# Patient Record
Sex: MALE | Race: WHITE | NOT HISPANIC OR LATINO | Employment: OTHER | URBAN - METROPOLITAN AREA
[De-identification: names, ages, dates, MRNs, and addresses within clinical notes are randomized per-mention and may not be internally consistent; named-entity substitution may affect disease eponyms.]

---

## 2021-01-14 ENCOUNTER — OFFICE VISIT (OUTPATIENT)
Dept: FAMILY MEDICINE | Facility: CLINIC | Age: 61
End: 2021-01-14
Payer: COMMERCIAL

## 2021-01-14 VITALS
OXYGEN SATURATION: 97 % | HEART RATE: 67 BPM | WEIGHT: 258 LBS | TEMPERATURE: 98.5 F | HEIGHT: 70 IN | DIASTOLIC BLOOD PRESSURE: 82 MMHG | RESPIRATION RATE: 18 BRPM | BODY MASS INDEX: 36.94 KG/M2 | SYSTOLIC BLOOD PRESSURE: 140 MMHG

## 2021-01-14 DIAGNOSIS — R10.31 RIGHT GROIN PAIN: Primary | ICD-10-CM

## 2021-01-14 DIAGNOSIS — M53.3 SACRAL PAIN: ICD-10-CM

## 2021-01-14 PROCEDURE — 99204 OFFICE O/P NEW MOD 45 MIN: CPT | Performed by: NURSE PRACTITIONER

## 2021-01-14 RX ORDER — CHLORAL HYDRATE 500 MG
2 CAPSULE ORAL DAILY
COMMUNITY
End: 2021-04-26

## 2021-01-14 ASSESSMENT — MIFFLIN-ST. JEOR: SCORE: 1976.53

## 2021-01-14 NOTE — PROGRESS NOTES
"  Assessment & Plan     Right groin pain  No definite hernia on exam today, but history is suspicious for this. Will obtain ultrasound.  - US Extremity Non Vascular Right; Future    Sacral pain  Ongoing for over a year. Xray negative. Will obtain MRI.  - MR Sacrum and Coccyx w/o Contrast; Future                       BMI:   Estimated body mass index is 37.02 kg/m  as calculated from the following:    Height as of this encounter: 1.778 m (5' 10\").    Weight as of this encounter: 117 kg (258 lb).         Patient Instructions   Schedule ultrasound - [phone]  I will let you know what it shows.      Return in about 1 week (around 1/21/2021) for worsening or continued symptoms.    TOREY Stewart CNP  [facility] is a 60 year old who presents to clinic today for the following health issues     HPI       Musculoskeletal problem/pain  Onset/Duration: x 4 months  Description  Location: leg - right - also tailbone pain from fall over a year ago  Joint Swelling: no  Redness: no  Pain: YES - more pain when getting up after sitting for awhile  Warmth: no  Intensity:  moderate, severe  Progression of Symptoms:  worsening and constant  Accompanying signs and symptoms:   Fevers: no  Numbness/tingling/weakness: no  History  Trauma to the area: no  Recent illness:  no  Previous similar problem: YES  Previous evaluation:  no  Precipitating or alleviating factors:  Aggravating factors include: sitting, climbing stairs and going down stairs  Therapies tried and outcome: nothing    Above HPI reviewed. Additionally,  had an xray at [facility] in August and was initially told he had a fracture of his coccyx but then later called and told the radiology read was negative. Reports the initial injury was about a year ago, he fell off a ladder into a seated position. He has had pain to the tail bone since the injury that has not improved.    He also reports right groin pain over the " "past 2 years that has worsened over the past few months. He notes he has an occasional ache to his right testicle as well. No constipation, melena or hematochezia. No increase in urinary frequency, dysuria, hematuria. Denies ever noticing a bulge to his groin. Pain is worst when up and walking, relieves with lying.        Review of Systems   Constitutional, HEENT, cardiovascular, pulmonary, gi and gu systems are negative, except as otherwise noted.      Objective    BP (!) 140/82 (BP Location: Right arm, Patient Position: Sitting, Cuff Size: Adult Large)   Pulse 67   Temp 98.5  F (36.9  C) (Tympanic)   Resp 18   Ht 1.778 m (5' 10\")   Wt 117 kg (258 lb)   SpO2 97%   BMI 37.02 kg/m    Body mass index is 37.02 kg/m .  Physical Exam  Vitals signs and nursing note reviewed.   Constitutional:       Appearance: Normal appearance.   HENT:      Head: Normocephalic and atraumatic.      Mouth/Throat:      Mouth: Mucous membranes are moist.   Neck:      Musculoskeletal: Neck supple.   Cardiovascular:      Rate and Rhythm: Normal rate.   Pulmonary:      Effort: Pulmonary effort is normal.   Abdominal:      General: Abdomen is protuberant.      Palpations: Abdomen is soft.      Tenderness: There is no abdominal tenderness.   Genitourinary:     Testes:         Right: Mass, tenderness or swelling not present.         Left: Mass, tenderness or swelling not present.      Epididymis:      Right: Normal.      Left: Normal.      Comments: Tender over right inguinal canal, however I do not appreciate a definite hernia.  Musculoskeletal:      Comments: TTP generalized over sacrum   Skin:     General: Skin is warm and dry.   Neurological:      General: No focal deficit present.      Mental Status: He is alert.   Psychiatric:         Mood and Affect: Mood normal.         Behavior: Behavior normal.                          "

## 2021-02-04 ENCOUNTER — HOSPITAL ENCOUNTER (OUTPATIENT)
Dept: MRI IMAGING | Facility: CLINIC | Age: 61
End: 2021-02-04
Attending: NURSE PRACTITIONER
Payer: COMMERCIAL

## 2021-02-04 ENCOUNTER — HOSPITAL ENCOUNTER (OUTPATIENT)
Dept: ULTRASOUND IMAGING | Facility: CLINIC | Age: 61
End: 2021-02-04
Attending: NURSE PRACTITIONER
Payer: COMMERCIAL

## 2021-02-04 DIAGNOSIS — R10.31 RIGHT GROIN PAIN: ICD-10-CM

## 2021-02-04 DIAGNOSIS — M53.3 SACRAL PAIN: ICD-10-CM

## 2021-02-04 PROCEDURE — 72195 MRI PELVIS W/O DYE: CPT | Mod: 26 | Performed by: RADIOLOGY

## 2021-02-04 PROCEDURE — 76705 ECHO EXAM OF ABDOMEN: CPT

## 2021-02-04 PROCEDURE — 72195 MRI PELVIS W/O DYE: CPT

## 2021-02-08 ENCOUNTER — TELEPHONE (OUTPATIENT)
Dept: FAMILY MEDICINE | Facility: CLINIC | Age: 61
End: 2021-02-08

## 2021-02-08 NOTE — TELEPHONE ENCOUNTER
Reason for call:  Results    Symptom or request: Patient would like Iliana Reid to address his hip and groin area that showed on MRI and US today.    Duration (how long have symptoms been present): Ongoing    Have you been treated for this before? Yes    Additional comments: Patient not happy only his tail bone was addressed, he has pain in groin and hip.Patient wants to know findings other than his tail bone.    Phone Number patient can be reached at:  Cell number on file:    Telephone Information:   Mobile [phone]       Best Time:  Any    Can we leave a detailed message on this number:  YES    Call taken on 2/8/2021 at 5:36 PM by Xiao Stanley

## 2021-02-09 NOTE — TELEPHONE ENCOUNTER
"Pt called back; wants to \"get to the bottom of this\" and wants an appt. Transferred to scheduling to arrange an appt; recommend he schedule a long visit as he wants a complete Physical.     Marlee HERNANDEZ RN, BSN      "

## 2021-02-09 NOTE — TELEPHONE ENCOUNTER
When I saw him in the office, we discussed pain to his groin which we evaluated with ultrasound to rule out hernia. This ultrasound was negative with the exception of a benign appearing lymph node. If he has ongoing groin pain, occasionally a hernia can be missed on ultrasound. We could obtain a CT of the pelvis if that pain has not improved since I saw him on January 14. At the end of the visit, he added that he had pain to the tail bone ongoing for over a year which is why we added the sacral MRI, I did make recommendations regarding this. We did not discuss hip pain which is why it was not addressed. If he has concerns about his hip, I need to reexamine him as I did not examine his hip and would recommend and office visit to do so.

## 2021-02-11 ENCOUNTER — OFFICE VISIT (OUTPATIENT)
Dept: FAMILY MEDICINE | Facility: CLINIC | Age: 61
End: 2021-02-11
Payer: COMMERCIAL

## 2021-02-11 VITALS
TEMPERATURE: 98.3 F | BODY MASS INDEX: 36.26 KG/M2 | HEART RATE: 76 BPM | SYSTOLIC BLOOD PRESSURE: 144 MMHG | WEIGHT: 259 LBS | HEIGHT: 71 IN | RESPIRATION RATE: 16 BRPM | OXYGEN SATURATION: 99 % | DIASTOLIC BLOOD PRESSURE: 92 MMHG

## 2021-02-11 DIAGNOSIS — R07.89 CHEST DISCOMFORT: ICD-10-CM

## 2021-02-11 DIAGNOSIS — R03.0 ELEVATED BLOOD PRESSURE READING WITHOUT DIAGNOSIS OF HYPERTENSION: ICD-10-CM

## 2021-02-11 DIAGNOSIS — R01.1 SYSTOLIC MURMUR: ICD-10-CM

## 2021-02-11 DIAGNOSIS — Z00.00 GENERAL MEDICAL EXAM: Primary | ICD-10-CM

## 2021-02-11 DIAGNOSIS — R94.31 ABNORMAL ELECTROCARDIOGRAM: ICD-10-CM

## 2021-02-11 DIAGNOSIS — M54.16 LUMBAR RADICULOPATHY: ICD-10-CM

## 2021-02-11 DIAGNOSIS — Z00.00 ENCOUNTER FOR PREVENTIVE HEALTH EXAMINATION: Primary | ICD-10-CM

## 2021-02-11 PROCEDURE — 99396 PREV VISIT EST AGE 40-64: CPT | Performed by: NURSE PRACTITIONER

## 2021-02-11 PROCEDURE — 99214 OFFICE O/P EST MOD 30 MIN: CPT | Mod: 25 | Performed by: NURSE PRACTITIONER

## 2021-02-11 PROCEDURE — 93000 ELECTROCARDIOGRAM COMPLETE: CPT | Performed by: NURSE PRACTITIONER

## 2021-02-11 RX ORDER — TAMSULOSIN HYDROCHLORIDE 0.4 MG/1
0.4 CAPSULE ORAL
COMMUNITY
Start: 2020-12-08 | End: 2022-07-22

## 2021-02-11 RX ORDER — CYCLOBENZAPRINE HCL 10 MG
10 TABLET ORAL 3 TIMES DAILY PRN
Qty: 60 TABLET | Refills: 1 | Status: ON HOLD | OUTPATIENT
Start: 2021-02-11 | End: 2021-05-28

## 2021-02-11 ASSESSMENT — MIFFLIN-ST. JEOR: SCORE: 1996.95

## 2021-02-11 NOTE — PATIENT INSTRUCTIONS
Lifestyle changes that can lower blood pressure include:  Limiting sodium in the diet.  Goal of <2.3 grams (2300 mg) daily.  Avoiding salty and prepared foods and not adding salt at the table can help.   Regular moderate exercise at least 150 minutes per week (30 minutes per day 5 days per week) plus muscle strengthening exercise at least 2 days per week.   Weight loss can help even if not dramatic or down to normal BMI range.  DASH type diet can actually lower blood pressure. You can find multiple resources for this on the internet.  Cutting back on alcohol can help for women drinking more than a drink per day and men more than 2 drinks per day on average.   Quitting smoking can help reduce your risk of heart attack or stroke.    A good resource for lifestyle changes is https://www.cardiosmart.org/topics/healthy-living    Schedule MRI.  Please contact the cardiac testing department at [phone] to schedule.          Preventive Health Recommendations  Male Ages 50 - 64    Yearly exam:             See your health care provider every year in order to  o   Review health changes.   o   Discuss preventive care.    o   Review your medicines if your doctor has prescribed any.     Have a cholesterol test every 5 years, or more frequently if you are at risk for high cholesterol/heart disease.     Have a diabetes test (fasting glucose) every three years. If you are at risk for diabetes, you should have this test more often.     Have a colonoscopy at age 50, or have a yearly FIT test (stool test). These exams will check for colon cancer.      Talk with your health care provider about whether or not a prostate cancer screening test (PSA) is right for you.    You should be tested each year for STDs (sexually transmitted diseases), if you re at risk.     Shots: Get a flu shot each year. Get a tetanus shot every 10 years.     Nutrition:    Eat at least 5 servings of fruits and vegetables daily.     Eat whole-grain bread,  whole-wheat pasta and brown rice instead of white grains and rice.     Get adequate Calcium and Vitamin D.     Lifestyle    Exercise for at least 150 minutes a week (30 minutes a day, 5 days a week). This will help you control your weight and prevent disease.     Limit alcohol to one drink per day.     No smoking.     Wear sunscreen to prevent skin cancer.     See your dentist every six months for an exam and cleaning.     See your eye doctor every 1 to 2 years.

## 2021-02-11 NOTE — PROGRESS NOTES
"  3  SUBJECTIVE:   CC: Michael Jimenez is an 60 year old male who presents for preventive health visit.       Patient has been advised of split billing requirements and indicates understanding: Yes  Healthy Habits:    Do you get at least three servings of calcium containing foods daily (dairy, green leafy vegetables, etc.)? yes    Amount of exercise or daily activities, outside of work: 0 day(s) per week    Problems taking medications regularly No    Medication side effects: No    Have you had an eye exam in the past two years? yes    Do you see a dentist twice per year? yes    Do you have sleep apnea, excessive snoring or daytime drowsiness?no      Joint Pain    Onset: 2 year groin, 1 year hip    Description:   Location: right hip  Character: Sharp and Dull ache    Intensity: moderate, severe    Progression of Symptoms: worse    Accompanying Signs & Symptoms:  Other symptoms: radiation of pain to groin, buttocks, and tailbone    History:   Previous similar pain: no       Precipitating factors:   Trauma or overuse: YES- fell on tailbone 1 year ago    Alleviating factors:  Improved by: nothing    Therapies Tried and outcome:       Above HPI reviewed. Additionally, seen by me for right groin/testicular pain about a month ago as well as pain to the tail bone ongoing for a year. Ultrasound of the groin did not reveal hernia, one benign appearing enlarged lymph node. 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  Presents today because he did not mention at his last visit that he has had ongoing pain to his right lower back that radiates to the right buttock, occasionally the right hip and the right lateral thigh. Occasionally, he right leg feels like it could \"give out\". No paresthesias, saddle anesthesia, loss of bowel or bladder control, fevers. No known injury.    Has also recently had some left sided chest discomfort. He notes it is not pain, it just doesn't feel right. Does not radiate to " neck, arm or jaw. Not associated with shortness of breath, dyspnea on exertion, palpitations, nausea, diaphoresis. Happens occasionally, last was a few weeks ago.    Today's PHQ-2 Score:   PHQ-2 ( 1999 [employer]) 2/11/2021   Q1: Little interest or pleasure in doing things 0   Q2: Feeling down, depressed or hopeless 0   PHQ-2 Score 0       Abuse: Current or Past(Physical, Sexual or Emotional)- No  Do you feel safe in your environment? Yes    Have you ever done Advance Care Planning? (For example, a Health Directive, POLST, or a discussion with a medical provider or your loved ones about your wishes): Yes, patient states has an Advance Care Planning document and will bring a copy to the clinic.    Social History     Tobacco Use     Smoking status: Never Smoker     Smokeless tobacco: Never Used   Substance Use Topics     Alcohol use: No     If you drink alcohol do you typically have >3 drinks per day or >7 drinks per week? No                      Last PSA: No results found for: PSA    Reviewed orders with patient. Reviewed health maintenance and updated orders accordingly - Yes  Lab work is in process  BP Readings from Last 3 Encounters:   02/11/21 (!) 144/92   01/14/21 (!) 140/82   07/16/09 114/60    Wt Readings from Last 3 Encounters:   02/11/21 117.5 kg (259 lb)   01/14/21 117 kg (258 lb)   07/09/09 103.8 kg (228 lb 12.8 oz)                  Patient Active Problem List   Diagnosis     CARDIOVASCULAR SCREENING; LDL GOAL LESS THAN 160     History reviewed. No pertinent surgical history.    Social History     Tobacco Use     Smoking status: Never Smoker     Smokeless tobacco: Never Used   Substance Use Topics     Alcohol use: No     Family History   Problem Relation Age of Onset     Heart Disease Father         emphasemia         Current Outpatient Medications   Medication Sig Dispense Refill     cyclobenzaprine (FLEXERIL) 10 MG tablet Take 1 tablet (10 mg) by mouth 3 times daily as needed for muscle spasms 60 tablet 1  "    FINASTERIDE OR 1 TABLET BY MOUTH DAILY       fish oil-omega-3 fatty acids 1000 MG capsule Take 2 g by mouth daily       Glucosamine Sulfate 1000 MG TABS        tamsulosin (FLOMAX) 0.4 MG capsule TAKE 1 CAPSULE BY MOUTH ONCE DAILY AFTER A MEAL         Reviewed and updated as needed this visit by clinical staff  Tobacco  Allergies  Meds  Problems  Med Hx  Surg Hx  Fam Hx  Soc Hx          Reviewed and updated as needed this visit by Provider  Tobacco  Allergies  Meds  Problems  Med Hx  Surg Hx  Fam Hx             ROS:  CONSTITUTIONAL: NEGATIVE for fever, chills, change in weight  INTEGUMENTARY/SKIN: NEGATIVE for worrisome rashes, moles or lesions  EYES: NEGATIVE for vision changes or irritation  ENT: NEGATIVE for ear, mouth and throat problems  RESP: NEGATIVE for significant cough or SOB  CV: NEGATIVE for chest pain, palpitations or peripheral edema  GI: NEGATIVE for nausea, abdominal pain, heartburn, or change in bowel habits   male: negative for dysuria, hematuria, decreased urinary stream, erectile dysfunction, urethral discharge  MUSCULOSKELETAL: NEGATIVE for significant arthralgias or myalgia  NEURO: NEGATIVE for weakness, dizziness or paresthesias  PSYCHIATRIC: NEGATIVE for changes in mood or affect    OBJECTIVE:   BP (!) 144/92   Pulse 76   Temp 98.3  F (36.8  C) (Tympanic)   Resp 16   Ht 1.803 m (5' 11\")   Wt 117.5 kg (259 lb)   SpO2 99%   BMI 36.12 kg/m    EXAM:  GENERAL: healthy, alert and no distress  EYES: Eyes grossly normal to inspection, PERRL and conjunctivae and sclerae normal  HENT: ear canals and TM's normal, nose and mouth without ulcers or lesions  NECK: no adenopathy, no asymmetry, masses, or scars and thyroid normal to palpation  RESP: lungs clear to auscultation - no rales, rhonchi or wheezes  CV: regular rates and rhythm, normal S1 S2, no S3 or S4, grade 2/6 systolic murmur heard best over the RUSB, peripheral pulses strong and no peripheral edema  ABDOMEN: soft, " "nontender, no hepatosplenomegaly, no masses and bowel sounds normal  MS: No TTP of lumbar spine. Normal heel and toe walk. Positive right SLR. No TTP over trochanteric bursa. Normal ROM of right hip, however increased pain with lateral extension.   SKIN: no suspicious lesions or rashes  NEURO: Normal strength and tone, mentation intact and speech normal  PSYCH: mentation appears normal, affect normal/bright    Diagnostic Test Results:  Labs reviewed in Epic  EKG with LBBB, no previous for comparison    ASSESSMENT/PLAN:   1. Encounter for preventive health examination    - Lipid panel reflex to direct LDL Fasting; Future  - **Basic metabolic panel FUTURE anytime; Future  - PSA, screen; Future  - **Hepatitis C Screen Reflex to RNA FUTURE anytime; Future  - HIV Antigen Antibody Combo    2. Lumbar radiculopathy  Will obtain MRI given pattern of pain, was offered PT however he does not want to pursue PT until he \"knows what's wrong\". Discussed can try flexeril.  - cyclobenzaprine (FLEXERIL) 10 MG tablet; Take 1 tablet (10 mg) by mouth 3 times daily as needed for muscle spasms  Dispense: 60 tablet; Refill: 1  - MR Lumbar Spine w/o Contrast; Future    3. Chest discomfort  Given recent discomfort, EKG was obtained and does show LBBB with no previous for comparison. 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 Will obtain stress echo.  - EKG 12-lead complete w/read - Clinics  - Echocardiogram Exercise Stress; Future    4. Abnormal electrocardiogram  See above  - Echocardiogram Exercise Stress; Future    5. Systolic murmur  See above    6. Elevated blood pressure reading without diagnosis of hypertension  Discussed lifestyle interventions, he is not currently interested in starting medication.      Patient has been advised of split billing requirements and indicates understanding: " "Yes  COUNSELING:  Reviewed preventive health counseling, as reflected in patient instructions       Regular exercise       Healthy diet/nutrition       Consider Hep C screening for all patients one time for ages 18-79 years       HIV screeninx in teen years, 1x in adult years, and at intervals if high risk    Estimated body mass index is 36.12 kg/m  as calculated from the following:    Height as of this encounter: 1.803 m (5' 11\").    Weight as of this encounter: 117.5 kg (259 lb).    Weight management plan: Discussed healthy diet and exercise guidelines    He reports that he has never smoked. He has never used smokeless tobacco.      Counseling Resources:  ATP IV Guidelines  Pooled Cohorts Equation Calculator  FRAX Risk Assessment  ICSI Preventive Guidelines  Dietary Guidelines for Americans, 2010  USDA's MyPlate  ASA Prophylaxis  Lung CA Screening    TOREY Stewart [facility]  "

## 2021-02-12 ENCOUNTER — HOSPITAL ENCOUNTER (OUTPATIENT)
Dept: MRI IMAGING | Facility: CLINIC | Age: 61
Discharge: HOME | End: 2021-02-12
Attending: NURSE PRACTITIONER | Admitting: NURSE PRACTITIONER
Payer: COMMERCIAL

## 2021-02-12 DIAGNOSIS — M54.16 LUMBAR RADICULOPATHY: ICD-10-CM

## 2021-02-12 PROCEDURE — 72148 MRI LUMBAR SPINE W/O DYE: CPT

## 2021-02-15 DIAGNOSIS — M54.16 LUMBAR RADICULOPATHY: Primary | ICD-10-CM

## 2021-02-18 DIAGNOSIS — Z00.00 ENCOUNTER FOR PREVENTIVE HEALTH EXAMINATION: ICD-10-CM

## 2021-02-18 DIAGNOSIS — Z00.00 GENERAL MEDICAL EXAM: ICD-10-CM

## 2021-02-18 LAB
ANION GAP SERPL CALCULATED.3IONS-SCNC: 3 MMOL/L (ref 3–14)
BUN SERPL-MCNC: 26 MG/DL (ref 7–30)
CALCIUM SERPL-MCNC: 9 MG/DL (ref 8.5–10.1)
CHLORIDE SERPL-SCNC: 105 MMOL/L (ref 94–109)
CHOLEST SERPL-MCNC: 259 MG/DL
CO2 SERPL-SCNC: 33 MMOL/L (ref 20–32)
CREAT SERPL-MCNC: 1.38 MG/DL (ref 0.66–1.25)
GFR SERPL CREATININE-BSD FRML MDRD: 54 ML/MIN/{1.73_M2}
GLUCOSE SERPL-MCNC: 80 MG/DL (ref 70–99)
HDLC SERPL-MCNC: 52 MG/DL
LDLC SERPL CALC-MCNC: 171 MG/DL
NONHDLC SERPL-MCNC: 207 MG/DL
POTASSIUM SERPL-SCNC: 4.2 MMOL/L (ref 3.4–5.3)
PSA SERPL-ACNC: 0.25 UG/L (ref 0–4)
SODIUM SERPL-SCNC: 141 MMOL/L (ref 133–144)
TRIGL SERPL-MCNC: 181 MG/DL

## 2021-02-18 PROCEDURE — 80048 BASIC METABOLIC PNL TOTAL CA: CPT | Performed by: NURSE PRACTITIONER

## 2021-02-18 PROCEDURE — 86803 HEPATITIS C AB TEST: CPT | Performed by: NURSE PRACTITIONER

## 2021-02-18 PROCEDURE — 36415 COLL VENOUS BLD VENIPUNCTURE: CPT | Performed by: NURSE PRACTITIONER

## 2021-02-18 PROCEDURE — G0103 PSA SCREENING: HCPCS | Performed by: NURSE PRACTITIONER

## 2021-02-18 PROCEDURE — 87389 HIV-1 AG W/HIV-1&-2 AB AG IA: CPT | Performed by: NURSE PRACTITIONER

## 2021-02-18 PROCEDURE — 80061 LIPID PANEL: CPT | Performed by: NURSE PRACTITIONER

## 2021-02-19 LAB
HCV AB SERPL QL IA: NONREACTIVE
HIV 1+2 AB+HIV1 P24 AG SERPL QL IA: NONREACTIVE

## 2021-02-23 ENCOUNTER — OFFICE VISIT (OUTPATIENT)
Dept: NEUROSURGERY | Facility: CLINIC | Age: 61
End: 2021-02-23
Attending: NURSE PRACTITIONER
Payer: COMMERCIAL

## 2021-02-23 VITALS
SYSTOLIC BLOOD PRESSURE: 143 MMHG | DIASTOLIC BLOOD PRESSURE: 98 MMHG | OXYGEN SATURATION: 95 % | HEIGHT: 71 IN | WEIGHT: 261.4 LBS | HEART RATE: 97 BPM | BODY MASS INDEX: 36.6 KG/M2

## 2021-02-23 DIAGNOSIS — M54.16 LUMBAR RADICULOPATHY: ICD-10-CM

## 2021-02-23 PROCEDURE — 99203 OFFICE O/P NEW LOW 30 MIN: CPT | Performed by: NURSE PRACTITIONER

## 2021-02-23 ASSESSMENT — MIFFLIN-ST. JEOR: SCORE: 2007.83

## 2021-02-23 ASSESSMENT — PAIN SCALES - GENERAL: PAINLEVEL: EXTREME PAIN (8)

## 2021-02-23 NOTE — PATIENT INSTRUCTIONS
-Lumbar injection ordered. They will contact you to schedule.  -Physical therapy ordered. They will contact you to schedule.  -Please contact our clinic with questions or concerns at [phone].

## 2021-02-23 NOTE — LETTER
2/23/2021         RE: Michael Jimenez  [address] [place] [id number]        Dear Colleague,    Thank you for referring your patient, Michael Jimenez, to the [facility]. Please see a copy of my visit note below.    [facility] Neurosurgery  Neurosurgery Clinic Visit      CC: low back and leg pain    Primary care Provider: Clinic, [facility]    Reason For Visit:   I was asked by Annabel Reid CNP to consult on the patient for lumbar radiculopathy.      HPI: Michael Jimenez is a 60 year old male with a history of chronic low back pain presents for worsening symptoms. He reports right-sided low back pain that radiates to the right hip and right lateral thigh to the knee. He denies paresthesias and weakness. He does note decreased flexibility. He denies bowel/bladder complaints and foot drop. He has undergone chiropractic therapy with benefit up until recently. He has had an MRI, but has not had any PT or injections. He is interested in conservative management at this time.      No past medical history on file.    Past Medical History reviewed with patient during visit.    No past surgical history on file.  Past Surgical History reviewed with patient during visit.    Current Outpatient Medications   Medication     cyclobenzaprine (FLEXERIL) 10 MG tablet     FINASTERIDE OR     fish oil-omega-3 fatty acids 1000 MG capsule     Glucosamine Sulfate 1000 MG TABS     tamsulosin (FLOMAX) 0.4 MG capsule     No current facility-administered medications for this visit.        No Known Allergies    Social History     Socioeconomic History     Marital status:      Spouse name: Not on file     Number of children: Not on file     Years of education: Not on file     Highest education level: Not on file   Occupational History     Not on file   Social Needs     Financial resource strain: Not on file     Food insecurity     Worry: Not on file     Inability: Not on file      "Transportation needs     Medical: Not on file     Non-medical: Not on file   Tobacco Use     Smoking status: Never Smoker     Smokeless tobacco: Never Used   Substance and Sexual Activity     Alcohol use: No     Drug use: No     Sexual activity: Never   Lifestyle     Physical activity     Days per week: Not on file     Minutes per session: Not on file     Stress: Not on file   Relationships     Social connections     Talks on phone: Not on file     Gets together: Not on file     Attends [employer] service: Not on file     Active member of club or organization: Not on file     Attends meetings of clubs or organizations: Not on file     Relationship status: Not on file     Intimate partner violence     Fear of current or ex partner: Not on file     Emotionally abused: Not on file     Physically abused: Not on file     Forced sexual activity: Not on file   Other Topics Concern     Parent/sibling w/ CABG, MI or angioplasty before 65F 55M? Yes     Comment: father   Social History Narrative     Not on file       Family History   Problem Relation Age of Onset     Heart Disease Father         emphasemia         ROS: 10 point ROS neg other than the symptoms noted above in the HPI.    Vital Signs:   BP (!) 143/98   Pulse 97   Ht 5' 11\" (1.803 m)   Wt 261 lb 6.4 oz (118.6 kg)   SpO2 95%   BMI 36.46 kg/m        Examination:  Constitutional:  Alert, well nourished, NAD.  Memory: recent and remote memory   HEENT: Normocephalic, atraumatic.   Pulm:  Without shortness of breath   CV:  No pitting edema of BLE.      Neurological:  Awake  Alert  Oriented x 3  Speech clear  Tongue midline    Motor exam:   Hip Flexor:                Right: 5/5  Left:  5/5  Hip Adductor:             Right:  5/5  Left:  5/5  Hip Abductor:             Right:  5/5  Left:  5/5  Gastroc Soleus:        Right:  5/5  Left:  5/5  Tib/Ant:                      Right:  5/5  Left:  5/5  EHL:                          Right:  5/5  Left:  5/5     Sensation normal " to bilateral upper and lower extremities  Muscle tone to bilateral upper and lower extremities   Gait: Able to stand from a seated position. Normal non-antalgic, non-myelopathic gait.  Able to heel/toe walk without loss of balance    Lumbar examination reveals no tenderness of the spine or paraspinous muscles.  Hip height is symmetrical. Negative SI joint, sciatic notch or greater trochanteric tenderness to palpation bilaterally.  Straight leg raise is negative bilaterally.      Imaging:   Lumbar MRI 2/12/2021  IMPRESSION:  1. Multilevel degenerative disc disease and facet arthropathy of the lumbar spine, as described.  2. Mild edema-like marrow signal centered about the right L4-L5 facet joint in the setting of severe right facet arthropathy, likely due to inflammatory phase of degenerative facet arthropathy.  3. Mild spinal canal stenosis at L3-L4. No high-grade spinal canal or lateral recess stenosis.  4. Moderate to severe left neural foraminal stenosis at L4-L5. There are lesser degrees of multilevel neural foraminal narrowing elsewhere, as detailed.  5. Probable mild chronic compression deformities of T12 and L1. No definite acute fracture seen.    Assessment/Plan:   Lumbar radiculopathy. Recommend LESI and PT at this time. He verbalized understanding and agreement.    Patient Instructions   -Lumbar injection ordered. They will contact you to schedule.  -Physical therapy ordered. They will contact you to schedule.  -Please contact our clinic with questions or concerns at [phone].      Magali Howell CNP  [facility]surgery  [address] [place] [id number]  Tel [phone]  Fax [phone]        Again, thank you for allowing me to participate in the care of your patient.        Sincerely,        Magali Howell, JADA

## 2021-02-23 NOTE — PROGRESS NOTES
[facility] Neurosurgery  Neurosurgery Clinic Visit      CC: low back and leg pain    Primary care Provider: Clinic, [facility]    Reason For Visit:   I was asked by Annabel Reid CNP to consult on the patient for lumbar radiculopathy.      HPI: Michael Jimenez is a 60 year old male with a history of chronic low back pain presents for worsening symptoms. He reports right-sided low back pain that radiates to the right hip and right lateral thigh to the knee. He denies paresthesias and weakness. He does note decreased flexibility. He denies bowel/bladder complaints and foot drop. He has undergone chiropractic therapy with benefit up until recently. He has had an MRI, but has not had any PT or injections. He is interested in conservative management at this time.      No past medical history on file.    Past Medical History reviewed with patient during visit.    No past surgical history on file.  Past Surgical History reviewed with patient during visit.    Current Outpatient Medications   Medication     cyclobenzaprine (FLEXERIL) 10 MG tablet     FINASTERIDE OR     fish oil-omega-3 fatty acids 1000 MG capsule     Glucosamine Sulfate 1000 MG TABS     tamsulosin (FLOMAX) 0.4 MG capsule     No current facility-administered medications for this visit.        No Known Allergies    Social History     Socioeconomic History     Marital status:      Spouse name: Not on file     Number of children: Not on file     Years of education: Not on file     Highest education level: Not on file   Occupational History     Not on file   Social Needs     Financial resource strain: Not on file     Food insecurity     Worry: Not on file     Inability: Not on file     Transportation needs     Medical: Not on file     Non-medical: Not on file   Tobacco Use     Smoking status: Never Smoker     Smokeless tobacco: Never Used   Substance and Sexual Activity     Alcohol use: No     Drug use: No     Sexual activity: Never  "  Lifestyle     Physical activity     Days per week: Not on file     Minutes per session: Not on file     Stress: Not on file   Relationships     Social connections     Talks on phone: Not on file     Gets together: Not on file     Attends Religion service: Not on file     Active member of club or organization: Not on file     Attends meetings of clubs or organizations: Not on file     Relationship status: Not on file     Intimate partner violence     Fear of current or ex partner: Not on file     Emotionally abused: Not on file     Physically abused: Not on file     Forced sexual activity: Not on file   Other Topics Concern     Parent/sibling w/ CABG, MI or angioplasty before 65F 55M? Yes     Comment: father   Social History Narrative     Not on file       Family History   Problem Relation Age of Onset     Heart Disease Father         emphasemia         ROS: 10 point ROS neg other than the symptoms noted above in the HPI.    Vital Signs:   BP (!) 143/98   Pulse 97   Ht 5' 11\" (1.803 m)   Wt 261 lb 6.4 oz (118.6 kg)   SpO2 95%   BMI 36.46 kg/m        Examination:  Constitutional:  Alert, well nourished, NAD.  Memory: recent and remote memory   HEENT: Normocephalic, atraumatic.   Pulm:  Without shortness of breath   CV:  No pitting edema of BLE.      Neurological:  Awake  Alert  Oriented x 3  Speech clear  Tongue midline    Motor exam:   Hip Flexor:                Right: 5/5  Left:  5/5  Hip Adductor:             Right:  5/5  Left:  5/5  Hip Abductor:             Right:  5/5  Left:  5/5  Gastroc Soleus:        Right:  5/5  Left:  5/5  Tib/Ant:                      Right:  5/5  Left:  5/5  EHL:                          Right:  5/5  Left:  5/5     Sensation normal to bilateral upper and lower extremities  Muscle tone to bilateral upper and lower extremities   Gait: Able to stand from a seated position. Normal non-antalgic, non-myelopathic gait.  Able to heel/toe walk without loss of balance    Lumbar examination " reveals no tenderness of the spine or paraspinous muscles.  Hip height is symmetrical. Negative SI joint, sciatic notch or greater trochanteric tenderness to palpation bilaterally.  Straight leg raise is negative bilaterally.      Imaging:   Lumbar MRI 2/12/2021  IMPRESSION:  1. Multilevel degenerative disc disease and facet arthropathy of the lumbar spine, as described.  2. Mild edema-like marrow signal centered about the right L4-L5 facet joint in the setting of severe right facet arthropathy, likely due to inflammatory phase of degenerative facet arthropathy.  3. Mild spinal canal stenosis at L3-L4. No high-grade spinal canal or lateral recess stenosis.  4. Moderate to severe left neural foraminal stenosis at L4-L5. There are lesser degrees of multilevel neural foraminal narrowing elsewhere, as detailed.  5. Probable mild chronic compression deformities of T12 and L1. No definite acute fracture seen.    Assessment/Plan:   Lumbar radiculopathy. Recommend LESI and PT at this time. He verbalized understanding and agreement.    Patient Instructions   -Lumbar injection ordered. They will contact you to schedule.  -Physical therapy ordered. They will contact you to schedule.  -Please contact our clinic with questions or concerns at [phone].      Magali Howell, [facility]  [address] [place] [id number]  Tel [phone]  Fax [phone]

## 2021-02-23 NOTE — NURSING NOTE
"Michael Jimenez is a 60 year old male who presents for:  Chief Complaint   Patient presents with     Neurologic Problem     LBP. Referred by Annabel Reid. MRI 2/12/21. Onset: on and off his whole life but worse in the last 2-3 months. He has had a few injuries - fell off a ladder about a year ago. He is having right sided low back and into the buttocks and will radiate into the anterior leg down to the knee. He is walking with a limp now. When he lays his pain increases. No tx.         Vitals:    Vitals:    02/23/21 1357   BP: (!) 143/98   Pulse: 97   SpO2: 95%   Weight: 261 lb 6.4 oz (118.6 kg)   Height: 5' 11\" (1.803 m)       BMI:  Estimated body mass index is 36.46 kg/m  as calculated from the following:    Height as of this encounter: 5' 11\" (1.803 m).    Weight as of this encounter: 261 lb 6.4 oz (118.6 kg).    Pain Score:  Extreme Pain (8)        Ange Cloud CMA      "

## 2021-02-24 ENCOUNTER — TELEPHONE (OUTPATIENT)
Dept: PALLIATIVE MEDICINE | Facility: CLINIC | Age: 61
End: 2021-02-24

## 2021-02-24 DIAGNOSIS — R07.9 CHEST PAIN, UNSPECIFIED TYPE: Primary | ICD-10-CM

## 2021-02-24 NOTE — TELEPHONE ENCOUNTER
Screening Questions for Radiology Injections:    Injection to be done at which interventional clinic site? [facility]    If [facility] location, tell patient that this procedure requires a COVID-19 lab test be done within 4 days of the procedure. Would you still like to move forward with scheduling the procedure?  YES:    If YES, let patient know that someone will call them to schedule the COVID-19 test and that they will only receive a call back if the result is positive. Route to nursing to enter order.     Instruct patient to arrive as directed prior to the scheduled appointment time:    [place] minutes before      [place]: 30 minutes before; if IV needed 1 hour before     Procedure ordered by Lynda    Procedure ordered? LESI      Transforaminal Cervical ED - no pain provider currently performing    As a reminder, receiving steroids can decrease your body's ability to fight infection.   Would you still like to move forward with scheduling the injection?  Yes    What insurance would patient like us to bill for this procedure? [employer]      Worker's comp or MVA (motor vehicle accident) -Any injection DO NOT SCHEDULE and route to Luna Anderson.      [employer] insurance - For SI joint injections, DO NOT SCHEDULE and route Luna Anderson.       ALL BCBS, [employer] and [employer]-Route to Luna for review DO NOT SCHEDULE      IF SCHEDULING IN [place] AND NEEDS A PA, IT IS OKAY TO SCHEDULE. [place] HANDLES THEIR OWN PA'S AFTER THE PATIENT IS SCHEDULED. PLEASE SCHEDULE AT LEAST 1 WEEK OUT SO A PA CAN BE OBTAINED.    Any chance of pregnancy? Not Applicable   If YES, do NOT schedule and route to RN [place]    Is an  needed? No     Patient has a drive home? (mandatory) YES: INFORMED    Is patient taking any blood thinners (i.e. plavix, coumadin, jantoven, warfarin, heparin, pradaxa or dabigatran, etc)? No   If hold needed, do NOT schedule, route to RN pool     Is patient taking  any aspirin products (includes Excedrin and Fiorinal)? No     If more than 325mg/day, OK to schedule; Instruct pt to decrease to less than 325 mg for 7 days AND route to RN pool    For CERVICAL procedures, hold all aspirin products for 6 days.     Tell pt that if aspirin product is not held for 6 days, the procedure WILL BE cancelled.      Does the patient have a bleeding or clotting disorder? No     If YES, okay to schedule AND route to RN nurse pool    For any patients with platelet count <100, must be forwarded to provider    Is patient diabetic?  No  If YES, instruct them to bring their glucometer.    Does patient have an active infection or treated for one within the past week? No     Is patient currently taking any antibiotics?  No     For patients on chronic, preventative, or prophylactic antibiotics, procedures may be scheduled.     For patients on antibiotics for active or recent infection:antibiotic course must have been completed for 4 days    Is patient currently taking any steroid medications? (i.e. Prednisone, Medrol)  No     For patients on steroid medications, course must have been completed for 4 days    Is patient actively being treated for cancer or immunocompromised? No  If YES, do NOT schedule and route to RN pool     Are you able to get on and off an exam table with minimal or no assistance? Yes  If NO, do NOT schedule and route to RN pool    Are you able to roll over and lay on your stomach with minimal or no assistance? Yes  If NO, do NOT schedule and route to RN pool     Any allergies to contrast dye, iodine, shellfish, or numbing and steroid medications? No  If YES, route to RN pool AND add allergy information to appointment notes    Allergies: Patient has no known allergies.      Has the patient had a flu shot or any other vaccinations within 7 days before or after the procedure.  No     Have you recently had a COVID vaccine or have plans to get it in the near future?   If yes, explain  that for the vaccine to work best they need to:       wait 1 week before and 1 week after getting Vaccine #1    wait 1 week before and 2 weeks after getting Vaccine #2    If patient has concerns about the timing, send to RN pool     Does patient have an MRI/CT?  YES: 2021  Check Procedure Scheduling Grid to see if required.      Was the MRI done within the last 3 years?  Yes    If yes, where was the MRI done i.e.[facility], [facility], [facility], [facility] etc? [facility]      If no, do not schedule and route to RN pool    If MRI was not done at [facility], [facility] or [facility] do NOT schedule and route to RN pool.      If pt has an imaging disc, the injection MAY be scheduled but pt has to bring disc to appt.     If they show up without the disc the injection cannot be done    Procedure Specific Instructions:      If celiac plexus block, informed patient NPO for 6 hours and that it is okay to take medications with sips of water, especially blood pressure medications  Not Applicable         If this is for a cervical procedure, informed patient that aspirin needs to be held for 6 days.   Not Applicable      If IV needed:    Do not schedule procedures requiring IV placement in the first appointment of the day or first appointment after lunch. Do NOT schedule at 0745, 0815 or 1245.     Instructed pt to arrive 30 minutes early for IV start if required. (Check Procedure Scheduling Grid)  Not Applicable    Reminders:      If you are started on any steroids or antibiotics between now and your appointment, you must contact us because the procedure may need to be cancelled.  Yes      For all procedures except radiofrequency ablations (RFAs) and spinal cord stimulator (SCS) trials, informed patient:    IV sedation is not provided for this procedure.  If you feel that an oral anti-anxiety medication is needed, you can discuss this further with your referring provider or primary care provider.  The Pain Clinic provider will  discuss specifics of what the procedure includes at your appointment.  Most procedures last 10-20 minutes.  We use numbing medications to help with any discomfort during the procedure.  Not Applicable      For patients 85 or older we recommend having an adult stay w/ them for the remainder of the day.       Does the patient have any questions?  NO  Abeba Snyder  [facility]

## 2021-02-24 NOTE — TELEPHONE ENCOUNTER
Pt rescheduled to [place], no covid test needed. Closing encounter.    Abeba COFFMAN    [facility]

## 2021-02-25 ENCOUNTER — HOSPITAL ENCOUNTER (OUTPATIENT)
Dept: PHYSICAL THERAPY | Facility: CLINIC | Age: 61
Setting detail: THERAPIES SERIES
End: 2021-02-25
Attending: NURSE PRACTITIONER
Payer: COMMERCIAL

## 2021-02-25 DIAGNOSIS — M54.16 LUMBAR RADICULOPATHY: ICD-10-CM

## 2021-02-25 PROCEDURE — 97110 THERAPEUTIC EXERCISES: CPT | Mod: GP | Performed by: PHYSICAL THERAPIST

## 2021-02-25 PROCEDURE — 97161 PT EVAL LOW COMPLEX 20 MIN: CPT | Mod: GP | Performed by: PHYSICAL THERAPIST

## 2021-02-25 NOTE — PROGRESS NOTES
02/25/21 0600   General Information   Type of Visit Initial OP Ortho PT Evaluation   Start of Care Date 02/25/21   Referring Physician TOREY Fitzpatrick CNP    Patient/Family Goals Statement to become more flexible, help LBP   Orders Evaluate and Treat   Date of Order 02/23/21   Certification Required? No   Medical Diagnosis Lumbar radiculopathy   Surgical/Medical history reviewed Yes   Precautions/Limitations no known precautions/limitations   Body Part(s)   Body Part(s) Lumbar Spine/SI   Presentation and Etiology   Pertinent history of current problem (include personal factors and/or comorbidities that impact the POC) Pt fell 1 year ago from the roof to the ground, landing on his back.  Felt like he landed on his tailbone.  Now low back is flaring up.  Pain keeps floating around, can be down right thigh, right groin, right SIJ.  Ultrasound cleared, no hernia.  Night is really hard, more than 2 hours is impossible.  Lumbar steroid injection next week.   Impairments A. Pain;B. Decreased WB tolerance;C. Swelling;D. Decreased ROM;E. Decreased flexibility   Functional Limitations perform activities of daily living;perform required work activities;perform desired leisure / sports activities   Symptom Location right lateral thigh to knee; right groin; right SIJ    How/Where did it occur From insidious onset;From Degenerative Joint Disease   Onset date of current episode/exacerbation 12/25/20   Chronicity Chronic   Pain rating (0-10 point scale) Best (/10);Worst (/10)   Best (/10) 3   Worst (/10) 9   Pain quality A. Sharp;C. Aching;E. Shooting;F. Stabbing   Frequency of pain/symptoms B. Intermittent   Pain/symptoms are: Worse during the night   Pain/symptoms exacerbated by A. Sitting;B. Walking;I. Bending   Pain/symptoms eased by E. Changing positions;G. Heat;I. OTC medication(s)   Progression of symptoms since onset: Worsened   Current / Previous Interventions   Diagnostic Tests: MRI   MRI Results Results   MRI  results MR lumbar spine IMPRESSION: 1. Multilevel degenerative disc disease and facet arthropathy of the lumbar spine, as described.  2. Mild edema-like marrow signal centered about the right L4-L5 facet joint in the setting of severe right facet arthropathy, likely due to inflammatory phase of degenerative facet arthropathy. 3. Mild spinal canal stenosis at L3-L4. No high-grade spinal canal or lateral recess stenosis. 4. Moderate to severe left neural foraminal stenosis at L4-L5. There are lesser degrees of multilevel neural foraminal narrowing elsewhere, as detailed. 5. Probable mild chronic compression deformities of T12 and L1. No definite acute fracture seen.   Prior Level of Function   Prior Level of Function-Mobility Independent   Prior Level of Function-ADLs Independent   Current Level of Function   Current Community Support Family/friend caregiver   Patient role/employment history A. Employed   Employment Comments partially employed   Living environment [facility]   Fall Risk Screen   Fall screen completed by PT   Have you fallen 2 or more times in the past year? No   Have you fallen and had an injury in the past year? No   Is patient a fall risk? No   Lumbar Spine/SI Objective Findings   Flexion ROM fingertips to tibia   Extension ROM 50%   Right Side Bending ROM fingertips to patella   Left Side Bending ROM fingertips to patella   Hip Screen (+) right for groin pain with end range flexion (100 degrees) groin pain with end range IR(10 degrees); limited ER 25 degrees   Hip Flexion (L2) Strength 4/5 some anterior hip pain   Hip Abduction Strength 4+/5   Hip Extension Strength 4+/5   Knee Extension (L3) Strength 5/5   Ankle Dorsiflexion (L4) Strength 5/5   Great Toe Extension (L5) Strength 5/5   Ankle Plantar Flexion (S1) Strength 5/5   Hip Flexor Flexibility 0 degrees of hip extension   Piriformis Flexibility limited and painful right   SLR 45 deg kartik   Planned Therapy Interventions   Planned Therapy  Interventions ADL retraining;balance training;gait training;joint mobilization;manual therapy;motor coordination training;neuromuscular re-education;ROM;strengthening;stretching   Planned Modality Interventions   Planned Modality Interventions Cryotherapy;Traction;TENS   Clinical Impression   Criteria for Skilled Therapeutic Interventions Met yes, treatment indicated   PT Diagnosis chronic LBP with underlying stenosis; right > left hip hypomobility and pain with likely underlying OA   Influenced by the following impairments pain; weakness; ROM loss; impaired gait and balance   Functional limitations due to impairments difficulty walking, performing ADL's, prolonged standing   Clinical Presentation Stable/Uncomplicated   Clinical Presentation Rationale (+) motivation   (-) age; underlying health conditions; likely only 1 PT visit   Clinical Decision Making (Complexity) Low complexity   Therapy Frequency other (see comments)   Predicted Duration of Therapy Intervention (days/wks) Pt preferring only 1 PT visit to learn HEP; chart open 6 weeks   Risk & Benefits of therapy have been explained Yes   Patient, Family & other staff in agreement with plan of care Yes   Clinical Impression Comments Michael arrives today with chronic low back pain radiating into right LE.  He also presents with signs and symptoms of right hip OA.  Patient will benefit from skilled PT to address the above impairments and functional limitations.   Education Assessment   Preferred Learning Style Listening;Demonstration;Pictures/video   Barriers to Learning No barriers   ORTHO GOALS   PT Ortho Eval Goals 1;2;3   Ortho Goal 1   Goal Description Patient will have >20 deg of right hip IR mobility to reduce strain on lumbar with ADL's.   Target Date 04/08/21   Ortho Goal 2   Goal Description Patient will be able to sleep for 4+ hours without waking due to LBP.   Target Date 04/01/21   Ortho Goal 3   Goal Description Patient will have =>60 degree straight  leg raise to reduce strain on lumbar while donning / doffing shoes.   Target Date 04/08/21   Total Evaluation Time   PT Eval, Low Complexity Minutes (54737) 25       Sara Dueñas, PT, DTP, [facility]  Doctor of Physical Therapy #[id number]  [facility]  [phone]  [email or web address]

## 2021-02-28 NOTE — PROGRESS NOTES
Pre procedure Diagnosis: lumbar radiculopathy    Post procedure Diagnosis: Same  Procedure performed: L4-5 interlaminar epidural steroid injection   Anesthesia: none  Complications: none  Operators: Kathleen Green MD     Indications:   Michael Jimenez is a 60 year old male was sent by Magali Howell NP for epidural steroid injection.  They have a history of right > left back pain, going into the buttock. On the right, the pain goes to the groin, anterior and lateral leg. Most of the time pain stays above the knee, but it can intermittently shoot down into the right foot.  Exam shows no SI joint or piriformis tenderness, no trochanteric tenderness. Pain with DUSTY, hip grind on right, and limited ROM and they have tried conservative treatment including medication, PT.    MRI was done on 2/12/21 which showed   1. Multilevel degenerative disc disease and facet arthropathy of the  lumbar spine, as described.  2. Mild edema-like marrow signal centered about the right L4-L5 facet  joint in the setting of severe right facet arthropathy, likely due to  inflammatory phase of degenerative facet arthropathy.  3. Mild spinal canal stenosis at L3-L4. No high-grade spinal canal or  lateral recess stenosis.  4. Moderate to severe left neural foraminal stenosis at L4-L5. There  are lesser degrees of multilevel neural foraminal narrowing elsewhere,  as detailed.  5. Probable mild chronic compression deformities of T12 and L1. No  definite acute fracture seen.      Options/alternatives, benefits and risks were discussed with the patient including bleeding, infection, no pain relief, tissue trauma, exposure to radiation, reaction to medications, spinal cord injury, dural puncture, weakness, numbness and headache.  Questions were answered to his satisfaction and he agrees to proceed. Voluntary informed consent was obtained and signed.     Vitals were reviewed: Yes  Allergies were reviewed:  Yes   Medications were reviewed:  Yes    Pre-procedure pain score: 8/10    Procedure:  After getting informed consent, patient was brought into the procedure suite and was placed in a prone position on the procedure table.   A Pause for the Cause was performed.  Patient was prepped and draped in sterile fashion.     The L4-5 interspace was identified with AP fluoroscopy.  A total of 4ml of 1% lidocaine was used to anesthetize the skin for a right paramedian approach.    A 20gauge 4.5inch Touhy needle was advanced under intermittent fluoroscopy.  A JOSE DAVID syringe was used to advance the needle into the epidural space.   After loss of resistance, there was no evidence of blood or CSF on aspiration. Location was verified with both AP and lateral fluoroscopic imaging.    A total of 3ml of Omnipaque 300 was injected demonstrating appropriate epidural spread and was checked in both the AP and lateral views. 7ml was wasted. There was no evidence of intravascular or intrathecal spread.    2 ml of 0.5% bupivacaine with 10mg of dexamethasone and 2ml of preservative free saline was injected.  The needle was removed from the epidural space, flushed with 1% lidocaine and removed.     Hemostasis was achieved, the area was cleaned, and bandaids were placed when appropriate.  The patient tolerated the procedure well, and was taken to the recovery room.    Images were saved to PACS.    Post-procedure pain score: 1/10  Follow-up includes:   -f/u phone call in one week  -f/u with referring provider  I think very possible right groin pain is related to hip, and that should be evaluated.     Kathleen Green MD  [facility]

## 2021-03-01 ENCOUNTER — RADIOLOGY INJECTION OFFICE VISIT (OUTPATIENT)
Dept: PALLIATIVE MEDICINE | Facility: CLINIC | Age: 61
End: 2021-03-01
Attending: NURSE PRACTITIONER
Payer: COMMERCIAL

## 2021-03-01 VITALS
DIASTOLIC BLOOD PRESSURE: 100 MMHG | RESPIRATION RATE: 16 BRPM | TEMPERATURE: 97.8 F | OXYGEN SATURATION: 97 % | HEART RATE: 86 BPM | SYSTOLIC BLOOD PRESSURE: 160 MMHG

## 2021-03-01 DIAGNOSIS — M54.16 LUMBAR RADICULOPATHY: ICD-10-CM

## 2021-03-01 PROCEDURE — 62323 NJX INTERLAMINAR LMBR/SAC: CPT | Performed by: PSYCHIATRY & NEUROLOGY

## 2021-03-01 NOTE — NURSING NOTE
Pre-procedure Intake    Have you been fasting? NA    If yes, for how long?     Are you taking a prescribed blood thinner such as coumadin, Plavix, Xarelto?    No    If yes, when did you take your last dose?     Do you take aspirin?  No    If cervical procedure, have you held aspirin for 6 days?   NA    Do you have any allergies to contrast dye, iodine, steroid and/or numbing medications?  NO    Are you currently taking antibiotics or have an active infection?  NO    Have you had a fever/elevated temperature within the past week? NO    Are you currently taking oral steroids? NO    Do you have a ? Yes       Are you pregnant or breastfeeding?  Not Applicable    Are the vital signs normal?  Yes    Mayo James, RN  Care Coordinator   [facility]

## 2021-03-01 NOTE — NURSING NOTE
Discharge Information    IV Discontiued Time:  NA    Amount of Fluid Infused:  NA    Discharge Criteria = When patient returns to baseline or as per MD order    Consciousness:  Pt is fully awake    Circulation:  BP +/- 20% of pre-procedure level    Respiration:  Patient is able to breathe deeply    O2 Sat:  Patient is able to maintain O2 Sat >92% on room air    Activity:  Moves 4 extremities on command    Ambulation:  Patient is able to stand and walk or stand and pivot into wheelchair    Dressing:  Clean/dry or No Dressing    Notes:   Discharge instructions and AVS given to patient    Patient meets criteria for discharge?  YES    Admitted to PCU?  No    Responsible adult present to accompany patient home?  Yes    Signature/Title:    Mayo James RN  RN Care Coordinator  [facility]

## 2021-03-01 NOTE — PATIENT INSTRUCTIONS
[facility]   Procedure Discharge Instructions    Today you saw:    Dr. Sneha Green     You had an:  Lumbar Epidural steroid injection       Medications used:  Lidocaine   Bupivacaine   Dexamethasone Omnipaque   Normal saline          If you were holding your blood thinning medication, please restart taking it: N/A    Be cautious when walking. Numbness and/or weakness in the lower extremities may occur for up to 6-8 hours after the procedure due to effect of the local anesthetic    Do not drive for 6 hours. The effect of the local anesthetic could slow your reflexes.     You may resume your regular activities after 24 hours    Avoid strenuous activity for the first 24 hours    You may shower, however avoid swimming, tub baths or hot tubs for 24 hours following your procedure    You may have a mild to moderate increase in pain for several days following the injection.    It may take up to 14 days for the steroid medication to start working although you may feel the effect as early as a few days after the procedure.       You may use ice packs for 10-15 minutes, 3 to 4 times a day at the injection site for comfort    Do not use heat to painful areas for 6 to 8 hours. This will give the local anesthetic time to wear off and prevent you from accidentally burning your skin.     Unless you have been directed to avoid the use of anti-inflammatory medications (NSAIDS), you may use medications such as ibuprofen, Aleve or Tylenol for pain control if needed.     Possible side effects of steroids that you may experience include flushing, elevated blood pressure, increased appetite, mild headaches and restlessness.  All of these symptoms will get better with time.    If you experience any of the following, call the Pain Clinic during work hours (Mon-Friday 8-4:30 pm) at [phone] or the Provider Line after hours at [phone]:  -Fever over 100 degree F  -Swelling, bleeding, redness,  drainage, warmth at the injection site  -Progressive weakness or numbness in your legs  -Loss of bowel or bladder function  -Unusual new onset of pain that is not improving

## 2021-03-03 ENCOUNTER — HOSPITAL ENCOUNTER (OUTPATIENT)
Dept: NUCLEAR MEDICINE | Facility: CLINIC | Age: 61
Setting detail: NUCLEAR MEDICINE
End: 2021-03-03
Attending: NURSE PRACTITIONER
Payer: COMMERCIAL

## 2021-03-03 ENCOUNTER — HOSPITAL ENCOUNTER (OUTPATIENT)
Dept: CARDIOLOGY | Facility: CLINIC | Age: 61
End: 2021-03-03
Attending: NURSE PRACTITIONER
Payer: COMMERCIAL

## 2021-03-03 VITALS — HEIGHT: 71 IN | WEIGHT: 261 LBS | BODY MASS INDEX: 36.54 KG/M2

## 2021-03-03 DIAGNOSIS — R93.1 ABNORMAL ECHOCARDIOGRAM: Primary | ICD-10-CM

## 2021-03-03 DIAGNOSIS — R07.9 CHEST PAIN, UNSPECIFIED TYPE: ICD-10-CM

## 2021-03-03 LAB
CV BLOOD PRESSURE: 42 %
CV STRESS MAX HR HE: 80
NUC STRESS EJECTION FRACTION: 45 %
RATE PRESSURE PRODUCT: NORMAL
STRESS ECHO BASELINE DIASTOLIC HE: 90
STRESS ECHO BASELINE HR: 69
STRESS ECHO BASELINE SYSTOLIC BP: 140
STRESS ECHO CALCULATED PERCENT HR: 51 %
STRESS ECHO LAST STRESS DIASTOLIC BP: 88
STRESS ECHO LAST STRESS SYSTOLIC BP: 138
STRESS ECHO TARGET HR: 158

## 2021-03-03 PROCEDURE — 343N000001 HC RX 343: Performed by: NURSE PRACTITIONER

## 2021-03-03 PROCEDURE — 93017 CV STRESS TEST TRACING ONLY: CPT

## 2021-03-03 PROCEDURE — 78452 HT MUSCLE IMAGE SPECT MULT: CPT | Mod: 26 | Performed by: INTERNAL MEDICINE

## 2021-03-03 PROCEDURE — 78452 HT MUSCLE IMAGE SPECT MULT: CPT

## 2021-03-03 PROCEDURE — A9502 TC99M TETROFOSMIN: HCPCS | Performed by: NURSE PRACTITIONER

## 2021-03-03 PROCEDURE — 250N000011 HC RX IP 250 OP 636: Performed by: NURSE PRACTITIONER

## 2021-03-03 PROCEDURE — 93016 CV STRESS TEST SUPVJ ONLY: CPT | Performed by: INTERNAL MEDICINE

## 2021-03-03 PROCEDURE — 93018 CV STRESS TEST I&R ONLY: CPT | Performed by: INTERNAL MEDICINE

## 2021-03-03 RX ORDER — REGADENOSON 0.08 MG/ML
0.4 INJECTION, SOLUTION INTRAVENOUS ONCE
Status: COMPLETED | OUTPATIENT
Start: 2021-03-03 | End: 2021-03-03

## 2021-03-03 RX ADMIN — TETROFOSMIN 11.8 MCI.: 1.38 INJECTION, POWDER, LYOPHILIZED, FOR SOLUTION INTRAVENOUS at 09:02

## 2021-03-03 RX ADMIN — TETROFOSMIN 37.2 MCI.: 1.38 INJECTION, POWDER, LYOPHILIZED, FOR SOLUTION INTRAVENOUS at 10:45

## 2021-03-03 RX ADMIN — REGADENOSON 0.4 MG: 0.08 INJECTION, SOLUTION INTRAVENOUS at 10:31

## 2021-03-03 ASSESSMENT — MIFFLIN-ST. JEOR: SCORE: 2006.02

## 2021-03-09 ENCOUNTER — OFFICE VISIT (OUTPATIENT)
Dept: CARDIOLOGY | Facility: CLINIC | Age: 61
End: 2021-03-09
Attending: NURSE PRACTITIONER
Payer: COMMERCIAL

## 2021-03-09 VITALS
BODY MASS INDEX: 35.98 KG/M2 | DIASTOLIC BLOOD PRESSURE: 83 MMHG | SYSTOLIC BLOOD PRESSURE: 128 MMHG | HEART RATE: 88 BPM | WEIGHT: 258 LBS | OXYGEN SATURATION: 97 %

## 2021-03-09 DIAGNOSIS — I44.7 LEFT BUNDLE BRANCH BLOCK: ICD-10-CM

## 2021-03-09 DIAGNOSIS — I10 ESSENTIAL HYPERTENSION: ICD-10-CM

## 2021-03-09 DIAGNOSIS — E78.5 HYPERLIPIDEMIA LDL GOAL <70: ICD-10-CM

## 2021-03-09 DIAGNOSIS — E66.812 CLASS 2 OBESITY DUE TO EXCESS CALORIES WITH BODY MASS INDEX (BMI) OF 35.0 TO 35.9 IN ADULT, UNSPECIFIED WHETHER SERIOUS COMORBIDITY PRESENT: ICD-10-CM

## 2021-03-09 DIAGNOSIS — R94.39 ABNORMAL CARDIOVASCULAR STRESS TEST: Primary | ICD-10-CM

## 2021-03-09 DIAGNOSIS — E66.09 CLASS 2 OBESITY DUE TO EXCESS CALORIES WITH BODY MASS INDEX (BMI) OF 35.0 TO 35.9 IN ADULT, UNSPECIFIED WHETHER SERIOUS COMORBIDITY PRESENT: ICD-10-CM

## 2021-03-09 DIAGNOSIS — R01.1 HEART MURMUR: ICD-10-CM

## 2021-03-09 PROBLEM — E66.01 MORBID OBESITY (H): Status: ACTIVE | Noted: 2021-03-09

## 2021-03-09 PROCEDURE — 99205 OFFICE O/P NEW HI 60 MIN: CPT | Performed by: INTERNAL MEDICINE

## 2021-03-09 RX ORDER — ROSUVASTATIN CALCIUM 20 MG/1
20 TABLET, COATED ORAL DAILY
Qty: 90 TABLET | Refills: 3 | Status: SHIPPED | OUTPATIENT
Start: 2021-03-09 | End: 2022-03-07

## 2021-03-09 RX ORDER — LISINOPRIL 10 MG/1
10 TABLET ORAL DAILY
Qty: 90 TABLET | Refills: 3 | Status: SHIPPED | OUTPATIENT
Start: 2021-03-09 | End: 2021-04-14

## 2021-03-09 RX ORDER — ASPIRIN 81 MG/1
81 TABLET, CHEWABLE ORAL DAILY
Qty: 90 TABLET | Refills: 0 | COMMUNITY
Start: 2021-03-09 | End: 2022-03-29

## 2021-03-09 NOTE — PROGRESS NOTES
Referring provider:TOREY Stewart CNP    HPI: Mr. Michael Jimenez is a 60 year old  male with PMH significant for obesity, former smoker, hypertension (not on treatment) and hyperlipidemia.    Patient recently presented to his primary care physician and reported chest discomfort.  Subsequently he had an EKG which showed left bundle branch block.  He had a Lexiscan stress test on 3/3/2021 which showed reduced LVEF at 42%, prior MI in basal anteroseptal left ventricle. I have reviewed patient's stress test which shows wide QRS tachycardia likely nonsustained VT on the pre- stress EKG.  No VT during drug infusion.    Patient tells me that he was told to have cardiac murmur.  That was the reason that he saw primary care physician in the first place.  Patient recalls having left-sided chest discomfort couple of months ago which lasted for few hours.  He cannot recall what was he doing at that time.  He tells me that he is currently very sedentary partially due to his hip pain.  Denies recurrent chest discomfort since then.  No shortness of breath with activity.  Denies palpitations, dizziness, syncope or lower extremity edema.    He has history of smoking for total of 15 years.  He stopped in his mid 30s.  For 2 years he tells me that he smoked 4 packs/day.  He has history of high blood pressure but never been on treatment.  He was on cholesterol-lowering treatment at some point then he self discontinued.  Recently his BMP showed elevated creatinine with low GFR.  Patient tells me that his brother in his 50s had silent MI.    No history of diabetes. LDL is significantly elevated at 171 (2/18/2021).  Not on statin.  Patient has CKD stage III with GFR at 54.    No prior echocardiogram available to review.    I reviewed patient's EKG 2/11/2021 which shows sinus rhythm and left bundle branch block.    Medications, personal, family, and social history reviewed with patient and revised.    PAST MEDICAL HISTORY:  No past  medical history on file.    CURRENT MEDICATIONS:  Current Outpatient Medications   Medication Sig Dispense Refill     cyclobenzaprine (FLEXERIL) 10 MG tablet Take 1 tablet (10 mg) by mouth 3 times daily as needed for muscle spasms (Patient not taking: Reported on 3/1/2021) 60 tablet 1     FINASTERIDE OR 1 TABLET BY MOUTH DAILY       fish oil-omega-3 fatty acids 1000 MG capsule Take 2 g by mouth daily       Glucosamine Sulfate 1000 MG TABS        tamsulosin (FLOMAX) 0.4 MG capsule TAKE 1 CAPSULE BY MOUTH ONCE DAILY AFTER A MEAL         PAST SURGICAL HISTORY:  No past surgical history on file.    ALLERGIES:   No Known Allergies    FAMILY HISTORY:  Family History   Problem Relation Age of Onset     Heart Disease Father         emphasemia         SOCIAL HISTORY:  Social History     Tobacco Use     Smoking status: Never Smoker     Smokeless tobacco: Never Used   Substance Use Topics     Alcohol use: No     Drug use: No       ROS:   Constitutional: No fever, chills, or sweats. Weight stable.   ENT: No visual disturbance, ear ache, epistaxis, sore throat.   Cardiovascular: As per HPI.   Respiratory: No cough, hemoptysis.    GI: No nausea, vomiting, hematemesis, melena, or hematochezia.   : No hematuria.   Integument: Negative.   Psychiatric: Negative.   Hematologic:  No easy bruising, no easy bleeding.  Neuro: Negative.   Endocrinology: No significant heat or cold intolerance   Musculoskeletal: No myalgia.    Exam:  /83 (BP Location: Left arm, Patient Position: Sitting, Cuff Size: Adult Large)   Pulse 88   Wt 117 kg (258 lb)   SpO2 97%   BMI 35.98 kg/m    GENERAL APPEARANCE: alert and no distress  HEENT: no icterus, no central cyanosis  LYMPH/NECK: no adenopathy, no asymmetry, JVP not elevated, no carotid bruits.  RESPIRATORY: lungs clear to auscultation - no rales, rhonchi or wheezes, no use of accessory muscles, no retractions, respirations are unlabored, normal respiratory rate  CARDIOVASCULAR: regular  rhythm, normal S1, S2, no S3 or S4 , upper parasternal 2/6 systolic ejection murmur radiating to carotids  GI: soft, non tender  EXTREMITIES: peripheral pulses normal, no edema  NEURO: alert, normal speech,and affect  VASC: Radial, dorsalis pedis and posterior tibialis pulses are normal in volumes and symmetric bilaterally.   SKIN: no ecchymoses, no rashes     I have reviewed the labs and personally reviewed the imaging below and made my comment in the assessment and plan.    Labs:  CBC RESULTS:   Lab Results   Component Value Date    HGB 16.9 01/04/2011     01/04/2011       BMP RESULTS:  Lab Results   Component Value Date     02/18/2021    POTASSIUM 4.2 02/18/2021    CHLORIDE 105 02/18/2021    CO2 33 (H) 02/18/2021    ANIONGAP 3 02/18/2021    GLC 80 02/18/2021    BUN 26 02/18/2021    CR 1.38 (H) 02/18/2021    GFRESTIMATED 54 (L) 02/18/2021    GFRESTBLACK 63 02/18/2021    SKYLER 9.0 02/18/2021        INR RESULTS:  Lab Results   Component Value Date    INR 0.92 01/04/2011       Lexiscan stress test 3/3/2021:  The nuclear stress test is abnormal.     There is a small area of a mild degree of infarction in the mid to basal anteroseptal segment(s) of the left ventricle.     Left ventricular function is mildly reduced.     The left ventricular ejection fraction at rest is 42%.  The left ventricular ejection fraction at stress is 45%.     One episode of 4 beat run on NSVT at rest and 4/10 chest pain with stress. Consider further testing with CT coronary angiogram if clinical suspicion of CAD is high.     There is no prior study for comparison.       EKG 2/11/2021 personally reviewed sinus rhythm with left bundle branch block        Assessment and Plan:   Patient is being seen today for abnormal stress test which was done for chest discomfort few months ago.  Stress test showed nonsustained VT, prior evidence of MI without ischemia.  Stress test report indicates that he had chest pain during the stress test  however today he denies having chest pain during the test.  Of note he has no prior history of coronary artery disease.  Patient denies recurrent chest discomfort since then.  He is currently feeling well.    #Coronary artery disease, new diagnosis  #Hypertension  #Low EF ? reported on stress test  #Nonsustained VT  #Chest discomfort  #CKD stage III  #Cardiac murmur on physical exam appears to be due to aortic stenosis (I do not think it is severe)  -Transthoracic echocardiogram  -Start lisinopril 10 mg/day  -Start aspirin 81 mg  -Start Crestor 20 mg  -CT coronary angiogram  -[facility] 1-[facility] week    #Sleep disturbance  #Obesity, short neck  -Sleep study    Return to clinic 1 month to review all the test results.    Total time spent today for this visit is 60 minutes including precharting, face-to-face clinic visit, review of labs/imaging and medical documentation.    Please donot hesitate to contact me if you have any questions or concerns. Again, thank you for allowing me to participate in the care of your patient.    Taye WELCH MD  [facility] Division of Cardiology  Pager [phone]

## 2021-03-09 NOTE — LETTER
3/9/2021      RE: Michael Jimenez  [address] [place] [id number]       Dear Colleague,    Thank you for the opportunity to participate in the care of your patient, Michael Jimenez, at the [facility]. Please see a copy of my visit note below.    Referring provider:TOREY Stewart CNP    HPI: . Michael Jimenez is a 60 year old  male with PMH significant for obesity, former smoker, hypertension (not on treatment) and hyperlipidemia.    Patient recently presented to his primary care physician and reported chest discomfort.  Subsequently he had an EKG which showed left bundle branch block.  He had a Lexiscan stress test on 3/3/2021 which showed reduced LVEF at 42%, prior MI in basal anteroseptal left ventricle. I have reviewed patient's stress test which shows wide QRS tachycardia likely nonsustained VT on the pre- stress EKG.  No VT during drug infusion.    Patient tells me that he was told to have cardiac murmur.  That was the reason that he saw primary care physician in the first place.  Patient recalls having left-sided chest discomfort couple of months ago which lasted for few hours.  He cannot recall what was he doing at that time.  He tells me that he is currently very sedentary partially due to his hip pain.  Denies recurrent chest discomfort since then.  No shortness of breath with activity.  Denies palpitations, dizziness, syncope or lower extremity edema.    He has history of smoking for total of 15 years.  He stopped in his mid 30s.  For 2 years he tells me that he smoked 4 packs/day.  He has history of high blood pressure but never been on treatment.  He was on cholesterol-lowering treatment at some point then he self discontinued.  Recently his BMP showed elevated creatinine with low GFR.  Patient tells me that his brother in his 50s had silent MI.    No history of diabetes. LDL is significantly elevated at  171 (2/18/2021).  Not on statin.  Patient has CKD stage III with GFR at 54.    No prior echocardiogram available to review.    I reviewed patient's EKG 2/11/2021 which shows sinus rhythm and left bundle branch block.    Medications, personal, family, and social history reviewed with patient and revised.    PAST MEDICAL HISTORY:  No past medical history on file.    CURRENT MEDICATIONS:  Current Outpatient Medications   Medication Sig Dispense Refill     cyclobenzaprine (FLEXERIL) 10 MG tablet Take 1 tablet (10 mg) by mouth 3 times daily as needed for muscle spasms (Patient not taking: Reported on 3/1/2021) 60 tablet 1     FINASTERIDE OR 1 TABLET BY MOUTH DAILY       fish oil-omega-3 fatty acids 1000 MG capsule Take 2 g by mouth daily       Glucosamine Sulfate 1000 MG TABS        tamsulosin (FLOMAX) 0.4 MG capsule TAKE 1 CAPSULE BY MOUTH ONCE DAILY AFTER A MEAL         PAST SURGICAL HISTORY:  No past surgical history on file.    ALLERGIES:   No Known Allergies    FAMILY HISTORY:  Family History   Problem Relation Age of Onset     Heart Disease Father         emphasemia         SOCIAL HISTORY:  Social History     Tobacco Use     Smoking status: Never Smoker     Smokeless tobacco: Never Used   Substance Use Topics     Alcohol use: No     Drug use: No       ROS:   Constitutional: No fever, chills, or sweats. Weight stable.   ENT: No visual disturbance, ear ache, epistaxis, sore throat.   Cardiovascular: As per HPI.   Respiratory: No cough, hemoptysis.    GI: No nausea, vomiting, hematemesis, melena, or hematochezia.   : No hematuria.   Integument: Negative.   Psychiatric: Negative.   Hematologic:  No easy bruising, no easy bleeding.  Neuro: Negative.   Endocrinology: No significant heat or cold intolerance   Musculoskeletal: No myalgia.    Exam:  /83 (BP Location: Left arm, Patient Position: Sitting, Cuff Size: Adult Large)   Pulse 88   Wt 117 kg (258 lb)   SpO2 97%   BMI 35.98 kg/m    GENERAL APPEARANCE:  alert and no distress  HEENT: no icterus, no central cyanosis  LYMPH/NECK: no adenopathy, no asymmetry, JVP not elevated, no carotid bruits.  RESPIRATORY: lungs clear to auscultation - no rales, rhonchi or wheezes, no use of accessory muscles, no retractions, respirations are unlabored, normal respiratory rate  CARDIOVASCULAR: regular rhythm, normal S1, S2, no S3 or S4 , upper parasternal 2/6 systolic ejection murmur radiating to carotids  GI: soft, non tender  EXTREMITIES: peripheral pulses normal, no edema  NEURO: alert, normal speech,and affect  VASC: Radial, dorsalis pedis and posterior tibialis pulses are normal in volumes and symmetric bilaterally.   SKIN: no ecchymoses, no rashes     I have reviewed the labs and personally reviewed the imaging below and made my comment in the assessment and plan.    Labs:  CBC RESULTS:   Lab Results   Component Value Date    HGB 16.9 01/04/2011     01/04/2011       BMP RESULTS:  Lab Results   Component Value Date     02/18/2021    POTASSIUM 4.2 02/18/2021    CHLORIDE 105 02/18/2021    CO2 33 (H) 02/18/2021    ANIONGAP 3 02/18/2021    GLC 80 02/18/2021    BUN 26 02/18/2021    CR 1.38 (H) 02/18/2021    GFRESTIMATED 54 (L) 02/18/2021    GFRESTBLACK 63 02/18/2021    SKYLER 9.0 02/18/2021        INR RESULTS:  Lab Results   Component Value Date    INR 0.92 01/04/2011       Lexiscan stress test 3/3/2021:  The nuclear stress test is abnormal.     There is a small area of a mild degree of infarction in the mid to basal anteroseptal segment(s) of the left ventricle.     Left ventricular function is mildly reduced.     The left ventricular ejection fraction at rest is 42%.  The left ventricular ejection fraction at stress is 45%.     One episode of 4 beat run on NSVT at rest and 4/10 chest pain with stress. Consider further testing with CT coronary angiogram if clinical suspicion of CAD is high.     There is no prior study for comparison.       EKG 2/11/2021 personally  reviewed sinus rhythm with left bundle branch block        Assessment and Plan:   Patient is being seen today for abnormal stress test which was done for chest discomfort few months ago.  Stress test showed nonsustained VT, prior evidence of MI without ischemia.  Stress test report indicates that he had chest pain during the stress test however today he denies having chest pain during the test.  Of note he has no prior history of coronary artery disease.  Patient denies recurrent chest discomfort since then.  He is currently feeling well.    #Coronary artery disease, new diagnosis  #Hypertension  #Low EF ? reported on stress test  #Nonsustained VT  #Chest discomfort  #CKD stage III  #Cardiac murmur on physical exam appears to be due to aortic stenosis (I do not think it is severe)  -Transthoracic echocardiogram  -Start lisinopril 10 mg/day  -Start aspirin 81 mg  -Start Crestor 20 mg  -CT coronary angiogram  -[facility] 1[facility] week    #Sleep disturbance  #Obesity, short neck  -Sleep study    Return to clinic 1 month to review all the test results.    Total time spent today for this visit is 60 minutes including precharting, face-to-face clinic visit, review of labs/imaging and medical documentation.    Please donot hesitate to contact me if you have any questions or concerns. Again, thank you for allowing me to participate in the care of your patient.    Taye WELCH MD  [facility] Division of Cardiology  Pager [phone]

## 2021-03-09 NOTE — PATIENT INSTRUCTIONS
1. New medications to start:   - Aspirin 81 mg daily  - crestor 20 mg daily  - lisinopril 10 mg daily    2. Blood test to check your renal function when you return from trip.    3. Testing that will be scheduled:     - CT coronary angiogram  - Echocardiogram   -Sleep study consult- they will call you to schedule  -Follow up with Dr Mcallister after all testing completed.     4. Dr Mcallister would like you to work on losing weight, start a heart healthy diet.  You will be scheduled for a follow up visit:   We encourage you to use My Chart as your primary form of communication if possible. If you need assistance in setting this up, please contact our office or ask at your follow up visit.     If you need a medication refill please contact your pharmacy. Please allow at least 3 business days for your refill to be completed.       Cardiology  Telephone Number    Suzanne Velasco [phone]   Or send a message to your provider via my chart.   For scheduling procedures:        Clinic appointments           [phone]   For the Device Clinic (Pacemakers and ICD's)   RN's :   Maxine Herrera  During business hours: [phone]    After business hours:   [phone]- select option 4 and ask for job code [id number].          As always, Thank you for trusting us with your health care needs!

## 2021-03-09 NOTE — NURSING NOTE
"Chief Complaint   Patient presents with     Results     lexiscan, ekg., per patient no heart symptoms        Initial BP (!) 152/94 (BP Location: Right arm, Patient Position: Sitting, Cuff Size: Adult Large)   Pulse 88   Wt 117 kg (258 lb)   SpO2 97%   BMI 35.98 kg/m   Estimated body mass index is 35.98 kg/m  as calculated from the following:    Height as of 3/3/21: 1.803 m (5' 11\").    Weight as of this encounter: 117 kg (258 lb)..  BP completed using cuff size: large    Princess Pino L.P.N.    "

## 2021-03-10 ENCOUNTER — ANCILLARY PROCEDURE (OUTPATIENT)
Dept: CARDIOLOGY | Facility: CLINIC | Age: 61
End: 2021-03-10
Attending: INTERNAL MEDICINE
Payer: COMMERCIAL

## 2021-03-10 DIAGNOSIS — R94.39 ABNORMAL CARDIOVASCULAR STRESS TEST: ICD-10-CM

## 2021-03-10 PROCEDURE — 93306 TTE W/DOPPLER COMPLETE: CPT | Performed by: INTERNAL MEDICINE

## 2021-03-10 PROCEDURE — 99207 PR STATISTIC IV PUSH SINGLE INITIAL SUBSTANCE: CPT | Performed by: INTERNAL MEDICINE

## 2021-03-10 RX ADMIN — Medication 5 ML: at 11:14

## 2021-03-12 ENCOUNTER — HOSPITAL ENCOUNTER (OUTPATIENT)
Dept: CT IMAGING | Facility: CLINIC | Age: 61
Discharge: HOME | End: 2021-03-12
Attending: INTERNAL MEDICINE | Admitting: INTERNAL MEDICINE
Payer: COMMERCIAL

## 2021-03-12 VITALS — SYSTOLIC BLOOD PRESSURE: 111 MMHG | HEART RATE: 68 BPM | RESPIRATION RATE: 16 BRPM | DIASTOLIC BLOOD PRESSURE: 65 MMHG

## 2021-03-12 DIAGNOSIS — R94.39 ABNORMAL CARDIOVASCULAR STRESS TEST: ICD-10-CM

## 2021-03-12 PROCEDURE — 75574 CT ANGIO HRT W/3D IMAGE: CPT | Mod: 26 | Performed by: STUDENT IN AN ORGANIZED HEALTH CARE EDUCATION/TRAINING PROGRAM

## 2021-03-12 PROCEDURE — 75574 CT ANGIO HRT W/3D IMAGE: CPT

## 2021-03-12 PROCEDURE — 250N000009 HC RX 250: Performed by: STUDENT IN AN ORGANIZED HEALTH CARE EDUCATION/TRAINING PROGRAM

## 2021-03-12 PROCEDURE — 250N000011 HC RX IP 250 OP 636: Performed by: INTERNAL MEDICINE

## 2021-03-12 PROCEDURE — 250N000013 HC RX MED GY IP 250 OP 250 PS 637: Performed by: STUDENT IN AN ORGANIZED HEALTH CARE EDUCATION/TRAINING PROGRAM

## 2021-03-12 RX ORDER — ONDANSETRON 2 MG/ML
4 INJECTION INTRAMUSCULAR; INTRAVENOUS
Status: DISCONTINUED | OUTPATIENT
Start: 2021-03-12 | End: 2021-03-13 | Stop reason: HOSPADM

## 2021-03-12 RX ORDER — IVABRADINE 5 MG/1
5-15 TABLET, FILM COATED ORAL
Status: COMPLETED | OUTPATIENT
Start: 2021-03-12 | End: 2021-03-12

## 2021-03-12 RX ORDER — METOPROLOL TARTRATE 1 MG/ML
5-15 INJECTION, SOLUTION INTRAVENOUS
Status: DISCONTINUED | OUTPATIENT
Start: 2021-03-12 | End: 2021-03-13 | Stop reason: HOSPADM

## 2021-03-12 RX ORDER — METOPROLOL TARTRATE 25 MG/1
25-100 TABLET, FILM COATED ORAL
Status: COMPLETED | OUTPATIENT
Start: 2021-03-12 | End: 2021-03-12

## 2021-03-12 RX ORDER — DIPHENHYDRAMINE HYDROCHLORIDE 50 MG/ML
25-50 INJECTION INTRAMUSCULAR; INTRAVENOUS
Status: DISCONTINUED | OUTPATIENT
Start: 2021-03-12 | End: 2021-03-13 | Stop reason: HOSPADM

## 2021-03-12 RX ORDER — DIPHENHYDRAMINE HCL 25 MG
25 CAPSULE ORAL
Status: DISCONTINUED | OUTPATIENT
Start: 2021-03-12 | End: 2021-03-13 | Stop reason: HOSPADM

## 2021-03-12 RX ORDER — ACYCLOVIR 200 MG/1
0-1 CAPSULE ORAL
Status: DISCONTINUED | OUTPATIENT
Start: 2021-03-12 | End: 2021-03-13 | Stop reason: HOSPADM

## 2021-03-12 RX ORDER — IOPAMIDOL 755 MG/ML
120 INJECTION, SOLUTION INTRAVASCULAR ONCE
Status: COMPLETED | OUTPATIENT
Start: 2021-03-12 | End: 2021-03-12

## 2021-03-12 RX ORDER — METHYLPREDNISOLONE SODIUM SUCCINATE 125 MG/2ML
125 INJECTION, POWDER, LYOPHILIZED, FOR SOLUTION INTRAMUSCULAR; INTRAVENOUS
Status: DISCONTINUED | OUTPATIENT
Start: 2021-03-12 | End: 2021-03-13 | Stop reason: HOSPADM

## 2021-03-12 RX ORDER — NITROGLYCERIN 0.4 MG/1
.4-.8 TABLET SUBLINGUAL
Status: DISCONTINUED | OUTPATIENT
Start: 2021-03-12 | End: 2021-03-13 | Stop reason: HOSPADM

## 2021-03-12 RX ADMIN — METOPROLOL TARTRATE 20 MG: 5 INJECTION INTRAVENOUS at 09:14

## 2021-03-12 RX ADMIN — NITROGLYCERIN 0.8 MG: 0.4 TABLET SUBLINGUAL at 09:14

## 2021-03-12 RX ADMIN — IOPAMIDOL 120 ML: 755 INJECTION, SOLUTION INTRAVENOUS at 09:05

## 2021-03-12 RX ADMIN — IVABRADINE 15 MG: 5 TABLET, FILM COATED ORAL at 07:59

## 2021-03-12 RX ADMIN — METOPROLOL TARTRATE 100 MG: 100 TABLET, FILM COATED ORAL at 07:59

## 2021-03-12 NOTE — PROGRESS NOTES
Pt arrived for Coronary CT angiogram. Test, meds and side effects reviewed with pt.  Resting HR 90 bpm. Given 100 mg PO Metoprolol + 15 mg PO Ivabradine per verbal order. Administered 0.8 mg SL nitro and 20 mg IV metoprolol on CTA table per order. CTA completed.  Patient tolerated procedure well and denies symptoms of allergic reaction.  Post monitoring completed and VSS.  D/C instructions reviewed with pt whom verbalized understanding of need to increase PO fluids today. D/C to gold waiting room accompanied by staff.

## 2021-03-24 VITALS — WEIGHT: 255 LBS | BODY MASS INDEX: 35.7 KG/M2 | HEIGHT: 71 IN

## 2021-03-24 ASSESSMENT — MIFFLIN-ST. JEOR: SCORE: 1978.8

## 2021-03-24 NOTE — PATIENT INSTRUCTIONS
Your BMI is Body mass index is 35.57 kg/m .  Weight management is a personal decision.  If you are interested in exploring weight loss strategies, the following discussion covers the approaches that may be successful. Body mass index (BMI) is one way to tell whether you are at a healthy weight, overweight, or obese. It measures your weight in relation to your height.  A BMI of 18.5 to 24.9 is in the healthy range. A person with a BMI of 25 to 29.9 is considered overweight, and someone with a BMI of 30 or greater is considered obese. More than two-thirds of American adults are considered overweight or obese.  Being overweight or obese increases the risk for further weight gain. Excess weight may lead to heart disease and diabetes.  Creating and following plans for healthy eating and physical activity may help you improve your health.  Weight control is part of healthy lifestyle and includes exercise, emotional health, and healthy eating habits. Careful eating habits lifelong are the mainstay of weight control. Though there are significant health benefits from weight loss, long-term weight loss with diet alone may be very difficult to achieve- studies show long-term success with dietary management in less than 10% of people. Attaining a healthy weight may be especially difficult to achieve in those with severe obesity. In some cases, medications, devices and surgical management might be considered.  What can you do?  If you are overweight or obese and are interested in methods for weight loss, you should discuss this with your provider.     Consider reducing daily calorie intake by 500 calories.     Keep a food journal.     Avoiding skipping meals, consider cutting portions instead.    Diet combined with exercise helps maintain muscle while optimizing fat loss. Strength training is particularly important for building and maintaining muscle mass. Exercise helps reduce stress, increase energy, and improves fitness.  Increasing exercise without diet control, however, may not burn enough calories to loose weight.       Start walking three days a week 10-20 minutes at a time    Work towards walking thirty minutes five days a week     Eventually, increase the speed of your walking for 1-2 minutes at time    In addition, we recommend that you review healthy lifestyles and methods for weight loss available through the National Institutes of Health patient information sites:  http://win.niddk.nih.gov/publications/index.htm    And look into health and wellness programs that may be available through your health insurance provider, employer, local community center, or [employer] club.    Weight management plan: Patient was referred to their PCP to discuss a diet and exercise plan.

## 2021-03-24 NOTE — PROGRESS NOTES
"Michael Jimenez is a 60 year old male being evaluated via a billable telephone visit.     \"This telephone visit will be conducted via a call between you and your physician/provider. We have found that certain health care needs can be provided without the need for an in-person visit or physical exam.  This service lets us provide the care you need with a telephone conversation.  If a prescription is necessary we can send it directly to your pharmacy.  If lab work is needed we can place an order for that and you can then stop by our lab to have the test done at a later time.\"    Telephone visits are billed at different rates depending on your insurance coverage.  Please reach out to your insurance provider with any questions.    Patient has given verbal consent for  a Telephone visit? Yes    What telephone number would you like your provider to contact at at:  [phone]    How would you like to obtain your AVS? [facility]    Telephone Visit Details:     Telephone Visit Start Time: 10:45 AM    Telephone Visit End Time:11:15 AM    Sleep Consultation:    Date on this visit: 3/25/2021    Michael Jimenez is sent by Taye Mcallister for a sleep consultation regarding possible sleep apnea.    Primary Physician: Clinic, [facility]     Michael Jimenez reports nightly poor quality of sleep & daytime fatigue for last 5 years.     His medical history is significant for hypertension, CAD & recent stress test showing nonsustained VT.     Michael does not have a regular bed partner. He knows that he breathes heavily but does not know if he snores. There is report of poor quality of sleep.  He does not have witnessed apneas. Patient sleeps on his back. He denies no morning headaches and restless legs. Michael denies any bruxism, sleep walking, sleep talking, dream enactment, sleep paralysis, cataplexy and hypnogogic/hypnopompic hallucinations.    Michael goes to sleep at 12:00 - 1:00 AM during the week. He wakes up at " 9:00 AM without an alarm. He falls asleep in 10 minutes.  Michael denies difficulty falling asleep.  He wakes up >8 times a night for 5 minutes before falling back to sleep.  Michael wakes up to go to the bathroom and uncertain reasons.  On weekends, Michael goes to sleep at 1:00 AM.  He wakes up at 9:00 AM without an alarm. He falls asleep in 15 minutes.  Patient gets an average of 7 hours of sleep per night.     Michael has difficulty breathing through his nose.      Patient's [place] Sleepiness score 3/24 consistent with no daytime sleepiness.      Michael naps 0-1 times per month for 10-20 minutes, feels refreshed after naps. He takes occcassional inadvertant naps.  He denies closing eyes, dozing and falling asleep while driving.  Patient was counseled on the importance of driving while alert, to pull over if drowsy, or nap before getting into the vehicle if sleepy.      He uses 1 cups/day of coffee, 1-2 sodas/day. Last caffeine intake is usually before 2 pm.    Allergies:    No Known Allergies    Medications:    Current Outpatient Medications   Medication Sig Dispense Refill     aspirin (ASA) 81 MG chewable tablet Take 1 tablet (81 mg) by mouth daily 90 tablet 0     cyclobenzaprine (FLEXERIL) 10 MG tablet Take 1 tablet (10 mg) by mouth 3 times daily as needed for muscle spasms 60 tablet 1     FINASTERIDE OR 1 TABLET BY MOUTH DAILY       fish oil-omega-3 fatty acids 1000 MG capsule Take 2 g by mouth daily       Glucosamine Sulfate 1000 MG TABS        lisinopril (ZESTRIL) 10 MG tablet Take 1 tablet (10 mg) by mouth daily 90 tablet 3     rosuvastatin (CRESTOR) 20 MG tablet Take 1 tablet (20 mg) by mouth daily 90 tablet 3     tamsulosin (FLOMAX) 0.4 MG capsule TAKE 1 CAPSULE BY MOUTH ONCE DAILY AFTER A MEAL         Problem List:  Patient Active Problem List    Diagnosis Date Noted     Morbid obesity (H) 03/09/2021     Priority: Medium     Snoring 08/06/2015     Priority: Medium     Formatting of this note might be different  from the original.  2015: advise to follow up for sleep study.       Impotence 2013     Priority: Medium     Hair loss 2013     Priority: Medium     Formatting of this note might be different from the original.  Finasteride 5mg tabs, he takes 1.25mg (quarter tablet) /day  Started taking Finasteride about , a year after he had a couple of hair transplants. He was initially on Propecia but this was too expensive.       Hayfever 2012     Priority: Medium     Formatting of this note might be different from the original.  Receives DEPO medrol injection 40mg annually and this seems to help him year round.       Glenoid labral tear 2011     Priority: Medium     Formatting of this note might be different from the original.  He has been doing a lot better since starting Glucosamine- Chondroitin.       Rupture of long head biceps tendon 2011     Priority: Medium     Supraspinatus tendon tear 2011     Priority: Medium     Hyperlipidemia with target low density lipoprotein (LDL) cholesterol less than 100 mg/dL 2011     Priority: Medium     Formatting of this note is different from the original.  Patient stopped taking statins 2015 as he was concerned about potential side effects. Importance of takign care of modifiable risk factors discussed with Patient. Will continue to address.  Lovaza (Omega-3 PUFA) 2mg twice a day).    Last Lipids:  Chol: 218    2013  T    2013  HDL:   55    2013  LDL:  127    2013   LDL CHOLESTEROL (mg/dL)   Date Value   2013 127     Center 10-year CHD Risk Score: 8% (11 Total Points)       Hypothenar hammer syndrome (H) 2011     Priority: Medium     Obesity 2011     Priority: Medium     Pre-diabetes 2011     Priority: Medium     Formatting of this note is different from the original.      HEMOGLOBIN A1C MONITORING (POCT) (%)   Date Value   2011 5.4      GLUCOSE                   (mg/dL)    Date Value   3/25/2013 77       Raynaud's phenomenon 04/01/2011     Priority: Medium     Right shoulder pain 04/01/2011     Priority: Medium     CARDIOVASCULAR SCREENING; LDL GOAL LESS THAN 160 10/31/2010     Priority: Medium        Past Medical/Surgical History:  No past medical history on file.  No past surgical history on file.    Social History:  Social History     Socioeconomic History     Marital status:      Spouse name: Not on file     Number of children: Not on file     Years of education: Not on file     Highest education level: Not on file   Occupational History     Not on file   Social Needs     Financial resource strain: Not on file     Food insecurity     Worry: Not on file     Inability: Not on file     Transportation needs     Medical: Not on file     Non-medical: Not on file   Tobacco Use     Smoking status: Former Smoker     Smokeless tobacco: Never Used   Substance and Sexual Activity     Alcohol use: No     Drug use: No     Sexual activity: Never   Lifestyle     Physical activity     Days per week: Not on file     Minutes per session: Not on file     Stress: Not on file   Relationships     Social connections     Talks on phone: Not on file     Gets together: Not on file     Attends [employer] service: Not on file     Active member of club or organization: Not on file     Attends meetings of clubs or organizations: Not on file     Relationship status: Not on file     Intimate partner violence     Fear of current or ex partner: Not on file     Emotionally abused: Not on file     Physically abused: Not on file     Forced sexual activity: Not on file   Other Topics Concern     Parent/sibling w/ CABG, MI or angioplasty before 65F 55M? Yes     Comment: father   Social History Narrative     Not on file       Family History:  Family History   Problem Relation Age of Onset     Heart Disease Father         emphasemia       Review of Systems:  A complete review of systems reviewed by me is negative  "with the exeption of what has been mentioned in the history of present illness.  CONSTITUTIONAL: NEGATIVE for weight gain/loss, fever, chills, sweats or night sweats, drug allergies.  EYES: NEGATIVE for changes in vision, blind spots, double vision.  ENT: NEGATIVE for ear pain, sore throat, sinus pain, post-nasal drip, runny nose, bloody nose  CARDIAC: NEGATIVE for fast heartbeats or fluttering in chest, chest pain or pressure, breathlessness when lying flat, swollen legs or swollen feet.  NEUROLOGIC: NEGATIVE headaches, weakness or numbness in the arms or legs.  DERMATOLOGIC: NEGATIVE for rashes, new moles or change in mole(s)  PULMONARY: NEGATIVE SOB at rest, SOB with activity, dry cough, productive cough, coughing up blood, wheezing or whistling when breathing.    GASTROINTESTINAL: NEGATIVE for nausea or vomitting, loose or watery stools, fat or grease in stools, constipation, abdominal pain, bowel movements black in color or blood noted.  GENITOURINARY: NEGATIVE for pain during urination, blood in urine, urinating more frequently than usual, irregular menstrual periods.  MUSCULOSKELETAL: NEGATIVE for muscle pain, bone or joint pain, swollen joints.  ENDOCRINE: NEGATIVE for increased thirst or urination, diabetes.  LYMPHATIC: NEGATIVE for swollen lymph nodes, lumps or bumps in the breasts or nipple discharge.    Screening:  Vitals: Ht 1.803 m (5' 11\")   Wt 115.7 kg (255 lb)   BMI 35.57 kg/m    BMI= Body mass index is 35.57 kg/m .         [place] Total Score 3/24/2021   Total score - [place] 3       JAM Total Score: 11 (03/24/21 1400)    Impression/Plan:    1. To rule out Obstructive sleep apnea    Patient is a 60 years old male, BMI 35, with comorb HTN & CAD, who is sent for an evaluation of sleep apnea by Cardiology. Patient reports poor sleep sleep with frequent nocturnal arousals, non restorative sleep and daytime fatigue. He doesnot have a bedpartner to provide information regarding extent of snoring or " witnessed apneas. Patient has demographic risk factors for sleep apnea and repetitive arousals in sleep is concerning for sleep disordered breathing or other sleep fragmentation diosrders. An overnight sleep study is recommended for assessment.     Plan:     1. Overnight PSG     He will follow up with me in approximately two weeks after his sleep study has been competed to review the results and discuss plan of care.       Polysomnography reviewed.  Obstructive sleep apnea reviewed.  Complications of untreated sleep apnea were reviewed.    I spent a total of 45 minutes for this appointment today which include time spent before, during and after the visit for patient care, counseling and coordination of care.    Dr. Ramin Delatorre     CC: Taye Mcallister

## 2021-03-25 ENCOUNTER — VIRTUAL VISIT (OUTPATIENT)
Dept: SLEEP MEDICINE | Facility: CLINIC | Age: 61
End: 2021-03-25
Attending: INTERNAL MEDICINE
Payer: COMMERCIAL

## 2021-03-25 DIAGNOSIS — I10 ESSENTIAL HYPERTENSION: ICD-10-CM

## 2021-03-25 DIAGNOSIS — R29.818 SUSPECTED SLEEP APNEA: Primary | ICD-10-CM

## 2021-03-25 DIAGNOSIS — R53.82 CHRONIC FATIGUE: ICD-10-CM

## 2021-03-25 DIAGNOSIS — G47.9 REPETITIVE INTRUSIONS OF SLEEP: ICD-10-CM

## 2021-03-25 DIAGNOSIS — E78.5 HYPERLIPIDEMIA LDL GOAL <70: ICD-10-CM

## 2021-03-25 LAB
ANION GAP SERPL CALCULATED.3IONS-SCNC: 2 MMOL/L (ref 3–14)
BUN SERPL-MCNC: 25 MG/DL (ref 7–30)
CALCIUM SERPL-MCNC: 9 MG/DL (ref 8.5–10.1)
CHLORIDE SERPL-SCNC: 105 MMOL/L (ref 94–109)
CO2 SERPL-SCNC: 30 MMOL/L (ref 20–32)
CREAT SERPL-MCNC: 1.08 MG/DL (ref 0.66–1.25)
GFR SERPL CREATININE-BSD FRML MDRD: 73 ML/MIN/{1.73_M2}
GLUCOSE SERPL-MCNC: 89 MG/DL (ref 70–99)
POTASSIUM SERPL-SCNC: 4.2 MMOL/L (ref 3.4–5.3)
SODIUM SERPL-SCNC: 137 MMOL/L (ref 133–144)

## 2021-03-25 PROCEDURE — 80048 BASIC METABOLIC PNL TOTAL CA: CPT | Performed by: INTERNAL MEDICINE

## 2021-03-25 PROCEDURE — 36415 COLL VENOUS BLD VENIPUNCTURE: CPT | Performed by: INTERNAL MEDICINE

## 2021-03-25 PROCEDURE — 99204 OFFICE O/P NEW MOD 45 MIN: CPT | Mod: TEL | Performed by: INTERNAL MEDICINE

## 2021-03-25 RX ORDER — ZOLPIDEM TARTRATE 5 MG/1
TABLET ORAL
Qty: 1 TABLET | Refills: 0 | Status: SHIPPED | OUTPATIENT
Start: 2021-03-25 | End: 2021-04-26

## 2021-03-29 ENCOUNTER — TELEPHONE (OUTPATIENT)
Dept: SLEEP MEDICINE | Facility: CLINIC | Age: 61
End: 2021-03-29

## 2021-03-29 NOTE — TELEPHONE ENCOUNTER
Reason for Call:  Other appointment    Detailed comments: please call patient to schedule sleep study with two week follow up For results.     Phone Number Patient can be reached at: Cell number on file:    Telephone Information:   Mobile [phone]       Best Time: any    Can we leave a detailed message on this number? YES    Call taken on 3/29/2021 at 2:09 PM by Anne-Marie Valencia

## 2021-04-05 NOTE — TELEPHONE ENCOUNTER
Sleep Study and follow up visit has been scheduled.     Venice Rodrigues CMA on 4/5/2021 at 1:49 PM

## 2021-04-12 ENCOUNTER — THERAPY VISIT (OUTPATIENT)
Dept: SLEEP MEDICINE | Facility: CLINIC | Age: 61
End: 2021-04-12
Payer: COMMERCIAL

## 2021-04-12 DIAGNOSIS — G47.33 OSA (OBSTRUCTIVE SLEEP APNEA): ICD-10-CM

## 2021-04-12 DIAGNOSIS — R29.818 SUSPECTED SLEEP APNEA: ICD-10-CM

## 2021-04-12 DIAGNOSIS — G47.9 REPETITIVE INTRUSIONS OF SLEEP: ICD-10-CM

## 2021-04-12 DIAGNOSIS — I10 ESSENTIAL HYPERTENSION: ICD-10-CM

## 2021-04-12 DIAGNOSIS — R53.82 CHRONIC FATIGUE: ICD-10-CM

## 2021-04-12 PROCEDURE — 95810 POLYSOM 6/> YRS 4/> PARAM: CPT | Performed by: INTERNAL MEDICINE

## 2021-04-14 ENCOUNTER — OFFICE VISIT (OUTPATIENT)
Dept: CARDIOLOGY | Facility: CLINIC | Age: 61
End: 2021-04-14
Payer: COMMERCIAL

## 2021-04-14 VITALS
HEART RATE: 73 BPM | OXYGEN SATURATION: 95 % | DIASTOLIC BLOOD PRESSURE: 80 MMHG | BODY MASS INDEX: 36.26 KG/M2 | SYSTOLIC BLOOD PRESSURE: 137 MMHG | WEIGHT: 260 LBS

## 2021-04-14 DIAGNOSIS — I10 ESSENTIAL HYPERTENSION: ICD-10-CM

## 2021-04-14 DIAGNOSIS — I25.10 CORONARY ARTERY DISEASE INVOLVING NATIVE CORONARY ARTERY OF NATIVE HEART WITHOUT ANGINA PECTORIS: Primary | ICD-10-CM

## 2021-04-14 DIAGNOSIS — E66.09 CLASS 2 OBESITY DUE TO EXCESS CALORIES WITHOUT SERIOUS COMORBIDITY WITH BODY MASS INDEX (BMI) OF 36.0 TO 36.9 IN ADULT: ICD-10-CM

## 2021-04-14 DIAGNOSIS — E66.812 CLASS 2 OBESITY DUE TO EXCESS CALORIES WITHOUT SERIOUS COMORBIDITY WITH BODY MASS INDEX (BMI) OF 36.0 TO 36.9 IN ADULT: ICD-10-CM

## 2021-04-14 DIAGNOSIS — E78.5 HYPERLIPIDEMIA LDL GOAL <70: ICD-10-CM

## 2021-04-14 LAB
ALBUMIN UR-MCNC: NEGATIVE MG/DL
APPEARANCE UR: CLEAR
BILIRUB UR QL STRIP: NEGATIVE
COLOR UR AUTO: YELLOW
GLUCOSE UR STRIP-MCNC: NEGATIVE MG/DL
HGB UR QL STRIP: NEGATIVE
KETONES UR STRIP-MCNC: NEGATIVE MG/DL
LEUKOCYTE ESTERASE UR QL STRIP: NEGATIVE
NITRATE UR QL: NEGATIVE
PH UR STRIP: 5.5 PH (ref 5–7)
RBC #/AREA URNS AUTO: NORMAL /HPF
SOURCE: NORMAL
SP GR UR STRIP: >1.03 (ref 1–1.03)
TRANS CELLS #/AREA URNS HPF: NORMAL /HPF
UROBILINOGEN UR STRIP-ACNC: 0.2 EU/DL (ref 0.2–1)
WBC #/AREA URNS AUTO: NORMAL /HPF

## 2021-04-14 PROCEDURE — 99215 OFFICE O/P EST HI 40 MIN: CPT | Performed by: INTERNAL MEDICINE

## 2021-04-14 PROCEDURE — 81001 URINALYSIS AUTO W/SCOPE: CPT | Performed by: INTERNAL MEDICINE

## 2021-04-14 RX ORDER — LISINOPRIL AND HYDROCHLOROTHIAZIDE 12.5; 2 MG/1; MG/1
1 TABLET ORAL DAILY
Qty: 90 TABLET | Refills: 3 | Status: SHIPPED | OUTPATIENT
Start: 2021-04-14 | End: 2022-06-07

## 2021-04-14 NOTE — PATIENT INSTRUCTIONS
Thank you for coming to the [facility]; please note the following instructions:    1. STOP: Lisinopril    2. START: Lisinopril/HCTZ 20/12.5mg daily    3. Urinalysis today    4. Fasting labs in May    5. In 1 month monitor blood pressure for 1 week and send us readings on [facility]    6. Echocardiogram in 3 years.        If you have any questions regarding your visit please contact your care team:     Cardiology  Telephone Number   Tiny LÓPEZ, RN  Suzanne KC, RN   Sarita FALCON, MELANIE BRAVO, MELANIE LICONA, LPN   [phone] (option 1)   For scheduling appts:     [phone] (select option 1)       For the Device Clinic (Pacemakers and ICD's)  RN's :  Maxine Lazaro   During business hours: [phone]    *After business hours:  [phone] (select option 4)      Normal test result notifications will be released via [employer] or mailed within 7 business days.  All other test results, will be communicated via telephone once reviewed by your cardiologist.    If you need a medication refill please contact your pharmacy.  Please allow 3 business days for your refill to be completed.    As always, thank you for trusting us with your health care needs!

## 2021-04-14 NOTE — PROGRESS NOTES
Referring provider:TOREY Stewart CNP    HPI: Mr. Michael Jimenez is a 60 year old  male with PMH significant for obesity, former smoker, hypertension (not on treatment) and hyperlipidemia.    Patient recently presented to his primary care physician and reported chest discomfort.  Subsequently he had an EKG which showed left bundle branch block.  He had a Lexiscan stress test on 3/3/2021 which showed reduced LVEF at 42%, prior MI in basal anteroseptal left ventricle. I have reviewed patient's stress test which shows wide QRS tachycardia likely nonsustained VT on the pre- stress EKG.  No VT during drug infusion.    Patient tells me that he was told to have cardiac murmur.  That was the reason that he saw primary care physician in the first place.  Patient recalls having left-sided chest discomfort couple of months ago which lasted for few hours.  He cannot recall what was he doing at that time.  He tells me that he is currently very sedentary partially due to his hip pain.  Denies recurrent chest discomfort since then.  No shortness of breath with activity.  Denies palpitations, dizziness, syncope or lower extremity edema.    He has history of smoking for total of 15 years.  He stopped in his mid 30s.  For 2 years he tells me that he smoked 4 packs/day.  He has history of high blood pressure but never been on treatment.  He was on cholesterol-lowering treatment at some point then he self discontinued.  Recently his BMP showed elevated creatinine with low GFR.  Patient tells me that his brother in his 50s had silent MI.    No history of diabetes. LDL is significantly elevated at 171 (2/18/2021).  Not on statin.  Patient has CKD stage III with GFR at 54.    No prior echocardiogram available to review.    I reviewed patient's EKG 2/11/2021 which shows sinus rhythm and left bundle branch block.    Medications, personal, family, and social history reviewed with patient and revised.    Interval history  4/14/2021:  Patient is being seen today for follow-up on blood pressure control and discuss the tests that I have ordered during my previous visit which include transthoracic echocardiogram and CT coronary angiogram. I have also started him on hypertension treatment with lisinopril 10 mg during my previous visit.  Patient has been monitoring his blood pressure at home and tells me that he has not seen significant change with initiation of lisinopril 10 mg.  He is complaining of abnormal smell in his stool, urine and body smell.  Otherwise denies chest pain, shortness of breath, lower extremity edema, syncope.    PAST MEDICAL HISTORY:  Hypertension  Hyperlipidemia  Nonobstructive coronary artery disease  Mild aortic stenosis  Former heavy smoker    CURRENT MEDICATIONS:  Current Outpatient Medications   Medication Sig Dispense Refill    aspirin (ASA) 81 MG chewable tablet Take 1 tablet (81 mg) by mouth daily 90 tablet 0    cyclobenzaprine (FLEXERIL) 10 MG tablet Take 1 tablet (10 mg) by mouth 3 times daily as needed for muscle spasms 60 tablet 1    FINASTERIDE OR 1 TABLET BY MOUTH DAILY      fish oil-omega-3 fatty acids 1000 MG capsule Take 2 g by mouth daily      Glucosamine Sulfate 1000 MG TABS       lisinopril (ZESTRIL) 10 MG tablet Take 1 tablet (10 mg) by mouth daily 90 tablet 3    rosuvastatin (CRESTOR) 20 MG tablet Take 1 tablet (20 mg) by mouth daily 90 tablet 3    tamsulosin (FLOMAX) 0.4 MG capsule TAKE 1 CAPSULE BY MOUTH ONCE DAILY AFTER A MEAL      zolpidem (AMBIEN) 5 MG tablet Take tablet by mouth 15 minutes prior to sleep, for Sleep Study 1 tablet 0       PAST SURGICAL HISTORY:  No past surgical history on file.    ALLERGIES:   No Known Allergies    FAMILY HISTORY:  Family History   Problem Relation Age of Onset    Heart Disease Father         emphasemia         SOCIAL HISTORY:  Social History     Tobacco Use    Smoking status: Former Smoker    Smokeless tobacco: Never Used   Substance Use Topics     Alcohol use: No    Drug use: No       ROS:   Constitutional: No fever, chills, or sweats. Weight stable.     Neuro: Complaining of loss of smell and taste+.       Exam:  /80   Pulse 73   Wt 117.9 kg (260 lb)   SpO2 95%   BMI 36.26 kg/m    GENERAL APPEARANCE: alert and no distress  HEENT: no icterus, no central cyanosis  CARDIOVASCULAR: regular rhythm, normal S1, S2, no S3 or S4 , upper parasternal 2/6 systolic ejection murmur radiating to carotids  GI: soft, non tender  EXTREMITIES:  no edema  NEURO: alert, normal speech,and affect   SKIN: no ecchymoses, no rashes     I have reviewed the labs and personally reviewed the imaging below and made my comment in the assessment and plan.    Labs:  CBC RESULTS:   Lab Results   Component Value Date    HGB 16.9 01/04/2011     01/04/2011       BMP RESULTS:  Lab Results   Component Value Date     03/25/2021    POTASSIUM 4.2 03/25/2021    CHLORIDE 105 03/25/2021    CO2 30 03/25/2021    ANIONGAP 2 (L) 03/25/2021    GLC 89 03/25/2021    BUN 25 03/25/2021    CR 1.08 03/25/2021    GFRESTIMATED 73 03/25/2021    GFRESTBLACK 85 03/25/2021    SKYLER 9.0 03/25/2021        INR RESULTS:  Lab Results   Component Value Date    INR 0.92 01/04/2011       Lexiscan stress test 3/3/2021:  The nuclear stress test is abnormal.    There is a small area of a mild degree of infarction in the mid to basal anteroseptal segment(s) of the left ventricle.    Left ventricular function is mildly reduced.    The left ventricular ejection fraction at rest is 42%.  The left ventricular ejection fraction at stress is 45%.    One episode of 4 beat run on NSVT at rest and 4/10 chest pain with stress. Consider further testing with CT coronary angiogram if clinical suspicion of CAD is high.    There is no prior study for comparison.       EKG 2/11/2021 personally reviewed sinus rhythm with left bundle branch block      CT coronary angiogram 3/12/2021  1.  This study was limited by patient motion  artifact. Suggest  coronary angiogram if clinical suspicion for obstructive lesions is  high.  2.  There is no evidence of significant stenosis in the left main and  proximal segments of the LAD, LCx and RCA.   3.  Total Agatston score 416 placing the patient in the 85th  percentile when compared to age and gender matched control group.    Echocardiogram 3/10/20 21  Mild aortic stenosis   LVEF 55 to 60%    Assessment and Plan:   Patient is being seen today for follow-up. He is currently asymptomatic from cardiac standpoint    #Coronary artery disease by CT coronary angiogram and stress test  #Nonsustained VT  #Uncontrolled hypertension  #Hyperlipidemia  -Normal EF  -Continue aspirin 81 mg, Crestor 20 mg  -Stop lisinopril 10 and switch to lisinopril hydrochlorothiazide 20/12.5 mg to achieve better blood pressure control since it is not well controlled yet.  -Monitor blood pressure at home and report to us through [email or web address] in a month.  -Recheck lipids and BMP next month      #Mild aortic stenosis  -Recheck aortic valve gradients in 3 years    #Sleep disturbance  #Obesity, short neck  -Sleep study completed.  Results not back yet.    #Loss of smell  #Loss of taste  #Abnormal smelling urine  -Ordered urine analysis  -Patient requesting other tests to rule out possible cancer in his urinary system  -I will defer further discussion to his primary care physician.    Return to clinic 3 years.    Total time spent today for this visit is 40 minutes including precharting, face-to-face clinic visit, review of labs/imaging and medical documentation.    Please donot hesitate to contact me if you have any questions or concerns. Again, thank you for allowing me to participate in the care of your patient.    Taye WELCH MD  [facility] Division of Cardiology  Pager [phone]    Interval history 5/20/2024:  Transthoracic echocardiogram shows mild aortic valve stenosis.  Left bundle branch block.  LVEF 50 to 55%.  Recommend  repeat follow-up in 3 years.  DR WELCH

## 2021-04-14 NOTE — LETTER
4/14/2021      RE: Michael Jimenez  [address] [place] [id number]       Dear Colleague,    Thank you for the opportunity to participate in the care of your patient, Michael Jimenez, at the [facility]. Please see a copy of my visit note below.    Referring provider:TOREY Stewart CNP    HPI: . Michael Jimenez is a 60 year old  male with PMH significant for obesity, former smoker, hypertension (not on treatment) and hyperlipidemia.    Patient recently presented to his primary care physician and reported chest discomfort.  Subsequently he had an EKG which showed left bundle branch block.  He had a Lexiscan stress test on 3/3/2021 which showed reduced LVEF at 42%, prior MI in basal anteroseptal left ventricle. I have reviewed patient's stress test which shows wide QRS tachycardia likely nonsustained VT on the pre- stress EKG.  No VT during drug infusion.    Patient tells me that he was told to have cardiac murmur.  That was the reason that he saw primary care physician in the first place.  Patient recalls having left-sided chest discomfort couple of months ago which lasted for few hours.  He cannot recall what was he doing at that time.  He tells me that he is currently very sedentary partially due to his hip pain.  Denies recurrent chest discomfort since then.  No shortness of breath with activity.  Denies palpitations, dizziness, syncope or lower extremity edema.    He has history of smoking for total of 15 years.  He stopped in his mid 30s.  For 2 years he tells me that he smoked 4 packs/day.  He has history of high blood pressure but never been on treatment.  He was on cholesterol-lowering treatment at some point then he self discontinued.  Recently his BMP showed elevated creatinine with low GFR.  Patient tells me that his brother in his 50s had silent MI.    No history of diabetes. LDL is significantly elevated at  171 (2/18/2021).  Not on statin.  Patient has CKD stage III with GFR at 54.    No prior echocardiogram available to review.    I reviewed patient's EKG 2/11/2021 which shows sinus rhythm and left bundle branch block.    Medications, personal, family, and social history reviewed with patient and revised.    Interval history 4/14/2021:  Patient is being seen today for follow-up on blood pressure control and discuss the tests that I have ordered during my previous visit which include transthoracic echocardiogram and CT coronary angiogram. I have also started him on hypertension treatment with lisinopril 10 mg during my previous visit.  Patient has been monitoring his blood pressure at home and tells me that he has not seen significant change with initiation of lisinopril 10 mg.  He is complaining of abnormal smell in his stool, urine and body smell.  Otherwise denies chest pain, shortness of breath, lower extremity edema, syncope.    PAST MEDICAL HISTORY:  Hypertension  Hyperlipidemia  Nonobstructive coronary artery disease  Mild aortic stenosis  Former heavy smoker    CURRENT MEDICATIONS:  Current Outpatient Medications   Medication Sig Dispense Refill     aspirin (ASA) 81 MG chewable tablet Take 1 tablet (81 mg) by mouth daily 90 tablet 0     cyclobenzaprine (FLEXERIL) 10 MG tablet Take 1 tablet (10 mg) by mouth 3 times daily as needed for muscle spasms 60 tablet 1     FINASTERIDE OR 1 TABLET BY MOUTH DAILY       fish oil-omega-3 fatty acids 1000 MG capsule Take 2 g by mouth daily       Glucosamine Sulfate 1000 MG TABS        lisinopril (ZESTRIL) 10 MG tablet Take 1 tablet (10 mg) by mouth daily 90 tablet 3     rosuvastatin (CRESTOR) 20 MG tablet Take 1 tablet (20 mg) by mouth daily 90 tablet 3     tamsulosin (FLOMAX) 0.4 MG capsule TAKE 1 CAPSULE BY MOUTH ONCE DAILY AFTER A MEAL       zolpidem (AMBIEN) 5 MG tablet Take tablet by mouth 15 minutes prior to sleep, for Sleep Study 1 tablet 0       PAST SURGICAL  HISTORY:  No past surgical history on file.    ALLERGIES:   No Known Allergies    FAMILY HISTORY:  Family History   Problem Relation Age of Onset     Heart Disease Father         emphasemia         SOCIAL HISTORY:  Social History     Tobacco Use     Smoking status: Former Smoker     Smokeless tobacco: Never Used   Substance Use Topics     Alcohol use: No     Drug use: No       ROS:   Constitutional: No fever, chills, or sweats. Weight stable.     Neuro: Complaining of loss of smell and taste+.       Exam:  /80   Pulse 73   Wt 117.9 kg (260 lb)   SpO2 95%   BMI 36.26 kg/m    GENERAL APPEARANCE: alert and no distress  HEENT: no icterus, no central cyanosis  CARDIOVASCULAR: regular rhythm, normal S1, S2, no S3 or S4 , upper parasternal 2/6 systolic ejection murmur radiating to carotids  GI: soft, non tender  EXTREMITIES:  no edema  NEURO: alert, normal speech,and affect   SKIN: no ecchymoses, no rashes     I have reviewed the labs and personally reviewed the imaging below and made my comment in the assessment and plan.    Labs:  CBC RESULTS:   Lab Results   Component Value Date    HGB 16.9 01/04/2011     01/04/2011       BMP RESULTS:  Lab Results   Component Value Date     03/25/2021    POTASSIUM 4.2 03/25/2021    CHLORIDE 105 03/25/2021    CO2 30 03/25/2021    ANIONGAP 2 (L) 03/25/2021    GLC 89 03/25/2021    BUN 25 03/25/2021    CR 1.08 03/25/2021    GFRESTIMATED 73 03/25/2021    GFRESTBLACK 85 03/25/2021    SKYLER 9.0 03/25/2021        INR RESULTS:  Lab Results   Component Value Date    INR 0.92 01/04/2011       Lexiscan stress test 3/3/2021:  The nuclear stress test is abnormal.     There is a small area of a mild degree of infarction in the mid to basal anteroseptal segment(s) of the left ventricle.     Left ventricular function is mildly reduced.     The left ventricular ejection fraction at rest is 42%.  The left ventricular ejection fraction at stress is 45%.     One episode of 4 beat run on  NSVT at rest and 4/10 chest pain with stress. Consider further testing with CT coronary angiogram if clinical suspicion of CAD is high.     There is no prior study for comparison.       EKG 2/11/2021 personally reviewed sinus rhythm with left bundle branch block      CT coronary angiogram 3/12/2021  1.  This study was limited by patient motion artifact. Suggest  coronary angiogram if clinical suspicion for obstructive lesions is  high.  2.  There is no evidence of significant stenosis in the left main and  proximal segments of the LAD, LCx and RCA.   3.  Total Agatston score 416 placing the patient in the 85th  percentile when compared to age and gender matched control group.    Echocardiogram 3/10/20 21  Mild aortic stenosis   LVEF 55 to 60%    Assessment and Plan:   Patient is being seen today for follow-up. He is currently asymptomatic from cardiac standpoint    #Coronary artery disease by CT coronary angiogram and stress test  #Nonsustained VT  #Uncontrolled hypertension  #Hyperlipidemia  -Normal EF  -Continue aspirin 81 mg, Crestor 20 mg  -Stop lisinopril 10 and switch to lisinopril hydrochlorothiazide 20/12.5 mg to achieve better blood pressure control since it is not well controlled yet.  -Monitor blood pressure at home and report to us through [facility] in a month.  -Recheck lipids and BMP next month      #Mild aortic stenosis  -Recheck aortic valve gradients in 3 years    #Sleep disturbance  #Obesity, short neck  -Sleep study completed.  Results not back yet.    #Loss of smell  #Loss of taste  #Abnormal smelling urine  -Ordered urine analysis  -Patient requesting other tests to rule out possible cancer in his urinary system  -I will defer further discussion to his primary care physician.    Return to clinic 3 years.    Total time spent today for this visit is 40 minutes including precharting, face-to-face clinic visit, review of labs/imaging and medical documentation.    Please donot hesitate to contact me if  you have any questions or concerns. Again, thank you for allowing me to participate in the care of your patient.    Taye WELCH MD  [facility] Division of Cardiology  Pager [phone]

## 2021-04-14 NOTE — NURSING NOTE
"Chief Complaint   Patient presents with     RECHECK     1 month follow up.        Initial BP (!) 146/88 (BP Location: Left arm, Patient Position: Chair, Cuff Size: Adult Large)   Pulse 79   Wt 117.9 kg (260 lb)   SpO2 95%   BMI 36.26 kg/m   Estimated body mass index is 36.26 kg/m  as calculated from the following:    Height as of 3/24/21: 1.803 m (5' 11\").    Weight as of this encounter: 117.9 kg (260 lb)..  BP completed using cuff size: mini Hoffman MA  "

## 2021-04-15 ENCOUNTER — TELEPHONE (OUTPATIENT)
Dept: CARDIOLOGY | Facility: CLINIC | Age: 61
End: 2021-04-15

## 2021-04-15 PROBLEM — G47.33 OSA (OBSTRUCTIVE SLEEP APNEA): Status: ACTIVE | Noted: 2021-04-15

## 2021-04-15 LAB — SLPCOMP: NORMAL

## 2021-04-15 NOTE — PROCEDURES
" SLEEP STUDY INTERPRETATION  DIAGNOSTIC POLYSOMNOGRAPHY REPORT      Patient: JUSTINO MCCOLLUM  YOB: 1960  Study Date: 4/12/2021  MRN: [id number]  Referring Provider: Taye Mcallister MD  Ordering Provider: MD Delatorre Rakesh    Indications for Polysomnography: The patient is a 60 year old Male who is 5' 11\" and weighs 255.0 lbs. His BMI is 35.7, [place] sleepiness scale 3 and neck circumference is 54 cm. Relevant medical history includes HTN & CAD. A diagnostic polysomnogram was performed to evaluate for sleep apnea.    Polysomnogram Data: A full night polysomnogram recorded the standard physiologic parameters including EEG, EOG, EMG, ECG, nasal and oral airflow. Respiratory parameters of chest and abdominal movements were recorded with respiratory inductance plethysmography. Oxygen saturation was recorded by pulse oximetry. Hypopnea scoring rule used: 1B 4%.    Sleep Architecture: Fragmented sleep.   The total recording time of the polysomnogram was 371.3 minutes. The total sleep time was 305.5 minutes. Sleep latency was normal at 24.4 minutes with the use of a sleep aid (Zolpidem 5 mg). REM latency was 47.5 minutes. Arousal index was increased at 22.2 arousals per hour. Sleep efficiency was mildly decreased at 82.3%. Wake after sleep onset was 33.0 minutes. The patient spent 8.7% of total sleep time in Stage N1, 66.8% in Stage N2, 4.9% in Stage N3, and 19.6% in REM. Time in REM supine was 46.5 minutes.    Respiration: Severe Obstructive sleep apnea.     Events ? The polysomnogram revealed a presence of 48 obstructive, - central, and - mixed apneas resulting in an apnea index of 9.4 events per hour. There were 105 obstructive hypopneas and - central hypopneas resulting in an obstructive hypopnea index of 20.6 and central hypopnea index of - events per hour. The combined apnea/hypopnea index was 30.0 events per hour (central apnea/hypopnea index was - events per hour). The REM AHI was 78.0 events per hour. The " supine AHI was 47.1 events per hour. The RERA index was 1.8 events per hour.  The RDI was 31.8 events per hour.    Snoring - was reported as moderate to loud& intermittent.    Respiratory rate and pattern - was notable for normal respiratory rate and pattern.    Sustained Sleep Associated Hypoventilation - Transcutaneous carbon dioxide monitoring was not used, however significant hypoventilation was not suggested by oximetry.    Sleep Associated Hypoxemia - (Greater than 5 minutes O2 sat at or below 88%) was present. Baseline oxygen saturation was 91.0%. Lowest oxygen saturation was 58.9%. Time spent less than or equal to 88% was 41.2 minutes. Time spent less than or equal to 89% was 51.6 minutes.    Movement Activity: An increased frequency of periodic sony movements of sleep is noted. However, majority of the movements were not associated with arousals.     Periodic Limb Activity - There were 221 PLMs during the entire study. The PLM index was 43.4 movements per hour. The PLM Arousal Index was 5.1 per hour.    REM EMG Activity - Excessive transient/sustained muscle activity was not present.    Nocturnal Behavior - Abnormal sleep related behaviors were not noted during/arising out of NREM / REM sleep.    Bruxism - None apparent.    Cardiac Summary: Sinus rhythm.   The average pulse rate was 73.3 bpm. The minimum pulse rate was 61.9 bpm while the maximum pulse rate was 96.1 bpm.      Assessment:     This sleep study shows severe obstructive sleep apnea with associated sleep fragmentation and oxygen desaturations. Sleep disordered breathing was predominantly supine position related and apnea events and oxygen desaturations were exacerbated in supine REM.      There was an increased frequency of periodic limb movements of sleep. However, majority of the movements were not associated with arousals.     Recommendations:    CPAP therapy is the treatment of choice for severe sleep apnea. Treatment could be empirically  initiated with Auto?titrating PAP therapy with a range of 5 to 15 cmH2O. Recommend clinical follow up with sleep management team.    If CPAP is not tolerated or accepted, patient may be a candidate for dental appliance through referral to Sleep Dentistry for the treatment of obstructive sleep apnea.    Oral appliance therapy is likely to be most effective when combined with positional restriction to avoid supine sleep.     If devices are not acceptable or effective, patient may benefit from evaluation of possible surgical options. If he is interested, would recommend referral to specialized ENT-Sleep provider.    Weight management.    Pharmacologic therapy should be used for management of restless legs syndrome only if present and clinically indicated and not based on the presence of periodic limb movements alone.    Diagnostic Codes:   Obstructive Sleep Apnea G47.33  Sleep Hypoxemia G47.36     4/12/2021 [facility] Sleep Study (255.0 lbs) - AHI 30.0, RDI 31.8, Supine AHI 47.1, REM AHI 78.0, Low O2 58.9%, Time Spent ?88% 41.2 minutes / Time Spent ?89% 51.6 minutes.      _____________________________________   Electronically Signed By: Ramin adam MD 04/15/2021

## 2021-04-15 NOTE — TELEPHONE ENCOUNTER
----- Message from Taye Mcallister MD sent at 4/14/2021  4:55 PM CDT -----  Michael,    Your urine analysis is normal. They looked at everything.    Good news.  DR.CAN

## 2021-04-25 NOTE — PROGRESS NOTES
Sleep Follow-Up Visit:    Date on this visit: 4/26/2021    Michael DEBBIE Jimenez comes in today for follow-up of his sleep study done on 4/12/21. He was initially seen for nightly poor quality of sleep & daytime fatigue for last 5 years.      His medical history is significant for hypertension, CAD & recent stress test showing nonsustained VT.     Sleep latency 24.4 minutes with Ambien.  REM achieved.   REM latency 47.5 minutes.  Sleep efficiency 82.3%. Total sleep time 305.5 minutes.    Sleep architecture:  Stage 1, 8.7% (5%), stage 2, 66.8% (45-55%), stage 3, 4.9% (15-20%), stage REM, 19.6% (20-25%).  AHI was 30/hr, with desaturations down to 56%. He spent 51.6 minutes below 90% SpO2 and 41.2 minutes below 89% SpO2. RDI 31.8/hr.  REM RDI 78/hr, consistent with severe REM CAROL.  Supine RDI 48.8/hr, consistent with severe SUPINE CAROL. His non-supine RDI was 8.4/hr (in 128.5 minutes including 13.5 minutes or REM).  Periodic Limb Movement Index 43.4/hour, 5.1/hr associated with arousals.       CPAP titration:  CPAP was not initiated. These findings were reviewed with the patient.    Past medical/surgical history, family history, social history, medications and allergies were reviewed.      Problem List:  Patient Active Problem List    Diagnosis Date Noted     CAROL (obstructive sleep apnea) 04/15/2021     Priority: Medium     4/12/2021 [facility] Sleep Study (255.0 lbs) - AHI 30.0, RDI 31.8, Supine AHI 47.1, REM AHI 78.0, Low O2 58.9%, Time Spent ?88% 41.2 minutes / Time Spent ?89% 51.6 minutes.       Morbid obesity (H) 03/09/2021     Priority: Medium     Snoring 08/06/2015     Priority: Medium     Formatting of this note might be different from the original.  8/2015: advise to follow up for sleep study.       Impotence 12/12/2013     Priority: Medium     Hair loss 12/11/2013     Priority: Medium     Formatting of this note might be different from the original.  Finasteride 5mg tabs, he takes 1.25mg (quarter tablet)  /day  Started taking Finasteride about , a year after he had a couple of hair transplants. He was initially on Propecia but this was too expensive.       Hayfever 2012     Priority: Medium     Formatting of this note might be different from the original.  Receives DEPO medrol injection 40mg annually and this seems to help him year round.       Glenoid labral tear 2011     Priority: Medium     Formatting of this note might be different from the original.  He has been doing a lot better since starting Glucosamine- Chondroitin.       Rupture of long head biceps tendon 2011     Priority: Medium     Supraspinatus tendon tear 2011     Priority: Medium     Hyperlipidemia with target low density lipoprotein (LDL) cholesterol less than 100 mg/dL 2011     Priority: Medium     Formatting of this note is different from the original.  Patient stopped taking statins 2015 as he was concerned about potential side effects. Importance of takign care of modifiable risk factors discussed with Patient. Will continue to address.  Lovaza (Omega-3 PUFA) 2mg twice a day).    Last Lipids:  Chol: 218    2013  T    2013  HDL:   55    2013  LDL:  127    2013   LDL CHOLESTEROL (mg/dL)   Date Value   2013 127     [place] 10-year CHD Risk Score: 8% (11 Total Points)       Hypothenar hammer syndrome (H) 2011     Priority: Medium     Obesity 2011     Priority: Medium     Pre-diabetes 2011     Priority: Medium     Formatting of this note is different from the original.      HEMOGLOBIN A1C MONITORING (POCT) (%)   Date Value   2011 5.4      GLUCOSE                   (mg/dL)   Date Value   3/25/2013 77       Raynaud's phenomenon 2011     Priority: Medium     Right shoulder pain 2011     Priority: Medium     CARDIOVASCULAR SCREENING; LDL GOAL LESS THAN 160 10/31/2010     Priority: Medium        Impression/Plan:    (G47.33) CAROL (obstructive sleep  apnea)  (primary encounter diagnosis)  Comment: This study shows an AHI of 30/hr with severe desaturations to 58% and 41 minutes below 89% SpO2. His apnea was highly positional with a supine RDI of 48/hr and non-supine RDI of 8/hr. He did have one REM period in a lateral position where his O2 baseline dropped and remained below 89% for several minutes in the absence of apnea. It is unclear if that is a true reading or secondary to the probe not sitting correctly on his finger.  Plan: Comprehensive DME        We reviewed treatment options including CPAP, dental appliance, weight loss, positional restriction and ENT surgery. The patient was interested in CPAP. I am prescribing auto CPAP 5-15 cm, heated humidifier and compliance meter. The patient was informed of the mask exchange policy and the compliance goals. They were also informed that they would be followed by the sleep therapy management team during the first month of CPAP use. I will consider a home sleep study once on treated to verify the hypoxemia is corrected.     He will follow up with me in about 2 month(s).     Phone visit duration: 24 min    Bennett Goltz, PA-C    CC: Taye Mcallister MD

## 2021-04-26 ENCOUNTER — VIRTUAL VISIT (OUTPATIENT)
Dept: SLEEP MEDICINE | Facility: CLINIC | Age: 61
End: 2021-04-26
Payer: COMMERCIAL

## 2021-04-26 ENCOUNTER — OFFICE VISIT (OUTPATIENT)
Dept: FAMILY MEDICINE | Facility: CLINIC | Age: 61
End: 2021-04-26
Payer: COMMERCIAL

## 2021-04-26 ENCOUNTER — TELEPHONE (OUTPATIENT)
Dept: CARDIOLOGY | Facility: CLINIC | Age: 61
End: 2021-04-26

## 2021-04-26 VITALS
DIASTOLIC BLOOD PRESSURE: 90 MMHG | BODY MASS INDEX: 36.23 KG/M2 | RESPIRATION RATE: 16 BRPM | TEMPERATURE: 97.7 F | OXYGEN SATURATION: 97 % | WEIGHT: 258.8 LBS | HEIGHT: 71 IN | SYSTOLIC BLOOD PRESSURE: 106 MMHG | HEART RATE: 97 BPM

## 2021-04-26 VITALS — WEIGHT: 262 LBS | BODY MASS INDEX: 36.68 KG/M2 | HEIGHT: 71 IN

## 2021-04-26 DIAGNOSIS — G47.33 OSA (OBSTRUCTIVE SLEEP APNEA): Primary | ICD-10-CM

## 2021-04-26 DIAGNOSIS — B07.8 OTHER VIRAL WARTS: ICD-10-CM

## 2021-04-26 DIAGNOSIS — M54.16 LUMBAR RADICULOPATHY: Primary | ICD-10-CM

## 2021-04-26 DIAGNOSIS — Z12.11 SPECIAL SCREENING FOR MALIGNANT NEOPLASMS, COLON: ICD-10-CM

## 2021-04-26 PROCEDURE — 99214 OFFICE O/P EST MOD 30 MIN: CPT | Performed by: NURSE PRACTITIONER

## 2021-04-26 PROCEDURE — 99213 OFFICE O/P EST LOW 20 MIN: CPT | Mod: TEL | Performed by: PHYSICIAN ASSISTANT

## 2021-04-26 ASSESSMENT — MIFFLIN-ST. JEOR
SCORE: 1996.04
SCORE: 2010.55

## 2021-04-26 NOTE — PROGRESS NOTES
"Michael Jimenez is a 60 year old male being evaluated via a billable telephone visit.     \"This telephone visit will be conducted via a call between you and your physician/provider. We have found that certain health care needs can be provided without the need for an in-person visit or physical exam.  This service lets us provide the care you need with a telephone conversation.  If a prescription is necessary we can send it directly to your pharmacy.  If lab work is needed we can place an order for that and you can then stop by our lab to have the test done at a later time.\"    Telephone visits are billed at different rates depending on your insurance coverage.  Please reach out to your insurance provider with any questions.    Patient has given verbal consent for  a Telephone visit? Yes    What telephone number would you like your provider to contact at at:  [phone]  How would you like to obtain your AVS? Michael Faulkner, [facility]    Telephone Visit Details:     Telephone Visit Start Time: 11:12 AM    Telephone Visit End Time:11:36 AM        "

## 2021-04-26 NOTE — TELEPHONE ENCOUNTER
He has labs on 5/11.  Dr. Mcallister, do you feel it is appropriate to order Covid antibody testing?  Please advise-if yes please sign order.    Tiny Bajwa RN  Cardiology Care Coordinator  [facility]  [phone] option 1

## 2021-04-26 NOTE — TELEPHONE ENCOUNTER
[facility]    Phone Message    May a detailed message be left on voicemail: no     Reason for Call: Other: Pt called in regards to a lab he was looking to update.  Pt is looking to see if he can have a test to see if he's had COVID-19.  He reports this is a test they could do and it's only done by blood work and would like it to be included.  If this is not able to be added on to the lab please give Pt a call back to notifiy him.     Action Taken: Message routed to:  [facility] ([facility]  Cardiology    Travel Screening: Not Applicable

## 2021-04-26 NOTE — PROGRESS NOTES
Assessment & Plan     Lumbar radiculopathy  No improvement with ED, went to PT once, cannot do the exercises they recommend due to pain. Would like to meet with specialist, referral placed.  - Orthopedic & Spine  Referral; Future    Special screening for malignant neoplasms, colon  Due for screening.  - GASTROENTEROLOGY ADULT REF PROCEDURE ONLY; Future    Other viral warts  Discussed that these lesions are benign, offered cryo today, declines. Will let me know if he changes his mind.       Patient Instructions   Schedule colonoscopy.  Schedule with spine surgeon.  If the skin lesions change, let me know, and we can refer you to dermatology.       Return in about 1 month (around 5/26/2021) for worsening or continued symptoms.    TOREY Stewart CNP  [facility] Rodriguez is a 60 year old who presents for the following health issues   Chief Complaint   Patient presents with     Derm Problem     Pt here for tags on his skin that come and go.     Back Pain     Pt also here for f/u on back pain.  Not getting any better.       HPI     Back Pain  Onset/Duration: since December  Description:   Location of pain: low back right  Character of pain: sharp, dull ache and intermittent  Pain radiation: radiates into the right buttocks, radiates into the right leg and radiates into the right foot  New numbness or weakness in legs, not attributed to pain: no   Intensity: Currently 1/10, At its worst 10/10  Progression of Symptoms: same and intermittent  History:   Specific cause: none  Pain interferes with job: not applicable  History of back problems: no prior back problems  Any previous MRI or X-rays: Yes- at [place].  Date 2/12/21 MRI  Sees a specialist for back pain: No  Alleviating factors:   Improved by: NSAIDs and steroid injection    Precipitating factors:  Worsened by: Walking uphill and standing  Therapies tried and outcome: muscle relaxants, NSAIDs and steroid  injection    Accompanying Signs & Symptoms:  Risk of Fracture: None  Risk of Cauda Equina: None  Risk of Infection: None  Risk of Cancer: None  Risk of Ankylosing Spondylitis: Onset at age <35, male, AND morning back stiffness  no     Above HPI reviewed. Additionally, had ED in March, states felt great for 2 days, then pain returned as bad or worse than before. Went to one PT session, notes cannot do exercises at home due to pain. Only occasionally takes flexeril, unsure if it is helpful because he takes this with ibuprofen. Feels cannot live normally due to pain. Pain radiates to right buttock, right leg. MRI of Lumbar spine 2/12/21 with following results -    IMPRESSION:  1. Multilevel degenerative disc disease and facet arthropathy of the  lumbar spine, as described.  2. Mild edema-like marrow signal centered about the right L4-L5 facet  joint in the setting of severe right facet arthropathy, likely due to  inflammatory phase of degenerative facet arthropathy.  3. Mild spinal canal stenosis at L3-L4. No high-grade spinal canal or  lateral recess stenosis.  4. Moderate to severe left neural foraminal stenosis at L4-L5. There  are lesser degrees of multilevel neural foraminal narrowing elsewhere,  as detailed.  5. Probable mild chronic compression deformities of T12 and L1. No  definite acute fracture seen.    Concern - skin issue  Onset: last fall  Description: Pt gets little skin tags that come and go.  Little white dots, skin colored.  Has one on left eye and a couple on his knees.  Intensity: not painful  Progression of Symptoms:  same  Accompanying Signs & Symptoms: none  Previous history of similar problem: none  Precipitating factors:        Worsened by: none  Alleviating factors:        Improved by: none   Therapies tried and outcome: None      Review of Systems   Constitutional, HEENT, cardiovascular, pulmonary, gi and gu systems are negative, except as otherwise noted.      Objective    There were no  vitals taken for this visit.  There is no height or weight on file to calculate BMI.  Physical Exam  Vitals signs and nursing note reviewed.   Constitutional:       Appearance: Normal appearance.   HENT:      Head: Normocephalic and atraumatic.      Mouth/Throat:      Mouth: Mucous membranes are moist.   Neck:      Musculoskeletal: Neck supple.   Cardiovascular:      Rate and Rhythm: Normal rate.   Pulmonary:      Effort: Pulmonary effort is normal.   Skin:     General: Skin is warm and dry.      Comments: 2 skin tags left upper eyelid.  There is a verrucous lesion to the left medial thigh.    Neurological:      General: No focal deficit present.      Mental Status: He is alert.   Psychiatric:         Mood and Affect: Mood normal.         Behavior: Behavior normal.                \plain

## 2021-04-26 NOTE — PATIENT INSTRUCTIONS
You will be provided with an auto-titrating CPAP with a pressure range of 5-15 cm with heated humidity to limit nasal congestion. Adjust the heat level on humidifier to find a setting that prevents dry nose but does not cause condensation in the hose or mask. Use distilled water in the humidifier.  The CPAP has a ramp function that starts the pressure lower than your prescribed pressure and gradually increases it over a number of minutes.  This may make it easier to fall asleep.    Try to use the CPAP every-night, all night (minimum of 4 hours). Many insurances require that we prove you are using the CPAP at least 4 hours on at least 70% of nights over a 30 day period. We have 90 days to meet those criteria.            Discussed weight management and the impact of weight gain on sleep apnea.  Let me know if you snore or feel the pressure is too high.    You can get new supplies (mask, hose and filter) for your CPAP every 3-6 months, covered by insurance. You do not need to get supplies that often, but they are available if you would like them.  You may exchange the mask once within the first month if you feel the initial mask does not fit well.  Contact your medical equipment provider for equipment issues.  Please let me know if you have any return of snoring, daytime sleepiness or poor sleep quality. We will want to make sure your CPAP is adequately treating your apnea.  There is a website called CPAP.com that has accessories that may make CPAP use easier. Please visit it at your convenience.  Our phone number is [phone].    Follow-up 2 month.  Bring your CPAP machine with you to the follow up appointment.   Your BMI is Body mass index is 36.54 kg/m .  Weight management is a personal decision.  If you are interested in exploring weight loss strategies, the following discussion covers the approaches that may be successful. Body mass index (BMI) is one way to tell whether you are at a healthy weight, overweight,  or obese. It measures your weight in relation to your height.  A BMI of 18.5 to 24.9 is in the healthy range. A person with a BMI of 25 to 29.9 is considered overweight, and someone with a BMI of 30 or greater is considered obese. More than two-thirds of American adults are considered overweight or obese.  Being overweight or obese increases the risk for further weight gain. Excess weight may lead to heart disease and diabetes.  Creating and following plans for healthy eating and physical activity may help you improve your health.  Weight control is part of healthy lifestyle and includes exercise, emotional health, and healthy eating habits. Careful eating habits lifelong are the mainstay of weight control. Though there are significant health benefits from weight loss, long-term weight loss with diet alone may be very difficult to achieve- studies show long-term success with dietary management in less than 10% of people. Attaining a healthy weight may be especially difficult to achieve in those with severe obesity. In some cases, medications, devices and surgical management might be considered.  What can you do?  If you are overweight or obese and are interested in methods for weight loss, you should discuss this with your provider.     Consider reducing daily calorie intake by 500 calories.     Keep a food journal.     Avoiding skipping meals, consider cutting portions instead.    Diet combined with exercise helps maintain muscle while optimizing fat loss. Strength training is particularly important for building and maintaining muscle mass. Exercise helps reduce stress, increase energy, and improves fitness. Increasing exercise without diet control, however, may not burn enough calories to loose weight.       Start walking three days a week 10-20 minutes at a time    Work towards walking thirty minutes five days a week     Eventually, increase the speed of your walking for 1-2 minutes at time    In addition, we  recommend that you review healthy lifestyles and methods for weight loss available through the National Institutes of Health patient information sites:  http://win.niddk.nih.gov/publications/index.htm    And look into health and wellness programs that may be available through your health insurance provider, employer, local community center, or mago club.

## 2021-04-26 NOTE — PATIENT INSTRUCTIONS
Schedule colonoscopy.  Schedule with spine surgeon.  If the skin lesions change, let me know, and we can refer you to dermatology.

## 2021-04-27 ENCOUNTER — TELEPHONE (OUTPATIENT)
Dept: FAMILY MEDICINE | Facility: CLINIC | Age: 61
End: 2021-04-27

## 2021-04-27 DIAGNOSIS — R43.0 ANOSMIA: Primary | ICD-10-CM

## 2021-04-27 NOTE — TELEPHONE ENCOUNTER
Reason for call:  Other   Patient called regarding (reason for call): call back  Additional comments: Requesting order for seroloogy testing to have done with labs on 5/11/2    Phone number to reach patient:  Home number on file [phone] (home)    Best Time:  N/A    Can we leave a detailed message on this number?  Not Applicable    Travel screening: Not Applicable

## 2021-04-27 NOTE — TELEPHONE ENCOUNTER
Annabel,    Patient asking for COVID antibody blood testing.  Order is pended.  Per patient he is unable to smell or taste. Ongoing for months and wonders if he had covid and that is what is going on. Jennifer WEINBERG RN

## 2021-04-27 NOTE — CONFIDENTIAL NOTE
Detailed message left for patient with MD's response and advised to call back if any questions.    Tiny Bajwa RN  Cardiology Care Coordinator  [facility]  [phone] option 1

## 2021-04-28 NOTE — TELEPHONE ENCOUNTER
Message left for patient to return call to clinic.  CSS - ok to deliver message below.    Leticia EASTMAN MSN, RN

## 2021-04-30 ENCOUNTER — RECORDS - HEALTHEAST (OUTPATIENT)
Dept: ADMINISTRATIVE | Facility: OTHER | Age: 61
End: 2021-04-30

## 2021-04-30 ENCOUNTER — RECORDS - HEALTHEAST (OUTPATIENT)
Dept: RADIOLOGY | Facility: CLINIC | Age: 61
End: 2021-04-30

## 2021-05-01 DIAGNOSIS — Z11.59 ENCOUNTER FOR SCREENING FOR OTHER VIRAL DISEASES: ICD-10-CM

## 2021-05-03 ENCOUNTER — DOCUMENTATION ONLY (OUTPATIENT)
Dept: SLEEP MEDICINE | Facility: CLINIC | Age: 61
End: 2021-05-03

## 2021-05-03 DIAGNOSIS — G47.33 OSA (OBSTRUCTIVE SLEEP APNEA): ICD-10-CM

## 2021-05-04 DIAGNOSIS — G47.33 OSA (OBSTRUCTIVE SLEEP APNEA): Primary | ICD-10-CM

## 2021-05-04 NOTE — PROGRESS NOTES
Patient was offered choice of vendor and chose [facility].  Patient Michael A Klar was set up at [place]  on May 3, 2021. Patient received a Resmed AirSense 10 Auto. Pressures were set at 5-15 cm H2O.   Patient s ramp is 5 cm H2O for Auto and FLEX/EPR is 2.  Patient received a Resmed Mask name: [id number]   Full Face mask size Wide, heated tubing and heated humidifier.  Patient does not need to meet compliance.  Maxine Vo

## 2021-05-05 ENCOUNTER — OFFICE VISIT - HEALTHEAST (OUTPATIENT)
Dept: NEUROSURGERY | Facility: CLINIC | Age: 61
End: 2021-05-05

## 2021-05-05 DIAGNOSIS — R52 PAIN: ICD-10-CM

## 2021-05-05 ASSESSMENT — MIFFLIN-ST. JEOR: SCORE: 1987.87

## 2021-05-06 ENCOUNTER — DOCUMENTATION ONLY (OUTPATIENT)
Dept: SLEEP MEDICINE | Facility: CLINIC | Age: 61
End: 2021-05-06

## 2021-05-06 DIAGNOSIS — G47.33 OSA (OBSTRUCTIVE SLEEP APNEA): ICD-10-CM

## 2021-05-06 NOTE — PROGRESS NOTES
3 day Sleep therapy management telephone visit    Diagnostic AHI: 30    PSG    Confirmed with patient at time of call- N/A Patient is still interested in STM service       Message left for patient to return call    Replacement device: No  STM ordered by provider: Yes    Objective data     Order Settings for PAP  CPAP min 5    CPAP max 15        Device settings from machine CPAP min 5.0     CPAP max 15.0      EPR Setting TWO    RESMED soft response  OFF     Assessment: Nightly usage over four hours      Action plan: Patient to have 14 day STM visit. Patient has a follow up visit scheduled:   No but should be scheduled     Total time spent on accessing and  interpreting remote patient PAP therapy data  10 minutes    Total time spent counseling, coaching  and reviewing PAP therapy data with patient  0 minutes    [id number] no

## 2021-05-06 NOTE — PROGRESS NOTES
Patient returned call.    Subjective measures:  Patient reports each night is getting better.  He has some pain that is causing arousals.       Assessment: Pt meeting objective benchmarks.  Patient meeting subjective benchmarks.     Action plan:pt to have 14 day [facility] visit.      Total time spent counseling, coaching  and reviewing PAP therapy data with patient  5 minutes     [id number] (this call)    [id number] no (previous call)

## 2021-05-10 ENCOUNTER — ANESTHESIA EVENT (OUTPATIENT)
Dept: GASTROENTEROLOGY | Facility: CLINIC | Age: 61
End: 2021-05-10
Payer: COMMERCIAL

## 2021-05-10 ENCOUNTER — HOSPITAL ENCOUNTER (OUTPATIENT)
Dept: MRI IMAGING | Facility: CLINIC | Age: 61
Discharge: HOME | End: 2021-05-10
Attending: SURGERY | Admitting: SURGERY
Payer: COMMERCIAL

## 2021-05-10 ENCOUNTER — DOCUMENTATION ONLY (OUTPATIENT)
Dept: LAB | Facility: CLINIC | Age: 61
End: 2021-05-10

## 2021-05-10 DIAGNOSIS — E78.5 HYPERLIPIDEMIA LDL GOAL <100: Primary | ICD-10-CM

## 2021-05-10 DIAGNOSIS — R52 PAIN: ICD-10-CM

## 2021-05-10 PROCEDURE — 73721 MRI JNT OF LWR EXTRE W/O DYE: CPT | Mod: RT

## 2021-05-10 PROCEDURE — 255N000002 HC RX 255 OP 636: Performed by: SURGERY

## 2021-05-10 PROCEDURE — A9585 GADOBUTROL INJECTION: HCPCS | Performed by: SURGERY

## 2021-05-10 PROCEDURE — 73721 MRI JNT OF LWR EXTRE W/O DYE: CPT | Mod: 26 | Performed by: RADIOLOGY

## 2021-05-10 RX ORDER — GADOBUTROL 604.72 MG/ML
10 INJECTION INTRAVENOUS ONCE
Status: DISCONTINUED | OUTPATIENT
Start: 2021-05-10 | End: 2021-05-12 | Stop reason: HOSPADM

## 2021-05-10 NOTE — ANESTHESIA PREPROCEDURE EVALUATION
Anesthesia Pre-Procedure Evaluation    Patient: Justino Mccollum   MRN: [id number] : 1960        Preoperative Diagnosis: Special screening for malignant neoplasm of colon [Z12.11]   Procedure : Procedure(s):  COLONOSCOPY     No past medical history on file.   No past surgical history on file.   No Known Allergies   Social History     Tobacco Use     Smoking status: Former Smoker     Years: 30.00     Types: Cigarettes     Smokeless tobacco: Never Used   Substance Use Topics     Alcohol use: No      Wt Readings from Last 1 Encounters:   21 118.8 kg (262 lb)        Anesthesia Evaluation   Pt has had prior anesthetic.     No history of anesthetic complications       ROS/MED HX  ENT/Pulmonary:     (+) sleep apnea, uses CPAP, CAROL risk factors, snores loudly, obese, allergic rhinitis, tobacco use, Past use,     Neurologic:  - neg neurologic ROS     Cardiovascular: Comment: Hx of abnormal stress test--area of mild infarction   BBB    (+) Dyslipidemia -----Previous cardiac testing   Echo: Date: 3/10/21 Results:  Echocardiogram Complete  Order: [id number]  Status: Edited Result - FINAL   Visible to patient: Yes (MyChart) Next appt: Today at 06:45 PM in Radiology. ([facility] MRI ROOM 2) Dx: Abnormal cardiovascular stress test  Details      Reading Physician Reading Date Result Priority  OSITO Rader MD  [phone] 3/10/2021     Narrative & Impression    [id number]  [id number]  [id number]  [id number]^YURI^VINCE           [facility], Echocardiography Laboratory  [address] [place] [address]        Name: JUSTINO MCCOLLUM  MRN: [id number]  : 1960  Study Date: 03/10/2021 09:58 AM  Age: 60 yrs  Gender: Male  Patient Location: [facility]  Reason For Study: Need to rule out AS prior to CTA on friday  Ordering Physician: VINCE WELCH  Referring Physician: VINCE WELCH  Performed By: Garth Robertson RDCS     BSA: 2.4 m2  Height: 72 in  Weight: 258 lb  BP: 128/83  mmHg  _____________________________________________________________________________  __        Procedure  Echocardiogram with two-dimensional, color and spectral Doppler performed.  Contrast Definity. Definity (NDC #13130-115-37) given intravenously. Patient  was given 5ml mixture of 1.5ml Definity and 8.5ml saline. 5 ml wasted. IV  start location R Upper arm .  _____________________________________________________________________________  __        Interpretation Summary     Mild aortic stenosis is present.  _____________________________________________________________________________  __        Left Ventricle  Global and regional left ventricular function is normal with an EF of 55-60%.  Left ventricular wall thickness is normal. Left ventricular size is normal.  Grade I or early diastolic dysfunction. No regional wall motion abnormalities  are seen.     Right Ventricle  Right ventricular function, chamber size, wall motion, and thickness are  normal.     Atria  Both atria appear normal. The atrial septum is intact as assessed by color  Doppler .     Mitral Valve  The mitral valve is normal. Mild mitral annular calcification is present.  Trace mitral insufficiency is present.        Aortic Valve  Moderate aortic valve calcification is present. Mild aortic stenosis is  present. The mean AoV pressure gradient is 22.2 mmHg. The calculated aortic  valve are is 1.5 cm^2.     Tricuspid Valve  The tricuspid valve is normal.     Pulmonic Valve  The valve leaflets are not well visualized. On Doppler interrogation, there is  no significant stenosis or regurgitation.     Vessels  The thoracic aorta is normal. The pulmonary artery and bifurcation cannot be  assessed. Dilation of the inferior vena cava is present with normal  respiratory variation in diameter.     Pericardium  No pericardial effusion is present.        Compared to Previous Study  There is no prior study for direct  comparison.  _____________________________________________________________________________  __  MMode/2D Measurements & Calculations     IVSd: 1.0 cm  LVIDd: 5.5 cm  LVIDs: 3.1 cm  LVPWd: 0.99 cm  FS: 43.7 %  LV mass(C)d: 215.9 grams  LV mass(C)dI: 90.9 grams/m2  Ao root diam: 3.8 cm  asc Aorta Diam: 3.5 cm  LVOT diam: 2.3 cm  LVOT area: 4.2 cm2  LA Volume (BP): 59.0 ml  LA Volume Index (BP): 24.9 ml/m2  RWT: 0.36     TAPSE: 2.0 cm     Time Measurements  Aortic HR: 72.0 BPM  Mitral HR: 75.0 BPM     Doppler Measurements & Calculations  MV E max michael: 64.2 cm/sec  MV A max michael: 66.1 cm/sec  MV E/A: 0.97  MV dec time: 0.19 sec  Ao V2 max: 356.1 cm/sec  Ao max P.0 mmHg  Ao V2 mean: 213.2 cm/sec  Ao mean P.2 mmHg  Ao V2 VTI: 59.2 cm  ELENA(I,D): 1.5 cm2  ELENA(V,D): 1.4 cm2  LV V1 max P.0 mmHg  LV V1 max: 122.2 cm/sec  LV V1 VTI: 20.4 cm  CO(LVOT): 6.2 l/min  CI(LVOT): 2.6 l/min/m2  SV(LVOT): 86.2 ml  SI(LVOT): 36.3 ml/m2  PA V2 max: 129.0 cm/sec  PA max P.7 mmHg  PA acc time: 0.09 sec  Pulm Sys Michael: 50.7 cm/sec  Pulm Piper Michael: 46.3 cm/sec  Pulm S/D: 1.1  AV Michael Ratio (DI): 0.34  ELENA Index (cm2/m2): 0.61     Lateral E/e': 8.7           _____________________________________________________________________________  __           Report approved by: Danis Vincent 03/10/2021 01:52 PM             Stress Test:  Date: 3/3/21 Results:  NM Lexiscan stress test  Order# [id number]  Reading physician: Dario Nunez MD Ordering physician: Milana Moulton APRN CNP Study date: 3/3/21  Patient Information    Patient Name   Michael Jimenez MRN   [id number] Sex   Male              Age   1960 (60 year old)  Reason for Exam  Priority: Routine  Dx: Chest pain, unspecified type (R07.9 (ICD-10-CM))  Comments:   ECG Link     Stress Test Data - Scan on 3/3/21 11:36 AM by   Indications    Chest pain, unspecified type (R07.9 (ICD-10-CM))  Conclusion          The nuclear stress test is abnormal.     There is a small area  of a mild degree of infarction in the mid to basal anteroseptal segment(s) of the left ventricle.     Left ventricular function is mildly reduced.     The left ventricular ejection fraction at rest is 42%.  The left ventricular ejection fraction at stress is 45%.     One episode of 4 beat run on NSVT at rest and 4/10 chest pain with stress. Consider further testing with CT coronary angiogram if clinical suspicion of CAD is high.     There is no prior study for comparison.     ECG Summary    ECG Baseline electrocardiogram demonstrates left bundle branch block.   The stress electrocardiogram was non-diagnostic due to left bundle branch block.  Technical Comments    Cardiac Protocol A pharmacologic stress test was performed following a sitting Lexiscan protocol using 0.4 mg of intravenous regadenoson administered over 10 seconds under the supervision of Dr. Rivera. The patient reported chest discomfort during the stress test.  The supervising registered nurse observed typical vasodilator side effects during the stress test.  Isotope Administration Nuclear imaging was accomplished using a one day protocol with 37.2 mCi of technetium tetrofosmin injected at the completion of Lexiscan infusion on 3/3/2021 and 11.8 mCi of technetium tetrofosmin at rest on 3/3/2021.  Nuclear Study Quality Final image quality is suboptimal.  Ejection Fraction    Left Ventricular EF   45 %         Stress Measurements    Baseline Vitals  Baseline HR   69     Baseline Systolic BP   140     Baseline Diastolic BP   90     Peak Stress Vitals  Max HR   80     Last Stress Systolic BP   138     Last Stress Diastolic BP   88       Nuclear Perfusion    Stress Summed Score: 2 Percent Abnormal: 2.94%      Mild count reduction in the following segments: basal anteroseptal and mid anteroseptal.  The following segments are normal: basal anterior, basal inferoseptal, basal inferior, basal inferolateral, basal anterolateral, mid anterior, mid inferoseptal,  "mid inferior, mid inferolateral, mid anterolateral, apex, apical anterior, apical septal, apical inferior and apical lateral.      Resting Summed Score: 2 Percent Abnormal: 2.94%      Mild count reduction in the following segments: basal anteroseptal and mid anteroseptal.  The following segments are normal: basal anterior, basal inferoseptal, basal inferior, basal inferolateral, basal anterolateral, mid anterior, mid inferoseptal, mid inferior, mid inferolateral, mid anterolateral, apex, apical anterior, apical septal, apical inferior and apical lateral.          Wall Score    Wall Motion    Score Index: 1.00       The left ventricular wall motion is normal.          Vitals    Height Weight BSA (Calculated - sq m) BMI (Calculated)  1.803 m (5' 11\") 118.4 kg (261 lb) 2.44 36.4    NM Lexiscan stress test: Result Notes     Annabel Reid APRN CNP   3/3/2021  4:40 PM CST    Called and discussed results with patient over the phone. No ongoing pain. Referral placed to cardiology of further evaluation.     Annabel Reid CNP          PACS Images     Show images for NM Lexiscan stress test  Reading Providers     Reading Role Read Date  Dario Marin MD ECG [place], SPECT [place] 3/3/2021  Signed by    Signed Date/Time  Phone Pager  DARIO MARIN 3/03/2021 16:20 [phone]   Printable Result Report      Result Report   Encounter      View Encounter        ECG Reviewed:  Date: Results:    Cath:  Date: Results:      METS/Exercise Tolerance: >4 METS    Hematologic:  - neg hematologic  ROS     Musculoskeletal: Comment: Raynaud's  Hypothenar hammer syndrome  Hx of glenoid labral tear  Right shoulder pain  Hx of supraspinatus tendon tear      GI/Hepatic:       Renal/Genitourinary:       Endo: Comment: prediabetes    (+) Obesity,     Psychiatric/Substance Use:  - neg psychiatric ROS     Infectious Disease:       Malignancy:       Other:  - neg other ROS          Physical Exam    Airway        Mallampati: II   TM " distance: > 3 FB   Neck ROM: full   Mouth opening: > 3 cm    Respiratory Devices and Support         Dental  no notable dental history         Cardiovascular   cardiovascular exam normal          Pulmonary   pulmonary exam normal                OUTSIDE LABS:  CBC:   Lab Results   Component Value Date    HGB 16.9 01/04/2011     01/04/2011     BMP:   Lab Results   Component Value Date     03/25/2021     02/18/2021    POTASSIUM 4.2 03/25/2021    POTASSIUM 4.2 02/18/2021    CHLORIDE 105 03/25/2021    CHLORIDE 105 02/18/2021    CO2 30 03/25/2021    CO2 33 (H) 02/18/2021    BUN 25 03/25/2021    BUN 26 02/18/2021    CR 1.08 03/25/2021    CR 1.38 (H) 02/18/2021    GLC 89 03/25/2021    GLC 80 02/18/2021     COAGS:   Lab Results   Component Value Date    PTT 30 01/04/2011    INR 0.92 01/04/2011     POC:   Lab Results   Component Value Date     (H) 07/09/2009     HEPATIC: No results found for: ALBUMIN, PROTTOTAL, ALT, AST, GGT, ALKPHOS, BILITOTAL, BILIDIRECT, RAJINDER  OTHER:   Lab Results   Component Value Date    A1C 5.8 01/04/2011    SKYLER 9.0 03/25/2021    CRP 0.7 02/22/2010       Anesthesia Plan    ASA Status:  3   NPO Status:  NPO Appropriate    Anesthesia Type: General.   Induction: Propofol, Intravenous.   Maintenance: TIVA.        Consents    Anesthesia Plan(s) and associated risks, benefits, and realistic alternatives discussed. Questions answered and patient/representative(s) expressed understanding.     - Discussed with:  Patient         Postoperative Care    Pain management: IV analgesics, Oral pain medications.   PONV prophylaxis: Ondansetron (or other 5HT-3), Dexamethasone or Solumedrol     Comments:                TOREY Clark CRNA

## 2021-05-11 ENCOUNTER — AMBULATORY - HEALTHEAST (OUTPATIENT)
Dept: NEUROSURGERY | Facility: CLINIC | Age: 61
End: 2021-05-11

## 2021-05-11 ENCOUNTER — RECORDS - HEALTHEAST (OUTPATIENT)
Dept: ADMINISTRATIVE | Facility: OTHER | Age: 61
End: 2021-05-11

## 2021-05-11 ENCOUNTER — TELEPHONE (OUTPATIENT)
Dept: SURGERY | Facility: CLINIC | Age: 61
End: 2021-05-11

## 2021-05-11 ENCOUNTER — RECORDS - HEALTHEAST (OUTPATIENT)
Dept: RADIOLOGY | Facility: CLINIC | Age: 61
End: 2021-05-11

## 2021-05-11 DIAGNOSIS — Z11.59 ENCOUNTER FOR SCREENING FOR OTHER VIRAL DISEASES: ICD-10-CM

## 2021-05-11 DIAGNOSIS — R43.0 ANOSMIA: ICD-10-CM

## 2021-05-11 DIAGNOSIS — E78.5 HYPERLIPIDEMIA LDL GOAL <100: ICD-10-CM

## 2021-05-11 LAB
ANION GAP SERPL CALCULATED.3IONS-SCNC: 4 MMOL/L (ref 3–14)
BUN SERPL-MCNC: 23 MG/DL (ref 7–30)
CALCIUM SERPL-MCNC: 8.8 MG/DL (ref 8.5–10.1)
CHLORIDE SERPL-SCNC: 101 MMOL/L (ref 94–109)
CHOLEST SERPL-MCNC: 157 MG/DL
CO2 SERPL-SCNC: 31 MMOL/L (ref 20–32)
CREAT SERPL-MCNC: 1.14 MG/DL (ref 0.66–1.25)
GFR SERPL CREATININE-BSD FRML MDRD: 68 ML/MIN/{1.73_M2}
GLUCOSE SERPL-MCNC: 92 MG/DL (ref 70–99)
HDLC SERPL-MCNC: 60 MG/DL
LABORATORY COMMENT REPORT: ABNORMAL
LDLC SERPL CALC-MCNC: 66 MG/DL
NONHDLC SERPL-MCNC: 97 MG/DL
POTASSIUM SERPL-SCNC: 4.2 MMOL/L (ref 3.4–5.3)
SARS-COV-2 RNA RESP QL NAA+PROBE: NORMAL
SARS-COV-2 RNA RESP QL NAA+PROBE: POSITIVE
SODIUM SERPL-SCNC: 136 MMOL/L (ref 133–144)
SPECIMEN SOURCE: ABNORMAL
SPECIMEN SOURCE: NORMAL
TRIGL SERPL-MCNC: 157 MG/DL

## 2021-05-11 PROCEDURE — 36415 COLL VENOUS BLD VENIPUNCTURE: CPT | Performed by: NURSE PRACTITIONER

## 2021-05-11 PROCEDURE — 80061 LIPID PANEL: CPT | Performed by: NURSE PRACTITIONER

## 2021-05-11 PROCEDURE — U0003 INFECTIOUS AGENT DETECTION BY NUCLEIC ACID (DNA OR RNA); SEVERE ACUTE RESPIRATORY SYNDROME CORONAVIRUS 2 (SARS-COV-2) (CORONAVIRUS DISEASE [COVID-19]), AMPLIFIED PROBE TECHNIQUE, MAKING USE OF HIGH THROUGHPUT TECHNOLOGIES AS DESCRIBED BY CMS-2020-01-R: HCPCS | Performed by: SURGERY

## 2021-05-11 PROCEDURE — 80048 BASIC METABOLIC PNL TOTAL CA: CPT | Performed by: NURSE PRACTITIONER

## 2021-05-11 PROCEDURE — 86769 SARS-COV-2 COVID-19 ANTIBODY: CPT | Mod: 59 | Performed by: NURSE PRACTITIONER

## 2021-05-11 PROCEDURE — 86769 SARS-COV-2 COVID-19 ANTIBODY: CPT | Performed by: NURSE PRACTITIONER

## 2021-05-11 PROCEDURE — U0005 INFEC AGEN DETEC AMPLI PROBE: HCPCS | Performed by: SURGERY

## 2021-05-12 ENCOUNTER — TELEPHONE (OUTPATIENT)
Dept: FAMILY MEDICINE | Facility: CLINIC | Age: 61
End: 2021-05-12

## 2021-05-12 LAB
SARS-COV-2 AB PNL SERPL IA: POSITIVE
SARS-COV-2 IGG SERPL IA-ACNC: NORMAL

## 2021-05-12 NOTE — TELEPHONE ENCOUNTER
Discussed with the patient who is wondering if since he is asymptomatic for COVID, denies all symptoms, except his sense of taste is off and has been since last Thanksgiving. Patient is advised to self quarantine for 14 days, social distancing, watch for worsening symptoms, he is rescheduled for his colonoscopy procedure 5-28-21.    LIANNA Hoyos

## 2021-05-12 NOTE — TELEPHONE ENCOUNTER
"-Coronavirus (COVID-19) Notification    Caller Name (Patient, parent, daughter/son, grandparent, etc)  Michael, patient    Reason for call  Notify of Positive Coronavirus (COVID-19) lab results, assess symptoms,  review  [employer] recommendations    Lab Result    Lab test:  2019-nCoV rRt-PCR or SARS-CoV-2 PCR    Oropharyngeal AND/OR nasopharyngeal swabs is POSITIVE for 2019-nCoV RNA/SARS-COV-2 PCR (COVID-19 virus)    RN Recommendations/Instructions per [facility] Coronavirus COVID-19 recommendations    Brief introduction script  Introduce self then review script:  \"I am calling on behalf of [employer].  We were notified that your Coronavirus test (COVID-19) for was POSITIVE for the virus.  I have some information to relay to you but first I wanted to mention that the [place] Dept of Health will be contacting you shortly [it's possible MD already called Patient] to talk to you more about how you are feeling and other people you have had contact with who might now also have the virus.  Also,  [employer] [place] is Partnering with the [facility] for Covid-19 research, you may be contacted directly by research staff.\"    Assessment (Inquire about Patient's current symptoms)   Assessment   Current Symptoms at time of phone call: (if no symptoms, document No symptoms] No symptoms   Symptoms onset (if applicable) N/A     If at time of call, Patients symptoms hare worsened, the Patient should contact 911 or have someone drive them to Emergency Dept promptly:      If Patient calling 911, inform 911 personal that you have tested positive for the Coronavirus (COVID-19).  Place mask on and await 911 to arrive.    If Emergency Dept, If possible, please have another adult drive you to the Emergency Dept but you need to wear mask when in contact with other people.      Monoclonal Antibody Administration    You may be eligible to receive a new treatment with a monoclonal antibody for preventing hospitalization in " "patients at high risk for complications from COVID-19.   This medication is still experimental and available on a limited basis; it is given through an IV and must be given at an infusion center. Please note that not all people who are eligible will receive the medication since it is in limited supply.     Are you interested in being considered for this medication?  No.   Does the patient fit the criteria: Patient declined    If patient qualifies based on above criteria:  \"You will be contacted if you are selected to receive this treatment in the next 1-2 business days.   This is time sensitive and if you are not selected in the next 1-2 business days, you will not receive the medication.  If you do not receive a call to schedule, you have not been selected.\"      Review information with Patient    Your result was positive. This means you have COVID-19 (coronavirus).  We have sent you a letter that reviews the information that I'll be reviewing with you now.    How can I protect others?    If you have symptoms: stay home and away from others (self-isolate) until:    You've had no fever--and no medicine that reduces fever--for 1 full day (24 hours). And       Your other symptoms have gotten better. For example, your cough or breathing has improved. And     At least 10 days have passed since your symptoms started. (If you've been told by a doctor that you have a weak immune system, wait 20 days.)     If you don't have symptoms: Stay home and away from others (self-isolate) until at least 10 days have passed since your first positive COVID-19 test. (Date test collected)    During this time:    Stay in your own room, including for meals. Use your own bathroom if you can.    Stay away from others in your home. No hugging, kissing or shaking hands. No visitors.     Don't go to work, school or anywhere else.     Clean  high touch  surfaces often (doorknobs, counters, handles, etc.). Use a household cleaning spray or wipes. " You'll find a full list on the EPA website at www.epa.gov/pesticide-registration/list-n-disinfectants-use-against-sars-cov-2.     Cover your mouth and nose with a mask, tissue or other face covering to avoid spreading germs.    Wash your hands and face often with soap and water.    Make a list of people you have been in close contact with recently, even if either of you wore a face covering.   ; Start your list from 2 days before you became ill or had a positive test.  ; Include anyone that was within 6 feet of you for a cumulative total of 15 minutes or more in 24 hours. (Example: if you sat next to Juan Ramon for 5 minutes in the morning and 10 minutes in the afternoon, then you were in close contact for 15 minutes total that day. Juan Ramon would be added to your list.)    A public health worker will call or text you. It is important that you answer. They will ask you questions about possible exposures to COVID-19, such as people you have been in direct contact with and places you have visited.    Tell the people on your list that you have COVID-19; they should stay away from others for 14 days starting from the last time they were in contact with you (unless you are told something different from a public health worker).     Caregivers in these groups are at risk for severe illness due to COVID-19:  o People 65 years and older  o People who live in a nursing home or long-term care facility  o People with chronic disease (lung, heart, cancer, diabetes, kidney, liver, immunologic)  o People who have a weakened immune system, including those who:  - Are in cancer treatment  - Take medicine that weakens the immune system, such as corticosteroids  - Had a bone marrow or organ transplant  - Have an immune deficiency  - Have poorly controlled HIV or AIDS  - Are obese (body mass index of 40 or higher)  - Smoke regularly    Caregivers should wear gloves while washing dishes, handling laundry and cleaning bedrooms and  bathrooms.    Wash and dry laundry with special caution. Don't shake dirty laundry, and use the warmest water setting you can.    If you have a weakened immune system, ask your doctor about other actions you should take.    For more tips, go to www.cdc.gov/coronavirus/2019-ncov/downloads/10Things.pdf.    You should not go back to work until you meet the guidelines above for ending your home isolation. You don't need to be retested for COVID-19 before going back to work--studies show that you won't spread the virus if it's been at least 10 days since your symptoms started (or 20 days, if you have a weak immune system).    Employers: This document serves as formal notice of your employee's medical guidelines for going back to work. They must meet the above guidelines before going back to work in person.    How can I take care of myself?    1. Get lots of rest. Drink extra fluids (unless a doctor has told you not to).    2. Take Tylenol (acetaminophen) for fever or pain. If you have liver or kidney problems, ask your family doctor if it's okay to take Tylenol.     Take either:     650 mg (two 325 mg pills) every 4 to 6 hours, or     1,000 mg (two 500 mg pills) every 8 hours as needed.     Note: Don't take more than 3,000 mg in one day. Acetaminophen is found in many medicines (both prescribed and over-the-counter medicines). Read all labels to be sure you don't take too much.    For children, check the Tylenol bottle for the right dose (based on their age or weight).    3. If you have other health problems (like cancer, heart failure, an organ transplant or severe kidney disease): Call your specialty clinic if you don't feel better in the next 2 days.    4. Know when to call 911: Emergency warning signs include:    Trouble breathing or shortness of breath    Pain or pressure in the chest that doesn't go away    Feeling confused like you haven't felt before, or not being able to wake up    Bluish-colored lips or  face    5. Sign up for [employer]. We know it's scary to hear that you have COVID-19. We want to track your symptoms to make sure you're okay over the next 2 weeks. Please look for an email from [employer]--this is a free, online program that we'll use to keep in touch. To sign up, follow the link in the email. Learn more at [email or web address].    Where can I get more information?    [facility]: [email or web address]    Coronavirus Basics: www.health.[email or web address].[place]./diseases/coronavirus/basics.html    What to Do If You're Sick: www.cdc.gov/coronavirus/2019-ncov/about/steps-when-sick.html    Ending Home Isolation: www.cdc.gov/coronavirus/2019-ncov/hcp/disposition-in-home-patients.html     Caring for Someone with COVID-19: www.cdc.gov/coronavirus/2019-ncov/if-you-are-sick/care-for-someone.html     [facility] clinical trials (COVID-19 research studies): [email or web address]-clinical-trials     A Positive COVID-19 letter will be sent via [employer] or the mail. (Exception, no letters sent to Presurgerical/Preprocedure Patients)    Viridiana Matthews LPN

## 2021-05-12 NOTE — TELEPHONE ENCOUNTER
Reason for call:    Symptom or request:     Patient called wanting to discuss covid testing.       Best Time:  any    Can we leave a detailed message on this number?  YES     Annita FALCON  Station

## 2021-05-13 DIAGNOSIS — Z11.59 ENCOUNTER FOR SCREENING FOR OTHER VIRAL DISEASES: ICD-10-CM

## 2021-05-14 ENCOUNTER — ANESTHESIA (OUTPATIENT)
Dept: GASTROENTEROLOGY | Facility: CLINIC | Age: 61
End: 2021-05-14
Payer: COMMERCIAL

## 2021-05-17 ENCOUNTER — DOCUMENTATION ONLY (OUTPATIENT)
Dept: SLEEP MEDICINE | Facility: CLINIC | Age: 61
End: 2021-05-17

## 2021-05-17 DIAGNOSIS — G47.33 OSA (OBSTRUCTIVE SLEEP APNEA): ICD-10-CM

## 2021-05-17 NOTE — PROGRESS NOTES
14  DAY STM VISIT    Diagnostic AHI: 30  PSG    Subjective measures:   Patient said he would like to try a different mask. He notices this one leaking. It's not bothering him too much, but he would like to try something else.    Assessment: Pt not meeting objective benchmarks for leak Patient failing following subjective benchmarks: leak issues     Action plan: schedule mask fit appointment      Device type: Auto-CPAP    PAP settings: CPAP min 5.0 cm  H20       CPAP max 15.0 cm  H20      95th% pressure 10.4 cm  H20        RESMED EPR level Setting: TWO    RESMED Soft response setting:  OFF    Mask type:  Full Face Mask    Objective measures: 14 day rolling measures      Compliance  100 %      Leak  38.83  lpm  last  upload      AHI 0.76   last  upload      Average number of minutes 437      Objective measure goal  Compliance   Goal >70%  Leak   Goal < 24 lpm  AHI  Goal < 5  Usage  Goal >240        Total time spent on accessing and interpreting remote patient PAP therapy data  5 minutes    Total time spent counseling, coaching  and reviewing PAP therapy data with patient  8 minutes    [id number]  [id number]  no (3 day STM)

## 2021-05-18 ENCOUNTER — TELEPHONE (OUTPATIENT)
Dept: CARDIOLOGY | Facility: CLINIC | Age: 61
End: 2021-05-18

## 2021-05-18 NOTE — TELEPHONE ENCOUNTER
Spoke with pt. Blood pressures have been good.   All under 130/70- except maybe once or twice 130-140    Pt stated sleep study in EMR. Using CPAP and helping     Pt stated he is doing well. No further questions     can recommended follow up in 3 years with an echocaridogram unless status changes pt verbalized understanding

## 2021-05-18 NOTE — TELEPHONE ENCOUNTER
Pt is to monitor blood pressures and send back via my chart.   Follow up in 3 years with an echocardiogram.

## 2021-05-18 NOTE — TELEPHONE ENCOUNTER
Pt was seen in clinic 4/14/21:  Plan:     Patient is being seen today for follow-up. He is currently asymptomatic from cardiac standpoint     #Coronary artery disease by CT coronary angiogram and stress test  #Nonsustained VT  #Uncontrolled hypertension  #Hyperlipidemia  -Normal EF  -Continue aspirin 81 mg, Crestor 20 mg  -Stop lisinopril 10 and switch to lisinopril hydrochlorothiazide 20/12.5 mg to achieve better blood pressure control since it is not well controlled yet.  -Monitor blood pressure at home and report to us through [employer] in a month.  -Recheck lipids and BMP next month       #Mild aortic stenosis  -Recheck aortic valve gradients in 3 years     #Sleep disturbance  #Obesity, short neck  -Sleep study completed.  Results not back yet.     #Loss of smell  #Loss of taste  #Abnormal smelling urine  -Ordered urine analysis  -Patient requesting other tests to rule out possible cancer in his urinary system  -I will defer further discussion to his primary care physician.     Return to clinic 3 years.

## 2021-05-18 NOTE — TELEPHONE ENCOUNTER
[facility] Call Center    Phone Message    May a detailed message be left on voicemail: yes     Reason for Call: Other: Pt was told to start monitoring his blood pressure beginning in early may, and he was wondering if  wanted him to send that information or make an appointment to discuss the blood pressure. He would like a call back to discuss how he should follow up.     Action Taken: Message routed to:  [facility] cardio    Travel Screening: Not Applicable

## 2021-05-19 ENCOUNTER — HOSPITAL ENCOUNTER (OUTPATIENT)
Dept: PHYSICAL MEDICINE AND REHAB | Facility: CLINIC | Age: 61
Discharge: HOME | End: 2021-05-19
Attending: SURGERY
Payer: COMMERCIAL

## 2021-05-19 ENCOUNTER — RECORDS - HEALTHEAST (OUTPATIENT)
Dept: ADMINISTRATIVE | Facility: OTHER | Age: 61
End: 2021-05-19

## 2021-05-19 DIAGNOSIS — R52 PAIN: ICD-10-CM

## 2021-05-24 ENCOUNTER — OFFICE VISIT - HEALTHEAST (OUTPATIENT)
Dept: NEUROSURGERY | Facility: CLINIC | Age: 61
End: 2021-05-24

## 2021-05-24 ENCOUNTER — DOCUMENTATION ONLY (OUTPATIENT)
Dept: SLEEP MEDICINE | Facility: CLINIC | Age: 61
End: 2021-05-24

## 2021-05-24 DIAGNOSIS — G47.33 OSA (OBSTRUCTIVE SLEEP APNEA): ICD-10-CM

## 2021-05-24 DIAGNOSIS — M25.551 HIP PAIN, RIGHT: ICD-10-CM

## 2021-05-24 ASSESSMENT — MIFFLIN-ST. JEOR: SCORE: 1987.87

## 2021-05-24 NOTE — PROGRESS NOTES
Patient came to [place]  for mask fitting appointment on May 24, 2021. Patient requested to switch masks because poor seal/leak. Discussed the following masks: F20 medium, F30i small wide Patient selected a [employer] Mask name: F20 Full Face mask size Medium

## 2021-05-26 ENCOUNTER — ANCILLARY PROCEDURE (OUTPATIENT)
Dept: GENERAL RADIOLOGY | Facility: CLINIC | Age: 61
End: 2021-05-26
Attending: ORTHOPAEDIC SURGERY
Payer: COMMERCIAL

## 2021-05-26 ENCOUNTER — OFFICE VISIT (OUTPATIENT)
Dept: ORTHOPEDICS | Facility: CLINIC | Age: 61
End: 2021-05-26
Payer: COMMERCIAL

## 2021-05-26 VITALS
DIASTOLIC BLOOD PRESSURE: 83 MMHG | HEIGHT: 71 IN | WEIGHT: 262.6 LBS | HEART RATE: 79 BPM | BODY MASS INDEX: 36.76 KG/M2 | SYSTOLIC BLOOD PRESSURE: 122 MMHG

## 2021-05-26 DIAGNOSIS — G89.29 CHRONIC RIGHT HIP PAIN: Primary | ICD-10-CM

## 2021-05-26 DIAGNOSIS — M25.551 CHRONIC RIGHT HIP PAIN: Primary | ICD-10-CM

## 2021-05-26 DIAGNOSIS — G89.29 CHRONIC RIGHT HIP PAIN: ICD-10-CM

## 2021-05-26 DIAGNOSIS — M25.551 CHRONIC RIGHT HIP PAIN: ICD-10-CM

## 2021-05-26 DIAGNOSIS — M16.11 PRIMARY OSTEOARTHRITIS OF RIGHT HIP: ICD-10-CM

## 2021-05-26 PROCEDURE — 99203 OFFICE O/P NEW LOW 30 MIN: CPT | Performed by: ORTHOPAEDIC SURGERY

## 2021-05-26 PROCEDURE — 73502 X-RAY EXAM HIP UNI 2-3 VIEWS: CPT | Mod: RT | Performed by: RADIOLOGY

## 2021-05-26 ASSESSMENT — PAIN SCALES - GENERAL: PAINLEVEL: SEVERE PAIN (7)

## 2021-05-26 ASSESSMENT — MIFFLIN-ST. JEOR: SCORE: 2013.28

## 2021-05-26 NOTE — PROGRESS NOTES
"Chief Complaint:   Chief Complaint   Patient presents with     Right Hip - Pain     Onset: 5 months. NKI. Pain has come on gradually and has gotten worse. Pain is in the groin and lateral hip and can go into his back as well. Pain with anything, hard to sleep. No tx.      Michael DEBBIE Jimenez is seen today in the [facility] for evaluation of right hip pain at the request of Dr. Kvng Spencer         HISTORY OF PRESENT ILLNESS    Michael Jimenez is a 60 year old male seen for evaluation of ongoing right hip pain with no known injury.   Pain has been present for 6 months, gradual onset and getting worse. Locates pain to the groin area and side of the hip, but can radiate into his buttocks and low back as well. Pain with movement, activities, even at night sleeping. Occasional numbness into the toes of the foot. Pain radiates down the thigh not past the knee. Difficulties getting in car, donning shoes/socks. Treatment with ibuprofen.    Can have pain with sitting, prolonged standing, walking especially inclines or stairs.    Has had problems with the low back, especially with long walks, pain and tightness. Has had epidural injections in the past, helped a couple of days.    Has always had \"tight hips\", problems spreading legs apart.    Present symptoms: moderate pain.    Pain severity: 7/10  Pain quality: sharp and shooting  Frequency of symptoms: are constant  Exacerbating Factors: weight bearing, stairs, [id number], in and out of car, prolonged sitting, prolonged standing, certain positions: laying on side, back; bending  Relieving Factors: positional changes  Night Pain: Yes  Pain while at rest: Yes   Associated numbness or tingling: occasional in foot/toes.     Orthopedic PMH: chronic low back pain.    Other PMH:  has no past medical history on file.  Patient Active Problem List   Diagnosis     CARDIOVASCULAR SCREENING; LDL GOAL LESS THAN 160     Glenoid labral tear     Hair loss     " Hayfever     Hyperlipidemia with target low density lipoprotein (LDL) cholesterol less than 100 mg/dL     Hypothenar hammer syndrome (H)     Impotence     Obesity     Pre-diabetes     Raynaud's phenomenon     Right shoulder pain     Rupture of long head biceps tendon     Snoring     Supraspinatus tendon tear     Morbid obesity (H)     CAROL (obstructive sleep apnea)       Surgical Hx:  has no past surgical history on file.    Medications:   Current Outpatient Medications:      aspirin (ASA) 81 MG chewable tablet, Take 1 tablet (81 mg) by mouth daily, Disp: 90 tablet, Rfl: 0     cyclobenzaprine (FLEXERIL) 10 MG tablet, Take 1 tablet (10 mg) by mouth 3 times daily as needed for muscle spasms, Disp: 60 tablet, Rfl: 1     FINASTERIDE OR, 1 TABLET BY MOUTH DAILY, Disp: , Rfl:      Glucosamine Sulfate 1000 MG TABS, , Disp: , Rfl:      lisinopril-hydrochlorothiazide (ZESTORETIC) 20-12.5 MG tablet, Take 1 tablet by mouth daily, Disp: 90 tablet, Rfl: 3     rosuvastatin (CRESTOR) 20 MG tablet, Take 1 tablet (20 mg) by mouth daily, Disp: 90 tablet, Rfl: 3     tamsulosin (FLOMAX) 0.4 MG capsule, TAKE 1 CAPSULE BY MOUTH ONCE DAILY AFTER A MEAL, Disp: , Rfl:   No current facility-administered medications for this visit.     Facility-Administered Medications Ordered in Other Visits:      sodium chloride (PF) 0.9% PF flush 10 mL, 10 mL, Intracatheter, Once, Taye Mcallister MD    Allergies: No Known Allergies    Social Hx: self-employed.  reports that he has quit smoking. His smoking use included cigarettes. He quit after 30.00 years of use. He has never used smokeless tobacco. He reports that he does not drink alcohol or use drugs.    Family Hx: family history includes Heart Disease in his father; Skin Cancer in his mother.    REVIEW OF SYSTEMS:  10 point ROS neg other than the symptoms noted above in the HPI and PAST MEDICAL HISTORY. Notables,  CONSTITUTIONAL:NEGATIVE for fever, chills, change in weight  INTEGUMENTARY/SKIN: NEGATIVE  "for worrisome rashes, moles or lesions  MUSCULOSKELETAL:See HPI above  NEURO: NEGATIVE for weakness, dizziness or paresthesias    PHYSICAL EXAM:  /83   Pulse 79   Ht 1.803 m (5' 11\")   Wt 119.1 kg (262 lb 9.6 oz)   BMI 36.63 kg/m     GENERAL APPEARANCE: healthy, alert, no distress  SKIN: no suspicious lesions or rashes  NEURO: Normal strength and tone, mentation intact and speech normal  PSYCH:  mentation appears normal and affect normal  RESPIRATORY: No increased work of breathing.  LYMPH: no palpable inguinal lymph nodes.    BILATERAL LOWER EXTREMITIES:  Gait: hunched, slight favors the right.  No Quad atrophy, strength normal. Well developed lower extremity muscle tone.  Intact sensation deep peroneal nerve, superficial peroneal nerve, med/lat tibial nerve, sural nerve, saphenous nerve  Intact EHL, EDL, TA, FHL, GS, quadriceps hamstrings and hip flexors  Toes warm and well perfused, brisk capillary refill. Palpable 2+ dp pulses.  Bilateral calf soft and nttp or squeeze.  DTRs: achilles 2+, patella 2+.  Edema: 1+  Leg lengths: slight short on right compared to left at medial malleolus.      BACK EXAM  Lumbar range of motion: flexion to touch mid shin causes right buttock pain and lateral hip pain; extension decreased without discomfort; side bend: adequate, pain not reproduced.  Squat: positive  Seated SLR: negative , bilateral.  Supine SLR: negative , bilateral.  Sciatic Notch tenderness: negative    LEFT HIP EXAM:      Palpation: Tender:   SI joint  Strength:  full strength  Special tests:  Irritability (flexion/adduction/internal rotation) positive   Hip range of motion: Internal rotation: 5, External rotation: 45, Flexion 95, pain with extremes of rotation      RIGHT HIP EXAM:    Palpation: Tender:   right greater trochanter, right gluteus medius, right SI joint, low back, groin  Strength:  full strength  Special tests:  Irritability (flexion/adduction/internal rotation) positive   Hip range of " "motion: Internal rotation: neutral, External rotation: 45, Flexion 90 with obligatory external rotation, pain with extremes of rotation        X-RAY: AP pelvis and AP/Lateral views right hip from 5/26/2021 were reviewed in clinic today. No obvious fractures or dislocations. Moderate-severe right hip degenerative changes with loss of superior joint space, sclerosis, marginal osteophytes. Moderate left hip degenerative changes. Degenerative changes of the lumbar spine with mild curvature.    MRI:  MRI right hip from 5/26/2021 was reviewed in clinic today.  1. No marrow signal changes in the right hip to suggest fracture, osteonecrosis, or marrow infiltration.  2. Small right hip joint effusion with mild synovitis.  3. Marked osteoarthrosis in the right hip joint, as described above, with areas of full-thickness cartilage loss with subchondral edema,  osteophytic spurring, and marked tearing in the anterior superior labrum.  4. Tendinosis of the hamstring tendons at their origin on the ischial tuberosity.      Impression: 59yo male with chronic right hip pain, primary osteoarthritis; chronic low back pain.    Plan:   * discussed pain in groin/hip likely from advanced hip arthritis. This is wearing of the cartilage within the hip joint either due to normal \"wear and tear\" or following an injury. Any low back / buttock / radiating pain likely coming from the low back.  *  treatment options for hip arthritis and pain include: do nothing, NSAIDS, activity modification, Physical Therapy, injections, total hip arthroplasty. Risks and benefits of each discussed.   * did discuss patient is a candidate for total hip arthroplasty, and offered total hip arthroplasty to patient. Total hip arthroplasty will only attempt to provide pain relief from hip related arthritis only and not any pain from the low back. Any low back pain likely to continue.  *  Discussed risks of surgery include, but not limited to: bleeding, infection, pain, " scar, damage to adjacent structures (e.g. Nerves, blood vessels, bone, cartilage), temporary or permanent nerve damage, recurrence of symptoms, implant dislocation, implant failure, implant infection, unequal limb lengths, stiffness, need for further surgery, blood clots, pulmonary embolism, risks of anesthesia, and death.  * patient would like to proceed with injection.  * referral placed to Sports Medicine for image-guided right hip intra-articular cortisone injection.  * return to clinic as needed.    * Physical Therapy referral placed as well      Jasper Sweeney M.D., M.S.  Dept. of Orthopaedic Surgery  [facility]

## 2021-05-27 ENCOUNTER — RECORDS - HEALTHEAST (OUTPATIENT)
Dept: ADMINISTRATIVE | Facility: OTHER | Age: 61
End: 2021-05-27

## 2021-05-27 VITALS
WEIGHT: 257 LBS | HEART RATE: 79 BPM | SYSTOLIC BLOOD PRESSURE: 137 MMHG | DIASTOLIC BLOOD PRESSURE: 83 MMHG | BODY MASS INDEX: 35.98 KG/M2 | OXYGEN SATURATION: 98 % | HEIGHT: 71 IN

## 2021-05-28 ENCOUNTER — HOSPITAL ENCOUNTER (OUTPATIENT)
Facility: CLINIC | Age: 61
Discharge: HOME | End: 2021-05-28
Attending: SURGERY | Admitting: SURGERY
Payer: COMMERCIAL

## 2021-05-28 VITALS
BODY MASS INDEX: 36.68 KG/M2 | HEIGHT: 71 IN | RESPIRATION RATE: 16 BRPM | DIASTOLIC BLOOD PRESSURE: 76 MMHG | HEART RATE: 66 BPM | OXYGEN SATURATION: 95 % | SYSTOLIC BLOOD PRESSURE: 119 MMHG | TEMPERATURE: 98.2 F | WEIGHT: 262 LBS

## 2021-05-28 LAB — COLONOSCOPY: NORMAL

## 2021-05-28 PROCEDURE — 258N000003 HC RX IP 258 OP 636: Performed by: SURGERY

## 2021-05-28 PROCEDURE — 370N000017 HC ANESTHESIA TECHNICAL FEE, PER MIN: Performed by: SURGERY

## 2021-05-28 PROCEDURE — 88305 TISSUE EXAM BY PATHOLOGIST: CPT | Mod: 26 | Performed by: PATHOLOGY

## 2021-05-28 PROCEDURE — 45382 COLONOSCOPY W/CONTROL BLEED: CPT | Performed by: SURGERY

## 2021-05-28 PROCEDURE — 45385 COLONOSCOPY W/LESION REMOVAL: CPT | Mod: PT | Performed by: SURGERY

## 2021-05-28 PROCEDURE — 45380 COLONOSCOPY AND BIOPSY: CPT | Mod: 59 | Performed by: SURGERY

## 2021-05-28 PROCEDURE — 250N000011 HC RX IP 250 OP 636: Performed by: NURSE ANESTHETIST, CERTIFIED REGISTERED

## 2021-05-28 PROCEDURE — 88305 TISSUE EXAM BY PATHOLOGIST: CPT | Mod: TC | Performed by: SURGERY

## 2021-05-28 PROCEDURE — 250N000009 HC RX 250: Performed by: NURSE ANESTHETIST, CERTIFIED REGISTERED

## 2021-05-28 PROCEDURE — 45380 COLONOSCOPY AND BIOPSY: CPT | Mod: PT,XU | Performed by: SURGERY

## 2021-05-28 PROCEDURE — 250N000009 HC RX 250: Performed by: SURGERY

## 2021-05-28 RX ORDER — NALOXONE HYDROCHLORIDE 0.4 MG/ML
0.4 INJECTION, SOLUTION INTRAMUSCULAR; INTRAVENOUS; SUBCUTANEOUS
Status: CANCELLED | OUTPATIENT
Start: 2021-05-28

## 2021-05-28 RX ORDER — LIDOCAINE HYDROCHLORIDE 10 MG/ML
INJECTION, SOLUTION EPIDURAL; INFILTRATION; INTRACAUDAL; PERINEURAL PRN
Status: DISCONTINUED | OUTPATIENT
Start: 2021-05-28 | End: 2021-05-28

## 2021-05-28 RX ORDER — PROPOFOL 10 MG/ML
INJECTION, EMULSION INTRAVENOUS PRN
Status: DISCONTINUED | OUTPATIENT
Start: 2021-05-28 | End: 2021-05-28

## 2021-05-28 RX ORDER — ONDANSETRON 2 MG/ML
4 INJECTION INTRAMUSCULAR; INTRAVENOUS
Status: DISCONTINUED | OUTPATIENT
Start: 2021-05-28 | End: 2021-05-28 | Stop reason: HOSPADM

## 2021-05-28 RX ORDER — FLUMAZENIL 0.1 MG/ML
0.2 INJECTION, SOLUTION INTRAVENOUS
Status: CANCELLED | OUTPATIENT
Start: 2021-05-28 | End: 2021-05-28

## 2021-05-28 RX ORDER — NALOXONE HYDROCHLORIDE 0.4 MG/ML
0.2 INJECTION, SOLUTION INTRAMUSCULAR; INTRAVENOUS; SUBCUTANEOUS
Status: CANCELLED | OUTPATIENT
Start: 2021-05-28

## 2021-05-28 RX ORDER — LIDOCAINE 40 MG/G
CREAM TOPICAL
Status: DISCONTINUED | OUTPATIENT
Start: 2021-05-28 | End: 2021-05-28 | Stop reason: HOSPADM

## 2021-05-28 RX ORDER — SODIUM CHLORIDE, SODIUM LACTATE, POTASSIUM CHLORIDE, CALCIUM CHLORIDE 600; 310; 30; 20 MG/100ML; MG/100ML; MG/100ML; MG/100ML
INJECTION, SOLUTION INTRAVENOUS CONTINUOUS
Status: DISCONTINUED | OUTPATIENT
Start: 2021-05-28 | End: 2021-05-28 | Stop reason: HOSPADM

## 2021-05-28 RX ORDER — PROPOFOL 10 MG/ML
INJECTION, EMULSION INTRAVENOUS CONTINUOUS PRN
Status: DISCONTINUED | OUTPATIENT
Start: 2021-05-28 | End: 2021-05-28

## 2021-05-28 RX ADMIN — SODIUM CHLORIDE, POTASSIUM CHLORIDE, SODIUM LACTATE AND CALCIUM CHLORIDE: 600; 310; 30; 20 INJECTION, SOLUTION INTRAVENOUS at 08:40

## 2021-05-28 RX ADMIN — PROPOFOL 150 MCG/KG/MIN: 10 INJECTION, EMULSION INTRAVENOUS at 08:50

## 2021-05-28 RX ADMIN — PROPOFOL 100 MG: 10 INJECTION, EMULSION INTRAVENOUS at 08:50

## 2021-05-28 RX ADMIN — LIDOCAINE HYDROCHLORIDE 1 ML: 10 INJECTION, SOLUTION EPIDURAL; INFILTRATION; INTRACAUDAL; PERINEURAL at 08:40

## 2021-05-28 RX ADMIN — LIDOCAINE HYDROCHLORIDE 5 ML: 10 INJECTION, SOLUTION EPIDURAL; INFILTRATION; INTRACAUDAL; PERINEURAL at 08:48

## 2021-05-28 ASSESSMENT — LIFESTYLE VARIABLES: TOBACCO_USE: 1

## 2021-05-28 ASSESSMENT — MIFFLIN-ST. JEOR: SCORE: 2010.55

## 2021-05-28 NOTE — ANESTHESIA CARE TRANSFER NOTE
Patient: Michael LEWIS Klar    Procedure(s):  COLONOSCOPY    Diagnosis: Special screening for malignant neoplasm of colon [Z12.11]  Diagnosis Additional Information: No value filed.    Anesthesia Type:   General     Note:    Oropharynx: spontaneously breathing  Level of Consciousness: drowsy  Oxygen Supplementation: nasal cannula  Level of Supplemental Oxygen (L/min / FiO2): 2  Independent Airway: airway patency satisfactory and stable  Dentition: dentition unchanged  Vital Signs Stable: post-procedure vital signs reviewed and stable  Report to RN Given: handoff report given  Patient transferred to: Phase II          Vitals: (Last set prior to Anesthesia Care Transfer)  [facility] VITALS  5/28/2021 0827 - 5/28/2021 0857      5/28/2021             Pulse:  82    Ht Rate:  81    SpO2:  98 %    Resp Rate (observed):  21        Electronically Signed By: TOREY Clark CRNA  May 28, 2021  8:57 AM

## 2021-05-28 NOTE — H&P
"60 year old year old male here for colonoscopy for screening.  Last colonoscopy was in 2010.  There is no known family history of colon cancer or polyps.    Patient Active Problem List   Diagnosis     CARDIOVASCULAR SCREENING; LDL GOAL LESS THAN 160     Glenoid labral tear     Hair loss     Hayfever     Hyperlipidemia with target low density lipoprotein (LDL) cholesterol less than 100 mg/dL     Hypothenar hammer syndrome (H)     Impotence     Obesity     Pre-diabetes     Raynaud's phenomenon     Right shoulder pain     Rupture of long head biceps tendon     Snoring     Supraspinatus tendon tear     Morbid obesity (H)     CAROL (obstructive sleep apnea)       No past medical history on file.    No past surgical history on file.    Family History   Problem Relation Age of Onset     Heart Disease Father         emphasemia     Skin Cancer Mother        No current outpatient medications on file.       No Known Allergies    Pt reports that he has quit smoking. His smoking use included cigarettes. He quit after 30.00 years of use. He has never used smokeless tobacco. He reports that he does not drink alcohol or use drugs.    Exam:  BP (!) 138/91 (BP Location: Right arm)   Pulse 73   Temp 98.2  F (36.8  C) (Oral)   Resp 16   Ht 1.803 m (5' 11\")   Wt 118.8 kg (262 lb)   SpO2 96%   BMI 36.54 kg/m      Awake, Alert OX3  Lungs - CTA bilaterally  CV - RRR, no murmurs, distal pulses intact  Abd - soft, non-distended, non-tender, +BS  Extr - No cyanosis or edema    A/P 60 year old year old male in need of colonoscopy for screening. Risks, benefits, alternatives, and complications were discussed including the possibility of perforation, bleeding, missed lesion and the patient agreed to proceed    Lito Sweet,  on 5/28/2021 at 8:38 AM    "

## 2021-05-28 NOTE — ANESTHESIA POSTPROCEDURE EVALUATION
Patient: Michael Jimenez    Procedure(s):  COLONOSCOPY    Diagnosis:Special screening for malignant neoplasm of colon [Z12.11]  Diagnosis Additional Information: No value filed.    Anesthesia Type:  General    Note:  Disposition: Outpatient   Postop Pain Control: Uneventful            Sign Out: Well controlled pain   PONV: No   Neuro/Psych: Uneventful            Sign Out: Acceptable/Baseline neuro status   Airway/Respiratory: Uneventful            Sign Out: Acceptable/Baseline resp. status; O2 supplementation   CV/Hemodynamics: Uneventful            Sign Out: Acceptable CV status; No obvious hypovolemia; No obvious fluid overload   Other NRE: NONE   DID A NON-ROUTINE EVENT OCCUR? No           Last vitals:  Vitals:    05/28/21 0812   BP: (!) 138/91   Pulse: 73   Resp: 16   Temp: 36.8  C (98.2  F)   SpO2: 96%       Last vitals prior to Anesthesia Care Transfer:  CRNA VITALS  5/28/2021 0828 - 5/28/2021 0858      5/28/2021             Pulse:  79    Ht Rate:  79    SpO2:  97 %    Resp Rate (observed):  20          Electronically Signed By: TOREY Clark CRNA  May 28, 2021  8:58 AM

## 2021-06-01 ENCOUNTER — OFFICE VISIT (OUTPATIENT)
Dept: ORTHOPEDICS | Facility: CLINIC | Age: 61
End: 2021-06-01
Payer: COMMERCIAL

## 2021-06-01 VITALS — BODY MASS INDEX: 36.68 KG/M2 | HEIGHT: 71 IN | WEIGHT: 262 LBS

## 2021-06-01 DIAGNOSIS — M25.551 CHRONIC RIGHT HIP PAIN: ICD-10-CM

## 2021-06-01 DIAGNOSIS — M16.11 PRIMARY OSTEOARTHRITIS OF RIGHT HIP: Primary | ICD-10-CM

## 2021-06-01 DIAGNOSIS — G89.29 CHRONIC RIGHT HIP PAIN: ICD-10-CM

## 2021-06-01 PROCEDURE — 20611 DRAIN/INJ JOINT/BURSA W/US: CPT | Mod: RT | Performed by: FAMILY MEDICINE

## 2021-06-01 RX ORDER — TRIAMCINOLONE ACETONIDE 40 MG/ML
40 INJECTION, SUSPENSION INTRA-ARTICULAR; INTRAMUSCULAR
Status: DISCONTINUED | OUTPATIENT
Start: 2021-06-01 | End: 2021-10-06

## 2021-06-01 RX ORDER — ROPIVACAINE HYDROCHLORIDE 5 MG/ML
3 INJECTION, SOLUTION EPIDURAL; INFILTRATION; PERINEURAL
Status: DISCONTINUED | OUTPATIENT
Start: 2021-06-01 | End: 2021-10-06

## 2021-06-01 RX ADMIN — TRIAMCINOLONE ACETONIDE 40 MG: 40 INJECTION, SUSPENSION INTRA-ARTICULAR; INTRAMUSCULAR at 08:40

## 2021-06-01 RX ADMIN — ROPIVACAINE HYDROCHLORIDE 3 ML: 5 INJECTION, SOLUTION EPIDURAL; INFILTRATION; PERINEURAL at 08:40

## 2021-06-01 ASSESSMENT — MIFFLIN-ST. JEOR: SCORE: 2010.55

## 2021-06-01 NOTE — PROGRESS NOTES
Michael DEBBIE Jimenez  :  1960  DOS: 2021  MRN: [id number]    Sports Medicine Clinic Procedure    Ultrasound Guided Right Intra-Articular Hip Injection    Clinical History: Gradual onset of moderate-severe right hip pain with antalgic gait over the past ~ 6+ months.  He had minimal relief with lumbar ED.    Diagnosis:   1. Chronic right hip pain      Referring Physician: Jasper Sweeney MD  Large Joint Injection/Arthocentesis: R hip joint    Date/Time: 2021 8:40 AM  Performed by: Mayo Mcleod DO  Authorized by: Mayo Mcleod DO     Indications:  Pain, diagnostic evaluation and osteoarthritis  Needle Size:  22 G  Guidance: ultrasound    Approach:  Anterior  Location:  Hip      Site:  R hip joint  Medications:  3 mL ropivacaine 5 MG/ML; 40 mg triamcinolone 40 MG/ML  Aspirate amount (mL):  4  Aspirate:  Serous and yellow  Outcome:  Tolerated well, no immediate complications  Procedure discussed: discussed risks, benefits, and alternatives    Consent Given by:  Patient  Timeout: timeout called immediately prior to procedure    Prep: patient was prepped and draped in usual sterile fashion     4 ml's of 1% lidocaine was used as local anesthetic prior to injection        Impression:  Successful Right intra-articular hip injection.    Plan:  Follow up as directed by Dr Sweeney  Good initial relief from anesthetic effect today  Expectations and goals of CSI reviewed  Often 2-3 days for steroid effect, and can take up to two weeks for maximum effect  We discussed modified progressive pain-free activity as tolerated  Do not overuse in first two weeks if feeling better due to concern for vulnerability while steroid is working  Supportive care reviewed  All questions were answered today  Contact us with additional questions or concerns  Signs and sx of concern reviewed      Mayo Mcleod DO, CAQ  Primary Care Sports Medicine  [facility]

## 2021-06-01 NOTE — LETTER
2021         RE: Michael Jimenez  [address] [place] [address]        Dear Colleague,    Thank you for referring your patient, Michael Jimenez, to the [facility] [place]. Please see a copy of my visit note below.    Michael Jimenez  :  1960  DOS: 2021  MRN: [id number]    Sports Medicine Clinic Procedure    Ultrasound Guided Right Intra-Articular Hip Injection    Clinical History: Gradual onset of moderate-severe right hip pain with antalgic gait over the past ~ 6+ months.  He had minimal relief with lumbar ED.    Diagnosis:   1. Chronic right hip pain      Referring Physician: Jasper Sweeney MD  Large Joint Injection/Arthocentesis: R hip joint    Date/Time: 2021 8:40 AM  Performed by: Mayo Mcleod DO  Authorized by: Mayo Mcleod DO     Indications:  Pain, diagnostic evaluation and osteoarthritis  Needle Size:  22 G  Guidance: ultrasound    Approach:  Anterior  Location:  Hip      Site:  R hip joint  Medications:  3 mL ropivacaine 5 MG/ML; 40 mg triamcinolone 40 MG/ML  Aspirate amount (mL):  4  Aspirate:  Serous and yellow  Outcome:  Tolerated well, no immediate complications  Procedure discussed: discussed risks, benefits, and alternatives    Consent Given by:  Patient  Timeout: timeout called immediately prior to procedure    Prep: patient was prepped and draped in usual sterile fashion     4 ml's of 1% lidocaine was used as local anesthetic prior to injection        Impression:  Successful Right intra-articular hip injection.    Plan:  Follow up as directed by Dr Sweeney  Good initial relief from anesthetic effect today  Expectations and goals of CSI reviewed  Often 2-3 days for steroid effect, and can take up to two weeks for maximum effect  We discussed modified progressive pain-free activity as tolerated  Do not overuse in first two weeks if feeling better due to concern for vulnerability while steroid is working  Supportive care reviewed  All  questions were answered today  Contact us with additional questions or concerns  Signs and sx of concern reviewed      Mayo Mcleod DO, CAQ  Primary Care Sports Medicine  [facility]         Again, thank you for allowing me to participate in the care of your patient.        Sincerely,        Mayo Mcleod DO

## 2021-06-03 ENCOUNTER — DOCUMENTATION ONLY (OUTPATIENT)
Dept: SLEEP MEDICINE | Facility: CLINIC | Age: 61
End: 2021-06-03

## 2021-06-03 ENCOUNTER — OFFICE VISIT (OUTPATIENT)
Dept: FAMILY MEDICINE | Facility: CLINIC | Age: 61
End: 2021-06-03
Payer: COMMERCIAL

## 2021-06-03 VITALS
HEIGHT: 71 IN | SYSTOLIC BLOOD PRESSURE: 140 MMHG | OXYGEN SATURATION: 98 % | DIASTOLIC BLOOD PRESSURE: 80 MMHG | HEART RATE: 86 BPM | RESPIRATION RATE: 16 BRPM | BODY MASS INDEX: 35.98 KG/M2 | WEIGHT: 257 LBS | TEMPERATURE: 98.3 F

## 2021-06-03 DIAGNOSIS — J34.2 DEVIATED NASAL SEPTUM: ICD-10-CM

## 2021-06-03 DIAGNOSIS — R43.9 SENSE OF SMELL ALTERED: Primary | ICD-10-CM

## 2021-06-03 DIAGNOSIS — G47.33 OSA (OBSTRUCTIVE SLEEP APNEA): ICD-10-CM

## 2021-06-03 PROCEDURE — 99213 OFFICE O/P EST LOW 20 MIN: CPT | Performed by: NURSE PRACTITIONER

## 2021-06-03 ASSESSMENT — MIFFLIN-ST. JEOR: SCORE: 1987.87

## 2021-06-03 NOTE — PROGRESS NOTES
30 DAY STM VISIT    Diagnostic AHI: 30  PSG    Subjective measures:  Patient said he's sleeping much better. He's not feeling any more energy yet. He tried a new mask, but went back to his first one.      Assessment: Pt not meeting objective benchmarks for leak Patient meeting subjective benchmarks.   Action plan: pt to have 6 month STM visit  Patient has not scheduled a follow up visit.   Device type: Auto-CPAP  PAP settings: CPAP min 5.0 cm  H20     CPAP max 15.0 cm  H20    95th% pressure 8.8 cm  H20      RESMED EPR level Setting: TWO    RESMED Soft response setting:  OFF  Mask type:  Full Face Mask  Objective measures: 14 day rolling measures      Compliance  100 %      Leak  44.64 lpm  last  upload      AHI 0.77   last  upload      Average number of minutes 421      Objective measure goal  Compliance   Goal >70%  Leak   Goal < 24 lpm  AHI  Goal < 5  Usage  Goal >240        Total time spent on accessing and interpreting remote patient PAP therapy data  2 minutes    Total time spent counseling, coaching  and reviewing PAP therapy data with patient  3 minutes     [id number] this call  [id number] no  at 3 or 14 day [facility]

## 2021-06-03 NOTE — PROGRESS NOTES
Assessment & Plan     Sense of smell altered  Unclear etiology, suspect related to underlying sinus issues as he has history of this and it did start prior to his COVID tests being positive, however antibody test was positive at the same time PCR test was positive so it stands to reason that he could have had a second infection and there are reports of ongoing issues with taste/smell following COVID infection. At his request, he is referred to ENT for consultation.  - OTOLARYNGOLOGY REFERRAL    Deviated nasal septum       Patient Instructions   Schedule appointment with ENT        No follow-ups on file.    TOREY Stewart CNP  [facility] DONNELL Rodriguez is a 60 year old who presents for the following health issues     HPI     Chief Complaint   Patient presents with     Referral     ENT referral needed - loss of taste or smell     Above HPI reviewed. Additionally, notes has had altered smell and occasionally altered taste intermittently for several years. First noted around Thanksgiving of 2019 following URI. Lasted a few weeks then resolved. Noted again just prior to Thanksgiving of 2020, resolved for a few weeks, then returned in December. It has been present since. Occasionally he notes anosmia but other times notes he smells things differently.     Does note he has a deviated septum, had sinus surgery about 15 years ago. Feels he does not breathe through his nose well. Does not use any type of nasal sprays.    Of note, he did have a positive COVID antibody titer on 5/11/2021. This was drawn on the same day he had an asymptomatic PCR test for a colonoscopy. This test was also positive. He tells me he did not have any symptoms consistent with COVID when he had these tests, nor has he ever had symptoms he believes were consistent with COVID. He also tells me that several days after the PCR test, [place] airport [place].    He would like to see  "an ENT provider as he is \"sick of dealing with this\".        Review of Systems   Constitutional, HEENT, cardiovascular, pulmonary, gi and gu systems are negative, except as otherwise noted.      Objective    BP (!) 140/80 (BP Location: Right arm, Patient Position: Sitting, Cuff Size: Adult Large)   Pulse 86   Temp 98.3  F (36.8  C) (Tympanic)   Resp 16   Ht 1.803 m (5' 11\")   Wt 116.6 kg (257 lb)   SpO2 98%   BMI 35.84 kg/m    Body mass index is 35.84 kg/m .  Physical Exam  Vitals signs and nursing note reviewed.   Constitutional:       Appearance: Normal appearance.   HENT:      Head: Normocephalic and atraumatic.      Nose: Septal deviation present. No congestion or rhinorrhea.      Right Sinus: No maxillary sinus tenderness or frontal sinus tenderness.      Left Sinus: No maxillary sinus tenderness or frontal sinus tenderness.      Mouth/Throat:      Mouth: Mucous membranes are moist.   Neck:      Musculoskeletal: Neck supple.   Cardiovascular:      Rate and Rhythm: Normal rate.   Pulmonary:      Effort: Pulmonary effort is normal.   Skin:     General: Skin is warm and dry.   Neurological:      General: No focal deficit present.      Mental Status: He is alert.   Psychiatric:         Mood and Affect: Mood normal.         Behavior: Behavior normal.                  "

## 2021-06-04 NOTE — PROGRESS NOTES
History of Present Illness - Michael Jimenez is a very pleasant 60 year old male here to see me for the first time at the consult of Leigh Ann Reid for changes in smell and taste.    This started in 2019, when he was returning from a trip to the [facility] in November of that year.  He had caught a severe head cold that lasted a month.  After that, he was better, but then in September of 2020, it happened again.  But this time he did not recall having a URI, but again lost his sense of smell and taste.  There was no preceding illness.  He had multiple Covid tests, all negative, but then a serology test was positive, but negative PCR swab tests since then.    He has not had any change in nasal airway, no facial pain, no change in vision.    Past Medical History -   Patient Active Problem List   Diagnosis     CARDIOVASCULAR SCREENING; LDL GOAL LESS THAN 160     Glenoid labral tear     Hair loss     Hayfever     Hyperlipidemia with target low density lipoprotein (LDL) cholesterol less than 100 mg/dL     Hypothenar hammer syndrome (H)     Impotence     Obesity     Pre-diabetes     Raynaud's phenomenon     Right shoulder pain     Rupture of long head biceps tendon     Snoring     Supraspinatus tendon tear     Morbid obesity (H)     CAROL (obstructive sleep apnea)       Current Medications -   Current Outpatient Medications:      aspirin (ASA) 81 MG chewable tablet, Take 1 tablet (81 mg) by mouth daily, Disp: 90 tablet, Rfl: 0     FINASTERIDE OR, 1 TABLET BY MOUTH DAILY, Disp: , Rfl:      Glucosamine Sulfate 1000 MG TABS, , Disp: , Rfl:      lisinopril-hydrochlorothiazide (ZESTORETIC) 20-12.5 MG tablet, Take 1 tablet by mouth daily, Disp: 90 tablet, Rfl: 3     rosuvastatin (CRESTOR) 20 MG tablet, Take 1 tablet (20 mg) by mouth daily, Disp: 90 tablet, Rfl: 3     tamsulosin (FLOMAX) 0.4 MG capsule, TAKE 1 CAPSULE BY MOUTH ONCE DAILY AFTER A MEAL, Disp: , Rfl:     Current Facility-Administered Medications:      ropivacaine  (NAROPIN) injection 3 mL, 3 mL, , , Mayo Mcleod, DO, 3 mL at 06/01/21 0840     triamcinolone (KENALOG-40) injection 40 mg, 40 mg, , , Mayo Mcleod, DO, 40 mg at 06/01/21 0840    Facility-Administered Medications Ordered in Other Visits:      sodium chloride (PF) 0.9% PF flush 10 mL, 10 mL, Intracatheter, Once, Taye Mcallister MD    Allergies - No Known Allergies    Social History -   Social History     Socioeconomic History     Marital status:      Spouse name: Not on file     Number of children: Not on file     Years of education: Not on file     Highest education level: Not on file   Occupational History     Not on file   Social Needs     Financial resource strain: Not on file     Food insecurity     Worry: Not on file     Inability: Not on file     Transportation needs     Medical: Not on file     Non-medical: Not on file   Tobacco Use     Smoking status: Former Smoker     Years: 30.00     Types: Cigarettes     Smokeless tobacco: Never Used   Substance and Sexual Activity     Alcohol use: No     Drug use: No     Sexual activity: Never   Lifestyle     Physical activity     Days per week: Not on file     Minutes per session: Not on file     Stress: Not on file   Relationships     Social connections     Talks on phone: Not on file     Gets together: Not on file     Attends Orthodoxy service: Not on file     Active member of club or organization: Not on file     Attends meetings of clubs or organizations: Not on file     Relationship status: Not on file     Intimate partner violence     Fear of current or ex partner: Not on file     Emotionally abused: Not on file     Physically abused: Not on file     Forced sexual activity: Not on file   Other Topics Concern     Parent/sibling w/ CABG, MI or angioplasty before 65F 55M? Yes     Comment: father   Social History Narrative     Not on file       Family History -   Family History   Problem Relation Age of Onset     Heart Disease Father          "emphasemia     Skin Cancer Mother        Review of Systems - As per HPI and PMHx, otherwise 10+ system review of the head and neck, and general constitution is negative.    Physical Exam  /69   Pulse 83   Resp 16   Ht 1.803 m (5' 11\")   Wt 116.1 kg (256 lb)   SpO2 94%   BMI 35.70 kg/m      General - The patient is well nourished and well developed, and appears to have good nutritional status.  Alert and oriented to person and place, answers questions and cooperates with examination appropriately.   Head and Face - Normocephalic and atraumatic, with no gross asymmetry noted of the contour of the facial features.  The facial nerve is intact, with strong symmetric movements.  Voice and Breathing - The patient was breathing comfortably without the use of accessory muscles. There was no wheezing, stridor, or stertor.  The patients voice was clear and strong, and had appropriate pitch and quality.  Ears - The tympanic membranes are normal in appearance, bony landmarks are intact.  No retraction, perforation, or masses.  No fluid or purulence was seen in the external canal or the middle ear. No evidence of infection of the middle ear or external canal, cerumen was normal in appearance.  Eyes - Extraocular movements intact, and the pupils were reactive to light.  Sclera were not icteric or injected, conjunctiva were pink and moist.  Mouth - Examination of the oral cavity showed pink, healthy oral mucosa. No lesions or ulcerations noted.  The tongue was mobile and midline, and the dentition were in good condition.    Throat - The walls of the oropharynx were smooth, pink, moist, symmetric, and had no lesions or ulcerations.  The tonsillar pillars and soft palate were symmetric.  The uvula was midline on elevation.    Neck - Normal midline excursion of the laryngotracheal complex during swallowing.  Full range of motion on passive movement.  Palpation of the occipital, submental, submandibular, internal jugular " chain, and supraclavicular nodes did not demonstrate any abnormal lymph nodes or masses.  The carotid pulse was palpable bilaterally.  Palpation of the thyroid was soft and smooth, with no nodules or goiter appreciated.  The trachea was mobile and midline.  Nose - External contour is symmetric, no gross deflection or scars.  Nasal mucosa is pink and moist with no abnormal mucus.  The septum was midline and non-obstructive, turbinates of normal size and position.  No polyps, masses, or purulence noted on examination.      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  I stressed that according to my experience and the clinical research, the underlying cause of anosmia is actually rarely found.  I have explained the indications for a brain MRI.    Next we discussed important precautions to take when one has an altered sense of smell.  For example, I recommended use of natural gas and carbon monoxide detection in the home, and being very careful with expiration dates on foods.    Finally, alternative techniques to make food more interesting were also discussed.  These included spiciness, visual appeal, and more interesting textures.    We then discussed smell reconditioning technique that he should try.    Buy four essential oils of familiar odors such as Miri, Lavender, Pine, etc.    Three times per day, think about what you expect the smell to be, and then smell the corresponding oil for 30 seconds.  Wait one minute, and move on to the next essential oil, and repeat the process.

## 2021-06-06 LAB — COPATH REPORT: NORMAL

## 2021-06-07 ENCOUNTER — OFFICE VISIT (OUTPATIENT)
Dept: OTOLARYNGOLOGY | Facility: CLINIC | Age: 61
End: 2021-06-07
Attending: NURSE PRACTITIONER
Payer: COMMERCIAL

## 2021-06-07 ENCOUNTER — HOSPITAL ENCOUNTER (OUTPATIENT)
Dept: PHYSICAL THERAPY | Facility: CLINIC | Age: 61
Setting detail: THERAPIES SERIES
End: 2021-06-07
Attending: ORTHOPAEDIC SURGERY
Payer: COMMERCIAL

## 2021-06-07 VITALS
HEART RATE: 83 BPM | RESPIRATION RATE: 16 BRPM | WEIGHT: 256 LBS | HEIGHT: 71 IN | DIASTOLIC BLOOD PRESSURE: 69 MMHG | SYSTOLIC BLOOD PRESSURE: 112 MMHG | OXYGEN SATURATION: 94 % | BODY MASS INDEX: 35.84 KG/M2

## 2021-06-07 DIAGNOSIS — R44.2 PHANTOSMIA: ICD-10-CM

## 2021-06-07 DIAGNOSIS — R43.9 DYSOSMIA: Primary | ICD-10-CM

## 2021-06-07 PROCEDURE — 97110 THERAPEUTIC EXERCISES: CPT | Mod: GP | Performed by: PHYSICAL THERAPIST

## 2021-06-07 PROCEDURE — 97161 PT EVAL LOW COMPLEX 20 MIN: CPT | Mod: GP | Performed by: PHYSICAL THERAPIST

## 2021-06-07 PROCEDURE — 97140 MANUAL THERAPY 1/> REGIONS: CPT | Mod: GP | Performed by: PHYSICAL THERAPIST

## 2021-06-07 PROCEDURE — 99203 OFFICE O/P NEW LOW 30 MIN: CPT | Performed by: OTOLARYNGOLOGY

## 2021-06-07 ASSESSMENT — MIFFLIN-ST. JEOR: SCORE: 1983.34

## 2021-06-07 ASSESSMENT — PAIN SCALES - GENERAL: PAINLEVEL: NO PAIN (0)

## 2021-06-07 NOTE — PATIENT INSTRUCTIONS
Scheduling Information  To schedule your CT/MRI scan, please contact [facility] at [phone] OR [facility] at [phone]    To schedule your Surgery, please contact our Specialty Schedulers at [phone]      ENT Clinic Locations Clinic Hours Telephone Number     [facility]  [address]  [place] [address]   Monday:           1:00pm -- 5:00pm    Friday:              8:00am - 12:00pm   To schedule/reschedule an appointment with   Dr. Ahn,   please contact our   Specialty Scheduling Department at:     [phone]       [facility]  [address] [place]  [place] [address] Tuesday:          8:00am -- 2:00pm         Urgent Care Locations Clinic Hours Telephone Numbers     [facility]  [address] [facility] [place] [address]     Monday-Friday:     11:00am - 9:00pm    Saturday-Sunday:  9:00am - 5:00pm   [phone]     [facility]  [address] [place] [address]. [place] [address]     Monday-Friday:      5:00pm - 9:00pm     Saturday-Sunday:  9:00am - 5:00pm   [phone]     We then discussed smell reconditioning technique that he should try.    Buy four essential oils of familiar odors such as Miri, Lavender, Pine, etc.    Three times per day, think about what you expect the smell to be, and then smell the corresponding oil for 30 seconds.  Wait one minute, and move on to the next essential oil, and repeat the process.

## 2021-06-07 NOTE — PROGRESS NOTES
Outpatient Physical Therapy Discharge Note     Patient: Michael Jimenez  : 1960    Beginning/End Dates of Reporting Period:  21 to 2021    Referring Provider: TOREY Fitzpatrick CNP    Therapy Diagnosis: LBP     Client Self Report: Pt fell 1 year ago from the roof to the ground, landing on his back.  Felt like he landed on his tailbone.  Now low back is flaring up.  Pain keeps floating around, can be down right thigh, right groin, right SIJ.  Ultrasound cleared, no hernia.  Night is really hard, more than 2 hours is impossible.  Lumbar steroid injection next week.    Objective Measurements:  Objective Measure: right hip IR  Details: 10 deg  Objective Measure: right SLR  Details: 45 deg            Goals:  Goal Identifier     Goal Description Patient will have >20 deg of right hip IR mobility to reduce strain on lumbar with ADL's.   Target Date 21   Date Met      Progress:     Goal Identifier     Goal Description Patient will be able to sleep for 4+ hours without waking due to LBP.   Target Date 21   Date Met      Progress:     Goal Identifier     Goal Description Patient will have =>60 degree straight leg raise to reduce strain on lumbar while donning / doffing shoes.   Target Date 21   Date Met      Progress:       Progress Toward Goals:   Progress this reporting period: Michael attended only 1 PT session; unable to further assess and progress.      Plan:  Discharge from therapy.    Discharge:    Reason for Discharge: Patient has failed to schedule further appointments.    Equipment Issued: theraband    Discharge Plan: Patient to continue home program.      Sara Dueñas, PT, DTP, [facility]  Doctor of Physical Therapy #[id number]  [facility]  [phone]  [email or web address]

## 2021-06-07 NOTE — LETTER
6/7/2021         RE: Michael Jimenez  [address] [place] [id number]        Dear Colleague,    Thank you for referring your patient, Michael Jimenez, to the [facility]. Please see a copy of my visit note below.    History of Present Illness - Michael Jimenez is a very pleasant 60 year old male here to see me for the first time at the consult of Leigh Ann Reid for changes in smell and taste.    This started in 2019, when he was returning from a trip to the [facility] in November of that year.  He had caught a severe head cold that lasted a month.  After that, he was better, but then in September of 2020, it happened again.  But this time he did not recall having a URI, but again lost his sense of smell and taste.  There was no preceding illness.  He had multiple Covid tests, all negative, but then a serology test was positive, but negative PCR swab tests since then.    He has not had any change in nasal airway, no facial pain, no change in vision.    Past Medical History -   Patient Active Problem List   Diagnosis     CARDIOVASCULAR SCREENING; LDL GOAL LESS THAN 160     Glenoid labral tear     Hair loss     Hayfever     Hyperlipidemia with target low density lipoprotein (LDL) cholesterol less than 100 mg/dL     Hypothenar hammer syndrome (H)     Impotence     Obesity     Pre-diabetes     Raynaud's phenomenon     Right shoulder pain     Rupture of long head biceps tendon     Snoring     Supraspinatus tendon tear     Morbid obesity (H)     CAROL (obstructive sleep apnea)       Current Medications -   Current Outpatient Medications:      aspirin (ASA) 81 MG chewable tablet, Take 1 tablet (81 mg) by mouth daily, Disp: 90 tablet, Rfl: 0     FINASTERIDE OR, 1 TABLET BY MOUTH DAILY, Disp: , Rfl:      Glucosamine Sulfate 1000 MG TABS, , Disp: , Rfl:      lisinopril-hydrochlorothiazide (ZESTORETIC) 20-12.5 MG tablet, Take 1 tablet by mouth daily, Disp: 90 tablet, Rfl: 3     rosuvastatin (CRESTOR) 20 MG tablet,  Take 1 tablet (20 mg) by mouth daily, Disp: 90 tablet, Rfl: 3     tamsulosin (FLOMAX) 0.4 MG capsule, TAKE 1 CAPSULE BY MOUTH ONCE DAILY AFTER A MEAL, Disp: , Rfl:     Current Facility-Administered Medications:      ropivacaine (NAROPIN) injection 3 mL, 3 mL, , , Mayo Mcleod DO, 3 mL at 06/01/21 0840     triamcinolone (KENALOG-40) injection 40 mg, 40 mg, , , Mayo Mcleod DO, 40 mg at 06/01/21 0840    Facility-Administered Medications Ordered in Other Visits:      sodium chloride (PF) 0.9% PF flush 10 mL, 10 mL, Intracatheter, Once, Taye Mcallister MD    Allergies - No Known Allergies    Social History -   Social History     Socioeconomic History     Marital status:      Spouse name: Not on file     Number of children: Not on file     Years of education: Not on file     Highest education level: Not on file   Occupational History     Not on file   Social Needs     Financial resource strain: Not on file     Food insecurity     Worry: Not on file     Inability: Not on file     Transportation needs     Medical: Not on file     Non-medical: Not on file   Tobacco Use     Smoking status: Former Smoker     Years: 30.00     Types: Cigarettes     Smokeless tobacco: Never Used   Substance and Sexual Activity     Alcohol use: No     Drug use: No     Sexual activity: Never   Lifestyle     Physical activity     Days per week: Not on file     Minutes per session: Not on file     Stress: Not on file   Relationships     Social connections     Talks on phone: Not on file     Gets together: Not on file     Attends Sabianism service: Not on file     Active member of club or organization: Not on file     Attends meetings of clubs or organizations: Not on file     Relationship status: Not on file     Intimate partner violence     Fear of current or ex partner: Not on file     Emotionally abused: Not on file     Physically abused: Not on file     Forced sexual activity: Not on file   Other Topics Concern      "Parent/sibling w/ CABG, MI or angioplasty before 65F 55M? Yes     Comment: father   Social History Narrative     Not on file       Family History -   Family History   Problem Relation Age of Onset     Heart Disease Father         emphasemia     Skin Cancer Mother        Review of Systems - As per HPI and PMHx, otherwise 10+ system review of the head and neck, and general constitution is negative.    Physical Exam  /69   Pulse 83   Resp 16   Ht 1.803 m (5' 11\")   Wt 116.1 kg (256 lb)   SpO2 94%   BMI 35.70 kg/m      General - The patient is well nourished and well developed, and appears to have good nutritional status.  Alert and oriented to person and place, answers questions and cooperates with examination appropriately.   Head and Face - Normocephalic and atraumatic, with no gross asymmetry noted of the contour of the facial features.  The facial nerve is intact, with strong symmetric movements.  Voice and Breathing - The patient was breathing comfortably without the use of accessory muscles. There was no wheezing, stridor, or stertor.  The patients voice was clear and strong, and had appropriate pitch and quality.  Ears - The tympanic membranes are normal in appearance, bony landmarks are intact.  No retraction, perforation, or masses.  No fluid or purulence was seen in the external canal or the middle ear. No evidence of infection of the middle ear or external canal, cerumen was normal in appearance.  Eyes - Extraocular movements intact, and the pupils were reactive to light.  Sclera were not icteric or injected, conjunctiva were pink and moist.  Mouth - Examination of the oral cavity showed pink, healthy oral mucosa. No lesions or ulcerations noted.  The tongue was mobile and midline, and the dentition were in good condition.    Throat - The walls of the oropharynx were smooth, pink, moist, symmetric, and had no lesions or ulcerations.  The tonsillar pillars and soft palate were symmetric.  The " uvula was midline on elevation.    Neck - Normal midline excursion of the laryngotracheal complex during swallowing.  Full range of motion on passive movement.  Palpation of the occipital, submental, submandibular, internal jugular chain, and supraclavicular nodes did not demonstrate any abnormal lymph nodes or masses.  The carotid pulse was palpable bilaterally.  Palpation of the thyroid was soft and smooth, with no nodules or goiter appreciated.  The trachea was mobile and midline.  Nose - External contour is symmetric, no gross deflection or scars.  Nasal mucosa is pink and moist with no abnormal mucus.  The septum was midline and non-obstructive, turbinates of normal size and position.  No polyps, masses, or purulence noted on examination.      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  I stressed that according to my experience and the clinical research, the underlying cause of anosmia is actually rarely found.  I have explained the indications for a brain MRI.    Next we discussed important precautions to take when one has an altered sense of smell.  For example, I recommended use of natural gas and carbon monoxide detection in the home, and being very careful with expiration dates on foods.    Finally, alternative techniques to make food more interesting were also discussed.  These included spiciness, visual appeal, and more interesting textures.    We then discussed smell reconditioning technique that he should try.    Buy four essential oils of familiar odors such as Miri, Lavender, Pine, etc.    Three times per day, think about what you expect the smell to be, and then smell the corresponding oil for 30 seconds.  Wait one minute, and move on to the next essential oil, and repeat the process.          Again, thank you for allowing me to participate in the care of your patient.         Sincerely,        Manoj Ahn MD

## 2021-06-07 NOTE — PROGRESS NOTES
06/07/21 1000   General Information   Type of Visit Initial OP Ortho PT Evaluation   Start of Care Date 06/07/21   Referring Physician Jasper Sweeney MD   Patient/Family Goals Statement to try to get as much PT in before end of June when insurance runs out; also wants to stretch out his shoulders and torso   Orders Evaluate and Treat   Date of Order 05/26/21   Certification Required? No   Medical Diagnosis chronic right hip pain; primary osteoarthritis of right hip   Surgical/Medical history reviewed Yes   Precautions/Limitations no known precautions/limitations   Body Part(s)   Body Part(s) Hip   Presentation and Etiology   Pertinent history of current problem (include personal factors and/or comorbidities that impact the POC) Pt reports: chronic LBP and right hip pain. At first doctors didn't know if it was coming from the back / hip, now suspect hip.  Recent Xrays revealed a lot of OA, patient feels he will undergo a hip replacement eventually.  For now he feels he is so tight everywhere and would like to stretch out as much as possible before insurance runs out at the end of the month.   Impairments A. Pain;D. Decreased ROM;E. Decreased flexibility;F. Decreased strength and endurance;H. Impaired gait   Functional Limitations perform activities of daily living;perform required work activities;perform desired leisure / sports activities   Symptom Location right anterior hip and right groin   How/Where did it occur From Degenerative Joint Disease   Onset date of current episode/exacerbation 04/07/21   Chronicity Chronic   Pain rating (0-10 point scale) Best (/10);Worst (/10)   Best (/10) 3   Worst (/10) 9   Pain quality A. Sharp;B. Dull;C. Aching;E. Shooting;F. Stabbing   Frequency of pain/symptoms C. With activity   Pain/symptoms are: Worse during the night   Pain/symptoms exacerbated by B. Walking;D. Carrying;I. Bending;K. Home tasks;L. Work tasks   Pain/symptoms eased by A. Sitting;E. Changing positions    Progression of symptoms since onset: Worsened   Current / Previous Interventions   Diagnostic Tests: X-ray;MRI   X-ray Results Results   X-ray results 5/26/21 right hip XR IMPRESSION: Moderate to advanced right hip degenerative changes.  Moderate left hip degenerative changes. No evidence of fracture or AVN.   MRI Results Results   MRI results 5/10/21 IMPRESSION: 1. No marrow signal changes in the right hip to suggest fracture,   Prior Level of Function   Prior Level of Function-Mobility Independent   Prior Level of Function-ADLs Independent   Current Level of Function   Current Community Support Family/friend caregiver   Patient role/employment history F. 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 No   Have you fallen and had an injury in the past year? No   Is patient a fall risk? No   Abuse Screen (yes response referral indicated)   Feels Unsafe at Home or Work/School no   Feels Threatened by Someone no   Does Anyone Try to Keep You From Having Contact with Others or Doing Things Outside Your Home? no   Physical Signs of Abuse Present no   Hip Objective Findings   Side (if bilateral, select both right and left) Right   Posture increased thoracic kyphosis   Gait/Locomotion no thorracic rotation / arm swing   Hip ROM Comments hip extension right=lacking 10 deg; left=to neutral   Lumbar ROM fingertips to mid thigh; extension=25% some LBP   Straight Leg Raise Test right=40 deg; left=50 deg   Right Hip Flexion PROM bzk=238 right with anterior hip pain   Right Hip ER PROM kartik=20   Right Hip IR PROM right=0 deg; left=5 deg   Planned Therapy Interventions   Planned Therapy Interventions ADL retraining;balance training;gait training;joint mobilization;manual therapy;motor coordination training;neuromuscular re-education;ROM;strengthening;stretching    Planned Modality Interventions   Planned Modality Interventions Cryotherapy   Clinical Impression   Criteria for Skilled Therapeutic Interventions Met yes, treatment indicated   PT Diagnosis right hip OA with underlying ROM loss globally   Influenced by the following impairments pain; weakness; impaired ROM; impaired gait and proprioception   Functional limitations due to impairments difficulty walking, prolonged standing, performing ADL's   Clinical Presentation Stable/Uncomplicated   Clinical Presentation Rationale (+) motivation; previous success with PT  (-) chronicity; insurance ending end of month   Clinical Decision Making (Complexity) Low complexity   Therapy Frequency 2 times/Week   Predicted Duration of Therapy Intervention (days/wks) 4 weeks   Risk & Benefits of therapy have been explained Yes   Patient, Family & other staff in agreement with plan of care Yes   Clinical Impression Comments Michael arrives today with chronic right hip pain secondary to severe OA and ROM loss; he will benefit from skilled PT to address the above impairements and functional limitations.   Education Assessment   Preferred Learning Style Listening;Demonstration;Pictures/video   Barriers to Learning No barriers   ORTHO GOALS   PT Ortho Eval Goals 1;2;3   Ortho Goal 1   Goal Description Patient will have >=60 deg kartik SLR to indicate reduce hamstring tone.   Target Date 07/01/21   Ortho Goal 2   Goal Description Patient will have >=5 deg hip extension to allow for normalized gait and ADL's.   Target Date 07/01/21   Ortho Goal 3   Goal Description Patient will be independent with his HEP to reduce future occurrence of pain and disability.   Target Date 01/14/22   Total Evaluation Time   PT Eval, Low Complexity Minutes (55742) 15       Sara Dueñas PT, DTP, SCS  Doctor of Physical Therapy #[id number]  [facility]  [phone]  [email or web address]

## 2021-06-10 ENCOUNTER — HOSPITAL ENCOUNTER (OUTPATIENT)
Dept: PHYSICAL THERAPY | Facility: CLINIC | Age: 61
Setting detail: THERAPIES SERIES
End: 2021-06-10
Attending: ORTHOPAEDIC SURGERY
Payer: COMMERCIAL

## 2021-06-10 PROCEDURE — 97140 MANUAL THERAPY 1/> REGIONS: CPT | Mod: GP | Performed by: PHYSICAL THERAPIST

## 2021-06-10 PROCEDURE — 97110 THERAPEUTIC EXERCISES: CPT | Mod: GP | Performed by: PHYSICAL THERAPIST

## 2021-06-14 ENCOUNTER — HOSPITAL ENCOUNTER (OUTPATIENT)
Dept: PHYSICAL THERAPY | Facility: CLINIC | Age: 61
Setting detail: THERAPIES SERIES
End: 2021-06-14
Attending: ORTHOPAEDIC SURGERY
Payer: COMMERCIAL

## 2021-06-14 PROCEDURE — 97110 THERAPEUTIC EXERCISES: CPT | Mod: GP | Performed by: PHYSICAL THERAPIST

## 2021-06-17 ENCOUNTER — HOSPITAL ENCOUNTER (OUTPATIENT)
Dept: PHYSICAL THERAPY | Facility: CLINIC | Age: 61
Setting detail: THERAPIES SERIES
End: 2021-06-17
Attending: ORTHOPAEDIC SURGERY
Payer: COMMERCIAL

## 2021-06-17 PROCEDURE — 97140 MANUAL THERAPY 1/> REGIONS: CPT | Mod: GP | Performed by: PHYSICAL THERAPIST

## 2021-06-17 PROCEDURE — 97110 THERAPEUTIC EXERCISES: CPT | Mod: GP | Performed by: PHYSICAL THERAPIST

## 2021-06-17 NOTE — PROGRESS NOTES
Neurosurgery consultation was requested by:  Annabel Reid CNP   Pain: mainly right low back pain   Radicular Pain is present: Pain in right hip anterior thigh and into groin and right buttock   Lhermitte sign: no   Motor complaints: minor weakness in right leg   Sensory complaints: numbness in 3 middle toes on right foot   Gait and balance issues: no   Bowel or bladder issues: no  Duration of SX is: 4 months   The symptoms are worse with: sleeping, walking , standing for long period of time and anything bent over.   The symptoms are better with: sitting   Injury: no   Severity is: mild- moderate  Patient has tried the following conservative measures: Pt saw a chiropractor and did give relief for a few days.  ELISEO Sen

## 2021-06-17 NOTE — PROGRESS NOTES
The patient is a 60-year-old male.  He complains of a 4-month history of back pain with extension of pain into his right buttock, right hip, right testicle, and right leg.  He does not have left leg symptoms.  The pain makes it hard for him to walk and to stand and to go up steps.  He had an epidural  steroid injection by a pain doctor, Kathleen Green.  The injection was at L4-5 and it helped for about 2 days.  On past medical history he has had an MI.  He has elevated cholesterol.  He has elevated blood pressure.  He has sleep apnea.  He does not have diabetes.  On exam he is obese with a BMI of 36.  His gait is okay.  He can walk on tiptoes and heels.  His reflexes are present at the knees, absent at the ankles.  He has limited low back flexion.  He has tenderness of the right hip.  He has a positive Gab sign on the right. Hip movement causes right hip and groin pain.  Straight leg raising is negative.  He has good strength with no obvious atrophy.  Sensation is okay.  On MRI he has degenerative changes of the facet at L4-5 on the right.  He has foramen stenosis at that level but the left side is actually worse than the right, opposite of what would fit his symptoms.  Plan MRI of the right hip.  Plan EMG right leg to look at the L4 and L5 nerve roots.  Plan consult and trial of conservative treatment at the spine center.  The patient is going to return for follow-up.  He is satisfied with the plan.  Total time 30 minutes, more than 50% spent counseling and/or coordinating care.

## 2021-06-17 NOTE — PROGRESS NOTES
Michael is here to f/u on EMG and MRI. He states symptoms are unchanged since last seen. He c/o right low back pain and pain in right hip and anterior thigh and into groin and right buttock. He has minor weakness in right leg and numbness in 3 middle toes on right foot.   ELISEO Sen

## 2021-06-17 NOTE — PATIENT INSTRUCTIONS - HE
Thank you for choosing the [facility] for your EMG testing.    The ordering provider will receive your final EMG results within the next few days.  Please follow up with your provider for the results and further treatment recommendations.

## 2021-06-17 NOTE — PROGRESS NOTES
The patient had an EMG which was essentially negative for radiculopathy.  His right hip MRI is quite abnormal.  Since the last visit his pain has localized more and more to the right hip.  The plan is referral to an orthopedist.  We should work on getting the right hip MRI pictures to the orthopedist before the visit.  The patient is satisfied with the plan.  Total time 15 minutes, more than 50% spent counseling and/or coordinating care.

## 2021-06-17 NOTE — PROGRESS NOTES
Patient presents at the request of Dr. Spencer for right lower extremity EMG.  He has right-sided low back pain posterior leg and gluteal pain into the proximal lateral thigh.  He also recently the past 2 months has began having right groin pain which is sharp.  Symptoms are worse with walking.  Has some numbness and tingling right great toe.  Failed lumbar epidural.  On exam he has high arches and hammertoes.  Normal patellar decreased bilateral symmetric Achilles reflexes.  Normal sensation to light touch bilateral lower extremities.  Normal strength of the bilateral lower extremities.  Decreased internal and external rotation of the right hip with pain.    EMG/NCS  results: Please see scanned full report.    Comment NCS: Borderline study  1.  Mildly reduced amplitude bilateral sural SNAPs.  2.  Mildly reduced amplitude bilateral tibial CMAP's with normal conduction velocities.  3.  Normal right peroneal CMAP  4.  Symmetric bilateral tibial H reflexes with small amplitudes.    Comment EMG: Normal study  1.  Normal needle EMG right lower extremity.    Interpretation: Borderline study: There is electrodiagnostic evidence of:    1.  Borderline/mildly reduced amplitudes of the bilateral sural sensory nerve conduction studies and tibial motor nerve conduction studies.  These findings may be normal for the patient or may represent a mild sensorimotor peripheral polyneuropathy.    2.  There is no electrodiagnostic evidence of lumbosacral radiculopathy, lumbosacral plexopathy, or focal neuropathy in the right lower extremity.  Specifically, there is no electrodiagnostic evidence of a right L3-4 radiculopathy.      Note: With the significantly reduced range of motion of the right hip on exam with increased pain on range of motion testing, patient may benefit from either right hip injection under ultrasound guidance to confirm hip pathology causing his pain or orthopedic surgical evaluation if he is found to have severe  osteoarthritis of the right hip.    The testing was completed in its entirety by the physician.      It was our pleasure caring for your patient today, if there any questions or concerns please do not hesitate to contact us.

## 2021-06-22 ENCOUNTER — HOSPITAL ENCOUNTER (OUTPATIENT)
Dept: PHYSICAL THERAPY | Facility: CLINIC | Age: 61
Setting detail: THERAPIES SERIES
End: 2021-06-22
Attending: ORTHOPAEDIC SURGERY
Payer: COMMERCIAL

## 2021-06-22 PROCEDURE — 97140 MANUAL THERAPY 1/> REGIONS: CPT | Mod: GP | Performed by: PHYSICAL THERAPIST

## 2021-06-22 PROCEDURE — 97110 THERAPEUTIC EXERCISES: CPT | Mod: GP | Performed by: PHYSICAL THERAPIST

## 2021-06-24 ENCOUNTER — HOSPITAL ENCOUNTER (OUTPATIENT)
Dept: PHYSICAL THERAPY | Facility: CLINIC | Age: 61
Setting detail: THERAPIES SERIES
End: 2021-06-24
Attending: ORTHOPAEDIC SURGERY
Payer: COMMERCIAL

## 2021-06-24 PROCEDURE — 97110 THERAPEUTIC EXERCISES: CPT | Mod: GP | Performed by: PHYSICAL THERAPIST

## 2021-06-28 ENCOUNTER — HOSPITAL ENCOUNTER (OUTPATIENT)
Dept: PHYSICAL THERAPY | Facility: CLINIC | Age: 61
Setting detail: THERAPIES SERIES
End: 2021-06-28
Attending: ORTHOPAEDIC SURGERY
Payer: COMMERCIAL

## 2021-06-28 PROCEDURE — 97140 MANUAL THERAPY 1/> REGIONS: CPT | Mod: GP | Performed by: PHYSICAL THERAPIST

## 2021-06-28 PROCEDURE — 97110 THERAPEUTIC EXERCISES: CPT | Mod: GP | Performed by: PHYSICAL THERAPIST

## 2021-07-06 VITALS
HEART RATE: 84 BPM | SYSTOLIC BLOOD PRESSURE: 127 MMHG | HEIGHT: 71 IN | OXYGEN SATURATION: 92 % | WEIGHT: 257 LBS | DIASTOLIC BLOOD PRESSURE: 76 MMHG | BODY MASS INDEX: 35.98 KG/M2

## 2021-07-26 ENCOUNTER — OFFICE VISIT (OUTPATIENT)
Dept: FAMILY MEDICINE | Facility: CLINIC | Age: 61
End: 2021-07-26
Payer: COMMERCIAL

## 2021-07-26 VITALS
SYSTOLIC BLOOD PRESSURE: 128 MMHG | TEMPERATURE: 96.9 F | HEART RATE: 70 BPM | DIASTOLIC BLOOD PRESSURE: 71 MMHG | OXYGEN SATURATION: 98 % | BODY MASS INDEX: 35.01 KG/M2 | WEIGHT: 251 LBS

## 2021-07-26 DIAGNOSIS — M16.11 PRIMARY OSTEOARTHRITIS OF RIGHT HIP: Primary | ICD-10-CM

## 2021-07-26 DIAGNOSIS — J30.1 HAYFEVER: ICD-10-CM

## 2021-07-26 PROCEDURE — 99214 OFFICE O/P EST MOD 30 MIN: CPT | Mod: 25 | Performed by: NURSE PRACTITIONER

## 2021-07-26 PROCEDURE — 96372 THER/PROPH/DIAG INJ SC/IM: CPT | Performed by: NURSE PRACTITIONER

## 2021-07-26 RX ORDER — CELECOXIB 100 MG/1
100 CAPSULE ORAL 2 TIMES DAILY
Qty: 60 CAPSULE | Refills: 1 | Status: SHIPPED | OUTPATIENT
Start: 2021-07-26 | End: 2022-03-07

## 2021-07-26 RX ORDER — METHYLPREDNISOLONE ACETATE 80 MG/ML
80 INJECTION, SUSPENSION INTRA-ARTICULAR; INTRALESIONAL; INTRAMUSCULAR; SOFT TISSUE ONCE
Status: COMPLETED | OUTPATIENT
Start: 2021-07-26 | End: 2021-07-26

## 2021-07-26 RX ADMIN — METHYLPREDNISOLONE ACETATE 80 MG: 80 INJECTION, SUSPENSION INTRA-ARTICULAR; INTRALESIONAL; INTRAMUSCULAR; SOFT TISSUE at 10:00

## 2021-07-26 NOTE — PATIENT INSTRUCTIONS
Try the Celebrex. If things are not improving we are going to have to get you established with a PCP to discuss pain medications.   If you want to pursue medical marijuana, you will need to see Dr. Montes De Oca, Dr. Rivera or Dr. Viera.    Follow up with orthopedics for your hip.

## 2021-08-30 ENCOUNTER — TELEPHONE (OUTPATIENT)
Dept: FAMILY MEDICINE | Facility: CLINIC | Age: 61
End: 2021-08-30

## 2021-08-30 DIAGNOSIS — N40.1 BENIGN PROSTATIC HYPERPLASIA WITH LOWER URINARY TRACT SYMPTOMS, SYMPTOM DETAILS UNSPECIFIED: Primary | ICD-10-CM

## 2021-08-30 NOTE — TELEPHONE ENCOUNTER
Reason for Call:  Medication or medication refill:    Do you use a [facility]?  Name of the pharmacy and phone number for the current request:  [facility] [phone]    Name of the medication requested: finasteride    Other request: Pt requesting refill of med, used to be prescribed by [facility] provider    Can we leave a detailed message on this number? YES    Phone number patient can be reached at: Cell number on file:    Telephone Information:   Mobile [phone]       Best Time: any    Call taken on 8/30/2021 at 3:47 PM by Sandrita Balderas

## 2021-08-31 RX ORDER — FINASTERIDE 5 MG/1
5 TABLET, FILM COATED ORAL DAILY
Qty: 90 TABLET | Refills: 0 | Status: SHIPPED | OUTPATIENT
Start: 2021-08-31 | End: 2021-10-06

## 2021-08-31 RX ORDER — FINASTERIDE 5 MG/1
5 TABLET, FILM COATED ORAL DAILY
Qty: 30 TABLET | Refills: 0 | Status: SHIPPED | OUTPATIENT
Start: 2021-08-31 | End: 2021-08-31

## 2021-08-31 NOTE — TELEPHONE ENCOUNTER
Called patient and was notified. Patient was upset about making appointment and was transferred to RN per patient request.    Jennyfer Guillermo on 8/31/2021 at 4:05 PM

## 2021-08-31 NOTE — TELEPHONE ENCOUNTER
I spoke with pt.  Pt is asking for 90 day refill of finasteride.    Dr Traore filled for 30 days and advised pt be seen in clinic to establish care with PCP.    Pt is aware that he needs to establish care with a PCP and that Annabel Reid does not carry a primary care pt load.    However, pt had a preventive health exam with Annabel 2/11/21.  Also, pt has seen Annabel 5 times this year for various things.    Pt asks that Annabel advise when he is due for an office visit for routine health maint?  Also, can he get 90 day refill in the meantime of finasteride to [facility].    I did review provider options with pt.  He has no problem establishing primary care.  However, he believes he is not due for the appt yet.    Viridiana Vieira RN

## 2021-08-31 NOTE — TELEPHONE ENCOUNTER
I refilled once.  He does need to establish care with a primary care provider.  Alvin Montes De Oca MD  Family Medicine

## 2021-08-31 NOTE — TELEPHONE ENCOUNTER
Called patient and left a message to call us back.    Jennyfer Guillermo on 8/31/2021 at 5:22 PM

## 2021-08-31 NOTE — TELEPHONE ENCOUNTER
Covering for PCP.  Patient needs to see provider for other refills of finasteride or other medications.  Of note, [facility] review showed that this request also went through his Mariela provider last week who requested patient to have a clinic visit.

## 2021-10-06 ENCOUNTER — OFFICE VISIT (OUTPATIENT)
Dept: FAMILY MEDICINE | Facility: CLINIC | Age: 61
End: 2021-10-06
Payer: COMMERCIAL

## 2021-10-06 VITALS
DIASTOLIC BLOOD PRESSURE: 80 MMHG | BODY MASS INDEX: 36.05 KG/M2 | HEIGHT: 71 IN | HEART RATE: 103 BPM | TEMPERATURE: 96.2 F | SYSTOLIC BLOOD PRESSURE: 138 MMHG | OXYGEN SATURATION: 94 % | WEIGHT: 257.5 LBS

## 2021-10-06 DIAGNOSIS — M25.551 HIP PAIN, BILATERAL: ICD-10-CM

## 2021-10-06 DIAGNOSIS — M25.552 HIP PAIN, BILATERAL: ICD-10-CM

## 2021-10-06 DIAGNOSIS — N40.1 BENIGN PROSTATIC HYPERPLASIA WITH LOWER URINARY TRACT SYMPTOMS, SYMPTOM DETAILS UNSPECIFIED: Primary | ICD-10-CM

## 2021-10-06 DIAGNOSIS — L91.8 SKIN TAG: ICD-10-CM

## 2021-10-06 PROCEDURE — 11200 RMVL SKIN TAGS UP TO&INC 15: CPT | Performed by: NURSE PRACTITIONER

## 2021-10-06 PROCEDURE — 99214 OFFICE O/P EST MOD 30 MIN: CPT | Mod: 25 | Performed by: NURSE PRACTITIONER

## 2021-10-06 RX ORDER — FINASTERIDE 5 MG/1
5 TABLET, FILM COATED ORAL DAILY
Qty: 90 TABLET | Refills: 3 | Status: SHIPPED | OUTPATIENT
Start: 2021-10-06 | End: 2022-10-17

## 2021-10-06 ASSESSMENT — MIFFLIN-ST. JEOR: SCORE: 1990.14

## 2021-10-06 NOTE — PROGRESS NOTES
Assessment & Plan     Benign prostatic hyperplasia with lower urinary tract symptoms, symptom details unspecified    - finasteride (PROSCAR) 5 MG tablet; Take 1 tablet (5 mg) by mouth daily You need to establish care with a new provider for further refills.    Skin tag  -After cleaning with alchohol and using a sharp iris scissors, 10 skin tag(s) was/were removed from bilateral axillary area, neck and left upper eyelid without complication.  - REMOVAL OF SKIN TAGS, FIRST 15    Hip pain, bilateral   -reviewed recent Xray results  -recommended follow up with orthopedics to discuss possible injections     TOREY Estrada CNP  [facility] is a 60 year old who presents for the following health issues   Chief Complaint   Patient presents with     Derm Problem     Musculoskeletal Problem     Refill Request     Proscar          [facility]     Concern - DERM CONCERN   Onset: one week   Description: He felt a bump on his back on the upper right side, something oozed out of the lump.   Intensity: mild  Progression of Symptoms:  Improved   Accompanying Signs & Symptoms: he has two skin tags that he would like removed. One on his left eye lid and one under his left arm pit.   Previous history of similar problem: none   Precipitating factors:        Worsened by: none   Alleviating factors:        Improved by: none   Therapies tried and outcome:  none     Musculoskeletal problem/pain  Onset/Duration: 2 weeks   Description  Location: both thighs and calfs   Joint Swelling: no  Redness: no  Pain: YES  Warmth: no  Intensity:  Moderate to Severe   Progression of Symptoms:  constant  Accompanying signs and symptoms:   Fevers: no  Numbness/tingling/weakness: no  History  Trauma to the area: no  Recent illness:  no  Previous similar problem: no  Previous evaluation:  no  Precipitating or alleviating factors:   Aggravating factors include: walking  Therapies tried and outcome: tylenol- not  "helping       Review of Systems   Constitutional, HEENT, cardiovascular, pulmonary, gi and gu systems are negative, except as otherwise noted.      Objective    /80 (BP Location: Right arm, Patient Position: Sitting, Cuff Size: Adult Large)   Pulse 103   Temp (!) 96.2  F (35.7  C) (Tympanic)   Ht 1.803 m (5' 11\")   Wt 116.8 kg (257 lb 8 oz)   SpO2 94%   BMI 35.91 kg/m    Body mass index is 35.91 kg/m .  Physical Exam   GENERAL: healthy, alert and no distress  EYES: Eyes grossly normal to inspection, PERRL and conjunctivae and sclerae normal  CV: regular rate and rhythm, normal S1 S2, no S3 or S4, no murmur, click or rub, no peripheral edema and peripheral pulses strong  MS: no gross musculoskeletal defects noted, no edema  SKIN: no suspicious lesions or rashes, multiple skin tags   NEURO: Normal strength and tone, mentation intact and speech normal  PSYCH: mentation appears normal, affect normal/bright          "

## 2021-10-24 ENCOUNTER — HEALTH MAINTENANCE LETTER (OUTPATIENT)
Age: 61
End: 2021-10-24

## 2021-10-25 ENCOUNTER — TELEPHONE (OUTPATIENT)
Dept: FAMILY MEDICINE | Facility: CLINIC | Age: 61
End: 2021-10-25

## 2021-10-25 NOTE — TELEPHONE ENCOUNTER
Reason for Call:  Medication question:    Do you use a [facility]?  Name of the pharmacy and phone number for the current request:  [facility] [place]    Name of the medication requested: Rosuvastatin    Other request: Rosuvastatin patient is on is causing muscle cramps and pain, its painful to walk.Can patient stop med?    Can we leave a detailed message on this number? YES    Phone number patient can be reached at: Home number on file [phone] (home)    Best Time: Any    Call taken on 10/25/2021 at 10:16 AM by Xiao Stanley

## 2021-10-27 NOTE — TELEPHONE ENCOUNTER
I had sent this original my chart message to the provider of the day.  Dr. Rivera sent my chart response and patient has read her message.   Close encounter. Jennifer WEINBERG RN

## 2022-01-11 ENCOUNTER — TRANSFERRED RECORDS (OUTPATIENT)
Dept: HEALTH INFORMATION MANAGEMENT | Facility: CLINIC | Age: 62
End: 2022-01-11
Payer: COMMERCIAL

## 2022-03-07 ENCOUNTER — OFFICE VISIT (OUTPATIENT)
Dept: FAMILY MEDICINE | Facility: CLINIC | Age: 62
End: 2022-03-07
Payer: COMMERCIAL

## 2022-03-07 VITALS
RESPIRATION RATE: 16 BRPM | WEIGHT: 255.4 LBS | DIASTOLIC BLOOD PRESSURE: 68 MMHG | HEART RATE: 89 BPM | HEIGHT: 71 IN | OXYGEN SATURATION: 95 % | SYSTOLIC BLOOD PRESSURE: 114 MMHG | BODY MASS INDEX: 35.76 KG/M2 | TEMPERATURE: 97.7 F

## 2022-03-07 DIAGNOSIS — I10 BENIGN ESSENTIAL HYPERTENSION: ICD-10-CM

## 2022-03-07 DIAGNOSIS — I50.9 CHRONIC CONGESTIVE HEART FAILURE, UNSPECIFIED HEART FAILURE TYPE (H): ICD-10-CM

## 2022-03-07 DIAGNOSIS — Z01.818 PREOP GENERAL PHYSICAL EXAM: Primary | ICD-10-CM

## 2022-03-07 DIAGNOSIS — G47.33 OSA (OBSTRUCTIVE SLEEP APNEA): ICD-10-CM

## 2022-03-07 DIAGNOSIS — R73.03 PRE-DIABETES: ICD-10-CM

## 2022-03-07 DIAGNOSIS — M25.551 HIP PAIN, RIGHT: ICD-10-CM

## 2022-03-07 LAB
ANION GAP SERPL CALCULATED.3IONS-SCNC: 5 MMOL/L (ref 3–14)
BASOPHILS # BLD AUTO: 0 10E3/UL (ref 0–0.2)
BASOPHILS NFR BLD AUTO: 0 %
BUN SERPL-MCNC: 27 MG/DL (ref 7–30)
CALCIUM SERPL-MCNC: 9.2 MG/DL (ref 8.5–10.1)
CHLORIDE BLD-SCNC: 105 MMOL/L (ref 94–109)
CO2 SERPL-SCNC: 29 MMOL/L (ref 20–32)
CREAT SERPL-MCNC: 1.03 MG/DL (ref 0.66–1.25)
EOSINOPHIL # BLD AUTO: 0.3 10E3/UL (ref 0–0.7)
EOSINOPHIL NFR BLD AUTO: 3 %
ERYTHROCYTE [DISTWIDTH] IN BLOOD BY AUTOMATED COUNT: 12.8 % (ref 10–15)
GFR SERPL CREATININE-BSD FRML MDRD: 82 ML/MIN/1.73M2
GLUCOSE BLD-MCNC: 127 MG/DL (ref 70–99)
HCT VFR BLD AUTO: 49.8 % (ref 40–53)
HGB BLD-MCNC: 16.9 G/DL (ref 13.3–17.7)
LYMPHOCYTES # BLD AUTO: 1.9 10E3/UL (ref 0.8–5.3)
LYMPHOCYTES NFR BLD AUTO: 20 %
MCH RBC QN AUTO: 31.8 PG (ref 26.5–33)
MCHC RBC AUTO-ENTMCNC: 33.9 G/DL (ref 31.5–36.5)
MCV RBC AUTO: 94 FL (ref 78–100)
MONOCYTES # BLD AUTO: 1 10E3/UL (ref 0–1.3)
MONOCYTES NFR BLD AUTO: 10 %
NEUTROPHILS # BLD AUTO: 6.3 10E3/UL (ref 1.6–8.3)
NEUTROPHILS NFR BLD AUTO: 66 %
PLATELET # BLD AUTO: 240 10E3/UL (ref 150–450)
POTASSIUM BLD-SCNC: 4 MMOL/L (ref 3.4–5.3)
RBC # BLD AUTO: 5.32 10E6/UL (ref 4.4–5.9)
SODIUM SERPL-SCNC: 139 MMOL/L (ref 133–144)
WBC # BLD AUTO: 9.5 10E3/UL (ref 4–11)

## 2022-03-07 PROCEDURE — 99214 OFFICE O/P EST MOD 30 MIN: CPT | Performed by: NURSE PRACTITIONER

## 2022-03-07 PROCEDURE — 85025 COMPLETE CBC W/AUTO DIFF WBC: CPT | Performed by: NURSE PRACTITIONER

## 2022-03-07 PROCEDURE — 93000 ELECTROCARDIOGRAM COMPLETE: CPT | Performed by: NURSE PRACTITIONER

## 2022-03-07 PROCEDURE — 80048 BASIC METABOLIC PNL TOTAL CA: CPT | Performed by: NURSE PRACTITIONER

## 2022-03-07 PROCEDURE — 36415 COLL VENOUS BLD VENIPUNCTURE: CPT | Performed by: NURSE PRACTITIONER

## 2022-03-07 RX ORDER — ZINC GLUCONATE 50 MG
50 TABLET ORAL DAILY
COMMUNITY

## 2022-03-07 ASSESSMENT — PAIN SCALES - GENERAL: PAINLEVEL: MODERATE PAIN (5)

## 2022-03-07 NOTE — PROGRESS NOTES
[facility]  [address] [address]  Phone: [phone]  Primary Provider: Vee Bhatti        PREOPERATIVE EVALUATION:  Today's date: 3/7/2022    Michael Jimenez is a 61 year old male who presents for a preoperative evaluation.    Surgical Information:  Surgery/Procedure: Total Right Hip Arthoplasty   Surgery Location: [facility]  Surgeon: Dr. Montana  Surgery Date: 03/29/2022  Time of Surgery: TBD  Where patient plans to recover: At home alone will have someone with him the first 2 days or so afterward  Fax number for surgical facility:     Type of Anesthesia Anticipated: to be determined    Assessment & Plan     The proposed surgical procedure is considered INTERMEDIATE risk.    Preop general physical exam    - REVIEW OF HEALTH MAINTENANCE PROTOCOL ORDERS  - Basic metabolic panel  (Ca, Cl, CO2, Creat, Gluc, K, Na, BUN); Future  - CBC with platelets and differential; Future  - EKG 12-lead complete w/read - Clinics  - Basic metabolic panel  (Ca, Cl, CO2, Creat, Gluc, K, Na, BUN)  - CBC with platelets and differential    Hip pain, right  -cleared for surgery     CAROL (obstructive sleep apnea)  -was advised to bring his CPAP machine     Pre-diabetes  -labs pending   - Basic metabolic panel  (Ca, Cl, CO2, Creat, Gluc, K, Na, BUN); Future  - Basic metabolic panel  (Ca, Cl, CO2, Creat, Gluc, K, Na, BUN)    Benign essential hypertension  -well controlled   - Basic metabolic panel  (Ca, Cl, CO2, Creat, Gluc, K, Na, BUN); Future  - EKG 12-lead complete w/read - Clinics  - Basic metabolic panel  (Ca, Cl, CO2, Creat, Gluc, K, Na, BUN)    Chronic congestive heart failure, unspecified heart failure type (H)  -stable, well controlled   - Basic metabolic panel  (Ca, Cl, CO2, Creat, Gluc, K, Na, BUN); Future  - EKG 12-lead complete w/read - Clinics  - Basic metabolic panel  (Ca, Cl, CO2, Creat, Gluc, K, Na, BUN)         Risks and Recommendations:  The patient  has the following additional risks and recommendations for perioperative complications:   - No identified additional risk factors other than previously addressed    Medication Instructions:   - Diuretics: HOLD on the day of surgery.    RECOMMENDATION:  APPROVAL GIVEN to proceed with proposed procedure, without further diagnostic evaluation.    Subjective     HPI related to upcoming procedure: right hip replacement surgery     Preop Questions 3/7/2022   1. Have you ever had a heart attack or stroke? No   2. Have you ever had surgery on your heart or blood vessels, such as a stent placement, a coronary artery bypass, or surgery on an artery in your head, neck, heart, or legs? No   3. Do you have chest pain with activity? No   4. Do you have a history of  heart failure? No   5. Do you currently have a cold, bronchitis or symptoms of other infection? No   6. Do you have a cough, shortness of breath, or wheezing? No   7. Do you or anyone in your family have previous history of blood clots? No   8. Do you or does anyone in your family have a serious bleeding problem such as prolonged bleeding following surgeries or cuts? No   9. Have you ever had problems with anemia or been told to take iron pills? No   10. Have you had any abnormal blood loss such as black, tarry or bloody stools? No   11. Have you ever had a blood transfusion? No   12. Are you willing to have a blood transfusion if it is medically needed before, during, or after your surgery? Yes   13. Have you or any of your relatives ever had problems with anesthesia? No   14. Do you have sleep apnea, excessive snoring or daytime drowsiness? YES - has a CPAP but doesn't use it   14a. Do you have a CPAP machine? Yes   15. Do you have any artifical heart valves or other implanted medical devices like a pacemaker, defibrillator, or continuous glucose monitor? No   16. Do you have artificial joints? No   17. Are you allergic to latex? No         Preoperative Review of  :   reviewed - no record of controlled substances prescribed.      Status of Chronic Conditions:  See problem list for active medical problems.  Problems all longstanding and stable, except as noted/documented.  See ROS for pertinent symptoms related to these conditions.      Review of Systems  Constitutional, neuro, ENT, endocrine, pulmonary, cardiac, gastrointestinal, genitourinary, musculoskeletal, integument and psychiatric systems are negative, except as otherwise noted.    Patient Active Problem List    Diagnosis Date Noted     CHF (congestive heart failure) (H) 03/07/2022     Priority: Medium     CAROL (obstructive sleep apnea) 04/15/2021     Priority: Medium     4/12/2021 [facility] Sleep Study (255.0 lbs) - AHI 30.0, RDI 31.8, Supine AHI 47.1, REM AHI 78.0, Low O2 58.9%, Time Spent ?88% 41.2 minutes / Time Spent ?89% 51.6 minutes.       Morbid obesity (H) 03/09/2021     Priority: Medium     Snoring 08/06/2015     Priority: Medium     Formatting of this note might be different from the original.  8/2015: advise to follow up for sleep study.       Impotence 12/12/2013     Priority: Medium     Hair loss 12/11/2013     Priority: Medium     Formatting of this note might be different from the original.  Finasteride 5mg tabs, he takes 1.25mg (quarter tablet) /day  Started taking Finasteride about 2005, a year after he had a couple of hair transplants. He was initially on Propecia but this was too expensive.       Hayfever 08/14/2012     Priority: Medium     Formatting of this note might be different from the original.  Receives DEPO medrol injection 40mg annually and this seems to help him year round.       Glenoid labral tear 04/06/2011     Priority: Medium     Formatting of this note might be different from the original.  He has been doing a lot better since starting Glucosamine- Chondroitin.       Rupture of long head biceps tendon 04/06/2011     Priority: Medium     Supraspinatus tendon tear  2011     Priority: Medium     Hyperlipidemia with target low density lipoprotein (LDL) cholesterol less than 100 mg/dL 2011     Priority: Medium     Formatting of this note is different from the original.  Patient stopped taking statins 2015 as he was concerned about potential side effects. Importance of takign care of modifiable risk factors discussed with Patient. Will continue to address.  Lovaza (Omega-3 PUFA) 2mg twice a day).    Last Lipids:  Chol: 218    2013  T    2013  HDL:   55    2013  LDL:  127    2013   LDL CHOLESTEROL (mg/dL)   Date Value   2013 127     [place] 10-year CHD Risk Score: 8% (11 Total Points)       Hypothenar hammer syndrome (H) 2011     Priority: Medium     Obesity 2011     Priority: Medium     Pre-diabetes 2011     Priority: Medium     Formatting of this note is different from the original.      HEMOGLOBIN A1C MONITORING (POCT) (%)   Date Value   2011 5.4      GLUCOSE                   (mg/dL)   Date Value   3/25/2013 77       Raynaud's phenomenon 2011     Priority: Medium     Right shoulder pain 2011     Priority: Medium     CARDIOVASCULAR SCREENING; LDL GOAL LESS THAN 160 10/31/2010     Priority: Medium      History reviewed. No pertinent past medical history.  Past Surgical History:   Procedure Laterality Date     CHOLECYSTECTOMY       COLONOSCOPY N/A 2021    Procedure: COLONOSCOPY, WITH POLYPECTOMY AND BIOPSY;  Surgeon: Lito Sweet DO;  Location: [facility]     FINGER SURGERY       Current Outpatient Medications   Medication Sig Dispense Refill     finasteride (PROSCAR) 5 MG tablet Take 1 tablet (5 mg) by mouth daily You need to establish care with a new provider for further refills. 90 tablet 3     Glucosamine Sulfate 1000 MG TABS        lisinopril-hydrochlorothiazide (ZESTORETIC) 20-12.5 MG tablet Take 1 tablet by mouth daily 90 tablet 3     Multiple Vitamin (MULTIVITAMIN ADULT PO)  "       tamsulosin (FLOMAX) 0.4 MG capsule TAKE 1 CAPSULE BY MOUTH ONCE DAILY AFTER A MEAL       zinc gluconate 50 MG tablet Take 50 mg by mouth daily       aspirin (ASA) 81 MG chewable tablet Take 1 tablet (81 mg) by mouth daily (Patient not taking: Reported on 7/26/2021) 90 tablet 0       No Known Allergies     Social History     Tobacco Use     Smoking status: Former Smoker     Years: 30.00     Types: Cigarettes     Smokeless tobacco: Never Used   Substance Use Topics     Alcohol use: Yes     Comment: 3-4 drinks a week       History   Drug Use No         Objective     /68 (BP Location: Left arm, Patient Position: Sitting, Cuff Size: Adult Large)   Pulse 89   Temp 97.7  F (36.5  C) (Tympanic)   Resp 16   Ht 1.803 m (5' 11\")   Wt 115.8 kg (255 lb 6.4 oz)   SpO2 95%   BMI 35.62 kg/m      Physical Exam    GENERAL APPEARANCE: healthy, alert and no distress     EYES: EOMI,  PERRL     HENT: ear canals and TM's normal and nose and mouth without ulcers or lesions     NECK: no adenopathy, no asymmetry, masses, or scars and thyroid normal to palpation     RESP: lungs clear to auscultation - no rales, rhonchi or wheezes     CV: regular rates and rhythm, normal S1 S2, no S3 or S4 and no murmur, click or rub     MS: extremities normal- no gross deformities noted, no evidence of inflammation in joints, FROM in all extremities.     SKIN: no suspicious lesions or rashes     NEURO: Normal strength and tone, sensory exam grossly normal, mentation intact and speech normal     PSYCH: mentation appears normal. and affect normal/bright     LYMPHATICS: No cervical adenopathy    Recent Labs   Lab Test 05/11/21  0930 03/25/21  0830    137   POTASSIUM 4.2 4.2   CR 1.14 1.08        Diagnostics:  No results found for this or any previous visit (from the past 24 hour(s)).   EKG: appears normal, NSR, normal axis, normal intervals, no acute ST/T changes c/w ischemia, no LVH by voltage criteria, Left Bundle Branch Block, " unchanged from previous tracings    Revised Cardiac Risk Index (RCRI):  The patient has the following serious cardiovascular risks for perioperative complications:   - No serious cardiac risks = 0 points     RCRI Interpretation: 0 points: Class I (very low risk - 0.4% complication rate)           Signed Electronically by: TOREY Estrada CNP  Copy of this evaluation report is provided to requesting physician.

## 2022-03-07 NOTE — H&P (VIEW-ONLY)
[facility]  [address] [address]  Phone: [phone]  Primary Provider: Vee Bhatti        PREOPERATIVE EVALUATION:  Today's date: 3/7/2022    Michael Jimenez is a 61 year old male who presents for a preoperative evaluation.    Surgical Information:  Surgery/Procedure: Total Right Hip Arthoplasty   Surgery Location: [facility]  Surgeon: Dr. Montana  Surgery Date: 03/29/2022  Time of Surgery: TBD  Where patient plans to recover: At home alone will have someone with him the first 2 days or so afterward  Fax number for surgical facility:     Type of Anesthesia Anticipated: to be determined    Assessment & Plan     The proposed surgical procedure is considered INTERMEDIATE risk.    Preop general physical exam    - REVIEW OF HEALTH MAINTENANCE PROTOCOL ORDERS  - Basic metabolic panel  (Ca, Cl, CO2, Creat, Gluc, K, Na, BUN); Future  - CBC with platelets and differential; Future  - EKG 12-lead complete w/read - Clinics  - Basic metabolic panel  (Ca, Cl, CO2, Creat, Gluc, K, Na, BUN)  - CBC with platelets and differential    Hip pain, right  -cleared for surgery     CAROL (obstructive sleep apnea)  -was advised to bring his CPAP machine     Pre-diabetes  -labs pending   - Basic metabolic panel  (Ca, Cl, CO2, Creat, Gluc, K, Na, BUN); Future  - Basic metabolic panel  (Ca, Cl, CO2, Creat, Gluc, K, Na, BUN)    Benign essential hypertension  -well controlled   - Basic metabolic panel  (Ca, Cl, CO2, Creat, Gluc, K, Na, BUN); Future  - EKG 12-lead complete w/read - Clinics  - Basic metabolic panel  (Ca, Cl, CO2, Creat, Gluc, K, Na, BUN)    Chronic congestive heart failure, unspecified heart failure type (H)  -stable, well controlled   - Basic metabolic panel  (Ca, Cl, CO2, Creat, Gluc, K, Na, BUN); Future  - EKG 12-lead complete w/read - Clinics  - Basic metabolic panel  (Ca, Cl, CO2, Creat, Gluc, K, Na, BUN)         Risks and Recommendations:  The patient  has the following additional risks and recommendations for perioperative complications:   - No identified additional risk factors other than previously addressed    Medication Instructions:   - Diuretics: HOLD on the day of surgery.    RECOMMENDATION:  APPROVAL GIVEN to proceed with proposed procedure, without further diagnostic evaluation.    Subjective     HPI related to upcoming procedure: right hip replacement surgery     Preop Questions 3/7/2022   1. Have you ever had a heart attack or stroke? No   2. Have you ever had surgery on your heart or blood vessels, such as a stent placement, a coronary artery bypass, or surgery on an artery in your head, neck, heart, or legs? No   3. Do you have chest pain with activity? No   4. Do you have a history of  heart failure? No   5. Do you currently have a cold, bronchitis or symptoms of other infection? No   6. Do you have a cough, shortness of breath, or wheezing? No   7. Do you or anyone in your family have previous history of blood clots? No   8. Do you or does anyone in your family have a serious bleeding problem such as prolonged bleeding following surgeries or cuts? No   9. Have you ever had problems with anemia or been told to take iron pills? No   10. Have you had any abnormal blood loss such as black, tarry or bloody stools? No   11. Have you ever had a blood transfusion? No   12. Are you willing to have a blood transfusion if it is medically needed before, during, or after your surgery? Yes   13. Have you or any of your relatives ever had problems with anesthesia? No   14. Do you have sleep apnea, excessive snoring or daytime drowsiness? YES - has a CPAP but doesn't use it   14a. Do you have a CPAP machine? Yes   15. Do you have any artifical heart valves or other implanted medical devices like a pacemaker, defibrillator, or continuous glucose monitor? No   16. Do you have artificial joints? No   17. Are you allergic to latex? No         Preoperative Review of  :   reviewed - no record of controlled substances prescribed.      Status of Chronic Conditions:  See problem list for active medical problems.  Problems all longstanding and stable, except as noted/documented.  See ROS for pertinent symptoms related to these conditions.      Review of Systems  Constitutional, neuro, ENT, endocrine, pulmonary, cardiac, gastrointestinal, genitourinary, musculoskeletal, integument and psychiatric systems are negative, except as otherwise noted.    Patient Active Problem List    Diagnosis Date Noted     CHF (congestive heart failure) (H) 03/07/2022     Priority: Medium     CAROL (obstructive sleep apnea) 04/15/2021     Priority: Medium     4/12/2021 [facility] Sleep Study (255.0 lbs) - AHI 30.0, RDI 31.8, Supine AHI 47.1, REM AHI 78.0, Low O2 58.9%, Time Spent ?88% 41.2 minutes / Time Spent ?89% 51.6 minutes.       Morbid obesity (H) 03/09/2021     Priority: Medium     Snoring 08/06/2015     Priority: Medium     Formatting of this note might be different from the original.  8/2015: advise to follow up for sleep study.       Impotence 12/12/2013     Priority: Medium     Hair loss 12/11/2013     Priority: Medium     Formatting of this note might be different from the original.  Finasteride 5mg tabs, he takes 1.25mg (quarter tablet) /day  Started taking Finasteride about 2005, a year after he had a couple of hair transplants. He was initially on Propecia but this was too expensive.       Hayfever 08/14/2012     Priority: Medium     Formatting of this note might be different from the original.  Receives DEPO medrol injection 40mg annually and this seems to help him year round.       Glenoid labral tear 04/06/2011     Priority: Medium     Formatting of this note might be different from the original.  He has been doing a lot better since starting Glucosamine- Chondroitin.       Rupture of long head biceps tendon 04/06/2011     Priority: Medium     Supraspinatus tendon tear  2011     Priority: Medium     Hyperlipidemia with target low density lipoprotein (LDL) cholesterol less than 100 mg/dL 2011     Priority: Medium     Formatting of this note is different from the original.  Patient stopped taking statins 2015 as he was concerned about potential side effects. Importance of takign care of modifiable risk factors discussed with Patient. Will continue to address.  Lovaza (Omega-3 PUFA) 2mg twice a day).    Last Lipids:  Chol: 218    2013  T    2013  HDL:   55    2013  LDL:  127    2013   LDL CHOLESTEROL (mg/dL)   Date Value   2013 127     [place] 10-year CHD Risk Score: 8% (11 Total Points)       Hypothenar hammer syndrome (H) 2011     Priority: Medium     Obesity 2011     Priority: Medium     Pre-diabetes 2011     Priority: Medium     Formatting of this note is different from the original.      HEMOGLOBIN A1C MONITORING (POCT) (%)   Date Value   2011 5.4      GLUCOSE                   (mg/dL)   Date Value   3/25/2013 77       Raynaud's phenomenon 2011     Priority: Medium     Right shoulder pain 2011     Priority: Medium     CARDIOVASCULAR SCREENING; LDL GOAL LESS THAN 160 10/31/2010     Priority: Medium      History reviewed. No pertinent past medical history.  Past Surgical History:   Procedure Laterality Date     CHOLECYSTECTOMY       COLONOSCOPY N/A 2021    Procedure: COLONOSCOPY, WITH POLYPECTOMY AND BIOPSY;  Surgeon: Lito Sweet DO;  Location: [facility]     FINGER SURGERY       Current Outpatient Medications   Medication Sig Dispense Refill     finasteride (PROSCAR) 5 MG tablet Take 1 tablet (5 mg) by mouth daily You need to establish care with a new provider for further refills. 90 tablet 3     Glucosamine Sulfate 1000 MG TABS        lisinopril-hydrochlorothiazide (ZESTORETIC) 20-12.5 MG tablet Take 1 tablet by mouth daily 90 tablet 3     Multiple Vitamin (MULTIVITAMIN ADULT PO)  "       tamsulosin (FLOMAX) 0.4 MG capsule TAKE 1 CAPSULE BY MOUTH ONCE DAILY AFTER A MEAL       zinc gluconate 50 MG tablet Take 50 mg by mouth daily       aspirin (ASA) 81 MG chewable tablet Take 1 tablet (81 mg) by mouth daily (Patient not taking: Reported on 7/26/2021) 90 tablet 0       No Known Allergies     Social History     Tobacco Use     Smoking status: Former Smoker     Years: 30.00     Types: Cigarettes     Smokeless tobacco: Never Used   Substance Use Topics     Alcohol use: Yes     Comment: 3-4 drinks a week       History   Drug Use No         Objective     /68 (BP Location: Left arm, Patient Position: Sitting, Cuff Size: Adult Large)   Pulse 89   Temp 97.7  F (36.5  C) (Tympanic)   Resp 16   Ht 1.803 m (5' 11\")   Wt 115.8 kg (255 lb 6.4 oz)   SpO2 95%   BMI 35.62 kg/m      Physical Exam    GENERAL APPEARANCE: healthy, alert and no distress     EYES: EOMI,  PERRL     HENT: ear canals and TM's normal and nose and mouth without ulcers or lesions     NECK: no adenopathy, no asymmetry, masses, or scars and thyroid normal to palpation     RESP: lungs clear to auscultation - no rales, rhonchi or wheezes     CV: regular rates and rhythm, normal S1 S2, no S3 or S4 and no murmur, click or rub     MS: extremities normal- no gross deformities noted, no evidence of inflammation in joints, FROM in all extremities.     SKIN: no suspicious lesions or rashes     NEURO: Normal strength and tone, sensory exam grossly normal, mentation intact and speech normal     PSYCH: mentation appears normal. and affect normal/bright     LYMPHATICS: No cervical adenopathy    Recent Labs   Lab Test 05/11/21  0930 03/25/21  0830    137   POTASSIUM 4.2 4.2   CR 1.14 1.08        Diagnostics:  No results found for this or any previous visit (from the past 24 hour(s)).   EKG: appears normal, NSR, normal axis, normal intervals, no acute ST/T changes c/w ischemia, no LVH by voltage criteria, Left Bundle Branch Block, " unchanged from previous tracings    Revised Cardiac Risk Index (RCRI):  The patient has the following serious cardiovascular risks for perioperative complications:   - No serious cardiac risks = 0 points     RCRI Interpretation: 0 points: Class I (very low risk - 0.4% complication rate)           Signed Electronically by: TOREY Estrada CNP  Copy of this evaluation report is provided to requesting physician.

## 2022-03-25 ENCOUNTER — LAB (OUTPATIENT)
Dept: LAB | Facility: CLINIC | Age: 62
End: 2022-03-25
Payer: COMMERCIAL

## 2022-03-25 DIAGNOSIS — Z01.818 PREOP GENERAL PHYSICAL EXAM: ICD-10-CM

## 2022-03-28 DIAGNOSIS — Z11.59 ENCOUNTER FOR SCREENING FOR OTHER VIRAL DISEASES: Primary | ICD-10-CM

## 2022-03-28 DIAGNOSIS — Z01.818 PREOP GENERAL PHYSICAL EXAM: Primary | ICD-10-CM

## 2022-03-28 LAB — SARS-COV-2 RNA RESP QL NAA+PROBE: NEGATIVE

## 2022-03-28 PROCEDURE — 87635 SARS-COV-2 COVID-19 AMP PRB: CPT

## 2022-03-29 NOTE — PROVIDER NOTIFICATION
Planning to discharge on POD 1 to home with his friend, Eric, helping him.       03/29/22 0842   Discharge Planning   Patient/Family Anticipates Transition to home with help/services  (friend)   Concerns to be Addressed all concerns addressed in this encounter   Living Arrangements   People in Home alone   Type of Residence Private Residence   Is your private residence a single family home or apartment? Single family home   Number of Stairs, Within Home, Primary seven   Once home, are you able to live on one level? Yes   Which level? Main Level   Bathroom Shower/Tub Tub/Shower unit   Equipment Currently Used at Home   (Has a cane and crutches to use after surgery)   Support System   Support Systems Friends/Neighbors;Family Members  (Friend, Eric, can stay with him if needed and son lives close by that can help.)   Do you have someone available to stay with you one or two nights once you are home? Yes   Medical Clearance   Date of Physical 03/07/22   Clinic Name [facility]

## 2022-03-31 ENCOUNTER — TRANSFERRED RECORDS (OUTPATIENT)
Dept: HEALTH INFORMATION MANAGEMENT | Facility: CLINIC | Age: 62
End: 2022-03-31

## 2022-03-31 ENCOUNTER — LAB (OUTPATIENT)
Dept: LAB | Facility: CLINIC | Age: 62
End: 2022-03-31
Attending: ORTHOPAEDIC SURGERY
Payer: COMMERCIAL

## 2022-03-31 DIAGNOSIS — Z11.59 ENCOUNTER FOR SCREENING FOR OTHER VIRAL DISEASES: ICD-10-CM

## 2022-03-31 LAB — SARS-COV-2 RNA RESP QL NAA+PROBE: NEGATIVE

## 2022-03-31 PROCEDURE — U0005 INFEC AGEN DETEC AMPLI PROBE: HCPCS

## 2022-03-31 PROCEDURE — U0003 INFECTIOUS AGENT DETECTION BY NUCLEIC ACID (DNA OR RNA); SEVERE ACUTE RESPIRATORY SYNDROME CORONAVIRUS 2 (SARS-COV-2) (CORONAVIRUS DISEASE [COVID-19]), AMPLIFIED PROBE TECHNIQUE, MAKING USE OF HIGH THROUGHPUT TECHNOLOGIES AS DESCRIBED BY CMS-2020-01-R: HCPCS

## 2022-04-01 NOTE — OR NURSING
"During the pre op call, Pt stated he had a scab/scrape on his RLE. The RN documented that she recommended him to call Dr. Montana's office and notify them of this.     Writer was reviewing this chart for surgery, and contacted the patient to f/u on the status of the concerning area. Pt stated \"obviously, they didn't communicate with you, because I was at Dr. Montana's office yesterday, and there is nothing to worry about\". Pt stated the area of concern was almost gone and is \"no big deal\".   "

## 2022-04-04 ENCOUNTER — ANESTHESIA EVENT (OUTPATIENT)
Dept: SURGERY | Facility: CLINIC | Age: 62
End: 2022-04-04
Payer: COMMERCIAL

## 2022-04-04 ENCOUNTER — APPOINTMENT (OUTPATIENT)
Dept: RADIOLOGY | Facility: CLINIC | Age: 62
End: 2022-04-04
Attending: ORTHOPAEDIC SURGERY
Payer: COMMERCIAL

## 2022-04-04 ENCOUNTER — ANESTHESIA (OUTPATIENT)
Dept: SURGERY | Facility: CLINIC | Age: 62
End: 2022-04-04
Payer: COMMERCIAL

## 2022-04-04 ENCOUNTER — HOSPITAL ENCOUNTER (OUTPATIENT)
Facility: CLINIC | Age: 62
Discharge: HOME | End: 2022-04-05
Attending: ORTHOPAEDIC SURGERY | Admitting: ORTHOPAEDIC SURGERY
Payer: COMMERCIAL

## 2022-04-04 ENCOUNTER — APPOINTMENT (OUTPATIENT)
Dept: RADIOLOGY | Facility: CLINIC | Age: 62
End: 2022-04-04
Attending: PHYSICIAN ASSISTANT
Payer: COMMERCIAL

## 2022-04-04 DIAGNOSIS — Z96.641 STATUS POST TOTAL HIP REPLACEMENT, RIGHT: Primary | ICD-10-CM

## 2022-04-04 PROBLEM — M16.9 DEGENERATIVE JOINT DISEASE (DJD) OF HIP: Status: ACTIVE | Noted: 2022-04-04

## 2022-04-04 LAB
CREAT SERPL-MCNC: 1.08 MG/DL (ref 0.7–1.3)
ERYTHROCYTE [DISTWIDTH] IN BLOOD BY AUTOMATED COUNT: 12.9 % (ref 10–15)
GFR SERPL CREATININE-BSD FRML MDRD: 77 ML/MIN/1.73M2
HCT VFR BLD AUTO: 47.4 % (ref 40–53)
HGB BLD-MCNC: 16.2 G/DL (ref 13.3–17.7)
INR PPP: 1.04 (ref 0.85–1.15)
MCH RBC QN AUTO: 31.2 PG (ref 26.5–33)
MCHC RBC AUTO-ENTMCNC: 34.2 G/DL (ref 31.5–36.5)
MCV RBC AUTO: 91 FL (ref 78–100)
PLATELET # BLD AUTO: 246 10E3/UL (ref 150–450)
POTASSIUM BLD-SCNC: 4.3 MMOL/L (ref 3.5–5)
RBC # BLD AUTO: 5.2 10E6/UL (ref 4.4–5.9)
WBC # BLD AUTO: 9.9 10E3/UL (ref 4–11)

## 2022-04-04 PROCEDURE — C1776 JOINT DEVICE (IMPLANTABLE): HCPCS | Performed by: ORTHOPAEDIC SURGERY

## 2022-04-04 PROCEDURE — 258N000003 HC RX IP 258 OP 636: Performed by: NURSE ANESTHETIST, CERTIFIED REGISTERED

## 2022-04-04 PROCEDURE — 999N000063 XR HIP PORT RT 1 VW

## 2022-04-04 PROCEDURE — 250N000013 HC RX MED GY IP 250 OP 250 PS 637: Performed by: ORTHOPAEDIC SURGERY

## 2022-04-04 PROCEDURE — 710N000010 HC RECOVERY PHASE 1, LEVEL 2, PER MIN: Performed by: ORTHOPAEDIC SURGERY

## 2022-04-04 PROCEDURE — 999N000141 HC STATISTIC PRE-PROCEDURE NURSING ASSESSMENT: Performed by: ORTHOPAEDIC SURGERY

## 2022-04-04 PROCEDURE — 360N000084 HC SURGERY LEVEL 4 W/ FLUORO, PER MIN: Performed by: ORTHOPAEDIC SURGERY

## 2022-04-04 PROCEDURE — 250N000011 HC RX IP 250 OP 636: Performed by: NURSE ANESTHETIST, CERTIFIED REGISTERED

## 2022-04-04 PROCEDURE — 250N000009 HC RX 250: Performed by: NURSE ANESTHETIST, CERTIFIED REGISTERED

## 2022-04-04 PROCEDURE — 85048 AUTOMATED LEUKOCYTE COUNT: CPT | Performed by: PHYSICIAN ASSISTANT

## 2022-04-04 PROCEDURE — 250N000013 HC RX MED GY IP 250 OP 250 PS 637: Performed by: INTERNAL MEDICINE

## 2022-04-04 PROCEDURE — 250N000011 HC RX IP 250 OP 636: Performed by: PHYSICIAN ASSISTANT

## 2022-04-04 PROCEDURE — 370N000017 HC ANESTHESIA TECHNICAL FEE, PER MIN: Performed by: ORTHOPAEDIC SURGERY

## 2022-04-04 PROCEDURE — 36415 COLL VENOUS BLD VENIPUNCTURE: CPT | Performed by: PHYSICIAN ASSISTANT

## 2022-04-04 PROCEDURE — 258N000001 HC RX 258: Performed by: ORTHOPAEDIC SURGERY

## 2022-04-04 PROCEDURE — 250N000011 HC RX IP 250 OP 636: Performed by: ANESTHESIOLOGY

## 2022-04-04 PROCEDURE — 272N000001 HC OR GENERAL SUPPLY STERILE: Performed by: ORTHOPAEDIC SURGERY

## 2022-04-04 PROCEDURE — 258N000003 HC RX IP 258 OP 636: Performed by: ANESTHESIOLOGY

## 2022-04-04 PROCEDURE — 99204 OFFICE O/P NEW MOD 45 MIN: CPT | Performed by: INTERNAL MEDICINE

## 2022-04-04 PROCEDURE — 999N000065 XR PELVIS AND HIP PORTABLE RIGHT 2 VIEWS

## 2022-04-04 PROCEDURE — 250N000011 HC RX IP 250 OP 636: Performed by: ORTHOPAEDIC SURGERY

## 2022-04-04 PROCEDURE — 250N000009 HC RX 250: Performed by: ORTHOPAEDIC SURGERY

## 2022-04-04 PROCEDURE — 85610 PROTHROMBIN TIME: CPT | Performed by: PHYSICIAN ASSISTANT

## 2022-04-04 PROCEDURE — 250N000013 HC RX MED GY IP 250 OP 250 PS 637: Performed by: ANESTHESIOLOGY

## 2022-04-04 PROCEDURE — 250N000013 HC RX MED GY IP 250 OP 250 PS 637: Performed by: PHYSICIAN ASSISTANT

## 2022-04-04 PROCEDURE — C1713 ANCHOR/SCREW BN/BN,TIS/BN: HCPCS | Performed by: ORTHOPAEDIC SURGERY

## 2022-04-04 PROCEDURE — 82565 ASSAY OF CREATININE: CPT | Performed by: PHYSICIAN ASSISTANT

## 2022-04-04 PROCEDURE — 84132 ASSAY OF SERUM POTASSIUM: CPT | Performed by: PHYSICIAN ASSISTANT

## 2022-04-04 PROCEDURE — P9041 ALBUMIN (HUMAN),5%, 50ML: HCPCS | Performed by: NURSE ANESTHETIST, CERTIFIED REGISTERED

## 2022-04-04 PROCEDURE — 250N000009 HC RX 250: Performed by: PHYSICIAN ASSISTANT

## 2022-04-04 PROCEDURE — 250N000025 HC SEVOFLURANE, PER MIN: Performed by: ORTHOPAEDIC SURGERY

## 2022-04-04 PROCEDURE — 258N000003 HC RX IP 258 OP 636: Performed by: ORTHOPAEDIC SURGERY

## 2022-04-04 DEVICE — TRIDENT X3 10 DEGREE POLYETHYLENE INSERT
Type: IMPLANTABLE DEVICE | Site: HIP | Status: FUNCTIONAL
Brand: TRIDENT X3 INSERT

## 2022-04-04 DEVICE — 6.5MM LOW PROFILE HEX SCREW 25MM
Type: IMPLANTABLE DEVICE | Site: HIP | Status: FUNCTIONAL
Brand: TRIDENT II

## 2022-04-04 DEVICE — TRIDENT II PSL CLUSTERHOLE HA ACETABULAR SHELL 56F
Type: IMPLANTABLE DEVICE | Site: HIP | Status: FUNCTIONAL
Brand: TRIDENT II

## 2022-04-04 DEVICE — CERAMIC V40 FEMORAL HEAD
Type: IMPLANTABLE DEVICE | Site: HIP | Status: FUNCTIONAL
Brand: BIOLOX

## 2022-04-04 DEVICE — 127 DEGREE NECK ANGLE HIP STEM
Type: IMPLANTABLE DEVICE | Site: HIP | Status: FUNCTIONAL
Brand: ACCOLADE

## 2022-04-04 RX ORDER — HYDROMORPHONE HCL IN WATER/PF 6 MG/30 ML
0.4 PATIENT CONTROLLED ANALGESIA SYRINGE INTRAVENOUS EVERY 5 MIN PRN
Status: DISCONTINUED | OUTPATIENT
Start: 2022-04-04 | End: 2022-04-04 | Stop reason: HOSPADM

## 2022-04-04 RX ORDER — AMOXICILLIN 250 MG
1 CAPSULE ORAL 2 TIMES DAILY
Status: DISCONTINUED | OUTPATIENT
Start: 2022-04-04 | End: 2022-04-05 | Stop reason: HOSPADM

## 2022-04-04 RX ORDER — ACETAMINOPHEN 325 MG/1
975 TABLET ORAL EVERY 8 HOURS
Status: DISCONTINUED | OUTPATIENT
Start: 2022-04-04 | End: 2022-04-05 | Stop reason: HOSPADM

## 2022-04-04 RX ORDER — OXYCODONE HYDROCHLORIDE 5 MG/1
5-10 TABLET ORAL EVERY 6 HOURS PRN
Qty: 30 TABLET | Refills: 0 | Status: SHIPPED | OUTPATIENT
Start: 2022-04-04 | End: 2022-04-05

## 2022-04-04 RX ORDER — SODIUM CHLORIDE, SODIUM LACTATE, POTASSIUM CHLORIDE, CALCIUM CHLORIDE 600; 310; 30; 20 MG/100ML; MG/100ML; MG/100ML; MG/100ML
INJECTION, SOLUTION INTRAVENOUS CONTINUOUS
Status: DISCONTINUED | OUTPATIENT
Start: 2022-04-04 | End: 2022-04-05

## 2022-04-04 RX ORDER — PROPOFOL 10 MG/ML
INJECTION, EMULSION INTRAVENOUS PRN
Status: DISCONTINUED | OUTPATIENT
Start: 2022-04-04 | End: 2022-04-04

## 2022-04-04 RX ORDER — CEFAZOLIN SODIUM/WATER 2 G/20 ML
2 SYRINGE (ML) INTRAVENOUS SEE ADMIN INSTRUCTIONS
Status: DISCONTINUED | OUTPATIENT
Start: 2022-04-04 | End: 2022-04-04 | Stop reason: HOSPADM

## 2022-04-04 RX ORDER — NALOXONE HYDROCHLORIDE 0.4 MG/ML
0.4 INJECTION, SOLUTION INTRAMUSCULAR; INTRAVENOUS; SUBCUTANEOUS
Status: DISCONTINUED | OUTPATIENT
Start: 2022-04-04 | End: 2022-04-05 | Stop reason: HOSPADM

## 2022-04-04 RX ORDER — LISINOPRIL AND HYDROCHLOROTHIAZIDE 12.5; 2 MG/1; MG/1
1 TABLET ORAL DAILY
Status: DISCONTINUED | OUTPATIENT
Start: 2022-04-05 | End: 2022-04-05 | Stop reason: HOSPADM

## 2022-04-04 RX ORDER — HYDROXYZINE HYDROCHLORIDE 25 MG/1
25 TABLET, FILM COATED ORAL EVERY 6 HOURS PRN
Status: DISCONTINUED | OUTPATIENT
Start: 2022-04-04 | End: 2022-04-05 | Stop reason: HOSPADM

## 2022-04-04 RX ORDER — CEFAZOLIN SODIUM/WATER 2 G/20 ML
2 SYRINGE (ML) INTRAVENOUS
Status: COMPLETED | OUTPATIENT
Start: 2022-04-04 | End: 2022-04-04

## 2022-04-04 RX ORDER — IBUPROFEN 600 MG/1
600 TABLET, FILM COATED ORAL EVERY 6 HOURS PRN
Refills: 0 | COMMUNITY
Start: 2022-04-04 | End: 2022-05-19

## 2022-04-04 RX ORDER — MAGNESIUM HYDROXIDE 1200 MG/15ML
LIQUID ORAL PRN
Status: DISCONTINUED | OUTPATIENT
Start: 2022-04-04 | End: 2022-04-04 | Stop reason: HOSPADM

## 2022-04-04 RX ORDER — METHOCARBAMOL 750 MG/1
750 TABLET, FILM COATED ORAL 4 TIMES DAILY PRN
Qty: 60 TABLET | Refills: 1 | Status: SHIPPED | OUTPATIENT
Start: 2022-04-04 | End: 2022-05-19

## 2022-04-04 RX ORDER — ACETAMINOPHEN 325 MG/1
975 TABLET ORAL ONCE
Status: COMPLETED | OUTPATIENT
Start: 2022-04-04 | End: 2022-04-04

## 2022-04-04 RX ORDER — DEXAMETHASONE SODIUM PHOSPHATE 10 MG/ML
INJECTION, SOLUTION INTRAMUSCULAR; INTRAVENOUS PRN
Status: DISCONTINUED | OUTPATIENT
Start: 2022-04-04 | End: 2022-04-04

## 2022-04-04 RX ORDER — OXYCODONE HYDROCHLORIDE 5 MG/1
5 TABLET ORAL EVERY 4 HOURS PRN
Status: DISCONTINUED | OUTPATIENT
Start: 2022-04-04 | End: 2022-04-04 | Stop reason: HOSPADM

## 2022-04-04 RX ORDER — TAMSULOSIN HYDROCHLORIDE 0.4 MG/1
0.4 CAPSULE ORAL
Status: DISCONTINUED | OUTPATIENT
Start: 2022-04-04 | End: 2022-04-05 | Stop reason: HOSPADM

## 2022-04-04 RX ORDER — ONDANSETRON 2 MG/ML
4 INJECTION INTRAMUSCULAR; INTRAVENOUS EVERY 30 MIN PRN
Status: DISCONTINUED | OUTPATIENT
Start: 2022-04-04 | End: 2022-04-04 | Stop reason: HOSPADM

## 2022-04-04 RX ORDER — OXYCODONE HYDROCHLORIDE 5 MG/1
10 TABLET ORAL EVERY 4 HOURS PRN
Status: DISCONTINUED | OUTPATIENT
Start: 2022-04-04 | End: 2022-04-05 | Stop reason: HOSPADM

## 2022-04-04 RX ORDER — SODIUM CHLORIDE, SODIUM LACTATE, POTASSIUM CHLORIDE, CALCIUM CHLORIDE 600; 310; 30; 20 MG/100ML; MG/100ML; MG/100ML; MG/100ML
INJECTION, SOLUTION INTRAVENOUS CONTINUOUS
Status: DISCONTINUED | OUTPATIENT
Start: 2022-04-04 | End: 2022-04-04 | Stop reason: HOSPADM

## 2022-04-04 RX ORDER — EPHEDRINE SULFATE 50 MG/ML
INJECTION, SOLUTION INTRAMUSCULAR; INTRAVENOUS; SUBCUTANEOUS PRN
Status: DISCONTINUED | OUTPATIENT
Start: 2022-04-04 | End: 2022-04-04

## 2022-04-04 RX ORDER — KETOROLAC TROMETHAMINE 30 MG/ML
15 INJECTION, SOLUTION INTRAMUSCULAR; INTRAVENOUS
Status: DISCONTINUED | OUTPATIENT
Start: 2022-04-04 | End: 2022-04-04 | Stop reason: HOSPADM

## 2022-04-04 RX ORDER — KETOROLAC TROMETHAMINE 30 MG/ML
INJECTION, SOLUTION INTRAMUSCULAR; INTRAVENOUS PRN
Status: DISCONTINUED | OUTPATIENT
Start: 2022-04-04 | End: 2022-04-04

## 2022-04-04 RX ORDER — BISACODYL 10 MG
10 SUPPOSITORY, RECTAL RECTAL DAILY PRN
Status: DISCONTINUED | OUTPATIENT
Start: 2022-04-04 | End: 2022-04-05 | Stop reason: HOSPADM

## 2022-04-04 RX ORDER — FENTANYL CITRATE 50 UG/ML
INJECTION, SOLUTION INTRAMUSCULAR; INTRAVENOUS PRN
Status: DISCONTINUED | OUTPATIENT
Start: 2022-04-04 | End: 2022-04-04

## 2022-04-04 RX ORDER — PROCHLORPERAZINE MALEATE 10 MG
10 TABLET ORAL EVERY 6 HOURS PRN
Status: DISCONTINUED | OUTPATIENT
Start: 2022-04-04 | End: 2022-04-05 | Stop reason: HOSPADM

## 2022-04-04 RX ORDER — HYDROMORPHONE HCL IN WATER/PF 6 MG/30 ML
0.4 PATIENT CONTROLLED ANALGESIA SYRINGE INTRAVENOUS
Status: DISCONTINUED | OUTPATIENT
Start: 2022-04-04 | End: 2022-04-05 | Stop reason: HOSPADM

## 2022-04-04 RX ORDER — OXYCODONE HYDROCHLORIDE 5 MG/1
5 TABLET ORAL EVERY 4 HOURS PRN
Status: DISCONTINUED | OUTPATIENT
Start: 2022-04-04 | End: 2022-04-05 | Stop reason: HOSPADM

## 2022-04-04 RX ORDER — HYDROMORPHONE HCL IN WATER/PF 6 MG/30 ML
0.2 PATIENT CONTROLLED ANALGESIA SYRINGE INTRAVENOUS
Status: DISCONTINUED | OUTPATIENT
Start: 2022-04-04 | End: 2022-04-05 | Stop reason: HOSPADM

## 2022-04-04 RX ORDER — LIDOCAINE 40 MG/G
CREAM TOPICAL
Status: DISCONTINUED | OUTPATIENT
Start: 2022-04-04 | End: 2022-04-04 | Stop reason: HOSPADM

## 2022-04-04 RX ORDER — LIDOCAINE 40 MG/G
CREAM TOPICAL
Status: DISCONTINUED | OUTPATIENT
Start: 2022-04-04 | End: 2022-04-05 | Stop reason: HOSPADM

## 2022-04-04 RX ORDER — FINASTERIDE 5 MG/1
5 TABLET, FILM COATED ORAL DAILY
Status: DISCONTINUED | OUTPATIENT
Start: 2022-04-04 | End: 2022-04-05 | Stop reason: HOSPADM

## 2022-04-04 RX ORDER — TRANEXAMIC ACID 650 MG/1
1950 TABLET ORAL ONCE
Status: COMPLETED | OUTPATIENT
Start: 2022-04-04 | End: 2022-04-04

## 2022-04-04 RX ORDER — ONDANSETRON 4 MG/1
4 TABLET, ORALLY DISINTEGRATING ORAL EVERY 30 MIN PRN
Status: DISCONTINUED | OUTPATIENT
Start: 2022-04-04 | End: 2022-04-04 | Stop reason: HOSPADM

## 2022-04-04 RX ORDER — MAGNESIUM SULFATE 4 G/50ML
4 INJECTION INTRAVENOUS ONCE
Status: COMPLETED | OUTPATIENT
Start: 2022-04-04 | End: 2022-04-04

## 2022-04-04 RX ORDER — HYDROXYZINE HYDROCHLORIDE 25 MG/1
25 TABLET, FILM COATED ORAL EVERY 6 HOURS PRN
Qty: 30 TABLET | Refills: 0 | Status: SHIPPED | OUTPATIENT
Start: 2022-04-04 | End: 2022-04-05

## 2022-04-04 RX ORDER — LORAZEPAM 2 MG/ML
.5-1 INJECTION INTRAMUSCULAR
Status: DISCONTINUED | OUTPATIENT
Start: 2022-04-04 | End: 2022-04-04 | Stop reason: HOSPADM

## 2022-04-04 RX ORDER — ACETAMINOPHEN 325 MG/1
1000 TABLET ORAL 3 TIMES DAILY
Refills: 0 | COMMUNITY
Start: 2022-04-04 | End: 2024-03-26

## 2022-04-04 RX ORDER — ALBUMIN, HUMAN INJ 5% 5 %
SOLUTION INTRAVENOUS CONTINUOUS PRN
Status: DISCONTINUED | OUTPATIENT
Start: 2022-04-04 | End: 2022-04-04

## 2022-04-04 RX ORDER — ACETAMINOPHEN 325 MG/1
975 TABLET ORAL ONCE
Status: DISCONTINUED | OUTPATIENT
Start: 2022-04-04 | End: 2022-04-04 | Stop reason: HOSPADM

## 2022-04-04 RX ORDER — KETAMINE HYDROCHLORIDE 50 MG/ML
INJECTION, SOLUTION INTRAMUSCULAR; INTRAVENOUS PRN
Status: DISCONTINUED | OUTPATIENT
Start: 2022-04-04 | End: 2022-04-04

## 2022-04-04 RX ORDER — POLYETHYLENE GLYCOL 3350 17 G/17G
17 POWDER, FOR SOLUTION ORAL DAILY
Status: DISCONTINUED | OUTPATIENT
Start: 2022-04-05 | End: 2022-04-05 | Stop reason: HOSPADM

## 2022-04-04 RX ORDER — CEFAZOLIN SODIUM 2 G/100ML
2 INJECTION, SOLUTION INTRAVENOUS EVERY 8 HOURS
Status: COMPLETED | OUTPATIENT
Start: 2022-04-04 | End: 2022-04-05

## 2022-04-04 RX ORDER — ONDANSETRON 2 MG/ML
4 INJECTION INTRAMUSCULAR; INTRAVENOUS EVERY 6 HOURS PRN
Status: DISCONTINUED | OUTPATIENT
Start: 2022-04-04 | End: 2022-04-05 | Stop reason: HOSPADM

## 2022-04-04 RX ORDER — NALOXONE HYDROCHLORIDE 0.4 MG/ML
0.2 INJECTION, SOLUTION INTRAMUSCULAR; INTRAVENOUS; SUBCUTANEOUS
Status: DISCONTINUED | OUTPATIENT
Start: 2022-04-04 | End: 2022-04-05 | Stop reason: HOSPADM

## 2022-04-04 RX ORDER — AMOXICILLIN 250 MG
1-2 CAPSULE ORAL DAILY PRN
Qty: 30 TABLET | Refills: 0
Start: 2022-04-04 | End: 2022-05-19

## 2022-04-04 RX ORDER — ONDANSETRON 4 MG/1
4 TABLET, ORALLY DISINTEGRATING ORAL EVERY 6 HOURS PRN
Status: DISCONTINUED | OUTPATIENT
Start: 2022-04-04 | End: 2022-04-05 | Stop reason: HOSPADM

## 2022-04-04 RX ORDER — FENTANYL CITRATE 50 UG/ML
25 INJECTION, SOLUTION INTRAMUSCULAR; INTRAVENOUS EVERY 5 MIN PRN
Status: DISCONTINUED | OUTPATIENT
Start: 2022-04-04 | End: 2022-04-04 | Stop reason: HOSPADM

## 2022-04-04 RX ORDER — FAMOTIDINE 20 MG/1
20 TABLET, FILM COATED ORAL 2 TIMES DAILY
Status: DISCONTINUED | OUTPATIENT
Start: 2022-04-04 | End: 2022-04-05 | Stop reason: HOSPADM

## 2022-04-04 RX ORDER — ONDANSETRON 2 MG/ML
INJECTION INTRAMUSCULAR; INTRAVENOUS PRN
Status: DISCONTINUED | OUTPATIENT
Start: 2022-04-04 | End: 2022-04-04

## 2022-04-04 RX ORDER — ACETAMINOPHEN 325 MG/1
650 TABLET ORAL EVERY 4 HOURS PRN
Status: DISCONTINUED | OUTPATIENT
Start: 2022-04-07 | End: 2022-04-05 | Stop reason: HOSPADM

## 2022-04-04 RX ORDER — DIAZEPAM 5 MG
5 TABLET ORAL EVERY 6 HOURS PRN
Status: DISCONTINUED | OUTPATIENT
Start: 2022-04-04 | End: 2022-04-05 | Stop reason: HOSPADM

## 2022-04-04 RX ORDER — HALOPERIDOL 5 MG/ML
1 INJECTION INTRAMUSCULAR
Status: DISCONTINUED | OUTPATIENT
Start: 2022-04-04 | End: 2022-04-04 | Stop reason: HOSPADM

## 2022-04-04 RX ADMIN — SODIUM CHLORIDE, POTASSIUM CHLORIDE, SODIUM LACTATE AND CALCIUM CHLORIDE: 600; 310; 30; 20 INJECTION, SOLUTION INTRAVENOUS at 11:04

## 2022-04-04 RX ADMIN — CEFAZOLIN SODIUM 2 G: 2 INJECTION, SOLUTION INTRAVENOUS at 20:08

## 2022-04-04 RX ADMIN — Medication 2 G: at 12:52

## 2022-04-04 RX ADMIN — OXYCODONE HYDROCHLORIDE 10 MG: 5 TABLET ORAL at 22:45

## 2022-04-04 RX ADMIN — ASPIRIN 325 MG: 325 TABLET ORAL at 18:30

## 2022-04-04 RX ADMIN — FENTANYL CITRATE 25 MCG: 50 INJECTION, SOLUTION INTRAMUSCULAR; INTRAVENOUS at 15:50

## 2022-04-04 RX ADMIN — ONDANSETRON 4 MG: 2 INJECTION INTRAMUSCULAR; INTRAVENOUS at 14:38

## 2022-04-04 RX ADMIN — TAMSULOSIN HYDROCHLORIDE 0.4 MG: 0.4 CAPSULE ORAL at 18:30

## 2022-04-04 RX ADMIN — ACETAMINOPHEN 975 MG: 325 TABLET ORAL at 18:30

## 2022-04-04 RX ADMIN — MIDAZOLAM 2 MG: 1 INJECTION INTRAMUSCULAR; INTRAVENOUS at 12:57

## 2022-04-04 RX ADMIN — Medication 5 MG: at 13:16

## 2022-04-04 RX ADMIN — PHENYLEPHRINE HYDROCHLORIDE 100 MCG: 10 INJECTION INTRAVENOUS at 13:09

## 2022-04-04 RX ADMIN — KETOROLAC TROMETHAMINE 15 MG: 30 INJECTION, SOLUTION INTRAMUSCULAR at 14:58

## 2022-04-04 RX ADMIN — OXYCODONE HYDROCHLORIDE 5 MG: 5 TABLET ORAL at 18:34

## 2022-04-04 RX ADMIN — ALBUMIN (HUMAN): 12.5 SOLUTION INTRAVENOUS at 13:35

## 2022-04-04 RX ADMIN — KETAMINE HYDROCHLORIDE 20 MG: 50 INJECTION, SOLUTION INTRAMUSCULAR; INTRAVENOUS at 14:08

## 2022-04-04 RX ADMIN — SENNOSIDES AND DOCUSATE SODIUM 1 TABLET: 50; 8.6 TABLET ORAL at 20:08

## 2022-04-04 RX ADMIN — FENTANYL CITRATE 50 MCG: 50 INJECTION, SOLUTION INTRAMUSCULAR; INTRAVENOUS at 14:11

## 2022-04-04 RX ADMIN — FENTANYL CITRATE 100 MCG: 50 INJECTION, SOLUTION INTRAMUSCULAR; INTRAVENOUS at 13:05

## 2022-04-04 RX ADMIN — SUGAMMADEX 300 MG: 100 INJECTION, SOLUTION INTRAVENOUS at 14:54

## 2022-04-04 RX ADMIN — ROCURONIUM BROMIDE 20 MG: 10 INJECTION, SOLUTION INTRAVENOUS at 14:22

## 2022-04-04 RX ADMIN — FAMOTIDINE 20 MG: 20 TABLET ORAL at 20:08

## 2022-04-04 RX ADMIN — PROPOFOL 200 MG: 10 INJECTION, EMULSION INTRAVENOUS at 13:05

## 2022-04-04 RX ADMIN — HYDROMORPHONE HYDROCHLORIDE 0.4 MG: 0.2 INJECTION, SOLUTION INTRAMUSCULAR; INTRAVENOUS; SUBCUTANEOUS at 16:43

## 2022-04-04 RX ADMIN — ACETAMINOPHEN 975 MG: 325 TABLET ORAL at 10:43

## 2022-04-04 RX ADMIN — FENTANYL CITRATE 50 MCG: 50 INJECTION, SOLUTION INTRAMUSCULAR; INTRAVENOUS at 14:01

## 2022-04-04 RX ADMIN — Medication 200 MG: at 13:05

## 2022-04-04 RX ADMIN — KETAMINE HYDROCHLORIDE 20 MG: 50 INJECTION, SOLUTION INTRAMUSCULAR; INTRAVENOUS at 14:25

## 2022-04-04 RX ADMIN — DEXAMETHASONE SODIUM PHOSPHATE 10 MG: 10 INJECTION, SOLUTION INTRAMUSCULAR; INTRAVENOUS at 13:06

## 2022-04-04 RX ADMIN — MAGNESIUM SULFATE HEPTAHYDRATE 4 G: 80 INJECTION, SOLUTION INTRAVENOUS at 11:04

## 2022-04-04 RX ADMIN — PHENYLEPHRINE HYDROCHLORIDE 100 MCG: 10 INJECTION INTRAVENOUS at 13:13

## 2022-04-04 RX ADMIN — PHENYLEPHRINE HYDROCHLORIDE 0.4 MCG/KG/MIN: 10 INJECTION INTRAVENOUS at 13:16

## 2022-04-04 RX ADMIN — Medication 5 MG: at 13:20

## 2022-04-04 RX ADMIN — KETAMINE HYDROCHLORIDE 10 MG: 50 INJECTION, SOLUTION INTRAMUSCULAR; INTRAVENOUS at 14:37

## 2022-04-04 RX ADMIN — HYDROXYZINE HYDROCHLORIDE 25 MG: 25 TABLET, FILM COATED ORAL at 18:30

## 2022-04-04 RX ADMIN — HYDROMORPHONE HYDROCHLORIDE 1 MG: 1 INJECTION, SOLUTION INTRAMUSCULAR; INTRAVENOUS; SUBCUTANEOUS at 14:54

## 2022-04-04 RX ADMIN — FINASTERIDE 5 MG: 5 TABLET, FILM COATED ORAL at 18:30

## 2022-04-04 RX ADMIN — TRANEXAMIC ACID 1950 MG: 650 TABLET ORAL at 10:43

## 2022-04-04 RX ADMIN — PHENYLEPHRINE HYDROCHLORIDE 100 MCG: 10 INJECTION INTRAVENOUS at 13:15

## 2022-04-04 RX ADMIN — ROCURONIUM BROMIDE 50 MG: 10 INJECTION, SOLUTION INTRAVENOUS at 13:25

## 2022-04-04 RX ADMIN — PROPOFOL 150 MCG/KG/MIN: 10 INJECTION, EMULSION INTRAVENOUS at 13:09

## 2022-04-04 RX ADMIN — SODIUM CHLORIDE, POTASSIUM CHLORIDE, SODIUM LACTATE AND CALCIUM CHLORIDE: 600; 310; 30; 20 INJECTION, SOLUTION INTRAVENOUS at 17:27

## 2022-04-04 ASSESSMENT — LIFESTYLE VARIABLES: TOBACCO_USE: 1

## 2022-04-04 NOTE — PHARMACY-ADMISSION MEDICATION HISTORY
Pharmacy Note - Admission Medication History    Pertinent Provider Information:    ______________________________________________________________________    Prior To Admission (PTA) med list completed and updated in EMR.       PTA Med List   Medication Sig Last Dose     finasteride (PROSCAR) 5 MG tablet Take 1 tablet (5 mg) by mouth daily You need to establish care with a new provider for further refills. 4/3/2022 at Unknown time     Glucosamine Sulfate 1000 MG TABS Take 1,000 mg by mouth daily  4/3/2022 at Unknown time     lisinopril-hydrochlorothiazide (ZESTORETIC) 20-12.5 MG tablet Take 1 tablet by mouth daily 4/3/2022 at Unknown time     Multiple Vitamin (MULTIVITAMIN ADULT PO) Take 1 tablet by mouth daily  4/3/2022 at Unknown time     tamsulosin (FLOMAX) 0.4 MG capsule Take 0.4 mg by mouth daily (with dinner)  4/3/2022 at Unknown time     zinc gluconate 50 MG tablet Take 50 mg by mouth daily 4/3/2022 at Unknown time       Information source(s): Patient and CareEverywhere/SureScripts    Method of interview communication: in-person    Patient was asked about OTC/herbal products specifically.  PTA med list reflects this.    Based on the pharmacist's assessment, the PTA med list information appears reliable    Allergies were reviewed, assessed, and updated with the patient.      Patient did not bring any medications to the hospital and can't retrieve from home. No multi-dose medications are available for use during hospital stay.      Thank you for the opportunity to participate in the care of this patient.      Nasir Brush [facility]     4/4/2022     10:59 AM

## 2022-04-04 NOTE — OP NOTE
Total Hip Arthoplasty Operative Note        PLAN:  Weight bearing status: Weight bearing as tolerated   Activity: Activity as tolerated  Patient may move about with assist as indicated or with supervision   Anticoagulation plan:                 325 ASA po qd  for 42 days  Follow up plan                           Follow up in 2 week(s)        Name: Michael Jimenez    PCP: Vee Bhatti    Procedure Date: 4/4/2022    Pre-operative diagnosis: Osteoarthritis of right hip [M16.11]   Post-operative diagnosis: Same   Procedure: Total hip arthoplasty (Right)   Surgeon: Timothy Montana MD     Assistant(s): Alcides Flores PA-C   Anesthesia: Spinal Anesthesia   Estimated blood loss: 300 ml   Drains: None   Specimens: None       Findings: See full dictated operative note for details   Complications: None       Comments: See dictated operative report for full details     Indications:    The patient has experienced progressive right hip pain despite use of  Analgesics, NSAID's, Injection therapy and physical therapy. Because of the failure of these therapies to control symptoms and limited walking, night pain and altered activities the patient has decided to move ahead with joint replacement surgery.        Procedure and Findings:    After being informed of the risks, benefits, and alternatives to the procedure, the patient desired to proceed. Brought to the main operating suite where she was placed under spinal anesthetic. She was positioned in an Innomed hip cardoso. The patient s Right lower extremity was prepped and draped in a manner appropriate for the procedure after a timeout verification step was complete.    Patient received 2 grams of intravenous Ancef. Alcides Flores PA-C was present for the entire length of the case for the purposes of proper patient positioning, surgical exposure, and patient safety.    A minimally invasive posterior approach to the hip was taken. IT band was divided. Gluteus fibers  divided and the Charnley retractor was placed. Attention was directed towards the external rotators. The piriformis was identified and tagged. Minimus was elevated. The capsule was teed and tagged. Hip leg length and offset were then determined using a Smith and Nephew device with a pin in the innominate and the hip was then dislocated. The neck was resected using the Tinkercad guide. Full thickness cartilage loss was demonstrated involving the femoral head and acetabulum.     Attention was directed toward the acetabulum. Retractors were placed. Soft tissues were resected. Sequential reaming from 45 to 56 mm was carried out. Good cancellous bleeding bed was demonstrated. The trail 56 mm cup was stable. The final 56 mm Trident 2 HA PSL cup was selected and secured with 2 supplemental fixation screws. A 10 degree liner was placed. Attention was directed towards the femur. Box chisel, canal finder, sequential broaching up to 6 was carried out. Trial reductions were carried out and excellent range of motion and stability and restoration of leg length and offset was demonstrated with a -5,36 head. X-ray was obtained which demonstrated appropriate sizing and positioning of components. The trial components were then removed. The final 10 degree liner was secured. Inferior osteophytes were resected from the acetabulum. The implant 6 Accolade 2, 127 degree offset stem was the secured. Trial reductions were carried out and a -5, 36 mm head was selected. The hip was reduced. The joint was copiously irrigated. The joint capsule and piriformis tendon was repaired back to the greater trochanter through drill holes in bone. Soft tissues were infiltrated with anesthetic solution. Care was taken to avoid neurovascular structures.   The IT band was closed with running #1 running Stratafix stitch. Subcutaneous closure With layered 2-0 Vicryl, skin was closed with 3-0 Stratafix and Aquacell dressing. Sterile dressing was applied. Hip  abductor pillow was placed. The patient returned to PAR in stable condition.       Timothy Montana MD    Date: 4/4/2022 Time: 2:51 PM      CONFIDENTIALITY NOTICE This message and any included attachments are from [facility] and are intended only for the addressee. The information contained in this message is confidential and may constitute inside or non-public information under international, federal, or state securities laws. Unauthorized forwarding, printing, copying, distribution, or use of such information is strictly prohibited and may be unlawful. If you are not the addressee, please promptly delete this message and notify the sender of the delivery error by e-mail.

## 2022-04-04 NOTE — ANESTHESIA PROCEDURE NOTES
Airway       Patient location during procedure: OR       Procedure Start/Stop Times: 4/4/2022 1:09 PM  Staff -        Anesthesiologist:  Timothy Fernández MD       CRNA: Marialuisa Kwan APRN CRNA       Performed By: CRNA  Consent for Airway        Urgency: elective  Indications and Patient Condition       Indications for airway management: davian-procedural       Induction type:intravenous       Mask difficulty assessment: 1 - vent by mask    Final Airway Details       Final airway type: endotracheal airway       Successful airway: ETT - single  Endotracheal Airway Details        ETT size (mm): 8.0       Cuffed: yes       Cuff volume (mL): 6       Successful intubation technique: video laryngoscopy       VL Blade Size: Glidescope 4       Grade View of Cords: 1       Adjucts: stylet       Position: Right       Measured from: lips       Secured at (cm): 22    Post intubation assessment        Placement verified by: capnometry and equal breath sounds        Number of attempts at approach: 1       Number of other approaches attempted: 0       Secured with: silk tape       Ease of procedure: easy       Dentition: Intact and Unchanged

## 2022-04-04 NOTE — CONSULTS
"[facility] Medicine Service Consult Note.     Physician requesting consult: Timothy Montana MD  Thank you, Timothy Acosta MD, for asking the [facility] Medicine Service to see Michael Jimenez in consultation.    Assessment/Recommendations   Assessment/Plan: 60 yo female status post right toal hip arthroplasty on 4/4/22.    Right total hip arthroplasty 4/4/2022  Postoperative pain.  Defer pain management to primary orthopedic group.    Hypertension, essential  Order lisinopril/HCTZ home dose.   order creatinine and potassium level for AM  Hold parameters written, as patient is high risk of post operative hypotension.      Benign prostatic hypertrophy  Continue finasteride and tamsulosin home dose.     Operative blood loss.   EBL 300cc.   Repeat hemoglobin in a.m.  At risk for postoperative anemia.     Prediabetes.   Diet controlled.     Heart failure preserved ejection fraction.   No acute exacerbation.   Expectant management.   Monitor I/O.     Class II obesity Body mass index is 35.55 kg/m .  Obstructive sleep apnea    Code status:Full Code  -Reviewed the patient's preoperative H and P and updated missing elements.  -Home medication reconciliation has been reviewed.      History of Present Illness/Subjective    HPI: Mr. Michael Jimenez is a 61 year old male status post Procedure(s):  RIGHT TOTAL HIP ARTHROPLASTY  Post-operative Day: Day of Surgery, hospital medicine was consulted for \"hospitalist consultation.\"    HTN. The patient had this problem for >1 years.  Perioperative blood pressures have ranged between 107-135 mmHg systolic.   Associated symptoms as none. They don't occur after anything.   Not taking blood pressure medicine makes it worse. Taking blood pressure medicine makes it better.     Patient denies cerebrovascular accident, myocardial infarction, pulmonary embolism, or deep venous thrombosis.     Estimated Blood Loss:  300 mL    I have reviewed preop physical including " "past medical hx, family hx, social hx, surgical hx, medication hx.      Physical Examination  Review of Systems   VITALS: /73 (BP Location: Right arm)   Pulse 80   Temp 97.4  F (36.3  C) (Oral)   Resp 20   Ht 1.803 m (5' 11\")   Wt 115.6 kg (254 lb 14.4 oz)   SpO2 97%   BMI 35.55 kg/m    BMI: Body mass index is 35.55 kg/m .  Wt Readings from Last 3 Encounters:   04/04/22 115.6 kg (254 lb 14.4 oz)   03/07/22 115.8 kg (255 lb 6.4 oz)   10/06/21 116.8 kg (257 lb 8 oz)       Intake/Output Summary (Last 24 hours) at 4/4/2022 1632  Last data filed at 4/4/2022 1610  Gross per 24 hour   Intake 1350 ml   Output 300 ml   Net 1050 ml     General Appearance:   Mild distress from right hip pain.    ENT/Mouth: membranes moist, no oral lesions or bleeding gums.      EYES:  no scleral icterus, normal conjunctivae   Neck: no carotid bruits or thyromegaly   Chest/Lungs:   lungs are clear to auscultation, no rales or wheezing, equal chest wall expansion    Cardiovascular:   Regular. Normal S1/S2 heart sounds with no murmurs, rubs, or gallops.   Abdomen:  no organomegaly, masses, bruits, or tenderness; bowel sounds are present   Extremities: no cyanosis or clubbing   Skin: no xanthelasma, warm.    Neurologic: normal  bilateral, no tremors     Psychiatric: alert and oriented x3, calm     A 12 point comprehensive review of systems was negative except as noted above.         Medical History  Surgical History Family History Social History   Patient Active Problem List    Diagnosis Date Noted     Degenerative joint disease (DJD) of hip 04/04/2022     Priority: Medium     CHF (congestive heart failure) (H) 03/07/2022     Priority: Medium     CAROL (obstructive sleep apnea) 04/15/2021     Priority: Medium     4/12/2021 [facility] Sleep Study (255.0 lbs) - AHI 30.0, RDI 31.8, Supine AHI 47.1, REM AHI 78.0, Low O2 58.9%, Time Spent ?88% 41.2 minutes / Time Spent ?89% 51.6 minutes.       Morbid obesity (H) 03/09/2021     " Priority: Medium     Snoring 2015     Priority: Medium     Formatting of this note might be different from the original.  2015: advise to follow up for sleep study.       Impotence 2013     Priority: Medium     Hair loss 2013     Priority: Medium     Formatting of this note might be different from the original.  Finasteride 5mg tabs, he takes 1.25mg (quarter tablet) /day  Started taking Finasteride about , a year after he had a couple of hair transplants. He was initially on Propecia but this was too expensive.       Hayfever 2012     Priority: Medium     Formatting of this note might be different from the original.  Receives DEPO medrol injection 40mg annually and this seems to help him year round.       Glenoid labral tear 2011     Priority: Medium     Formatting of this note might be different from the original.  He has been doing a lot better since starting Glucosamine- Chondroitin.       Rupture of long head biceps tendon 2011     Priority: Medium     Supraspinatus tendon tear 2011     Priority: Medium     Hyperlipidemia with target low density lipoprotein (LDL) cholesterol less than 100 mg/dL 2011     Priority: Medium     Formatting of this note is different from the original.  Patient stopped taking statins 2015 as he was concerned about potential side effects. Importance of takign care of modifiable risk factors discussed with Patient. Will continue to address.  Lovaza (Omega-3 PUFA) 2mg twice a day).    Last Lipids:  Chol: 218    2013  T    2013  HDL:   55    2013  LDL:  127    2013   LDL CHOLESTEROL (mg/dL)   Date Value   2013 127     [place] 10-year CHD Risk Score: 8% (11 Total Points)       Hypothenar hammer syndrome (H) 2011     Priority: Medium     Obesity 2011     Priority: Medium     Pre-diabetes 2011     Priority: Medium     Formatting of this note is different from the  original.      HEMOGLOBIN A1C MONITORING (POCT) (%)   Date Value   4/1/2011 5.4      GLUCOSE                   (mg/dL)   Date Value   3/25/2013 77       Raynaud's phenomenon 04/01/2011     Priority: Medium     Right shoulder pain 04/01/2011     Priority: Medium     CARDIOVASCULAR SCREENING; LDL GOAL LESS THAN 160 10/31/2010     Priority: Medium    Past Surgical History:   Procedure Laterality Date     CHOLECYSTECTOMY       COLONOSCOPY N/A 5/28/2021    Procedure: COLONOSCOPY, WITH POLYPECTOMY AND BIOPSY;  Surgeon: Lito Sweet DO;  Location: [facility]     FINGER SURGERY       Reviewed, and family history includes Coronary Artery Disease in his father; Emphysema in his father; Heart Disease in his father; Skin Cancer in his mother.     Reviewed, and he  reports that he has quit smoking. His smoking use included cigarettes. He quit after 30.00 years of use. He has never used smokeless tobacco. He reports current alcohol use. He reports that he does not use drugs.  Social History     Tobacco Use     Smoking status: Former Smoker     Years: 30.00     Types: Cigarettes     Smokeless tobacco: Never Used   Substance Use Topics     Alcohol use: Yes     Comment: 3-4 drinks a week        Medications  Allergies   Medications Prior to Admission   Medication Sig Dispense Refill Last Dose     finasteride (PROSCAR) 5 MG tablet Take 1 tablet (5 mg) by mouth daily You need to establish care with a new provider for further refills. 90 tablet 3 4/3/2022 at Unknown time     Glucosamine Sulfate 1000 MG TABS Take 1,000 mg by mouth daily    4/3/2022 at Unknown time     lisinopril-hydrochlorothiazide (ZESTORETIC) 20-12.5 MG tablet Take 1 tablet by mouth daily 90 tablet 3 4/3/2022 at Unknown time     Multiple Vitamin (MULTIVITAMIN ADULT PO) Take 1 tablet by mouth daily    4/3/2022 at Unknown time     tamsulosin (FLOMAX) 0.4 MG capsule Take 0.4 mg by mouth daily (with dinner)    4/3/2022 at Unknown time     zinc gluconate 50 MG tablet  Take 50 mg by mouth daily   4/3/2022 at Unknown time     No Known Allergies      Cardiographics Reviewed Personally By Myself   EKG Results: personally reviewed. Left bundle branch block.    Imaging Reviewed Personally By Myself    Radiology Results:   Recent Results (from the past 24 hour(s))   XR Hip Port Right 1 View    Narrative    EXAM: XR HIP PORT RT 1 VW  LOCATION: [facility]  DATE/TIME: 4/4/2022 2:05 PM    INDICATION: right total hip  COMPARISON: None.      Impression    IMPRESSION: Intraoperative view of the right hip shows placement of a right total hip arthroplasty.        Labs Reviewed Personally By Myself     Most Recent 3 CBC's:Recent Labs   Lab Test 04/04/22  1103 03/07/22  1327   WBC 9.9 9.5   HGB 16.2 16.9   MCV 91 94    240     Most Recent 3 BMP's:Recent Labs   Lab Test 04/04/22  1103 03/07/22  1327 05/11/21  0930 03/25/21  0830   NA  --  139 136 137   POTASSIUM 4.3 4.0 4.2 4.2   CHLORIDE  --  105 101 105   CO2  --  29 31 30   BUN  --  27 23 25   CR 1.08 1.03 1.14 1.08   ANIONGAP  --  5 4 2*   SKYLER  --  9.2 8.8 9.0   GLC  --  127* 92 89     Thank you for this consultation.  Appreciate the opportunity to participate in the care of Michael Jimenez, please feel free to contact us for any questions or concerns.    Romeo Orozco DO, MS  [facility]  Internal Medicine  Phone: #[phone]

## 2022-04-04 NOTE — INTERVAL H&P NOTE
"I have reviewed the surgical (or preoperative) H&P that is linked to this encounter, and examined the patient. There are no significant changes    Clinical Conditions Present on Arrival:  Clinically Significant Risk Factors Present on Admission                    # Obesity: Estimated body mass index is 35.55 kg/m  as calculated from the following:    Height as of this encounter: 1.803 m (5' 11\").    Weight as of this encounter: 115.6 kg (254 lb 14.4 oz).       "

## 2022-04-04 NOTE — BRIEF OP NOTE
[facility]    Brief Operative Note    Pre-operative diagnosis: Osteoarthritis of right hip [M16.11]  Post-operative diagnosis Same as pre-operative diagnosis    Procedure: Procedure(s):  RIGHT TOTAL HIP ARTHROPLASTY  Surgeon: Surgeon(s) and Role:     * Timothy Montana MD - Primary     * Alcides Flores PA-C - Assisting  Anesthesia: Choice   Estimated Blood Loss: 300 ml    Drains: None  Specimens: * No specimens in log *  Findings:   None.  Complications: None.  Implants:   Implant Name Type Inv. Item Serial No.  Lot No. LRB No. Used Action   GUIDE PIN LONG ACETABULAR - [id number] Wire GUIDE PIN LONG ACETABULAR  [employer] [id number] Right 1 Wasted   SHELL ACTB 56MM HIP CH HA TRDNT II PSL F STRL 742-11-56F - [id number] Total Joint Component/Insert SHELL ACTB 56MM HIP CH HA TRDNT II PSL F STRL 742-11-56F  [id number] Right 1 Implanted   IMP SCR STRK LOW PROFILE HEX 6.5X25MM 5226-5773 - [id number] Metallic Hardware/[place] IMP SCR STRK LOW PROFILE HEX 6.5X25MM [id number]  [employer] [id number] Right 1 Implanted   IMP SCR STRK LOW PROFILE HEX 6.5X25MM 7411-4296 - [id number] Metallic Hardware/[place] IMP SCR STRK LOW PROFILE HEX 6.5X25MM [id number]  [employer] [id number] Right 1 Implanted   INSERT ACTB 10DEG  36MM 0D F HIP X3 TRDNT - [id number] Total Joint Component/Insert INSERT ACTB 10DEG  36MM 0D F HIP X3 TRDNT  [employer] [id number] Right 1 Implanted   IMP STEM FEMORAL HIP STRK ACCOLADE II 127DEG SZ 6 6952-9512 - [id number] Total Joint Component/Insert IMP STEM FEMORAL HIP STRK ACCOLADE II 127DEG SZ 6 5587-0420  [employer] [id number] Right 1 Implanted   IMP HEAD FEMORAL STRK BIOLOX DELTA CERAMIC 36MM -5MM - [id number] Total Joint Component/Insert IMP HEAD FEMORAL STRK BIOLOX DELTA CERAMIC 36MM -5MM  [employer] [id number] Right 1 Implanted

## 2022-04-04 NOTE — ANESTHESIA POSTPROCEDURE EVALUATION
Patient: Michael Jimenez    Procedure: Procedure(s):  RIGHT TOTAL HIP ARTHROPLASTY       Anesthesia Type:  General    Note:  Disposition: Inpatient   Postop Pain Control: Uneventful            Sign Out: Well controlled pain   PONV: No   Neuro/Psych: Uneventful            Sign Out: Acceptable/Baseline neuro status   Airway/Respiratory: Uneventful            Sign Out: Acceptable/Baseline resp. status   CV/Hemodynamics: Uneventful            Sign Out: Acceptable CV status; No obvious hypovolemia; No obvious fluid overload   Other NRE: NONE   DID A NON-ROUTINE EVENT OCCUR? No           Last vitals:  Vitals Value Taken Time   /52 04/04/22 1600   Temp 36.9  C (98.4  F) 04/04/22 1550   Pulse 81 04/04/22 1605   Resp 9 04/04/22 1605   SpO2 98 % 04/04/22 1605   Vitals shown include unvalidated device data.    Electronically Signed By: Niharika Prieto MD  April 4, 2022  4:10 PM

## 2022-04-04 NOTE — ANESTHESIA PREPROCEDURE EVALUATION
Anesthesia Pre-Procedure Evaluation    Patient: Michael Jimenez   MRN: [id number] : 1960        Preoperative Diagnosis: Special screening for malignant neoplasm of colon [Z12.11]   Procedure : Procedure(s):  COLONOSCOPY     Past Medical History:   Diagnosis Date     Arthritis      Congestive heart failure (H)      Hyperlipidemia      Hypertension      Obese      Prediabetes      Sleep apnea     doesn't use CPAP      Past Surgical History:   Procedure Laterality Date     CHOLECYSTECTOMY       COLONOSCOPY N/A 2021    Procedure: COLONOSCOPY, WITH POLYPECTOMY AND BIOPSY;  Surgeon: Lito Sweet DO;  Location: [facility]        No Known Allergies   Social History     Tobacco Use     Smoking status: Former Smoker     Years: 30.00     Types: Cigarettes     Smokeless tobacco: Never Used   Substance Use Topics     Alcohol use: Yes     Comment: 3-4 drinks a week      Wt Readings from Last 1 Encounters:   22 115.8 kg (255 lb 6.4 oz)        Anesthesia Evaluation   Pt has had prior anesthetic.     No history of anesthetic complications       ROS/MED HX  ENT/Pulmonary:     (+) sleep apnea, uses CPAP, CAROL risk factors, snores loudly, obese, allergic rhinitis, tobacco use, Past use,     Neurologic:  - neg neurologic ROS     Cardiovascular: Comment: Hx of abnormal stress test--area of mild infarction   BBB    (+) Dyslipidemia hypertension-----CHF Previous cardiac testing   Echo: Date: 3/10/21 Results:  Echocardiogram Complete  Order: [id number]  Status: Edited Result - FINAL   Visible to patient: Yes (MyChart) Next appt: Today at 06:45 PM in Radiology. ([facility] MRI ROOM 2) Dx: Abnormal cardiovascular stress test  Details      Reading Physician Reading Date Result Priority  OSITO Rader MD  [phone] 3/10/2021     Narrative & Impression    [id number]  [id number]  [id number]  [id number]^VINCE           [facility], Echocardiography Laboratory  [address] [place] [address],  [place] [address]        Name: JUSTINO MCCOLLUM  MRN: [id number]  : 1960  Study Date: 03/10/2021 09:58 AM  Age: 60 yrs  Gender: Male  Patient Location: [facility]  Reason For Study: Need to rule out AS prior to CTA on friday  Ordering Physician: VINCE WELCH  Referring Physician: VINCE WELCH  Performed By: Garth Robertson RDCS     BSA: 2.4 m2  Height: 72 in  Weight: 258 lb  BP: 128/83 mmHg  _____________________________________________________________________________  __        Procedure  Echocardiogram with two-dimensional, color and spectral Doppler performed.  Contrast Definity. Definity (NDC #78315-845-71) given intravenously. Patient  was given 5ml mixture of 1.5ml Definity and 8.5ml saline. 5 ml wasted. IV  start location R Upper arm .  _____________________________________________________________________________  __        Interpretation Summary     Mild aortic stenosis is present.  _____________________________________________________________________________  __        Left Ventricle  Global and regional left ventricular function is normal with an EF of 55-60%.  Left ventricular wall thickness is normal. Left ventricular size is normal.  Grade I or early diastolic dysfunction. No regional wall motion abnormalities  are seen.     Right Ventricle  Right ventricular function, chamber size, wall motion, and thickness are  normal.     Atria  Both atria appear normal. The atrial septum is intact as assessed by color  Doppler .     Mitral Valve  The mitral valve is normal. Mild mitral annular calcification is present.  Trace mitral insufficiency is present.        Aortic Valve  Moderate aortic valve calcification is present. Mild aortic stenosis is  present. The mean AoV pressure gradient is 22.2 mmHg. The calculated aortic  valve are is 1.5 cm^2.     Tricuspid Valve  The tricuspid valve is normal.     Pulmonic Valve  The valve leaflets are not well visualized. On Doppler interrogation, there is  no significant  stenosis or regurgitation.     Vessels  The thoracic aorta is normal. The pulmonary artery and bifurcation cannot be  assessed. Dilation of the inferior vena cava is present with normal  respiratory variation in diameter.     Pericardium  No pericardial effusion is present.        Compared to Previous Study  There is no prior study for direct comparison.  _____________________________________________________________________________  __  MMode/2D Measurements & Calculations     IVSd: 1.0 cm  LVIDd: 5.5 cm  LVIDs: 3.1 cm  LVPWd: 0.99 cm  FS: 43.7 %  LV mass(C)d: 215.9 grams  LV mass(C)dI: 90.9 grams/m2  Ao root diam: 3.8 cm  asc Aorta Diam: 3.5 cm  LVOT diam: 2.3 cm  LVOT area: 4.2 cm2  LA Volume (BP): 59.0 ml  LA Volume Index (BP): 24.9 ml/m2  RWT: 0.36     TAPSE: 2.0 cm     Time Measurements  Aortic HR: 72.0 BPM  Mitral HR: 75.0 BPM     Doppler Measurements & Calculations  MV E max michael: 64.2 cm/sec  MV A max michael: 66.1 cm/sec  MV E/A: 0.97  MV dec time: 0.19 sec  Ao V2 max: 356.1 cm/sec  Ao max P.0 mmHg  Ao V2 mean: 213.2 cm/sec  Ao mean P.2 mmHg  Ao V2 VTI: 59.2 cm  ELENA(I,D): 1.5 cm2  ELENA(V,D): 1.4 cm2  LV V1 max P.0 mmHg  LV V1 max: 122.2 cm/sec  LV V1 VTI: 20.4 cm  CO(LVOT): 6.2 l/min  CI(LVOT): 2.6 l/min/m2  SV(LVOT): 86.2 ml  SI(LVOT): 36.3 ml/m2  PA V2 max: 129.0 cm/sec  PA max P.7 mmHg  PA acc time: 0.09 sec  Pulm Sys Michael: 50.7 cm/sec  Pulm Piper Michael: 46.3 cm/sec  Pulm S/D: 1.1  AV Michael Ratio (DI): 0.34  ELENA Index (cm2/m2): 0.61     Lateral E/e': 8.7           _____________________________________________________________________________  __           Report approved by: Danis Vincent 03/10/2021 01:52 PM             Stress Test:  Date: 3/3/21 Results:  NM Lexiscan stress test  Order# [id number]  Reading physician: Draio Nunez MD Ordering physician: Milana Moulton APRN CNP Study date: 3/3/21  Patient Information    Patient Name   Michael Jimenez MRN   [id number] Sex   Male               Age   1960 (60 year old)  Reason for Exam  Priority: Routine  Dx: Chest pain, unspecified type (R07.9 (ICD-10-CM))  Comments:   ECG Link     Stress Test Data - Scan on 3/3/21 11:36 AM by   Indications    Chest pain, unspecified type (R07.9 (ICD-10-CM))  Conclusion          The nuclear stress test is abnormal.     There is a small area of a mild degree of infarction in the mid to basal anteroseptal segment(s) of the left ventricle.     Left ventricular function is mildly reduced.     The left ventricular ejection fraction at rest is 42%.  The left ventricular ejection fraction at stress is 45%.     One episode of 4 beat run on NSVT at rest and 4/10 chest pain with stress. Consider further testing with CT coronary angiogram if clinical suspicion of CAD is high.     There is no prior study for comparison.     ECG Summary    ECG Baseline electrocardiogram demonstrates left bundle branch block.   The stress electrocardiogram was non-diagnostic due to left bundle branch block.  Technical Comments    Cardiac Protocol A pharmacologic stress test was performed following a sitting Lexiscan protocol using 0.4 mg of intravenous regadenoson administered over 10 seconds under the supervision of Dr. Rivera. The patient reported chest discomfort during the stress test.  The supervising registered nurse observed typical vasodilator side effects during the stress test.  Isotope Administration Nuclear imaging was accomplished using a one day protocol with 37.2 mCi of technetium tetrofosmin injected at the completion of Lexiscan infusion on 3/3/2021 and 11.8 mCi of technetium tetrofosmin at rest on 3/3/2021.  Nuclear Study Quality Final image quality is suboptimal.  Ejection Fraction    Left Ventricular EF   45 %         Stress Measurements    Baseline Vitals  Baseline HR   69     Baseline Systolic BP   140     Baseline Diastolic BP   90     Peak Stress Vitals  Max HR   80     Last Stress Systolic BP   138     Last Stress  "Diastolic BP   88       Nuclear Perfusion    Stress Summed Score: 2 Percent Abnormal: 2.94%      Mild count reduction in the following segments: basal anteroseptal and mid anteroseptal.  The following segments are normal: basal anterior, basal inferoseptal, basal inferior, basal inferolateral, basal anterolateral, mid anterior, mid inferoseptal, mid inferior, mid inferolateral, mid anterolateral, apex, apical anterior, apical septal, apical inferior and apical lateral.      Resting Summed Score: 2 Percent Abnormal: 2.94%      Mild count reduction in the following segments: basal anteroseptal and mid anteroseptal.  The following segments are normal: basal anterior, basal inferoseptal, basal inferior, basal inferolateral, basal anterolateral, mid anterior, mid inferoseptal, mid inferior, mid inferolateral, mid anterolateral, apex, apical anterior, apical septal, apical inferior and apical lateral.          Wall Score    Wall Motion    Score Index: 1.00       The left ventricular wall motion is normal.          Vitals    Height Weight BSA (Calculated - sq m) BMI (Calculated)  1.803 m (5' 11\") 118.4 kg (261 lb) 2.44 36.4    NM Lexiscan stress test: Result Notes     Annabel Reid APRN CNP   3/3/2021  4:40 PM CST    Called and discussed results with patient over the phone. No ongoing pain. Referral placed to cardiology of further evaluation.     Annabel Reid CNP          PACS Images     Show images for NM Lexiscan stress test  Reading Providers     Reading Role Read Date  Dario Marin MD ECG [place], SPECT [place] 3/3/2021  Signed by    Signed Date/Time  Phone Pager  DARIO MARIN 3/03/2021 16:20 [phone]   Printable Result Report      Result Report   Encounter      View Encounter        ECG Reviewed:  Date: Results:    Cath:  Date: Results:      METS/Exercise Tolerance: >4 METS    Hematologic:  - neg hematologic  ROS     Musculoskeletal: Comment: Raynaud's  Hypothenar hammer syndrome  Hx of glenoid " labral tear  Right shoulder pain  Hx of supraspinatus tendon tear      GI/Hepatic:       Renal/Genitourinary:       Endo: Comment: prediabetes    (+) Obesity,     Psychiatric/Substance Use:  - neg psychiatric ROS     Infectious Disease:       Malignancy:       Other:  - neg other ROS          Physical Exam    Airway        Mallampati: II   TM distance: > 3 FB   Neck ROM: full   Mouth opening: > 3 cm    Respiratory Devices and Support         Dental  no notable dental history         Cardiovascular   cardiovascular exam normal          Pulmonary   pulmonary exam normal                OUTSIDE LABS:  CBC:   Lab Results   Component Value Date    WBC 9.5 03/07/2022    HGB 16.9 03/07/2022    HGB 16.9 01/04/2011    HCT 49.8 03/07/2022     03/07/2022     01/04/2011     BMP:   Lab Results   Component Value Date     03/07/2022     05/11/2021    POTASSIUM 4.0 03/07/2022    POTASSIUM 4.2 05/11/2021    CHLORIDE 105 03/07/2022    CHLORIDE 101 05/11/2021    CO2 29 03/07/2022    CO2 31 05/11/2021    BUN 27 03/07/2022    BUN 23 05/11/2021    CR 1.03 03/07/2022    CR 1.14 05/11/2021     (H) 03/07/2022    GLC 92 05/11/2021     COAGS:   Lab Results   Component Value Date    PTT 30 01/04/2011    INR 0.92 01/04/2011     POC:   Lab Results   Component Value Date     (H) 07/09/2009     HEPATIC: No results found for: ALBUMIN, PROTTOTAL, ALT, AST, GGT, ALKPHOS, BILITOTAL, BILIDIRECT, RAJINDER  OTHER:   Lab Results   Component Value Date    A1C 5.8 01/04/2011    SKYLER 9.2 03/07/2022    CRP 0.7 02/22/2010       Anesthesia Plan    ASA Status:  3   NPO Status:  NPO Appropriate    Anesthesia Type: General.     - Airway: ETT   Induction: Propofol, Intravenous.   Maintenance: Balanced.   Techniques and Equipment:     - Airway: Video-Laryngoscope         Consents    Anesthesia Plan(s) and associated risks, benefits, and realistic alternatives discussed. Questions answered and patient/representative(s) expressed  understanding.    - Discussed:     - Discussed with:  Patient      - Extended Intubation/Ventilatory Support Discussed: No.      - Patient is DNR/DNI Status: No    Use of blood products discussed: Yes.     - Discussed with: Patient.     - Consented: consented to blood products            Reason for refusal: other.     Postoperative Care    Pain management: IV analgesics, Oral pain medications, Multi-modal analgesia.   PONV prophylaxis: Ondansetron (or other 5HT-3), Dexamethasone or Solumedrol     Comments:    Other Comments: Decadron, Zofran.  Diprivan infusion.  Toradol, Tylenol.  Ketamine (0.5 mg/kg).  Magnesium.  Glidescope.  Patient would prefer GA.                Timothy Fernández MD

## 2022-04-04 NOTE — ANESTHESIA CARE TRANSFER NOTE
Patient: Michael Jimenez    Procedure: Procedure(s):  RIGHT TOTAL HIP ARTHROPLASTY       Diagnosis: Osteoarthritis of right hip [M16.11]  Diagnosis Additional Information: No value filed.    Anesthesia Type:   General     Note:    Oropharynx: oropharynx clear of all foreign objects  Level of Consciousness: drowsy  Oxygen Supplementation: face mask  Level of Supplemental Oxygen (L/min / FiO2): 8  Independent Airway: airway patency satisfactory and stable  Dentition: dentition unchanged  Vital Signs Stable: post-procedure vital signs reviewed and stable  Report to RN Given: handoff report given  Patient transferred to: PACU    Handoff Report: Identifed the Patient, Identified the Reponsible Provider, Reviewed the pertinent medical history, Discussed the surgical course, Reviewed Intra-OP anesthesia mangement and issues during anesthesia, Set expectations for post-procedure period and Allowed opportunity for questions and acknowledgement of understanding      Vitals:  Vitals Value Taken Time   /68 04/04/22 1524   Temp 36.4  C (97.5  F) 04/04/22 1522   Pulse 85 04/04/22 1527   Resp 0 04/04/22 1527   SpO2 100 % 04/04/22 1527   Vitals shown include unvalidated device data.    Electronically Signed By: TOREY Hamilton CRNA  April 4, 2022  3:28 PM

## 2022-04-05 ENCOUNTER — APPOINTMENT (OUTPATIENT)
Dept: PHYSICAL THERAPY | Facility: CLINIC | Age: 62
End: 2022-04-05
Attending: ORTHOPAEDIC SURGERY
Payer: COMMERCIAL

## 2022-04-05 ENCOUNTER — APPOINTMENT (OUTPATIENT)
Dept: OCCUPATIONAL THERAPY | Facility: CLINIC | Age: 62
End: 2022-04-05
Attending: ORTHOPAEDIC SURGERY
Payer: COMMERCIAL

## 2022-04-05 VITALS
HEIGHT: 71 IN | WEIGHT: 254.9 LBS | BODY MASS INDEX: 35.69 KG/M2 | TEMPERATURE: 98.3 F | SYSTOLIC BLOOD PRESSURE: 135 MMHG | RESPIRATION RATE: 18 BRPM | OXYGEN SATURATION: 95 % | HEART RATE: 84 BPM | DIASTOLIC BLOOD PRESSURE: 74 MMHG

## 2022-04-05 LAB
CREAT SERPL-MCNC: 1.31 MG/DL (ref 0.7–1.3)
FASTING STATUS PATIENT QL REPORTED: ABNORMAL
GFR SERPL CREATININE-BSD FRML MDRD: 61 ML/MIN/1.73M2
GLUCOSE BLD-MCNC: 137 MG/DL (ref 70–125)
HGB BLD-MCNC: 13.1 G/DL (ref 13.3–17.7)
POTASSIUM BLD-SCNC: 5.1 MMOL/L (ref 3.5–5)

## 2022-04-05 PROCEDURE — 82565 ASSAY OF CREATININE: CPT | Performed by: INTERNAL MEDICINE

## 2022-04-05 PROCEDURE — 99214 OFFICE O/P EST MOD 30 MIN: CPT | Performed by: INTERNAL MEDICINE

## 2022-04-05 PROCEDURE — 97166 OT EVAL MOD COMPLEX 45 MIN: CPT | Mod: GO

## 2022-04-05 PROCEDURE — 97161 PT EVAL LOW COMPLEX 20 MIN: CPT | Mod: GP

## 2022-04-05 PROCEDURE — 97535 SELF CARE MNGMENT TRAINING: CPT | Mod: GO

## 2022-04-05 PROCEDURE — 250N000011 HC RX IP 250 OP 636: Performed by: ORTHOPAEDIC SURGERY

## 2022-04-05 PROCEDURE — 85018 HEMOGLOBIN: CPT | Performed by: ORTHOPAEDIC SURGERY

## 2022-04-05 PROCEDURE — 250N000013 HC RX MED GY IP 250 OP 250 PS 637: Performed by: ORTHOPAEDIC SURGERY

## 2022-04-05 PROCEDURE — 97116 GAIT TRAINING THERAPY: CPT | Mod: GP

## 2022-04-05 PROCEDURE — 84132 ASSAY OF SERUM POTASSIUM: CPT | Performed by: INTERNAL MEDICINE

## 2022-04-05 PROCEDURE — 250N000013 HC RX MED GY IP 250 OP 250 PS 637: Performed by: INTERNAL MEDICINE

## 2022-04-05 PROCEDURE — 97110 THERAPEUTIC EXERCISES: CPT | Mod: GP

## 2022-04-05 PROCEDURE — 36415 COLL VENOUS BLD VENIPUNCTURE: CPT | Performed by: INTERNAL MEDICINE

## 2022-04-05 PROCEDURE — 82947 ASSAY GLUCOSE BLOOD QUANT: CPT | Performed by: ORTHOPAEDIC SURGERY

## 2022-04-05 RX ORDER — HYDROXYZINE HYDROCHLORIDE 25 MG/1
25 TABLET, FILM COATED ORAL EVERY 6 HOURS PRN
Qty: 30 TABLET | Refills: 0 | Status: SHIPPED | OUTPATIENT
Start: 2022-04-05 | End: 2022-05-19

## 2022-04-05 RX ORDER — OXYCODONE HYDROCHLORIDE 5 MG/1
5-10 TABLET ORAL EVERY 6 HOURS PRN
Qty: 30 TABLET | Refills: 0 | Status: SHIPPED | OUTPATIENT
Start: 2022-04-05 | End: 2022-05-19

## 2022-04-05 RX ADMIN — OXYCODONE HYDROCHLORIDE 5 MG: 5 TABLET ORAL at 07:12

## 2022-04-05 RX ADMIN — POLYETHYLENE GLYCOL 3350 17 G: 17 POWDER, FOR SOLUTION ORAL at 09:43

## 2022-04-05 RX ADMIN — ASPIRIN 325 MG: 325 TABLET ORAL at 09:43

## 2022-04-05 RX ADMIN — SENNOSIDES AND DOCUSATE SODIUM 1 TABLET: 50; 8.6 TABLET ORAL at 09:43

## 2022-04-05 RX ADMIN — ACETAMINOPHEN 975 MG: 325 TABLET ORAL at 09:43

## 2022-04-05 RX ADMIN — FINASTERIDE 5 MG: 5 TABLET, FILM COATED ORAL at 09:59

## 2022-04-05 RX ADMIN — CEFAZOLIN SODIUM 2 G: 2 INJECTION, SOLUTION INTRAVENOUS at 04:20

## 2022-04-05 RX ADMIN — ACETAMINOPHEN 975 MG: 325 TABLET ORAL at 01:28

## 2022-04-05 RX ADMIN — FAMOTIDINE 20 MG: 20 TABLET ORAL at 09:43

## 2022-04-05 ASSESSMENT — ACTIVITIES OF DAILY LIVING (ADL): DEPENDENT_IADLS:: INDEPENDENT

## 2022-04-05 NOTE — PROGRESS NOTES
"[facility] Orthopaedics Progress Note    Post-operative Day: 1 Day Post-Op    Procedure(s):  RIGHT TOTAL HIP ARTHROPLASTY        Plan: Anticoagulation protocol: scoanticoagulationlist2:  mg daily  x 40  days            Pain medications:  scopainmedication: oxycodone and tylenol            Weight bearing status:  WBAT            Disposition:  Home this morning             Continue cares and rehabilitation     Subjective:  Michael is seated in his recliner ready to eat breakfast this morning, reports being very hungry.   Pain: minimal and moderate  Chest pain, SOB:  No  Denies lightheadedness/dizziness  Denies nausea/ vomiting  Passing flatus  voiding well    Objective:  Blood pressure 135/74, pulse 84, temperature 98.3  F (36.8  C), temperature source Oral, resp. rate 18, height 1.803 m (5' 11\"), weight 115.6 kg (254 lb 14.4 oz), SpO2 95 %.    Patient Vitals for the past 24 hrs:   BP Temp Temp src Pulse Resp SpO2 Height Weight   04/05/22 0731 135/74 98.3  F (36.8  C) Oral 84 18 95 % -- --   04/05/22 0426 118/65 97.8  F (36.6  C) Oral 69 18 93 % -- --   04/04/22 2330 122/68 98  F (36.7  C) Oral 85 18 96 % -- --   04/04/22 1930 (!) 143/71 97.5  F (36.4  C) Oral 106 22 93 % -- --   04/04/22 1838 (!) 159/73 97.8  F (36.6  C) Oral 90 18 -- -- --   04/04/22 1730 130/65 97.5  F (36.4  C) Oral 81 18 97 % -- --   04/04/22 1700 121/58 97.6  F (36.4  C) Oral 81 18 96 % -- --   04/04/22 1630 135/73 97.4  F (36.3  C) Oral 80 20 97 % -- --   04/04/22 1600 107/52 -- -- 81 14 98 % -- --   04/04/22 1550 127/58 98.4  F (36.9  C) Temporal 84 16 98 % -- --   04/04/22 1540 129/73 -- -- 85 26 98 % -- --   04/04/22 1530 127/64 -- -- 84 15 100 % -- --   04/04/22 1522 128/68 97.5  F (36.4  C) Temporal 84 (!) 7 99 % -- --   04/04/22 1036 127/69 98.2  F (36.8  C) Temporal 84 16 95 % 1.803 m (5' 11\") 115.6 kg (254 lb 14.4 oz)       Wt Readings from Last 4 Encounters:   04/04/22 115.6 kg (254 lb 14.4 oz)   03/07/22 115.8 kg (255 lb 6.4 oz) "   10/06/21 116.8 kg (257 lb 8 oz)   07/26/21 113.9 kg (251 lb)         Motor function, sensation, and circulation intact   Yes  Wound status: Aquacel is clean, without shadowing, dry, and intact. Yes  Calf tenderness: Bilateral  No    Pertinent Labs   Lab Results: personally reviewed.     Recent Labs   Lab Test 04/05/22  0632 04/04/22  1103 03/07/22  1327 05/11/21  0930 03/25/21  0830   INR  --  1.04  --   --   --    HGB 13.1* 16.2 16.9  --   --    HCT  --  47.4 49.8  --   --    MCV  --  91 94  --   --    PLT  --  246 240  --   --    NA  --   --  139 136 137       Report completed by:  Harpreet Huff NP  Date: 4/5/2022

## 2022-04-05 NOTE — PROGRESS NOTES
Care Management Discharge Note    Discharge Date: 04/05/2022       Discharge Disposition: Outpatient Rehab PT  Discharge Services: None    Discharge DME: None    Discharge Transportation:  Family     Private pay costs discussed: Not applicable    PAS Confirmation Code:  (n/a)  Patient/family educated on Medicare website which has current facility and service quality ratings: no    Education Provided on the Discharge Plan:  Yes   Persons Notified of Discharge Plans: Pt   Patient/Family in Agreement with the Plan: yes    Handoff Referral Completed: None     Additional Information:  SWCM met and introduced self and CM services to Pt.  Pt lives alone.  Pt independent at baseline.  Pt will call [facility] in [place] to set up Out Pt PT.  No other CM needs identified.  Family to transport.     SIRIA Ying

## 2022-04-05 NOTE — PROGRESS NOTES
Patient vital signs are at baseline: Yes  Patient able to ambulate as they were prior to admission or with assist devices provided by therapies during their stay:  Yes  Patient MUST void prior to discharge:  Yes  Patient able to tolerate oral intake:  Yes  Pain has adequate pain control using Oral analgesics:  Yes, PRN oxy 10 mg x1 for c/o right teste pain   Does patient have an identified :  Yes  Has goal D/C date and time been discussed with patient:  Yes

## 2022-04-05 NOTE — PROGRESS NOTES
04/05/22 0830   Quick Adds   Type of Visit Initial Occupational Therapy Evaluation   Living Environment   People in Home alone   Current Living Arrangements house   Living Environment Comments WIS in basement, Tub/shower upstairs, RTS with support on either side.   Self-Care   Usual Activity Tolerance good   Current Activity Tolerance good   General Information   Onset of Illness/Injury or Date of Surgery 04/04/22   Referring Physician Sami Montana   Patient/Family Therapy Goal Statement (OT) To return home today   Right Lower Extremity (Weight-bearing Status) weight-bearing as tolerated (WBAT)   Cognitive Status Examination   Orientation Status orientation to person, place and time   Visual Perception   Visual Impairment/Limitations corrective lenses for reading   Bed Mobility   Bed Mobility rolling left;supine-sit;sit-supine   Rolling Left [place] (Bed Mobility) minimum assist (75% patient effort)   Supine-Sit [place] (Bed Mobility) minimum assist (75% patient effort)   Sit-Supine [place] (Bed Mobility) modified independence   Assistive Device (Bed Mobility) leg    Transfers   Transfers bed-chair transfer;sit-stand transfer   Transfer Skill: Bed to Chair/Chair to Bed   Bed-Chair [place] (Transfers) modified independence   Assistive Device (Bed-Chair Transfers) rolling walker   Sit-Stand Transfer   Sit-Stand [place] (Transfers) modified independence   Assistive Device (Sit-Stand Transfers) walker, front-wheeled   Balance   Balance Assessment no deficits were identified   Activities of Daily Living   BADL Assessment/Intervention upper body dressing;lower body dressing   Upper Body Dressing Assessment/Training   [place] Level (Upper Body Dressing) independent   Lower Body Dressing Assessment/Training   [place] Level (Lower Body Dressing) supervision;verbal cues   Assistive Devices (Lower Body Dressing) leg ;reacher;sock-aid   Position (Lower Body Dressing)  supported sitting;supported standing   Clinical Impression   Criteria for Skilled Therapeutic Interventions Met (OT) Yes, treatment indicated   OT Diagnosis decreased indep with ADLs due to NAOMI - posterior approach   OT Problem List-Impairments impacting ADL mobility   Assessment of Occupational Performance 3-5 Performance Deficits   Identified Performance Deficits dressing, toileting, bathing, trsfs.   Planned Therapy Interventions (OT) ADL retraining   Clinical Decision Making Complexity (OT) moderate complexity   Anticipated Equipment Needs Upon Discharge (OT) reacher  (leg )   Risk & Benefits of therapy have been explained patient   OT Discharge Planning   OT Discharge Recommendation (DC Rec) home   OT Rationale for DC Rec Pt feels confident he will be indep at home with AE.   Total Evaluation Time (Minutes)   Total Evaluation Time (Minutes) 15   OT Goals   Therapy Frequency (OT) Daily   OT Predicated Duration/Target Date for Goal Attainment 04/05/22   OT Goals Lower Body Dressing;Transfers   OT: Lower Body Dressing Modified independent;Goal Met   OT: Transfer Modified independent;Goal Met

## 2022-04-05 NOTE — PROGRESS NOTES
04/05/22 0730   Quick Adds   Type of Visit Initial PT Evaluation   Living Environment   People in Home alone   Current Living Arrangements house   Home Accessibility stairs to enter home;stairs within home   Number of Stairs, Main Entrance 1   Stair Railings, Main Entrance none  (holds door jam)   Number of Stairs, Within Home, Primary eight   Stair Railings, Within Home, Primary railing on left side (ascending)   Living Environment Comments Split entry home. Able to stay on one level once up stairs   Self-Care   Usual Activity Tolerance good   Current Activity Tolerance good   Equipment Currently Used at Home none  (has crutches and cane)   Fall history within last six months no   Activity/Exercise/Self-Care Comment Pt reports independent with all functional mobility without AD at baseline. Works full time.   General Information   Onset of Illness/Injury or Date of Surgery 04/04/22   Referring Physician Dr Timothy Montana   Patient/Family Therapy Goals Statement (PT) No more pain   Pertinent History of Current Problem (include personal factors and/or comorbidities that impact the POC) Admit for R NAOMI   Existing Precautions/Restrictions no hip IR;no hip ADD past midline;no hip hyperextension;90 degree hip flexion   Weight-Bearing Status - RLE weight-bearing as tolerated   Pain Assessment   Patient Currently in Pain No   Transfers   Impairments Contributing to Impaired Transfers decreased strength   Comment, (Transfers) Sit<>stand recliner to FWW mod I.   Gait/Stairs (Locomotion)   [place] Level (Gait) modified independence;verbal cues   Assistive Device (Gait) walker, front-wheeled   Distance in Feet (Required for LE Total Joints) 200'x1   Pattern (Gait) swing-through   Comment, (Gait/Stairs) Up/down 4 steps x 2 with one rail and cane mod I   Clinical Impression   Criteria for Skilled Therapeutic Intervention Yes, treatment indicated   PT Diagnosis (PT) Impaired functional mobility   Influenced by the  following impairments weakness, pain   Functional limitations due to impairments transfers, gait, stairs   Clinical Presentation (PT Evaluation Complexity) Stable/Uncomplicated   Clinical Presentation Rationale Presents as medically diagnosed   Clinical Decision Making (Complexity) low complexity   Planned Therapy Interventions (PT) gait training;home exercise program;stair training;transfer training   Anticipated Equipment Needs at Discharge (PT) walker, rolling;cane, straight  (FWW for gait, cane on stairs)   Risk & Benefits of therapy have been explained evaluation/treatment results reviewed;care plan/treatment goals reviewed;participants voiced agreement with care plan;participants included;patient   PT Discharge Planning   PT Discharge Recommendation (DC Rec) home;home with outpatient physical therapy   PT Rationale for DC Rec Has friend who can assist if needed. Needs to set up OP PT appointments.   Plan of Care Review   Plan of Care Reviewed With patient

## 2022-04-05 NOTE — PLAN OF CARE
Patient vital signs are at baseline: Yes  Patient able to ambulate as they were prior to admission or with assist devices provided by therapies during their stay:  Yes  Patient MUST void prior to discharge:  Yes  Patient able to tolerate oral intake:  Yes  Pain has adequate pain control using Oral analgesics:  Yes  Does patient have an identified :  Yes  Has goal D/C date and time been discussed with patient:  Yes    Pt endorses mild pain this shift; managed w/ APAP & Oxycodone. Dressing to R. Hip is CDI. Tolerating a regular diet. Up w/ a SBA, walker & gait belt. Pulled PIV. Charge RN reviewed AVS to pt's understanding & satisfaction. Assisted in gathering pt's belongings. Pt awaiting ride home.

## 2022-04-05 NOTE — PROGRESS NOTES
Physical Therapy Discharge Summary    Reason for therapy discharge:    All goals and outcomes met, no further needs identified.    Progress towards therapy goal(s). See goals on Care Plan in [facility] electronic health record for goal details.  Goals met    Therapy recommendation(s):    Continue home exercise program.   Continue home ambulation program.  Plans to set up OP PT appointments with [facility].

## 2022-04-05 NOTE — PLAN OF CARE
Goal Outcome Evaluation:        Patient vital signs are at baseline: Yes  Patient able to ambulate as they were prior to admission or with assist devices provided by therapies during their stay:  Yes  Patient MUST void prior to discharge:  Yes  Patient able to tolerate oral intake:  Yes  Pain has adequate pain control using Oral analgesics:  Yes  Does patient have an identified :  Yes  Has goal D/C date and time been discussed with patient:  Yes     Pt is A & O X 4. Pain is controled with scheduled Tylenol. CMS intact.

## 2022-04-05 NOTE — DISCHARGE SUMMARY
[facility] Discharge Summary                                  [facility]     JUSTINO MCCOLLUM [id number]   Age: 61 year old  PCP: Vee Bhatti, [phone] 1960     Date of Admission:  4/4/2022  Date of Discharge::  4/5/2022  Discharge Provider:  Harpreet Huff NP    Code status:  Full Code    Admission Information:  Admission Diagnosis:  Osteoarthritis of right hip [M16.11]  Degenerative joint disease (DJD) of hip [M16.9]    Post-Operative Day: 1 Day Post-Op     Reason for admission:  The patient was admitted for the following:Procedure(s) (LRB):  RIGHT TOTAL HIP ARTHROPLASTY (Right)    Active Problems:    Degenerative joint disease (DJD) of hip      Allergies:  Patient has no known allergies.    Following the procedure noted above the patient was transferred to the post-op floor and started on:    Therapy:  physical therapy and occupational therapy  Anticoagulation Plan: scoanticoagulationlist2:  mg daily  for 40 days  Pain Management: scopainmedication: oxycodone and tylenol  Weight bearing status: Weight bearing as tolerated     The patient was followed by Orthopedics during the inpatient treatment course:  Complications:  None  [place]  [facility]     Pertinent Labs   Lab Results: personally reviewed.     Recent Labs   Lab Test 04/05/22  0632 04/04/22  1103 03/07/22  1327 05/11/21  0930 03/25/21  0830   INR  --  1.04  --   --   --    HGB 13.1* 16.2 16.9  --   --    HCT  --  47.4 49.8  --   --    MCV  --  91 94  --   --    PLT  --  246 240  --   --    NA  --   --  139 136 137          Discharge Information:  Condition at discharge: Stable  Discharge destination:  Discharged to home     Medications at discharge:  Current Discharge Medication List      START taking these medications    Details   acetaminophen (TYLENOL) 325 MG tablet Take 3 tablets (975 mg) by mouth 3 times daily  Refills: 0    Associated Diagnoses: Status post total hip replacement, right       aspirin (ASA) 325 MG EC tablet Take 1 tablet (325 mg) by mouth daily  Qty: 42 tablet, Refills: 0    Associated Diagnoses: Status post total hip replacement, right      hydrOXYzine (ATARAX) 25 MG tablet Take 1 tablet (25 mg) by mouth every 6 hours as needed for itching or anxiety (with pain, moderate pain)  Qty: 30 tablet, Refills: 0    Associated Diagnoses: Status post total hip replacement, right      ibuprofen (ADVIL/MOTRIN) 600 MG tablet Take 1 tablet (600 mg) by mouth every 6 hours as needed (mild)  Refills: 0    Associated Diagnoses: Status post total hip replacement, right      methocarbamol (ROBAXIN) 750 MG tablet Take 1 tablet (750 mg) by mouth 4 times daily as needed for muscle spasms  Qty: 60 tablet, Refills: 1    Associated Diagnoses: Status post total hip replacement, right      oxyCODONE (ROXICODONE) 5 MG tablet Take 1-2 tablets (5-10 mg) by mouth every 6 hours as needed for pain (Moderate to Severe) Take 1 pill for moderate pain (4-6/10) and 2 pills for severe pain (7-10/10). Alternate with Tylenol. Maximum 7 per day  Qty: 30 tablet, Refills: 0    Associated Diagnoses: Status post total hip replacement, right      senna-docusate (SENOKOT-S/PERICOLACE) 8.6-50 MG tablet Take 1-2 tablets by mouth daily as needed for constipation Take while on oral narcotics to prevent or treat constipation.  Qty: 30 tablet, Refills: 0    Comments: While taking narcotics  Associated Diagnoses: Status post total hip replacement, right         CONTINUE these medications which have NOT CHANGED    Details   finasteride (PROSCAR) 5 MG tablet Take 1 tablet (5 mg) by mouth daily You need to establish care with a new provider for further refills.  Qty: 90 tablet, Refills: 3    Associated Diagnoses: Benign prostatic hyperplasia with lower urinary tract symptoms, symptom details unspecified      Glucosamine Sulfate 1000 MG TABS Take 1,000 mg by mouth daily       lisinopril-hydrochlorothiazide (ZESTORETIC) 20-12.5 MG tablet Take 1  tablet by mouth daily  Qty: 90 tablet, Refills: 3    Associated Diagnoses: Essential hypertension      Multiple Vitamin (MULTIVITAMIN ADULT PO) Take 1 tablet by mouth daily       tamsulosin (FLOMAX) 0.4 MG capsule Take 0.4 mg by mouth daily (with dinner)       zinc gluconate 50 MG tablet Take 50 mg by mouth daily                        Follow-Up Care:  Patient should be seen in the office in 10-14 days by the Orthopedic Surgeon/Physician Assistant.  Call [phone] for appointment or questions.    It was my pleasure to take care of the above patient.  Harpreet Huff NP

## 2022-04-05 NOTE — PROGRESS NOTES
[facility] Daily Progress Note    Assessment/Plan:  60 yo female status post right toal hip arthroplasty on 4/4/22.         Right total hip arthroplasty 4/4/2022  Postoperative pain.  Defer pain management to primary orthopedic group.     Hypertension, essential  Elevated creatinine, not VERONICA.  Mild hyperkalemia.   Likely hyperkalemia secondary to LR infusion after surgery.   Hold Hydrochlorothiazide/lisinopril on 4/5/22.   Repeat BMP in 1 week with PCP.   Hold lisinopril/HCTZ home dose for 4/5/22.      Benign prostatic hypertrophy  finasteride and tamsulosin home dose.      Postoperative anemia secondary to operative blood loss.   EBL 300cc.   Hemoglobin 13.1g/dL, patient asymptomatic.  No further workup required at this time.     Prediabetes.   Diet controlled.      Heart failure preserved ejection fraction.   No acute exacerbation.   Expectant management.   Monitor I/O.      Class II obesity Body mass index is 35.55 kg/m .  Obstructive sleep apnea    Diet: Regular Diet Adult  Regular Diet Adult  DVT Prophylaxis:  ASA per orthopedics.   Code Status: Full Code    Active Problems:    Degenerative joint disease (DJD) of hip     LOS: 0 days     Barriers to discharge: None.  Discharge Disposition: Per orthopedics.     Subjective:  Michael feels great after surgery.  No calf tenderness, tolerating diet, and passing flatus.         acetaminophen  975 mg Oral Q8H     aspirin  325 mg Oral Daily     famotidine  20 mg Oral BID    Or     famotidine  20 mg Intravenous BID     finasteride  5 mg Oral Daily     [Held by provider] lisinopril-hydrochlorothiazide  1 tablet Oral Daily     polyethylene glycol  17 g Oral Daily     senna-docusate  1 tablet Oral BID     sodium chloride (PF)  3 mL Intracatheter Q8H     tamsulosin  0.4 mg Oral Daily with supper       Objective:  Vital signs in last 24 hours:  Temp:  [97.4  F (36.3  C)-98.4  F (36.9  C)] 98.3  F (36.8  C)  Pulse:  [] 84  Resp:  [7-26] 18  BP: (107-159)/(52-74) 135/74  SpO2:   [93 %-100 %] 95 %  Weight:   Weight:   @THISENCWEIGHTS(1)@  Weight change:   Body mass index is 35.55 kg/m .    Intake/Output last 3 shifts:  I/O last 3 completed shifts:  In: 1950 [P.O.:600; I.V.:1100]  Out: 300 [Blood:300]  Intake/Output this shift:  No intake/output data recorded.    Review of Systems:   As per subjective, all others negative.    Physical Exam:    GENERAL:  Alert, appears comfortable, in no acute distress, appears stated age   HEAD:  Normocephalic, without obvious abnormality, atraumatic               NECK: Supple, symmetrical, trachea midline   BACK:   Symmetric, no curvature, ROM normal   LUNGS:   Clear to auscultation bilaterally, no rales, rhonchi, or wheezing, symmetric chest rise on inhalation, respirations unlabored   CHEST WALL:  No tenderness or deformity   HEART:  Regular rate and rhythm, S1 and S2 normal, no murmur, rub, or gallop    ABDOMEN:   Soft, non-tender, bowel sounds active all four quadrants, no masses, no organomegaly, no rebound or guarding   EXTREMITIES: Extremities normal, atraumatic, no cyanosis or edema , no calf tenderness.   SKIN: Dry to touch, no exanthems in the visualized areas   NEURO: Alert, oriented x3, moves all four extremities freely, non-focal     Imaging:  Personally Reviewed.  Results for orders placed or performed during the hospital encounter of 04/04/22   XR Hip Port Right 1 View    Impression    IMPRESSION: Intraoperative view of the right hip shows placement of a right total hip arthroplasty.    XR Pelvis w Hip Port Right G/E 2 Views    Impression    IMPRESSION: Right NAOMI. Components are well seated. Postoperative air is present. No fractures are identified.       Lab Results:  Personally Reviewed.   Recent Labs   Lab 04/05/22  0632 04/04/22  1103   WBC  --  9.9   HGB 13.1* 16.2   HCT  --  47.4   PLT  --  246     No results for input(s): NA, CO2, BUN, CREATININE, ALBUMIN, BILITOT, ALKPHOS, ALT, AST, GLUCOSE in the last 168 hours.    Invalid input(s):  K, CL, CALCIUM, LABALBU, PROT  Recent Labs   Lab 04/04/22  1103   INR 1.04       I reviewed all labs and imaging studies as of this date and I reviewed all current inpatient medications and updated them    Romeo Orozco DO, MS  [facility]  Internal Medicine

## 2022-04-06 ENCOUNTER — TELEPHONE (OUTPATIENT)
Dept: FAMILY MEDICINE | Facility: CLINIC | Age: 62
End: 2022-04-06
Payer: COMMERCIAL

## 2022-04-06 NOTE — TELEPHONE ENCOUNTER
Reason for Call:  Other appointment    Detailed comments: patient called and would like to schedule an E/R f/u from Farooq ALCALA.  Discharged April 5.  Needs 1 week.  Only this provider.  Reason:  Right hip replacement.  Please contact patient.  Thank you.    Phone Number Patient can be reached at: Home number on file [phone] (home)    Best Time: any    Can we leave a detailed message on this number? YES    Call taken on 4/6/2022 at 11:54 AM by Gloria Leary

## 2022-04-08 NOTE — TELEPHONE ENCOUNTER
Patient states he has F/U scheduled with surgeon and declines appt at this time with Vee, will call back if anything further is needed.

## 2022-04-10 ENCOUNTER — HEALTH MAINTENANCE LETTER (OUTPATIENT)
Age: 62
End: 2022-04-10

## 2022-04-21 ENCOUNTER — TRANSFERRED RECORDS (OUTPATIENT)
Dept: HEALTH INFORMATION MANAGEMENT | Facility: CLINIC | Age: 62
End: 2022-04-21
Payer: COMMERCIAL

## 2022-05-06 DIAGNOSIS — G47.33 OSA (OBSTRUCTIVE SLEEP APNEA): Primary | ICD-10-CM

## 2022-05-18 ENCOUNTER — TRANSFERRED RECORDS (OUTPATIENT)
Dept: HEALTH INFORMATION MANAGEMENT | Facility: CLINIC | Age: 62
End: 2022-05-18
Payer: COMMERCIAL

## 2022-05-19 ENCOUNTER — HOSPITAL ENCOUNTER (OUTPATIENT)
Dept: MRI IMAGING | Facility: CLINIC | Age: 62
Discharge: HOME | End: 2022-05-19
Attending: NURSE PRACTITIONER
Payer: COMMERCIAL

## 2022-05-19 ENCOUNTER — OFFICE VISIT (OUTPATIENT)
Dept: FAMILY MEDICINE | Facility: CLINIC | Age: 62
End: 2022-05-19
Payer: COMMERCIAL

## 2022-05-19 VITALS
SYSTOLIC BLOOD PRESSURE: 132 MMHG | OXYGEN SATURATION: 97 % | DIASTOLIC BLOOD PRESSURE: 86 MMHG | TEMPERATURE: 97.7 F | HEART RATE: 76 BPM | WEIGHT: 262 LBS | RESPIRATION RATE: 16 BRPM | BODY MASS INDEX: 36.54 KG/M2

## 2022-05-19 DIAGNOSIS — M54.50 PAIN OF LUMBAR SPINE: ICD-10-CM

## 2022-05-19 DIAGNOSIS — M54.6 LOWER THORACIC BACK PAIN: ICD-10-CM

## 2022-05-19 DIAGNOSIS — R73.03 PRE-DIABETES: ICD-10-CM

## 2022-05-19 DIAGNOSIS — Z13.220 SCREENING FOR HYPERLIPIDEMIA: ICD-10-CM

## 2022-05-19 DIAGNOSIS — M54.6 LOWER THORACIC BACK PAIN: Primary | ICD-10-CM

## 2022-05-19 LAB
ALT SERPL W P-5'-P-CCNC: 130 U/L (ref 0–70)
CHOLEST SERPL-MCNC: 238 MG/DL
FASTING STATUS PATIENT QL REPORTED: NO
HBA1C MFR BLD: 5.4 % (ref 0–5.6)
HDLC SERPL-MCNC: 44 MG/DL
LDLC SERPL CALC-MCNC: 153 MG/DL
NONHDLC SERPL-MCNC: 194 MG/DL
TRIGL SERPL-MCNC: 204 MG/DL
TSH SERPL DL<=0.005 MIU/L-ACNC: 1.77 MU/L (ref 0.4–4)

## 2022-05-19 PROCEDURE — 72146 MRI CHEST SPINE W/O DYE: CPT

## 2022-05-19 PROCEDURE — 84460 ALANINE AMINO (ALT) (SGPT): CPT | Performed by: NURSE PRACTITIONER

## 2022-05-19 PROCEDURE — 99214 OFFICE O/P EST MOD 30 MIN: CPT | Performed by: NURSE PRACTITIONER

## 2022-05-19 PROCEDURE — 72148 MRI LUMBAR SPINE W/O DYE: CPT

## 2022-05-19 PROCEDURE — 84443 ASSAY THYROID STIM HORMONE: CPT | Performed by: NURSE PRACTITIONER

## 2022-05-19 PROCEDURE — 36415 COLL VENOUS BLD VENIPUNCTURE: CPT | Performed by: NURSE PRACTITIONER

## 2022-05-19 PROCEDURE — 83036 HEMOGLOBIN GLYCOSYLATED A1C: CPT | Performed by: NURSE PRACTITIONER

## 2022-05-19 PROCEDURE — 80061 LIPID PANEL: CPT | Performed by: NURSE PRACTITIONER

## 2022-05-19 RX ORDER — AMOXICILLIN 500 MG/1
CAPSULE ORAL
COMMUNITY
Start: 2022-05-18 | End: 2022-05-19

## 2022-05-19 ASSESSMENT — ENCOUNTER SYMPTOMS: EYE PAIN: 1

## 2022-05-19 ASSESSMENT — PAIN SCALES - GENERAL: PAINLEVEL: SEVERE PAIN (6)

## 2022-05-19 NOTE — PROGRESS NOTES
Assessment & Plan     Lower thoracic back pain  Ordered MRI for mid/lower back pain that persists due to compression fx seen on last MRI at T12-L1 and lumbar pain.  No concerning symptoms with worsening pain despite hip surgery recently.  I discussed with patient I would determine whether he needs spine consultation if any changes on the MRI since it has been over 1 year.  - MR Thoracic Spine w/o Contrast; Future    Pain of lumbar spine  See note above.  - MR Lumbar Spine w/o Contrast; Future    Pre-diabetes  Labs ordered for evaluation of average blood glucose with A1c, ALT and thyroid due to hx of prediabetes. I will notify him of results.  - ALT; Future  - TSH WITH FREE T4 REFLEX; Future  - Hemoglobin A1c; Future  - ALT  - TSH WITH FREE T4 REFLEX  - Hemoglobin A1c    Screening for hyperlipidemia  - Lipid panel reflex to direct LDL Fasting; Future  - Lipid panel reflex to direct LDL Fasting    See Patient Instructions    Return in about 1 week (around 5/26/2022), or if symptoms worsen or fail to improve.    Isa Sarmiento NP  [facility] is a 61 year [facility] who presents for the following health issues     Musculoskeletal Problem    Eye Problem     History of Present Illness       Reason for visit:  Diabetic check and back  pain    He eats 2-3 servings of fruits and vegetables daily.He consumes 3 sweetened beverage(s) daily.He exercises with enough effort to increase his heart rate 9 or less minutes per day.  He exercises with enough effort to increase his heart rate 3 or less days per week.   He is taking medications regularly.       Pain History:  When did you first notice your pain? - Chronic Pain   Have you seen this provider for your pain in the past? No   Where in your body do your have pain? Low back- center, no radiation  Are you seeing anyone else for your pain? No  What makes your pain better? None   What makes your pain worse? Standing and walking   How has  pain affected your ability to work? Not applicable      PEG Score 5/19/2022 5/19/2022   PEG Total Score 7.33 7.33     Last appointment with spine was May/June 2021.  He felt his pain at that time was the hip as cause on the MRI.    Underwent right hip surgery in April 2022.  He has resolved pain from the hip and groin area but continues to have central back pain in the mid to low back still.  He feels that this is worsening.  The more walking and bending, the worse the pain gets.  Even standing in one position for long time for 5 minutes increases pain.  He did do Physical Therapy back last year but it was hard due to hip pain.  Review of the MRI showed chronic compression deformities at T12-L1 and left moderate to severe foraminal stenosis on L4-5.    Blurry Vision       Duration: one month     Description (location/character/radiation): 4 times in the past month, he has had blurry vision. When he rubs his eye the blurry vision goes away. Lasts a few seconds and notices this more during the day.     Intensity:  mild    Accompanying signs and symptoms: none     History (similar episodes/previous evaluation): None    Precipitating or alleviating factors: None    Therapies tried and outcome: None    Was concerned about this being diabetic and did not know he was prediabetic on his chart.      Review of Systems   Eyes: Positive for pain.          Objective    /86 (BP Location: Left arm, Patient Position: Sitting, Cuff Size: Adult Large)   Pulse 76   Temp 97.7  F (36.5  C) (Tympanic)   Resp 16   Wt 118.8 kg (262 lb)   SpO2 97%   BMI 36.54 kg/m    Body mass index is 36.54 kg/m .  Physical Exam   GENERAL: healthy, alert and no distress  RESP: lungs clear to auscultation - no rales, rhonchi or wheezes  CV: regular rate and rhythm, normal S1 S2, no S3 or S4, no murmur, click or rub, no peripheral edema and peripheral pulses strong  MS: no gross musculoskeletal defects noted, no edema  Comprehensive back pain  exam:  Tenderness of upper and lower lumbar spine, Range of motion not limited by pain, Lower extremity strength functional and equal on both sides, Lower extremity reflexes within normal limits bilaterally, Lower extremity sensation normal and equal on both sides and Straight leg raise negative bilaterally; patient is hesitant to do exercises with the back secondary to pain.  PSYCH: mentation appears normal, affect normal/bright

## 2022-05-19 NOTE — PATIENT INSTRUCTIONS
Labs today.  I will notify you of results.  Make appointment with [place] [facility] at [phone] for you MRI's.  I will be in touch with you on results and plan.

## 2022-05-20 DIAGNOSIS — M47.816 OSTEOARTHRITIS OF LUMBAR SPINE, UNSPECIFIED SPINAL OSTEOARTHRITIS COMPLICATION STATUS: Primary | ICD-10-CM

## 2022-05-20 NOTE — TELEPHONE ENCOUNTER
SPINE PATIENTS - NEW PROTOCOL PREVISIT    RECORDS RECEIVED FROM: Internal   REASON FOR VISIT: Osteoarthritis of lumbar spine, unspecified spinal osteoarthritis complication status   Date of Appt: 05/26/2022   NOTES (FOR ALL VISITS) STATUS DETAILS   OFFICE NOTE from referring provider Internal 05/19/2022 Dr Sarmiento [facility]    OFFICE NOTE from other specialist Internal 04/26/2021 Dr Reid [facility]   DISCHARGE SUMMARY from hospital N/A    DISCHARGE REPORT from ER N/A    EMG REPORT N/A    MEDICATION LIST N/A    IMAGING  (FOR ALL VISITS)     MRI (HEAD, NECK, SPINE) Internal 05/19/2022 lumbar thoracic spine   XRAY (SPINE) *NEUROSURGERY* Received 08/11/2020 sacrum and coccyx   CT (HEAD, NECK, SPINE) N/A       Images in PACS  Katie Reyes on 5/23/2022 at 11:42 AM

## 2022-05-25 NOTE — PROGRESS NOTES
"    SUBJECTIVE:  HPI:  Michael Jimenez  Is a 61 year old male who presents today for new patient evaluation of low back pain upon referral from nurse practitioner Isa Sarmiento whose 5/19/2022 office note records the following history: \"Last appointment with spine was May/June 2021.  He felt his pain at that time was the hip as cause on the MRI.  Underwent right hip surgery in April 2022.  He has resolved pain from the hip and groin area but continues to have central back pain in the mid to low back still.  He feels that this is worsening.  The more walking and bending, the worse the pain gets.  Even standing in one position for long time for 5 minutes increases pain.  He did do Physical Therapy back last year but it was hard due to hip pain.  Review of the MRI showed chronic compression deformities at T12-L1 and left moderate to severe foraminal stenosis on L4-5.\"    Thoracic and lumbar MRI were ordered.  The lumbar MRI from 5/19/2022 shows no high-grade stenosis, diffuse mild DDD and mild to moderate multilevel DJD.  Thoracic MRI 5/19/2022: Normal.  Pelvis x-ray 4/4/2022 2 views portable shows right NAOMI with intact components.    He has had back pain for 20 years or more, which gradually has gotten worse in conjunction with right hip pain that prevented him from walking long distances hip pain was also associated with radiating pain into the inguinal area and since his surgery on 4/4/2022, and recovery from it, the groin and hip socket pain has gone away but the low back pain is still persistent and prevents him from ambulating he does have some right leg pain which she thinks is shooting from his back down into the leg but that is a minimal component of his symptoms.    Epidural injections have \"done nothing\" for pain relief.  Chiropractic care used to help but for many years has not last year before his hip surgery he had therapy for the hip and the back and he did not notice much improvement..  He has no bowel " or bladder dysfunction, saddle anesthesia, sexual dysfunction, leg numbness or weakness.  He did several falls but he does not recall ever injuring his back with him.  3 years ago he fell hard on his tailbone and that was sore but eventually recovered.      SYMPTOMS WORSENED WITH walking for more than 5 minutes, standing for more than 5 minutes    SYMPTOMS IMPROVED WITH sitting    Pain score and diagram reviewed.  See questionnaire.      ROS: No history of cancer or long-term steroid use.  Otherwise negative for bowel/bladder dysfunction, balance changes, headache, leg pain/numbness/weakness, fevers, chills, night sweats, unexplained weight loss;  otherwise unremarkable.  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  Does not like exercise because he hurts to walk and it is frustrating because the weight keeps building see the patient's intake questionnaire from today for details.  He still has a tiny opening in his right hip incision and until that heals he is not allowed to swim    Treatment to Date: As above    MEDICATIONS:  Reviewed.    ALLERGIES:  Reviewed.     Reviewed past medical, surgical, and family history.    Pertinent for right NAOMI April 2022, CHF, CAROL, morbid obesity, prediabetes, BPH    SOCIAL HX: For many years he worked in the [employer] business.  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  He is .  He has 2 children.  He enjoys travel and denies other sports hobbies or activities.  Recently he was unable to walk on the beach, and cannot walk 1/4 mile to his mailbox down the driveway.  Positive shopping cart sign.  His [employer] business really ramps up around July and his insurance runs out before then.      OBJECTIVE:    --CONSTITUTIONAL:   No acute distress.  The patient is well nourished and well groomed.  Mesomorphic frame.  Increased BMI.  He transitions well but does not stand up  tall  --PSYCHIATRIC:  Appropriate mood and affect. The patient is awake, alert, oriented to person, place, time and answering questions appropriately with clear speech.    --SKIN:  Skin over the face, bilateral lower extremities, and posterior torso is clean, dry, intact without rashes.  Right NAOMI scar anterior part shows a small eschar that still healing  --RESPIRATORY: Normal respiratory excursion and effort, and no dyspnea.   --GAIT:  is non-antalgic. Flat foot, heel and toe walking:  normal   .  Squat and rise   normal    .  --STANDING EXAMINATION:    Symmetry of spine/pelvis   unremarkable   .      Range of motion full in flexion sidebending and rotation and painless.  50% restricted in extension with pain in the upper lumbar spine.   Standing flexion   negative   .    Claus's sign   negative    .     Stork test   negative    .   --NEUROLOGICAL:     ROMBERG, TANDEM WALK, PRONATOR DRIFT:   Normal.   .  SENSATION to light touch is intact in bilateral thighs, lower legs and feet.   REFLEXES:  patellar 1+, and achilles absent.  Babinski is negative. No clonus.  MANUAL MOTOR TESTING:  L3- S1 Myotomes, Femoral, Obturator, Peroneal and Tibial nerves 5/5   DURAL STRETCH TESTS:  SLR negative.  Femoral Stretch Test not done.   --PELVIC/HIP JOINTS:                Long Sitting   negative   .    Hip scour   negative   .    Hip Impingement      right positive for hip pain.   DUSTY      right positive for hip and groin pain.     Piriformis   negative   .   Spring testing minimal left SI maximal L1 and L2.      PELVIC ALIGNMENT neutral.   --LUMBAR/GLUTEAL MUSCLES: Right upper outer gluteal muscle slightly tender without triggering otherwise negative.    --ABDOMINAL:  Non-distended.  Rectus diastases and cholecystectomy scar.  Obesity.  Core weakness.  Nontender  --VASCULAR: Femoral pulses nonpalpable. Lower extremity capillary refill, temperature and color normal.         IMAGING: Images and reports reviewed.    MR LUMBAR  SPINE W/O CONTRAST 5/19/2022      Transitional segment is designated S1.    BONES: There is no evidence of a marrow-replacing process. There is no evidence of abnormal bony edema. Fatty marrow signal change along the superior L1 and L2 endplates. Slight height loss of the L1 and L2 vertebral bodies without edema may represent chronic fracture deformities.     SPINAL CANAL: No high-grade stenosis.       L2-L3: Mild intervertebral disc height loss and disc desiccation. Shallow broad-based bulge. Moderate facet arthropathy. No significant spinal canal stenosis. No significant neural foraminal stenosis.      L3-L4: Preserved intervertebral disc height. No herniation. Moderate facet arthropathy and ligamentum flavum thickening. No significant spinal canal stenosis or neural foraminal stenosis.     L4-L5: Mild intervertebral disc height loss with disc desiccation. Broad-based disc bulging and left foraminal protrusion. Moderate facet arthropathy. Mild to moderate spinal canal stenosis. Mild right and mild-to-moderate left neural foraminal stenosis.  A left foraminal protrusion displaces the foraminal left L4 nerve root.     L5-S1: Mild intervertebral disc height loss and disc desiccation. Shallow bulge and annular fissure with superimposed broad-based central protrusion abutting the descending right S1 nerve root. Moderate right and mild left facet arthropathy. No  significant spinal canal stenosis. Mild right and moderate left neural foraminal stenosis. Mild left lateral recess stenosis.                                                                      IMPRESSION:  1.  Transitional lumbosacral anatomy as above.  2.  Unremarkable cord and cauda equina nerve roots. No high-grade spinal canal stenosis.  3.  At L4-L5 there is mild-to-moderate spinal canal stenosis and mild-to-moderate left neural foraminal stenosis. A left foraminal protrusion displaces the exiting left L4 nerve root.  4.  At L5-S1, a broad-based central  protrusion abuts the descending right S1 nerve root. There is moderate left neural foraminal stenosis and mild left lateral recess stenosis.  5.  Additional degenerative changes as above.    (OM-L1-L3 facets show some fluid signal suggesting active arthropathy)    MR THORACIC SPINE W/O CONTRAST 5/19/2022                                                                   IMPRESSION:  1.  Unremarkable thoracic cord.  2.  No sites of high-grade spinal canal stenosis or high-grade neural foraminal stenosis.  3.  No findings specific for recent fracture. Please correlate clinically.      ASSESSMENT: Michael Jimenez is a 61 year old male who presents  today for new patient evaluation of:      Subacute progression of chronic low back pain-likely facet mediated, especially upper lumbar    6 weeks postop right NAOMI    Generative lumbar disc disease of doubtful clinical significance and no evidence of radiculopathy    Right gluteal myofascial pain    No Pelvic Joint Dysfunction    PLAN:  His insurance is up in July so he is eager to get as much intervention before then as possible.  I will refer him to Dr. Nichole for an RFA work-up and I like to see him back after the RFA if it is done to reassess his ability to tolerate lumbar extension and his right gluteal trigger points, with an eye towards a MedX strengthening program and gluteal myofascial protocol training in PT.  Once his hip scar heals I encouraged him to try swimming for exercise and also biking which is low impact.  The hope is to get him back to walking so he can enjoy his [employer] and travel and be able to lose weight.  I am also referring him to nutritional counseling to assist with weight loss      Advised patient to call or return early if symptoms worsen, or having problems controlling bladder and bowel function or worsening leg weakness.     Please note: Voice recognition software was used in this dictation.  It may therefore contain typographical  errors.    Dionte Person MD

## 2022-05-26 ENCOUNTER — PRE VISIT (OUTPATIENT)
Dept: NEUROSURGERY | Facility: CLINIC | Age: 62
End: 2022-05-26
Payer: COMMERCIAL

## 2022-05-26 ENCOUNTER — OFFICE VISIT (OUTPATIENT)
Dept: NEUROSURGERY | Facility: CLINIC | Age: 62
End: 2022-05-26
Payer: COMMERCIAL

## 2022-05-26 VITALS
SYSTOLIC BLOOD PRESSURE: 125 MMHG | BODY MASS INDEX: 36.68 KG/M2 | WEIGHT: 262 LBS | HEART RATE: 87 BPM | HEIGHT: 71 IN | DIASTOLIC BLOOD PRESSURE: 79 MMHG

## 2022-05-26 DIAGNOSIS — E66.9 OBESITY WITHOUT SERIOUS COMORBIDITY, UNSPECIFIED CLASSIFICATION, UNSPECIFIED OBESITY TYPE: ICD-10-CM

## 2022-05-26 DIAGNOSIS — Z96.641 HISTORY OF RIGHT HIP REPLACEMENT: ICD-10-CM

## 2022-05-26 DIAGNOSIS — M47.816 OSTEOARTHRITIS OF LUMBAR SPINE, UNSPECIFIED SPINAL OSTEOARTHRITIS COMPLICATION STATUS: ICD-10-CM

## 2022-05-26 DIAGNOSIS — M79.18 MYOFASCIAL PAIN: Primary | ICD-10-CM

## 2022-05-26 PROCEDURE — 99205 OFFICE O/P NEW HI 60 MIN: CPT | Performed by: PREVENTIVE MEDICINE

## 2022-05-26 RX ORDER — IBUPROFEN 200 MG
600 TABLET ORAL EVERY 4 HOURS PRN
COMMUNITY
End: 2024-01-29

## 2022-05-26 ASSESSMENT — PAIN SCALES - GENERAL: PAINLEVEL: SEVERE PAIN (6)

## 2022-05-26 NOTE — LETTER
"    5/26/2022         RE: Michael Jimenez  [address] [place] [id number]        Dear Colleague,    Thank you for referring your patient, Michael Jimenez, to the [facility] [place]. Please see a copy of my visit note below.        SUBJECTIVE:  HPI:  Michael Jimenez  Is a 61 year old male who presents today for new patient evaluation of low back pain upon referral from nurse practitioner Isa Sarmiento whose 5/19/2022 office note records the following history: \"Last appointment with spine was May/June 2021.  He felt his pain at that time was the hip as cause on the MRI.  Underwent right hip surgery in April 2022.  He has resolved pain from the hip and groin area but continues to have central back pain in the mid to low back still.  He feels that this is worsening.  The more walking and bending, the worse the pain gets.  Even standing in one position for long time for 5 minutes increases pain.  He did do Physical Therapy back last year but it was hard due to hip pain.  Review of the MRI showed chronic compression deformities at T12-L1 and left moderate to severe foraminal stenosis on L4-5.\"    Thoracic and lumbar MRI were ordered.  The lumbar MRI from 5/19/2022 shows no high-grade stenosis, diffuse mild DDD and mild to moderate multilevel DJD.  Thoracic MRI 5/19/2022: Normal.  Pelvis x-ray 4/4/2022 2 views portable shows right NAOMI with intact components.    He has had back pain for 20 years or more, which gradually has gotten worse in conjunction with right hip pain that prevented him from walking long distances hip pain was also associated with radiating pain into the inguinal area and since his surgery on 4/4/2022, and recovery from it, the groin and hip socket pain has gone away but the low back pain is still persistent and prevents him from ambulating he does have some right leg pain which she thinks is shooting from his back down into the leg but that is a minimal component of his " "symptoms.    Epidural injections have \"done nothing\" for pain relief.  Chiropractic care used to help but for many years has not last year before his hip surgery he had therapy for the hip and the back and he did not notice much improvement..  He has no bowel or bladder dysfunction, saddle anesthesia, sexual dysfunction, leg numbness or weakness.  He did several falls but he does not recall ever injuring his back with him.  3 years ago he fell hard on his tailbone and that was sore but eventually recovered.      SYMPTOMS WORSENED WITH walking for more than 5 minutes, standing for more than 5 minutes    SYMPTOMS IMPROVED WITH sitting    Pain score and diagram reviewed.  See questionnaire.      ROS: No history of cancer or long-term steroid use.  Otherwise negative for bowel/bladder dysfunction, balance changes, headache, leg pain/numbness/weakness, fevers, chills, night sweats, unexplained weight loss;  otherwise unremarkable.  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  Does not like exercise because he hurts to walk and it is frustrating because the weight keeps building see the patient's intake questionnaire from today for details.  He still has a tiny opening in his right hip incision and until that heals he is not allowed to swim    Treatment to Date: As above    MEDICATIONS:  Reviewed.    ALLERGIES:  Reviewed.     Reviewed past medical, surgical, and family history.    Pertinent for right NAOMI April 2022, CHF, CAROL, morbid obesity, prediabetes, BPH    SOCIAL HX: For many years he worked in the [employer] business.  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  He is .  He has 2 children.  He enjoys travel and denies other sports hobbies or activities.  Recently he was unable to walk on the beach, and cannot walk 1/4 mile to his mailbox down the driveway.  Positive shopping cart sign.  His [employer] business " really ramps up around July and his insurance runs out before then.      OBJECTIVE:    --CONSTITUTIONAL:   No acute distress.  The patient is well nourished and well groomed.  Mesomorphic frame.  Increased BMI.  He transitions well but does not stand up tall  --PSYCHIATRIC:  Appropriate mood and affect. The patient is awake, alert, oriented to person, place, time and answering questions appropriately with clear speech.    --SKIN:  Skin over the face, bilateral lower extremities, and posterior torso is clean, dry, intact without rashes.  Right NAOMI scar anterior part shows a small eschar that still healing  --RESPIRATORY: Normal respiratory excursion and effort, and no dyspnea.   --GAIT:  is non-antalgic. Flat foot, heel and toe walking:  normal   .  Squat and rise   normal    .  --STANDING EXAMINATION:    Symmetry of spine/pelvis   unremarkable   .      Range of motion full in flexion sidebending and rotation and painless.  50% restricted in extension with pain in the upper lumbar spine.   Standing flexion   negative   .    Claus's sign   negative    .     Stork test   negative    .   --NEUROLOGICAL:     ROMBERG, TANDEM WALK, PRONATOR DRIFT:   Normal.   .  SENSATION to light touch is intact in bilateral thighs, lower legs and feet.   REFLEXES:  patellar 1+, and achilles absent.  Babinski is negative. No clonus.  MANUAL MOTOR TESTING:  L3- S1 Myotomes, Femoral, Obturator, Peroneal and Tibial nerves 5/5   DURAL STRETCH TESTS:  SLR negative.  Femoral Stretch Test not done.   --PELVIC/HIP JOINTS:                Long Sitting   negative   .    Hip scour   negative   .    Hip Impingement      right positive for hip pain.   DUSTY      right positive for hip and groin pain.     Piriformis   negative   .   Spring testing minimal left SI maximal L1 and L2.      PELVIC ALIGNMENT neutral.   --LUMBAR/GLUTEAL MUSCLES: Right upper outer gluteal muscle slightly tender without triggering otherwise negative.    --ABDOMINAL:   Non-distended.  Rectus diastases and cholecystectomy scar.  Obesity.  Core weakness.  Nontender  --VASCULAR: Femoral pulses nonpalpable. Lower extremity capillary refill, temperature and color normal.         IMAGING: Images and reports reviewed.    MR LUMBAR SPINE W/O CONTRAST 5/19/2022      Transitional segment is designated S1.    BONES: There is no evidence of a marrow-replacing process. There is no evidence of abnormal bony edema. Fatty marrow signal change along the superior L1 and L2 endplates. Slight height loss of the L1 and L2 vertebral bodies without edema may represent chronic fracture deformities.     SPINAL CANAL: No high-grade stenosis.       L2-L3: Mild intervertebral disc height loss and disc desiccation. Shallow broad-based bulge. Moderate facet arthropathy. No significant spinal canal stenosis. No significant neural foraminal stenosis.      L3-L4: Preserved intervertebral disc height. No herniation. Moderate facet arthropathy and ligamentum flavum thickening. No significant spinal canal stenosis or neural foraminal stenosis.     L4-L5: Mild intervertebral disc height loss with disc desiccation. Broad-based disc bulging and left foraminal protrusion. Moderate facet arthropathy. Mild to moderate spinal canal stenosis. Mild right and mild-to-moderate left neural foraminal stenosis.  A left foraminal protrusion displaces the foraminal left L4 nerve root.     L5-S1: Mild intervertebral disc height loss and disc desiccation. Shallow bulge and annular fissure with superimposed broad-based central protrusion abutting the descending right S1 nerve root. Moderate right and mild left facet arthropathy. No  significant spinal canal stenosis. Mild right and moderate left neural foraminal stenosis. Mild left lateral recess stenosis.                                                                      IMPRESSION:  1.  Transitional lumbosacral anatomy as above.  2.  Unremarkable cord and cauda equina nerve roots.  No high-grade spinal canal stenosis.  3.  At L4-L5 there is mild-to-moderate spinal canal stenosis and mild-to-moderate left neural foraminal stenosis. A left foraminal protrusion displaces the exiting left L4 nerve root.  4.  At L5-S1, a broad-based central protrusion abuts the descending right S1 nerve root. There is moderate left neural foraminal stenosis and mild left lateral recess stenosis.  5.  Additional degenerative changes as above.    (OM-L1-L3 facets show some fluid signal suggesting active arthropathy)    MR THORACIC SPINE W/O CONTRAST 5/19/2022                                                                   IMPRESSION:  1.  Unremarkable thoracic cord.  2.  No sites of high-grade spinal canal stenosis or high-grade neural foraminal stenosis.  3.  No findings specific for recent fracture. Please correlate clinically.      ASSESSMENT: Michael Jimenez is a 61 year old male who presents  today for new patient evaluation of:      Subacute progression of chronic low back pain-likely facet mediated, especially upper lumbar    6 weeks postop right NAOMI    Generative lumbar disc disease of doubtful clinical significance and no evidence of radiculopathy    Right gluteal myofascial pain    No Pelvic Joint Dysfunction    PLAN:  His insurance is up in July so he is eager to get as much intervention before then as possible.  I will refer him to Dr. Nichole for an RFA work-up and I like to see him back after the RFA if it is done to reassess his ability to tolerate lumbar extension and his right gluteal trigger points, with an eye towards a MedX strengthening program and gluteal myofascial protocol training in PT.  Once his hip scar heals I encouraged him to try swimming for exercise and also biking which is low impact.  The hope is to get him back to walking so he can enjoy his [place] and travel and be able to lose weight.  I am also referring him to nutritional counseling to assist with weight loss      Advised  patient to call or return early if symptoms worsen, or having problems controlling bladder and bowel function or worsening leg weakness.     Please note: Voice recognition software was used in this dictation.  It may therefore contain typographical errors.    Dionte Person MD             Again, thank you for allowing me to participate in the care of your patient.        Sincerely,        Dionte Person MD

## 2022-05-26 NOTE — NURSING NOTE
"Reason For Visit:   Chief Complaint   Patient presents with     Consult     Low back down right leg         Occupation: [employer]  Currently working? No.  Work status?  Not currently.    Sports: n  Activities: n             /79   Pulse 87   Ht 1.803 m (5' 11\")   Wt 118.8 kg (262 lb)   BMI 36.54 kg/m        No Known Allergies    Current Outpatient Medications   Medication     finasteride (PROSCAR) 5 MG tablet     Glucosamine Sulfate 1000 MG TABS     ibuprofen (ADVIL/MOTRIN) 200 MG tablet     lisinopril-hydrochlorothiazide (ZESTORETIC) 20-12.5 MG tablet     Multiple Vitamin (MULTIVITAMIN ADULT PO)     tamsulosin (FLOMAX) 0.4 MG capsule     zinc gluconate 50 MG tablet     acetaminophen (TYLENOL) 325 MG tablet     No current facility-administered medications for this visit.     Facility-Administered Medications Ordered in Other Visits   Medication     sodium chloride (PF) 0.9% PF flush 10 mL         Darla Severin-Brown, LPN  "

## 2022-05-26 NOTE — PATIENT INSTRUCTIONS
It was nice to meet you Michael.  I think most of your pain is coming from facet arthritis in your upper lumbar back and I will send you to Dr. Trey Nichole for the RFA work-up that we talked about.  If this is successful come and see me 4 to 6 weeks after the burn the nerves and we can talk more about strengthening your back and your gluteal muscles.  In the meantime try some swimming once your scar heals and you can do some biking as well.  The nutrition counselor will help you with weight loss.  I hope the RFA works and I am looking forward to seeing you back later this summer.  See the assessment and plan below for further details of our conversation today.      ASSESSMENT: Michael Jimenez is a 61 year old male who presents  today for new patient evaluation of:    Subacute progression of chronic low back pain-likely facet mediated, especially upper lumbar  6 weeks postop right NAOMI  Generative lumbar disc disease of doubtful clinical significance and no evidence of radiculopathy  Right gluteal myofascial pain  No Pelvic Joint Dysfunction    PLAN:  His insurance is up in July so he is eager to get as much intervention before then as possible.  I will refer him to Dr. Nichole for an RFA work-up and I like to see him back after the RFA if it is done to reassess his ability to tolerate lumbar extension and his right gluteal trigger points, with an eye towards a MedX strengthening program and gluteal myofascial protocol training in PT.  Once his hip scar heals I encouraged him to try swimming for exercise and also biking which is low impact.  The hope is to get him back to walking so he can enjoy his alf and travel and be able to lose weight.  I am also referring him to nutritional counseling to assist with weight loss

## 2022-05-27 ENCOUNTER — MYC MEDICAL ADVICE (OUTPATIENT)
Dept: NEUROSURGERY | Facility: CLINIC | Age: 62
End: 2022-05-27
Payer: COMMERCIAL

## 2022-05-27 ENCOUNTER — TELEPHONE (OUTPATIENT)
Dept: NEUROSURGERY | Facility: CLINIC | Age: 62
End: 2022-05-27
Payer: COMMERCIAL

## 2022-05-27 DIAGNOSIS — M47.816 OSTEOARTHRITIS OF LUMBAR SPINE, UNSPECIFIED SPINAL OSTEOARTHRITIS COMPLICATION STATUS: Primary | ICD-10-CM

## 2022-05-27 NOTE — TELEPHONE ENCOUNTER
[facility]    Phone Message    May a detailed message be left on voicemail: yes     Reason for Call: Other: Pt calling to schedule RFA procedure. Pt says the first appt has to be before 06/10 and pt has an appt for 06/24. Pt would like to know who else he can see in neurosurgery that can do this procedure. He is also willing to go out of [facility] if he can be seen sooner. Writer unable to schedule due to scheduling protocols. Pt says Dr. Person said that the procedure needs to be done by 06/10 and the 2nd two weeks later. Please call pt asap to schedule or offer other neurosurgeons who can do the procedure. Please call pt back today if possible.    Ph: [phone]    Action Taken: Message routed to:  [facility] Neurosurgery    Travel Screening: Not Applicable

## 2022-05-27 NOTE — TELEPHONE ENCOUNTER
[facility] Call [facility]    Phone Message    May a detailed message be left on voicemail: yes     Reason for Call: Other: Pt called and would like to know if there is an earlier appt [facility] [place] [facility] as he needs to been seen in early June because of his insurance. Pt is currently scheduled in [place] for 06/24/22. Please call Pt back with scheduling options.      Action Taken: Message routed to:  [facility] [place] [facility]    Travel Screening: Not Applicable

## 2022-06-01 ENCOUNTER — TELEPHONE (OUTPATIENT)
Dept: NEUROSURGERY | Facility: CLINIC | Age: 62
End: 2022-06-01
Payer: COMMERCIAL

## 2022-06-01 NOTE — TELEPHONE ENCOUNTER
[facility] Call [facility]    Phone Message    May a detailed message be left on voicemail: yes     Reason for Call: The patient stated he has questions regarding his last visit and would like a call to discuss.  He did not stated what the questions were pertaining to.  Thank you.     Action Taken: Message routed to:  Adult Clinics: Neurology p [id number]    Travel Screening: Not Applicable

## 2022-06-02 NOTE — TELEPHONE ENCOUNTER
Writer spoke with [facility] scheduling. They should be able to accept pt's insurance and referral for RFA work-up. Per , pt could likely be scheduled within 1-2 weeks. Faxed to [facility] at [phone]. Confirmed fax was sent successfully via right fax.     Writer called patient to inform him that referral was successfully faxed and pt given [facility] number to call to schedule appt later this afternoon. Pt may call clinic back if there are any concerns. Pt reminded that he is to return to see Dr. Person again 4-6 weeks if the RFA is completed. Pt verbalized understanding.       Radha Argueta, RNCC  Neurology/Neurosurgery/PM&R

## 2022-06-02 NOTE — TELEPHONE ENCOUNTER
See my chart encounter note from 5/27/22.      Radha Argueta, RNCC  Neurology/Neurosurgery/PM&R

## 2022-06-04 DIAGNOSIS — I10 ESSENTIAL HYPERTENSION: ICD-10-CM

## 2022-06-07 ENCOUNTER — TRANSFERRED RECORDS (OUTPATIENT)
Dept: HEALTH INFORMATION MANAGEMENT | Facility: CLINIC | Age: 62
End: 2022-06-07
Payer: COMMERCIAL

## 2022-06-07 RX ORDER — LISINOPRIL AND HYDROCHLOROTHIAZIDE 12.5; 2 MG/1; MG/1
TABLET ORAL
Qty: 90 TABLET | Refills: 0 | OUTPATIENT
Start: 2022-06-07

## 2022-06-08 ENCOUNTER — LAB (OUTPATIENT)
Dept: LAB | Facility: CLINIC | Age: 62
End: 2022-06-08
Payer: COMMERCIAL

## 2022-06-08 DIAGNOSIS — I10 PRIMARY HYPERTENSION: ICD-10-CM

## 2022-06-08 LAB
ANION GAP SERPL CALCULATED.3IONS-SCNC: 4 MMOL/L (ref 3–14)
BUN SERPL-MCNC: 27 MG/DL (ref 7–30)
CALCIUM SERPL-MCNC: 8.9 MG/DL (ref 8.5–10.1)
CHLORIDE BLD-SCNC: 111 MMOL/L (ref 94–109)
CO2 SERPL-SCNC: 29 MMOL/L (ref 20–32)
CREAT SERPL-MCNC: 1.12 MG/DL (ref 0.66–1.25)
GFR SERPL CREATININE-BSD FRML MDRD: 74 ML/MIN/1.73M2
GLUCOSE BLD-MCNC: 106 MG/DL (ref 70–99)
POTASSIUM BLD-SCNC: 4.1 MMOL/L (ref 3.4–5.3)
SODIUM SERPL-SCNC: 144 MMOL/L (ref 133–144)

## 2022-06-08 PROCEDURE — 36415 COLL VENOUS BLD VENIPUNCTURE: CPT

## 2022-06-08 PROCEDURE — 80048 BASIC METABOLIC PNL TOTAL CA: CPT

## 2022-07-21 ENCOUNTER — MYC MEDICAL ADVICE (OUTPATIENT)
Dept: FAMILY MEDICINE | Facility: CLINIC | Age: 62
End: 2022-07-21

## 2022-07-21 DIAGNOSIS — N40.0 BENIGN PROSTATIC HYPERPLASIA, UNSPECIFIED WHETHER LOWER URINARY TRACT SYMPTOMS PRESENT: Primary | ICD-10-CM

## 2022-07-22 RX ORDER — TAMSULOSIN HYDROCHLORIDE 0.4 MG/1
0.4 CAPSULE ORAL
Qty: 90 CAPSULE | Refills: 3 | Status: SHIPPED | OUTPATIENT
Start: 2022-07-22 | End: 2023-08-10

## 2022-07-22 NOTE — TELEPHONE ENCOUNTER
Forwarding MyChart refill request for tamsulosin to PCP, as it's historical status on medication list.  Last visit with PCP was 3/7/22.   MyChart sent to patient to confirm dose.     Mesha Pratt RN  [facility]

## 2022-08-15 ENCOUNTER — MYC MEDICAL ADVICE (OUTPATIENT)
Dept: FAMILY MEDICINE | Facility: CLINIC | Age: 62
End: 2022-08-15

## 2022-08-15 DIAGNOSIS — J30.1 HAYFEVER: Primary | ICD-10-CM

## 2022-08-16 NOTE — PROGRESS NOTES
"[facility]    Outpatient Physical Therapy Discharge Note  Patient: Michael Jimenez  : 1960    Beginning/End Dates of Reporting Period:  21 to 22    Referring Provider: Jasper Sweeney MD    Therapy Diagnosis: chronic right hip pain     Client Self Report: May have to make this the last visit, will call in tomorrow because will be doing a lot of work cleaning out an apt.  PT does help, but only for a short term after the manual therapy.  To have a consult for \"ozone\" injections vs NAOMI.    Objective Measurements:  Objective Measure: right hip extn PROM  Details: lacking 5                        Goals:  Goal Identifier     Goal Description Patient will have >=60 deg kartik SLR to indicate reduce hamstring tone.   Target Date 21   Date Met  21   Progress (detail required for progress note):       Goal Identifier     Goal Description Patient will have >=5 deg hip extension to allow for normalized gait and ADL's. (less than 0*)   Target Date 21   Date Met      Progress (detail required for progress note):       Goal Identifier     Goal Description Patient will be independent with his HEP to reduce future occurrence of pain and disability.   Target Date 22   Date Met  21   Progress (detail required for progress note):               Plan:  Discharge from therapy.    Discharge:    Reason for Discharge: working independently towards final goals    Equipment Issued: theraband    Discharge Plan: Patient to continue home program.      Sara Dueñas, PT, DTP, [facility]  Doctor of Physical Therapy #[id number]  [facility]  [phone]  [email or web address]    "

## 2022-08-17 RX ORDER — METHYLPREDNISOLONE ACETATE 80 MG/ML
80 INJECTION, SUSPENSION INTRA-ARTICULAR; INTRALESIONAL; INTRAMUSCULAR; SOFT TISSUE ONCE
Status: COMPLETED | OUTPATIENT
Start: 2022-08-17 | End: 2022-08-18

## 2022-08-17 NOTE — TELEPHONE ENCOUNTER
Vee,    Patient wanting his annual allergy shot of depo medrol 80 mg IM.  Please advise. Jennifer WEINBERG RN

## 2022-08-18 ENCOUNTER — ALLIED HEALTH/NURSE VISIT (OUTPATIENT)
Dept: FAMILY MEDICINE | Facility: CLINIC | Age: 62
End: 2022-08-18
Payer: COMMERCIAL

## 2022-08-18 DIAGNOSIS — J30.1 HAYFEVER: Primary | ICD-10-CM

## 2022-08-18 PROCEDURE — 99207 PR NO CHARGE NURSE ONLY: CPT

## 2022-08-18 PROCEDURE — 96372 THER/PROPH/DIAG INJ SC/IM: CPT | Performed by: NURSE PRACTITIONER

## 2022-08-18 RX ADMIN — METHYLPREDNISOLONE ACETATE 80 MG: 80 INJECTION, SUSPENSION INTRA-ARTICULAR; INTRALESIONAL; INTRAMUSCULAR; SOFT TISSUE at 09:49

## 2022-10-14 DIAGNOSIS — N40.1 BENIGN PROSTATIC HYPERPLASIA WITH LOWER URINARY TRACT SYMPTOMS, SYMPTOM DETAILS UNSPECIFIED: ICD-10-CM

## 2022-10-15 ENCOUNTER — HEALTH MAINTENANCE LETTER (OUTPATIENT)
Age: 62
End: 2022-10-15

## 2022-10-17 RX ORDER — FINASTERIDE 5 MG/1
TABLET, FILM COATED ORAL
Qty: 90 TABLET | Refills: 0 | Status: SHIPPED | OUTPATIENT
Start: 2022-10-17 | End: 2023-02-08

## 2022-10-17 NOTE — TELEPHONE ENCOUNTER
"Requested Prescriptions   Pending Prescriptions Disp Refills     finasteride (PROSCAR) 5 MG tablet [Pharmacy Med Name: Finasteride 5 MG Oral Tablet] 90 tablet 0     Sig: TAKE 1 TABLET BY MOUTH ONCE DAILY.  NEED TO ESTABLISH CARE WITH A NEW PROVIDER FOR FURTHER REFILLS.       BPH Agents Passed - 10/14/2022  5:31 AM        Passed - Recent (12 mo) or future (30 days) visit within the authorizing provider's department     Patient has had an office visit with the authorizing provider or a provider within the authorizing providers department within the previous 12 mos or has a future within next 30 days. See \"Patient Info\" tab in inbasket, or \"Choose Columns\" in Meds & Orders section of the refill encounter.              Passed - Medication is active on med list        Passed - Patient is 18 years of age or older             "

## 2023-01-03 ENCOUNTER — OFFICE VISIT (OUTPATIENT)
Dept: FAMILY MEDICINE | Facility: CLINIC | Age: 63
End: 2023-01-03
Payer: COMMERCIAL

## 2023-01-03 VITALS
OXYGEN SATURATION: 95 % | RESPIRATION RATE: 20 BRPM | HEART RATE: 74 BPM | SYSTOLIC BLOOD PRESSURE: 124 MMHG | HEIGHT: 70 IN | DIASTOLIC BLOOD PRESSURE: 80 MMHG | WEIGHT: 261.3 LBS | TEMPERATURE: 97.6 F | BODY MASS INDEX: 37.41 KG/M2

## 2023-01-03 DIAGNOSIS — R73.03 PRE-DIABETES: ICD-10-CM

## 2023-01-03 DIAGNOSIS — I50.32 CHRONIC DIASTOLIC CONGESTIVE HEART FAILURE (H): Primary | ICD-10-CM

## 2023-01-03 DIAGNOSIS — R07.9 CHEST PAIN, UNSPECIFIED TYPE: ICD-10-CM

## 2023-01-03 DIAGNOSIS — R21 RASH AND NONSPECIFIC SKIN ERUPTION: ICD-10-CM

## 2023-01-03 DIAGNOSIS — E66.01 MORBID OBESITY (H): ICD-10-CM

## 2023-01-03 DIAGNOSIS — I35.0 AORTIC VALVE STENOSIS, ETIOLOGY OF CARDIAC VALVE DISEASE UNSPECIFIED: ICD-10-CM

## 2023-01-03 DIAGNOSIS — R20.2 NUMBNESS AND TINGLING OF BOTH FEET: ICD-10-CM

## 2023-01-03 DIAGNOSIS — R20.0 NUMBNESS AND TINGLING OF BOTH FEET: ICD-10-CM

## 2023-01-03 LAB
ANION GAP SERPL CALCULATED.3IONS-SCNC: 10 MMOL/L (ref 7–15)
BUN SERPL-MCNC: 27 MG/DL (ref 8–23)
CALCIUM SERPL-MCNC: 9.6 MG/DL (ref 8.8–10.2)
CHLORIDE SERPL-SCNC: 106 MMOL/L (ref 98–107)
CREAT SERPL-MCNC: 1.11 MG/DL (ref 0.67–1.17)
DEPRECATED HCO3 PLAS-SCNC: 28 MMOL/L (ref 22–29)
GFR SERPL CREATININE-BSD FRML MDRD: 74 ML/MIN/1.73M2
GLUCOSE SERPL-MCNC: 94 MG/DL (ref 70–99)
HBA1C MFR BLD: 5.9 % (ref 0–5.6)
POTASSIUM SERPL-SCNC: 4.5 MMOL/L (ref 3.4–5.3)
SODIUM SERPL-SCNC: 144 MMOL/L (ref 136–145)
VIT B12 SERPL-MCNC: 795 PG/ML (ref 232–1245)

## 2023-01-03 PROCEDURE — 82607 VITAMIN B-12: CPT | Performed by: NURSE PRACTITIONER

## 2023-01-03 PROCEDURE — 83036 HEMOGLOBIN GLYCOSYLATED A1C: CPT | Performed by: NURSE PRACTITIONER

## 2023-01-03 PROCEDURE — 99214 OFFICE O/P EST MOD 30 MIN: CPT | Performed by: NURSE PRACTITIONER

## 2023-01-03 PROCEDURE — 80048 BASIC METABOLIC PNL TOTAL CA: CPT | Performed by: NURSE PRACTITIONER

## 2023-01-03 PROCEDURE — 36415 COLL VENOUS BLD VENIPUNCTURE: CPT | Performed by: NURSE PRACTITIONER

## 2023-01-03 RX ORDER — METOPROLOL SUCCINATE 25 MG/1
25 TABLET, EXTENDED RELEASE ORAL DAILY
Qty: 90 TABLET | Refills: 0 | Status: CANCELLED | OUTPATIENT
Start: 2023-01-03

## 2023-01-03 RX ORDER — TRIAMCINOLONE ACETONIDE 5 MG/G
1 OINTMENT TOPICAL 2 TIMES DAILY
Qty: 15 G | Refills: 0 | Status: SHIPPED | OUTPATIENT
Start: 2023-01-03 | End: 2024-03-26

## 2023-01-03 ASSESSMENT — PAIN SCALES - GENERAL: PAINLEVEL: NO PAIN (0)

## 2023-01-03 NOTE — PATIENT INSTRUCTIONS
Cardiology Care Coordinator  [facility]  [phone] option 1    Discussed reasons to follow up with further chest pain or concerns.  Labs done today.    PRISCILLA Bustos

## 2023-01-03 NOTE — PROGRESS NOTES
"  Assessment & Plan     Chronic diastolic congestive heart failure (H)  Stable - due for follow up with Cardiology.  Reviewed Echocardiogram - stable    - BASIC METABOLIC PANEL  - Adult Cardiology Eval  Referral    Morbid obesity (H)  Follow-up with PCP for diet/exercise recommendations    Pre-diabetes  Labs ordered.    - Hemoglobin A1c  - Vitamin B12    Numbness and tingling of both feet  Labs ordered - normal sensory exam today.  Uncertain etiology - if labs normal could consider EMG   - BASIC METABOLIC PANEL  - Hemoglobin A1c  - Vitamin B12    Aortic valve stenosis, etiology of cardiac valve disease unspecified   Follow-up with Cardiology - will likely need repeat Echo.    - Adult Cardiology Eval  Referral    Rash and nonspecific skin eruption     - triamcinolone (KENALOG) 0.5 % external ointment  Dispense: 15 g; Refill: 0    Chest pain   Occurred X 1 with heavy activity - advised avoiding heavy lifting/activity until seen by Cardiology.  Will likely need repeat GXT or CTA.  Given red flags and reasons to return to clinic.  Follow-up with PCP for follow up on chronic disease management.        BMI:   Estimated body mass index is 37.49 kg/m  as calculated from the following:    Height as of this encounter: 1.778 m (5' 10\").    Weight as of this encounter: 118.5 kg (261 lb 4.8 oz).   Weight management plan: Patient was referred to their PCP to discuss a diet and exercise plan.    See Patient Instructions    Return in about 3 months (around 4/3/2023) for Recheck symptoms, Medication Follow up.    Raysa Bianchi NP  [facility] DONNELL Rodriguez is a 62 year old, presenting for the following health issues:  Rash, Pre Diabetes , Numbness (In feet), Health Maintenance (Declined covid, flu , pneumonia vaccines), and Chest Pain      HPI     Rash  Onset/Duration: x 1 month   Description  Location: Back of Right Upper Arm  Character: raised  Itching: no  Intensity:  " "mild  Progression of Symptoms:  same  Accompanying signs and symptoms:   Fever: No  Body aches or joint pain: No  Sore throat symptoms: No  Recent cold symptoms: No  History:           Previous episodes of similar rash: None  New exposures:  None  Recent travel: No  Exposure to similar rash: No  Precipitating or alleviating factors: none   Therapies tried and outcome: Neosporin - helped a little     Concern - Pre Diabetes  - follow up on pre diabetes, he would like labs done     Concern  Description: Numbness in Feet  On and off x 2 weeks   Intensity: mild  Progression of Symptoms:  same  Accompanying Signs & Symptoms:Itchiness, warmth in feet.  numbness in fingers at times   Previous history of similar problem: none   Precipitating factors:        Worsened by: none   Alleviating factors:        Improved by: none   Therapies tried and outcome:  none   Quit smoking 30 years ago - was a 4 pack a day smoker.  30 pack year smoker.  Reports does not exercise - more sedentary since retired.  No FH of diabetes.  Has gained weight.    Chest Pain  Onset/Duration: x 2 months on and off  Description:   Location: mid chest   Character: dull ache   Radiation: none   Duration: 1 hours   Intensity: mild  Progression of Symptoms: same and intermittent  Accompanying Signs & Symptoms:  Shortness of breath: No  Sweating: No  Nausea/vomiting: No  Lightheadedness: No  Palpitations: No  Fever/Chills: No  Cough: No           Heartburn: No  History:   Family history of heart disease: YES- Father   Tobacco use: No  Previous similar symptoms: no   Precipitating factors:   Worse with exertion: YES- when carrying wood too   Worse with deep breaths: No           Related to eating: No           Better with burping: No  Alleviating factors: Resting   Therapies tried and outcome: rest helps    Reports \"ache\" in left chest with more moderate activity (chopping wood).  Resolves on its own with resting.  No chest pain with walking, or normal activity " "(not severe).  No chest pain with rest or at night, or waking him up at night.  No history of MI - thought he had \"mild heart attack in the past\"                 CTA done 2021 and was negative.                                                      IMPRESSION:  1.  This study was limited by patient motion artifact. Suggest  coronary angiogram if clinical suspicion for obstructive lesions is  high.  2.  There is no evidence of significant stenosis in the left main and  proximal segments of the LAD, LCx and RCA.   3.  Total Agatston score 416 placing the patient in the 85th  percentile when compared to age and gender matched control group.  4.  Please review Radiology report for incidental noncardiac findings  that will follow separately.     FINDINGS:     CORONARY CALCIUM SCORE     Total Agatston calcium score: 416   Left main: 0  Left anterior descendin  Left circumflex: 18.1  Right coronary artery: 13.8   This places the patient in the 85th  percentile when compared to age  and gender matched control group.     CORONARY ANGIOGRAPHY      Review of Systems   Constitutional, HEENT, cardiovascular, pulmonary, GI, , musculoskeletal, neuro, skin, endocrine and psych systems are negative, except as otherwise noted.      Objective    /80 (BP Location: Left arm, Patient Position: Chair, Cuff Size: Adult Large)   Pulse 74   Temp 97.6  F (36.4  C) (Tympanic)   Resp 20   Ht 1.778 m (5' 10\")   Wt 118.5 kg (261 lb 4.8 oz)   SpO2 95%   BMI 37.49 kg/m    Body mass index is 37.49 kg/m .  Physical Exam   GENERAL: healthy, alert and no distress  NECK: no adenopathy, no asymmetry, masses, or scars and thyroid normal to palpation  RESP: lungs clear to auscultation - no rales, rhonchi or wheezes  CV: regular rate and rhythm, normal S1 S2, no S3 or S4, no murmur, click or rub, no peripheral edema and peripheral pulses strong  ABDOMEN: soft, nontender, no hepatosplenomegaly, no masses and bowel sounds normal  MS: no " gross musculoskeletal defects noted, no edema  SKIN: several scattered mildly erythematous macular rash posterior upper arms.  PSYCH: mentation appears normal, affect normal/bright

## 2023-02-05 DIAGNOSIS — N40.1 BENIGN PROSTATIC HYPERPLASIA WITH LOWER URINARY TRACT SYMPTOMS, SYMPTOM DETAILS UNSPECIFIED: ICD-10-CM

## 2023-02-08 RX ORDER — FINASTERIDE 5 MG/1
TABLET, FILM COATED ORAL
Qty: 90 TABLET | Refills: 1 | Status: SHIPPED | OUTPATIENT
Start: 2023-02-08 | End: 2023-07-24

## 2023-02-27 ENCOUNTER — DOCUMENTATION ONLY (OUTPATIENT)
Dept: SLEEP MEDICINE | Facility: CLINIC | Age: 63
End: 2023-02-27
Payer: COMMERCIAL

## 2023-04-12 ENCOUNTER — OFFICE VISIT (OUTPATIENT)
Dept: FAMILY MEDICINE | Facility: CLINIC | Age: 63
End: 2023-04-12
Payer: COMMERCIAL

## 2023-04-12 VITALS
RESPIRATION RATE: 18 BRPM | DIASTOLIC BLOOD PRESSURE: 82 MMHG | HEIGHT: 70 IN | WEIGHT: 267 LBS | OXYGEN SATURATION: 95 % | SYSTOLIC BLOOD PRESSURE: 128 MMHG | BODY MASS INDEX: 38.22 KG/M2 | TEMPERATURE: 98.2 F | HEART RATE: 81 BPM

## 2023-04-12 DIAGNOSIS — R73.03 PREDIABETES: ICD-10-CM

## 2023-04-12 DIAGNOSIS — G89.29 CHRONIC MIDLINE LOW BACK PAIN WITHOUT SCIATICA: Primary | ICD-10-CM

## 2023-04-12 DIAGNOSIS — M54.50 CHRONIC MIDLINE LOW BACK PAIN WITHOUT SCIATICA: Primary | ICD-10-CM

## 2023-04-12 PROCEDURE — 99214 OFFICE O/P EST MOD 30 MIN: CPT | Performed by: NURSE PRACTITIONER

## 2023-04-12 ASSESSMENT — ENCOUNTER SYMPTOMS: BACK PAIN: 1

## 2023-04-12 ASSESSMENT — PAIN SCALES - GENERAL: PAINLEVEL: NO PAIN (1)

## 2023-04-12 NOTE — PROGRESS NOTES
Assessment & Plan     Chronic midline low back pain without sciatica  Has been seen by [facility] in the past for similar symptoms and is interested in seeing them again as nerve ablation was the only thing that helped, referral placed  - Sp[facility]  Referral; Future    Prediabetes  Hemoglobin A1c 5.9 January 3, 2023.  Drinking 3-4 sugary sodas in addition to juice and milk daily, lifestyle recommendations discussed to decrease risk of progression to diabetes.    TOREY Ortiz CNP  [facility]    Erica Rodriguez is a 62 year old, presenting for the following health issues:  Back Pain        4/12/2023     9:58 AM   Additional Questions   Roomed by Jessica AMAYA CMA   Accompanied by Self     Back Pain     History of Present Illness       Back Pain:  He presents for follow up of back pain. Patient's back pain is a new problem.    Original cause of back pain: other  First noticed back pain: more than 1 month ago  Patient feels back pain: comes and goesLocation of back pain:  Right lower back and left lower back  Description of back pain: sharp and shooting  Back pain spreads: nowhere    Since patient first noticed back pain, pain is: gradually worsening  Does back pain interfere with his job:  Yes  On a scale of 1-10 (10 being the worst), patient describes pain as:  9  What makes back pain worse: standing and other  Acupuncture: not tried  Acetaminophen: not tried  Activity or exercise: not helpful  Chiropractor:  Not tried  Cold: not helpful  Heat: not tried  Massage: not tried  Muscle relaxants: not tried  NSAIDS: not helpful  Opioids: not tried  Physical Therapy: not helpful  Rest: helpful  Steroid Injection: not helpful  Stretching: not helpful  Surgery: helpful  TENS unit: not tried  Topical pain relievers: not helpful  Other healthcare providers patient is seeing for back pain: None    Reason for visit:  Back pain and itchy tingling feeling in my feet  Symptom onset:  More than a  "month    He eats 2-3 servings of fruits and vegetables daily.He consumes 4 sweetened beverage(s) daily.He exercises with enough effort to increase his heart rate 9 or less minutes per day.  He exercises with enough effort to increase his heart rate 3 or less days per week. He is missing 1 dose(s) of medications per week.  He is not taking prescribed medications regularly due to remembering to take.     Foot Problem - tingling/itching on top of feet intermittently. States no pain down legs from back. No loss of bowel or bladder.  Sharp pain in back with walking for about 4 months -sitting helps  Bending and standing for any length of time is difficult      Review of Systems   Musculoskeletal: Positive for back pain.          Objective    /82 (Cuff Size: Adult Large)   Pulse 81   Temp 98.2  F (36.8  C) (Tympanic)   Resp 18   Ht 1.778 m (5' 10\")   Wt 121.1 kg (267 lb)   SpO2 95%   BMI 38.31 kg/m    Body mass index is 38.31 kg/m .  Physical Exam   GENERAL: healthy, alert and no distress  MS: tenderness to palpation midline, negative straight leg raise  PSYCH: mentation appears normal, affect normal/bright            "

## 2023-05-27 ENCOUNTER — HEALTH MAINTENANCE LETTER (OUTPATIENT)
Age: 63
End: 2023-05-27

## 2023-06-22 ENCOUNTER — TRANSFERRED RECORDS (OUTPATIENT)
Dept: HEALTH INFORMATION MANAGEMENT | Facility: CLINIC | Age: 63
End: 2023-06-22
Payer: COMMERCIAL

## 2023-06-24 DIAGNOSIS — I10 ESSENTIAL HYPERTENSION: Primary | ICD-10-CM

## 2023-06-28 RX ORDER — LISINOPRIL AND HYDROCHLOROTHIAZIDE 12.5; 2 MG/1; MG/1
1 TABLET ORAL DAILY
Qty: 90 TABLET | Refills: 3 | Status: SHIPPED | OUTPATIENT
Start: 2023-06-28 | End: 2024-05-20

## 2023-06-28 NOTE — TELEPHONE ENCOUNTER
Last Clinic Visit: [facility] 4/14/21  Next visit: 3 years, around 4/14/24

## 2023-07-24 DIAGNOSIS — N40.1 BENIGN PROSTATIC HYPERPLASIA WITH LOWER URINARY TRACT SYMPTOMS, SYMPTOM DETAILS UNSPECIFIED: ICD-10-CM

## 2023-07-24 RX ORDER — FINASTERIDE 5 MG/1
TABLET, FILM COATED ORAL
Qty: 90 TABLET | Refills: 0 | Status: SHIPPED | OUTPATIENT
Start: 2023-07-24 | End: 2023-10-16

## 2023-07-24 NOTE — TELEPHONE ENCOUNTER
Pending Prescriptions:                       Disp   Refills    finasteride (PROSCAR) 5 MG tablet [Pharma*90 tab*0            Sig: TAKE 1 TABLET BY MOUTH ONCE DAILY . APPOINTMENT           REQUIRED FOR FUTURE REFILLS    Routing refill request to provider for review/approval because:  rx states needs appt before refill. Pt has been seen by another provider for other complaints.   Unsure if should be approved for fill        Myles Perez RN

## 2023-08-10 DIAGNOSIS — N40.0 BENIGN PROSTATIC HYPERPLASIA, UNSPECIFIED WHETHER LOWER URINARY TRACT SYMPTOMS PRESENT: ICD-10-CM

## 2023-08-10 NOTE — TELEPHONE ENCOUNTER
Pending Prescriptions:                       Disp   Refills    tamsulosin (FLOMAX) 0.4 MG capsule        90 cap*3            Sig: Take 1 capsule (0.4 mg) by mouth daily (with           dinner)       Patient is also asking for order for dep medrol inj. His yearly allergy shot.         Annita FALCON  Station

## 2023-08-11 RX ORDER — TAMSULOSIN HYDROCHLORIDE 0.4 MG/1
0.4 CAPSULE ORAL
Qty: 90 CAPSULE | Refills: 1 | Status: SHIPPED | OUTPATIENT
Start: 2023-08-11 | End: 2024-03-07

## 2023-08-11 NOTE — TELEPHONE ENCOUNTER
Prescription approved per [facility] Refill Protocol     Anu Haas     RN MSN

## 2023-08-16 ENCOUNTER — TELEPHONE (OUTPATIENT)
Dept: FAMILY MEDICINE | Facility: CLINIC | Age: 63
End: 2023-08-16

## 2023-08-16 DIAGNOSIS — J30.1 HAYFEVER: ICD-10-CM

## 2023-08-16 RX ORDER — METHYLPREDNISOLONE ACETATE 80 MG/ML
80 INJECTION, SUSPENSION INTRA-ARTICULAR; INTRALESIONAL; INTRAMUSCULAR; SOFT TISSUE ONCE
Status: COMPLETED | OUTPATIENT
Start: 2023-08-16 | End: 2023-08-21

## 2023-08-16 NOTE — TELEPHONE ENCOUNTER
General Call      Reason for Call: patient needs order for dep medrol inj for his yearly allergy injection, requesting to have this done by next Tuesday.     Could we send this information to you in [place] or would you prefer to receive a phone call?: Patient would prefer a phone call   Okay to leave a detailed message?: Yes at Home number on file [phone] (home)     Annita FALCON  Station

## 2023-08-16 NOTE — TELEPHONE ENCOUNTER
LVM on personal msg stating his name that the provider order his prescription and should be at the pharmacy.

## 2023-08-16 NOTE — TELEPHONE ENCOUNTER
Routing refill request to provider for review/approval because:  Drug not on the [facility] refill protocol     Pending Prescriptions:                       Disp   Refills    methylPREDNISolone (DEPO-Medrol) injectio*                    Requested Prescriptions   Pending Prescriptions Disp Refills    methylPREDNISolone (DEPO-Medrol) injection 80 mg         There is no refill protocol information for this order        Tete Hein RN on 8/16/2023 at 3:19 PM

## 2023-08-21 ENCOUNTER — ALLIED HEALTH/NURSE VISIT (OUTPATIENT)
Dept: FAMILY MEDICINE | Facility: CLINIC | Age: 63
End: 2023-08-21
Payer: COMMERCIAL

## 2023-08-21 DIAGNOSIS — J30.1 HAYFEVER: Primary | ICD-10-CM

## 2023-08-21 PROCEDURE — 99207 PR NO CHARGE NURSE ONLY: CPT

## 2023-08-21 PROCEDURE — 96372 THER/PROPH/DIAG INJ SC/IM: CPT | Performed by: NURSE PRACTITIONER

## 2023-08-21 RX ADMIN — METHYLPREDNISOLONE ACETATE 80 MG: 80 INJECTION, SUSPENSION INTRA-ARTICULAR; INTRALESIONAL; INTRAMUSCULAR; SOFT TISSUE at 13:23

## 2023-09-20 ENCOUNTER — TELEPHONE (OUTPATIENT)
Dept: FAMILY MEDICINE | Facility: CLINIC | Age: 63
End: 2023-09-20
Payer: COMMERCIAL

## 2023-09-20 ENCOUNTER — LAB (OUTPATIENT)
Dept: FAMILY MEDICINE | Facility: CLINIC | Age: 63
End: 2023-09-20
Payer: COMMERCIAL

## 2023-09-20 DIAGNOSIS — Z12.11 COLON CANCER SCREENING: Primary | ICD-10-CM

## 2023-09-20 DIAGNOSIS — Z12.11 COLON CANCER SCREENING: ICD-10-CM

## 2023-09-20 NOTE — TELEPHONE ENCOUNTER
Vee ramon is asking for a Cologuard test.  His insurance company said he is due.  Team please call patient when ready to  at the .

## 2023-10-13 DIAGNOSIS — N40.1 BENIGN PROSTATIC HYPERPLASIA WITH LOWER URINARY TRACT SYMPTOMS, SYMPTOM DETAILS UNSPECIFIED: ICD-10-CM

## 2023-10-13 LAB — NONINV COLON CA DNA+OCC BLD SCRN STL QL: NEGATIVE

## 2023-10-16 RX ORDER — FINASTERIDE 5 MG/1
TABLET, FILM COATED ORAL
Qty: 90 TABLET | Refills: 0 | Status: SHIPPED | OUTPATIENT
Start: 2023-10-16 | End: 2023-11-16

## 2023-11-16 ENCOUNTER — OFFICE VISIT (OUTPATIENT)
Dept: FAMILY MEDICINE | Facility: CLINIC | Age: 63
End: 2023-11-16
Payer: COMMERCIAL

## 2023-11-16 ENCOUNTER — ANCILLARY PROCEDURE (OUTPATIENT)
Dept: GENERAL RADIOLOGY | Facility: CLINIC | Age: 63
End: 2023-11-16
Attending: NURSE PRACTITIONER
Payer: COMMERCIAL

## 2023-11-16 VITALS
WEIGHT: 265 LBS | HEIGHT: 70 IN | TEMPERATURE: 97.7 F | DIASTOLIC BLOOD PRESSURE: 86 MMHG | BODY MASS INDEX: 37.94 KG/M2 | SYSTOLIC BLOOD PRESSURE: 144 MMHG | OXYGEN SATURATION: 96 % | RESPIRATION RATE: 18 BRPM | HEART RATE: 91 BPM

## 2023-11-16 DIAGNOSIS — I47.20 VENTRICULAR TACHYCARDIA (H): ICD-10-CM

## 2023-11-16 DIAGNOSIS — M72.2 PLANTAR FASCIITIS: ICD-10-CM

## 2023-11-16 DIAGNOSIS — I50.9 CONGESTIVE HEART FAILURE, UNSPECIFIED HF CHRONICITY, UNSPECIFIED HEART FAILURE TYPE (H): ICD-10-CM

## 2023-11-16 DIAGNOSIS — N40.1 BENIGN PROSTATIC HYPERPLASIA WITH LOWER URINARY TRACT SYMPTOMS, SYMPTOM DETAILS UNSPECIFIED: ICD-10-CM

## 2023-11-16 DIAGNOSIS — M72.2 PLANTAR FASCIITIS: Primary | ICD-10-CM

## 2023-11-16 DIAGNOSIS — E78.5 HYPERLIPIDEMIA WITH TARGET LOW DENSITY LIPOPROTEIN (LDL) CHOLESTEROL LESS THAN 100 MG/DL: ICD-10-CM

## 2023-11-16 PROCEDURE — 99214 OFFICE O/P EST MOD 30 MIN: CPT | Performed by: NURSE PRACTITIONER

## 2023-11-16 PROCEDURE — 73630 X-RAY EXAM OF FOOT: CPT | Mod: TC | Performed by: RADIOLOGY

## 2023-11-16 RX ORDER — FINASTERIDE 5 MG/1
1 TABLET, FILM COATED ORAL DAILY
Qty: 90 TABLET | Refills: 0 | Status: SHIPPED | OUTPATIENT
Start: 2023-11-16 | End: 2024-01-16

## 2023-11-16 ASSESSMENT — PAIN SCALES - GENERAL: PAINLEVEL: NO PAIN (0)

## 2023-11-16 NOTE — PROGRESS NOTES
"  Assessment & Plan     Plantar fasciitis  Provided tri-care brace, patient noted improvement in symptoms of the foot with application of the brace. Discussed shoes and improved foot wear as his are likely broken down. Xray did not reveal any bone spurring. Waiting on official radiology read.   - XR Foot Right G/E 3 Views; Future  - Ankle/Foot Bracing Supplies DME Ankle Brace; Right    Congestive heart failure, unspecified HF chronicity, unspecified heart failure type (H)  No concerns patient feeling well.     Hyperlipidemia with target low density lipoprotein (LDL) cholesterol less than 100 mg/dL  Chronic/ stable.     Ventricular tachycardia (H)  No symptoms, denies concerns.     Benign prostatic hyperplasia with lower urinary tract symptoms, symptom details unspecified  Refill provided. Doing well without side effects.   - finasteride (PROSCAR) 5 MG tablet; Take 1 tablet (5 mg) by mouth daily         BMI:   Estimated body mass index is 38.02 kg/m  as calculated from the following:    Height as of this encounter: 1.778 m (5' 10\").    Weight as of this encounter: 120.2 kg (265 lb).           TOREY Ramos [facility]    Erica Rodriguez is a 62 year old, presenting for the following health issues:  Foot Pain        11/16/2023     1:45 PM   Additional Questions   Roomed by Elisabet FABIAN MA   Accompanied by Self       History of Present Illness       Reason for visit:  Foot pain  Symptom onset:  1-2 weeks ago  Symptoms include:  Pain  Symptom intensity:  Severe  Symptom progression:  Staying the same  Had these symptoms before:  No  What makes it worse:  Walking and standing  What makes it better:  Getting off my feet    He eats 2-3 servings of fruits and vegetables daily.He consumes 2 sweetened beverage(s) daily.He exercises with enough effort to increase his heart rate 9 or less minutes per day.  He exercises with enough effort to increase his heart rate 3 or less days per week. " "  He is taking medications regularly.       *Right foot-has been bothering him for about 2 weeks. Flared up while he was in [facility].  No known injury.   Pain seems to be located mainly on bottom of foot and heel.  Used a frozen water bottle and rolled on his foot, helped at the time.   Symptoms worse on the bottom of the right foot after period of rest.  When up and moving it eventually does improve with symptoms.  He has not changed his footwear.  He does note that he is getting some back and knee pain as he has been walking differently on the right foot.  He is leaving for another trip next week.    He would like a refill of his finasteride if able.  He last had labs completed in February.  Advised patient that he be due for lab recheck in February or March.      Review of Systems   Constitutional, HEENT, cardiovascular, pulmonary, gi and gu systems are negative, except as otherwise noted.      Objective    BP (!) 144/86   Pulse 91   Temp 97.7  F (36.5  C) (Tympanic)   Resp 18   Ht 1.778 m (5' 10\")   Wt 120.2 kg (265 lb)   SpO2 96%   BMI 38.02 kg/m    Body mass index is 38.02 kg/m .  Physical Exam   GENERAL: healthy, alert and no distress  RESP: lungs clear to auscultation - no rales, rhonchi or wheezes  CV: regular rate and rhythm, normal S1 S2, no S3 or S4, no murmur, click or rub, no peripheral edema and peripheral pulses strong  MS: no gross musculoskeletal defects noted, no edema. Foot pain with flexion of the foot.                       "

## 2023-12-15 NOTE — LETTER
"    5/26/2021         RE: Michael Jimenez  [address] [place] [id number]        Dear Colleague,    Thank you for referring your patient, Michael Jimenez, to the [facility] [place]. Please see a copy of my visit note below.    Chief Complaint:   Chief Complaint   Patient presents with     Right Hip - Pain     Onset: 5 months. NKI. Pain has come on gradually and has gotten worse. Pain is in the groin and lateral hip and can go into his back as well. Pain with anything, hard to sleep. No tx.      Michael Jimenez is seen today in the [facility] for evaluation of right hip pain at the request of Dr. Kvng Spencer         HISTORY OF PRESENT ILLNESS    Michael Jimenez is a 60 year old male seen for evaluation of ongoing right hip pain with no known injury.   Pain has been present for 6 months, gradual onset and getting worse. Locates pain to the groin area and side of the hip, but can radiate into his buttocks and low back as well. Pain with movement, activities, even at night sleeping. Occasional numbness into the toes of the foot. Pain radiates down the thigh not past the knee. Difficulties getting in car, donning shoes/socks. Treatment with ibuprofen.    Can have pain with sitting, prolonged standing, walking especially inclines or stairs.    Has had problems with the low back, especially with long walks, pain and tightness. Has had epidural injections in the past, helped a couple of days.    Has always had \"tight hips\", problems spreading legs apart.    Present symptoms: moderate pain.    Pain severity: 7/10  Pain quality: sharp and shooting  Frequency of symptoms: are constant  Exacerbating Factors: weight bearing, stairs, [id number], in and out of car, prolonged sitting, prolonged standing, certain positions: laying on side, back; bending  Relieving Factors: positional changes  Night Pain: Yes  Pain while at rest: Yes   Associated numbness or tingling: occasional in " foot/toes.     Orthopedic PMH: chronic low back pain.    Other PMH:  has no past medical history on file.  Patient Active Problem List   Diagnosis     CARDIOVASCULAR SCREENING; LDL GOAL LESS THAN 160     Glenoid labral tear     Hair loss     Hayfever     Hyperlipidemia with target low density lipoprotein (LDL) cholesterol less than 100 mg/dL     Hypothenar hammer syndrome (H)     Impotence     Obesity     Pre-diabetes     Raynaud's phenomenon     Right shoulder pain     Rupture of long head biceps tendon     Snoring     Supraspinatus tendon tear     Morbid obesity (H)     CAROL (obstructive sleep apnea)       Surgical Hx:  has no past surgical history on file.    Medications:   Current Outpatient Medications:      aspirin (ASA) 81 MG chewable tablet, Take 1 tablet (81 mg) by mouth daily, Disp: 90 tablet, Rfl: 0     cyclobenzaprine (FLEXERIL) 10 MG tablet, Take 1 tablet (10 mg) by mouth 3 times daily as needed for muscle spasms, Disp: 60 tablet, Rfl: 1     FINASTERIDE OR, 1 TABLET BY MOUTH DAILY, Disp: , Rfl:      Glucosamine Sulfate 1000 MG TABS, , Disp: , Rfl:      lisinopril-hydrochlorothiazide (ZESTORETIC) 20-12.5 MG tablet, Take 1 tablet by mouth daily, Disp: 90 tablet, Rfl: 3     rosuvastatin (CRESTOR) 20 MG tablet, Take 1 tablet (20 mg) by mouth daily, Disp: 90 tablet, Rfl: 3     tamsulosin (FLOMAX) 0.4 MG capsule, TAKE 1 CAPSULE BY MOUTH ONCE DAILY AFTER A MEAL, Disp: , Rfl:   No current facility-administered medications for this visit.     Facility-Administered Medications Ordered in Other Visits:      sodium chloride (PF) 0.9% PF flush 10 mL, 10 mL, Intracatheter, Once, Taye Mcallister MD    Allergies: No Known Allergies    Social Hx: self-employed.  reports that he has quit smoking. His smoking use included cigarettes. He quit after 30.00 years of use. He has never used smokeless tobacco. He reports that he does not drink alcohol or use drugs.    Family Hx: family history includes Heart Disease in his father;  "Skin Cancer in his mother.    REVIEW OF SYSTEMS:  10 point ROS neg other than the symptoms noted above in the HPI and PAST MEDICAL HISTORY. Notables,  CONSTITUTIONAL:NEGATIVE for fever, chills, change in weight  INTEGUMENTARY/SKIN: NEGATIVE for worrisome rashes, moles or lesions  MUSCULOSKELETAL:See HPI above  NEURO: NEGATIVE for weakness, dizziness or paresthesias    PHYSICAL EXAM:  /83   Pulse 79   Ht 1.803 m (5' 11\")   Wt 119.1 kg (262 lb 9.6 oz)   BMI 36.63 kg/m     GENERAL APPEARANCE: healthy, alert, no distress  SKIN: no suspicious lesions or rashes  NEURO: Normal strength and tone, mentation intact and speech normal  PSYCH:  mentation appears normal and affect normal  RESPIRATORY: No increased work of breathing.  LYMPH: no palpable inguinal lymph nodes.    BILATERAL LOWER EXTREMITIES:  Gait: hunched, slight favors the right.  No Quad atrophy, strength normal. Well developed lower extremity muscle tone.  Intact sensation deep peroneal nerve, superficial peroneal nerve, med/lat tibial nerve, sural nerve, saphenous nerve  Intact EHL, EDL, TA, FHL, GS, quadriceps hamstrings and hip flexors  Toes warm and well perfused, brisk capillary refill. Palpable 2+ dp pulses.  Bilateral calf soft and nttp or squeeze.  DTRs: achilles 2+, patella 2+.  Edema: 1+  Leg lengths: slight short on right compared to left at medial malleolus.      BACK EXAM  Lumbar range of motion: flexion to touch mid shin causes right buttock pain and lateral hip pain; extension decreased without discomfort; side bend: adequate, pain not reproduced.  Squat: positive  Seated SLR: negative , bilateral.  Supine SLR: negative , bilateral.  Sciatic Notch tenderness: negative    LEFT HIP EXAM:      Palpation: Tender:   SI joint  Strength:  full strength  Special tests:  Irritability (flexion/adduction/internal rotation) positive   Hip range of motion: Internal rotation: 5, External rotation: 45, Flexion 95, pain with extremes of " "rotation      RIGHT HIP EXAM:    Palpation: Tender:   right greater trochanter, right gluteus medius, right SI joint, low back, groin  Strength:  full strength  Special tests:  Irritability (flexion/adduction/internal rotation) positive   Hip range of motion: Internal rotation: neutral, External rotation: 45, Flexion 90 with obligatory external rotation, pain with extremes of rotation        X-RAY: AP pelvis and AP/Lateral views right hip from 5/26/2021 were reviewed in clinic today. No obvious fractures or dislocations. Moderate-severe right hip degenerative changes with loss of superior joint space, sclerosis, marginal osteophytes. Moderate left hip degenerative changes. Degenerative changes of the lumbar spine with mild curvature.    MRI:  MRI right hip from 5/26/2021 was reviewed in clinic today.  1. No marrow signal changes in the right hip to suggest fracture, osteonecrosis, or marrow infiltration.  2. Small right hip joint effusion with mild synovitis.  3. Marked osteoarthrosis in the right hip joint, as described above, with areas of full-thickness cartilage loss with subchondral edema,  osteophytic spurring, and marked tearing in the anterior superior labrum.  4. Tendinosis of the hamstring tendons at their origin on the ischial tuberosity.      Impression: 59yo male with chronic right hip pain, primary osteoarthritis; chronic low back pain.    Plan:   * discussed pain in groin/hip likely from advanced hip arthritis. This is wearing of the cartilage within the hip joint either due to normal \"wear and tear\" or following an injury. Any low back / buttock / radiating pain likely coming from the low back.  *  treatment options for hip arthritis and pain include: do nothing, NSAIDS, activity modification, Physical Therapy, injections, total hip arthroplasty. Risks and benefits of each discussed.   * did discuss patient is a candidate for total hip arthroplasty, and offered total hip arthroplasty to patient. " Total hip arthroplasty will only attempt to provide pain relief from hip related arthritis only and not any pain from the low back. Any low back pain likely to continue.  *  Discussed risks of surgery include, but not limited to: bleeding, infection, pain, scar, damage to adjacent structures (e.g. Nerves, blood vessels, bone, cartilage), temporary or permanent nerve damage, recurrence of symptoms, implant dislocation, implant failure, implant infection, unequal limb lengths, stiffness, need for further surgery, blood clots, pulmonary embolism, risks of anesthesia, and death.  * patient would like to proceed with injection.  * referral placed to Sports Medicine for image-guided right hip intra-articular cortisone injection.  * return to clinic as needed.    * Physical Therapy referral placed as well      Jasper Sweeney M.D., M.S.  Dept. of Orthopaedic Surgery  [facility]            Again, thank you for allowing me to participate in the care of your patient.        Sincerely,        Jasper Sweeney MD     No

## 2024-01-07 ENCOUNTER — HEALTH MAINTENANCE LETTER (OUTPATIENT)
Age: 64
End: 2024-01-07

## 2024-01-10 ENCOUNTER — HOSPITAL ENCOUNTER (EMERGENCY)
Facility: CLINIC | Age: 64
Discharge: HOME | End: 2024-01-10
Attending: PHYSICIAN ASSISTANT | Admitting: PHYSICIAN ASSISTANT
Payer: COMMERCIAL

## 2024-01-10 VITALS
DIASTOLIC BLOOD PRESSURE: 66 MMHG | RESPIRATION RATE: 20 BRPM | SYSTOLIC BLOOD PRESSURE: 135 MMHG | TEMPERATURE: 98.7 F | HEART RATE: 93 BPM | OXYGEN SATURATION: 97 %

## 2024-01-10 DIAGNOSIS — M25.50 MULTIPLE JOINT PAIN: ICD-10-CM

## 2024-01-10 LAB
BASOPHILS # BLD AUTO: 0.1 10E3/UL (ref 0–0.2)
BASOPHILS NFR BLD AUTO: 1 %
CRP SERPL-MCNC: 9.94 MG/L
EOSINOPHIL # BLD AUTO: 0.1 10E3/UL (ref 0–0.7)
EOSINOPHIL NFR BLD AUTO: 1 %
ERYTHROCYTE [DISTWIDTH] IN BLOOD BY AUTOMATED COUNT: 12.8 % (ref 10–15)
HCT VFR BLD AUTO: 48.4 % (ref 40–53)
HGB BLD-MCNC: 16.4 G/DL (ref 13.3–17.7)
IMM GRANULOCYTES # BLD: 0.1 10E3/UL
IMM GRANULOCYTES NFR BLD: 1 %
LYMPHOCYTES # BLD AUTO: 1.4 10E3/UL (ref 0.8–5.3)
LYMPHOCYTES NFR BLD AUTO: 9 %
MCH RBC QN AUTO: 31.7 PG (ref 26.5–33)
MCHC RBC AUTO-ENTMCNC: 33.9 G/DL (ref 31.5–36.5)
MCV RBC AUTO: 93 FL (ref 78–100)
MONOCYTES # BLD AUTO: 1.2 10E3/UL (ref 0–1.3)
MONOCYTES NFR BLD AUTO: 8 %
NEUTROPHILS # BLD AUTO: 12.8 10E3/UL (ref 1.6–8.3)
NEUTROPHILS NFR BLD AUTO: 80 %
NRBC # BLD AUTO: 0 10E3/UL
NRBC BLD AUTO-RTO: 0 /100
PLATELET # BLD AUTO: 271 10E3/UL (ref 150–450)
RBC # BLD AUTO: 5.18 10E6/UL (ref 4.4–5.9)
WBC # BLD AUTO: 15.7 10E3/UL (ref 4–11)

## 2024-01-10 PROCEDURE — G0463 HOSPITAL OUTPT CLINIC VISIT: HCPCS | Performed by: PHYSICIAN ASSISTANT

## 2024-01-10 PROCEDURE — 36415 COLL VENOUS BLD VENIPUNCTURE: CPT | Performed by: PHYSICIAN ASSISTANT

## 2024-01-10 PROCEDURE — 86618 LYME DISEASE ANTIBODY: CPT | Performed by: PHYSICIAN ASSISTANT

## 2024-01-10 PROCEDURE — 86140 C-REACTIVE PROTEIN: CPT | Performed by: PHYSICIAN ASSISTANT

## 2024-01-10 PROCEDURE — 99213 OFFICE O/P EST LOW 20 MIN: CPT | Performed by: PHYSICIAN ASSISTANT

## 2024-01-10 PROCEDURE — 85025 COMPLETE CBC W/AUTO DIFF WBC: CPT | Performed by: PHYSICIAN ASSISTANT

## 2024-01-10 PROCEDURE — 29125 APPL SHORT ARM SPLINT STATIC: CPT | Mod: RT | Performed by: PHYSICIAN ASSISTANT

## 2024-01-10 RX ORDER — NAPROXEN 500 MG/1
500 TABLET ORAL 2 TIMES DAILY WITH MEALS
Qty: 24 TABLET | Refills: 0 | Status: SHIPPED | OUTPATIENT
Start: 2024-01-10 | End: 2024-01-23

## 2024-01-10 NOTE — ED TRIAGE NOTES
Pt reports hand pain that gets worse throughout the day states hard to close the hand and travels up to elbow on the right side.     Symptom onset 3weeks

## 2024-01-10 NOTE — ED PROVIDER NOTES
History     Chief Complaint   Patient presents with    Hand Pain     HPI  Michael Jimenez is a 63 year old male who presents with concern for right wrist/hand/arm pain which been present for the last several weeks.  He also complains of pain in his left wrist, left knee.  He denies any associated swelling of his joints.  No overlying erythema rashes or skin changes.  No significant numbness or paresthesias.  He does complain of stiffness primarily upon waking in the morning which improves minimally throughout the day.  He has attempted to treat with multiple OTC medications including, Aleve, Tylenol without significant improvement.  He denies any associated fever, chills, nausea, cough, dyspnea, wheezing, chest pain, abdominal pain, neck pain.  He does note that he is following with a spine specialist for chronic low back pain, has upcoming appointment with them within the next month and with PCP within the next week.      Allergies:  No Known Allergies    Problem List:    Patient Active Problem List    Diagnosis Date Noted    Ventricular tachycardia (H) 11/16/2023     Priority: Medium    Aortic valve stenosis, etiology of cardiac valve disease unspecified 01/03/2023     Priority: Medium    Rash and nonspecific skin eruption 01/03/2023     Priority: Medium    Degenerative joint disease (DJD) of hip 04/04/2022     Priority: Medium    CHF (congestive heart failure) (H) 03/07/2022     Priority: Medium    CAROL (obstructive sleep apnea) 04/15/2021     Priority: Medium     4/12/2021 [facility] Sleep Study (255.0 lbs) - AHI 30.0, RDI 31.8, Supine AHI 47.1, REM AHI 78.0, Low O2 58.9%, Time Spent ?88% 41.2 minutes / Time Spent ?89% 51.6 minutes.      Morbid obesity (H) 03/09/2021     Priority: Medium    Snoring 08/06/2015     Priority: Medium     Formatting of this note might be different from the original.  8/2015: advise to follow up for sleep study.      Hayfever 08/14/2012     Priority: Medium     Formatting of  this note might be different from the original.  Receives DEPO medrol injection 40mg annually and this seems to help him year round.      Glenoid labral tear 2011     Priority: Medium     Formatting of this note might be different from the original.  He has been doing a lot better since starting Glucosamine- Chondroitin.      Rupture of long head biceps tendon 2011     Priority: Medium    Supraspinatus tendon tear 2011     Priority: Medium    Hyperlipidemia with target low density lipoprotein (LDL) cholesterol less than 100 mg/dL 2011     Priority: Medium     Formatting of this note is different from the original.  Patient stopped taking statins 2015 as he was concerned about potential side effects. Importance of takign care of modifiable risk factors discussed with Patient. Will continue to address.  Lovaza (Omega-3 PUFA) 2mg twice a day).    Last Lipids:  Chol: 218    2013  T    2013  HDL:   55    2013  LDL:  127    2013   LDL CHOLESTEROL (mg/dL)   Date Value   2013 127     [place] 10-year CHD Risk Score: 8% (11 Total Points)      Pre-diabetes 2011     Priority: Medium     Formatting of this note is different from the original.      HEMOGLOBIN A1C MONITORING (POCT) (%)   Date Value   2011 5.4      GLUCOSE                   (mg/dL)   Date Value   3/25/2013 77      Raynaud's phenomenon 2011     Priority: Medium    Right shoulder pain 2011     Priority: Medium    CARDIOVASCULAR SCREENING; LDL GOAL LESS THAN 160 10/31/2010     Priority: Medium        Past Medical History:    Past Medical History:   Diagnosis Date    Arthritis     Congestive heart failure (H)     Hyperlipidemia     Hypertension     Obese     Prediabetes     Sleep apnea        Past Surgical History:    Past Surgical History:   Procedure Laterality Date    ARTHROPLASTY HIP Right 2022    Procedure: RIGHT TOTAL HIP ARTHROPLASTY;  Surgeon: Timothy Montana MD;   Location: [facility] Main OR    CHOLECYSTECTOMY      COLONOSCOPY N/A 5/28/2021    Procedure: COLONOSCOPY, WITH POLYPECTOMY AND BIOPSY;  Surgeon: Lito Sweet DO;  Location: [facility]    FINGER SURGERY         Family History:    Family History   Problem Relation Age of Onset    Heart Disease Father         emphasemia    Skin Cancer Mother     Coronary Artery Disease Father     Emphysema Father        Social History:  Marital Status:   [4]  Social History     Tobacco Use    Smoking status: Former     Years: 30     Types: Cigarettes    Smokeless tobacco: Never   Vaping Use    Vaping Use: Never used   Substance Use Topics    Alcohol use: Yes     Comment: 3-4 drinks a week    Drug use: No        Medications:    finasteride (PROSCAR) 5 MG tablet  Glucosamine Sulfate 1000 MG TABS  lisinopril-hydrochlorothiazide (ZESTORETIC) 20-12.5 MG tablet  naproxen (NAPROSYN) 500 MG tablet  tamsulosin (FLOMAX) 0.4 MG capsule  acetaminophen (TYLENOL) 325 MG tablet  BETA BLOCKER NOT PRESCRIBED (INTENTIONAL)  ibuprofen (ADVIL/MOTRIN) 200 MG tablet  Multiple Vitamin (MULTIVITAMIN ADULT PO)  triamcinolone (KENALOG) 0.5 % external ointment  zinc gluconate 50 MG tablet      Review of Systems  Per HPI  Physical Exam   BP: 135/66  Pulse: 93  Temp: 98.7  F (37.1  C)  Resp: 20  SpO2: 97 %  Physical Exam  Constitutional:       General: He is not in acute distress.     Appearance: He is not ill-appearing or toxic-appearing.   HENT:      Head: Normocephalic and atraumatic.   Cardiovascular:      Rate and Rhythm: Normal rate and regular rhythm.      Heart sounds: No murmur heard.     No friction rub. No gallop.   Pulmonary:      Effort: Pulmonary effort is normal. No respiratory distress.      Breath sounds: Normal breath sounds. No wheezing, rhonchi or rales.   Musculoskeletal:      Comments: Acquired deformity of the left ring finger.  Phalen and Tinel's sign were negative for the wrist bilaterally.  Some diffuse mild tenderness  palpation of right hand, wrist, arm  No focal joint erythema, swelling.  No diffuse swelling of either upper extremity bilaterally.     Skin:     General: Skin is warm and dry.      Findings: No abrasion, ecchymosis, erythema, laceration or rash.   Neurological:      Mental Status: He is alert.      Sensory: No sensory deficit.       ED Course                 Procedures              Critical Care time:  none               Results for orders placed or performed during the hospital encounter of 01/10/24   CRP Inflammation     Status: Abnormal   Result Value Ref Range    CRP Inflammation 9.94 (H) <5.00 mg/L   Lyme Disease Total Abs Bld with Reflex to Confirm CLIA     Status: Normal   Result Value Ref Range    Lyme Disease Antibodies Total 0.05 <0.90   CBC with platelets and differential     Status: Abnormal   Result Value Ref Range    WBC Count 15.7 (H) 4.0 - 11.0 10e3/uL    RBC Count 5.18 4.40 - 5.90 10e6/uL    Hemoglobin 16.4 13.3 - 17.7 g/dL    Hematocrit 48.4 40.0 - 53.0 %    MCV 93 78 - 100 fL    MCH 31.7 26.5 - 33.0 pg    MCHC 33.9 31.5 - 36.5 g/dL    RDW 12.8 10.0 - 15.0 %    Platelet Count 271 150 - 450 10e3/uL    % Neutrophils 80 %    % Lymphocytes 9 %    % Monocytes 8 %    % Eosinophils 1 %    % Basophils 1 %    % Immature Granulocytes 1 %    NRBCs per 100 WBC 0 <1 /100    Absolute Neutrophils 12.8 (H) 1.6 - 8.3 10e3/uL    Absolute Lymphocytes 1.4 0.8 - 5.3 10e3/uL    Absolute Monocytes 1.2 0.0 - 1.3 10e3/uL    Absolute Eosinophils 0.1 0.0 - 0.7 10e3/uL    Absolute Basophils 0.1 0.0 - 0.2 10e3/uL    Absolute Immature Granulocytes 0.1 <=0.4 10e3/uL    Absolute NRBCs 0.0 10e3/uL   CBC with platelets, differential     Status: Abnormal    Narrative    The following orders were created for panel order CBC with platelets, differential.  Procedure                               Abnormality         Status                     ---------                               -----------         ------                     CBC  with platelets and d...[[id number]]  Abnormal            Final result                 Please view results for these tests on the individual orders.     Medications - No data to display    Assessments & Plan (with Medical Decision Making)     I have reviewed the nursing notes.  I have reviewed the findings, diagnosis, plan and need for follow up with the patient.         New Prescriptions    NAPROXEN (NAPROSYN) 500 MG TABLET    Take 1 tablet (500 mg) by mouth 2 times daily (with meals)     Final diagnoses:   Multiple joint pain     63-year-old male presents to urgent care with concern over multiple areas of joint pain and stiffness primarily in the right upper extremities.  He had stable vital signs upon arrival.  Physical exam findings did not demonstrate any focal joint erythema, swelling, tenderness palpation.  No recent injuries to suggest benefit of imaging with x-ray at this time.  Would consider potential for pressure palsy from patient's sleep position contributing to his symptoms.  I have low suspicion for carpal tunnel however after discussing her/benefits patient agreed to trial of the wrist brace while sleeping.   No evidence of erythema to suggest cellulitis, septic arthritis I do not suspect DVT.  I did obtain some baseline labs for patient's request including CBC, CRP, Lyme screen which were pending at time of discharge, but did show elevated WBC at 15.7 of unclear significance, elevated CRP at 9.94.  He was discharged home with prescription for naproxen as needed for pain control, follow-up with primary care provider within the next week as directed.  Worrisome reasons to return to the ER/UC sooner discussed.    Disclaimer: This note consists of symbols derived from keyboarding, dictation, and/or voice recognition software. As a result, there may be errors in the script that have gone undetected.  Please consider this when interpreting information found in the chart.      1/10/2024   [facility]  [place] [facility]       Noa De Jesus PA-C  01/11/24 2015

## 2024-01-11 LAB — B BURGDOR IGG+IGM SER QL: 0.05

## 2024-01-11 NOTE — RESULT ENCOUNTER NOTE
Final result for Lyme Disease Total Abs Bld with Reflex to Confirm CLIA is NEGATIVE.    No change in treatment per [facility] Result Lyme Disease protocol.   Staging Info: By selecting yes to the question above you will include information on AJCC 8 tumor staging in your Mohs note. Information on tumor staging will be automatically added for SCCs on the head and neck. AJCC 8 includes tumor size, tumor depth, perineural involvement and bone invasion.

## 2024-01-16 ENCOUNTER — OFFICE VISIT (OUTPATIENT)
Dept: FAMILY MEDICINE | Facility: CLINIC | Age: 64
End: 2024-01-16
Payer: COMMERCIAL

## 2024-01-16 ENCOUNTER — TRANSFERRED RECORDS (OUTPATIENT)
Dept: HEALTH INFORMATION MANAGEMENT | Facility: CLINIC | Age: 64
End: 2024-01-16

## 2024-01-16 VITALS
HEIGHT: 70 IN | WEIGHT: 265 LBS | SYSTOLIC BLOOD PRESSURE: 142 MMHG | TEMPERATURE: 97.2 F | DIASTOLIC BLOOD PRESSURE: 88 MMHG | OXYGEN SATURATION: 98 % | BODY MASS INDEX: 37.94 KG/M2 | HEART RATE: 73 BPM | RESPIRATION RATE: 18 BRPM

## 2024-01-16 DIAGNOSIS — E66.01 MORBID OBESITY (H): ICD-10-CM

## 2024-01-16 DIAGNOSIS — N40.1 BENIGN PROSTATIC HYPERPLASIA WITH LOWER URINARY TRACT SYMPTOMS, SYMPTOM DETAILS UNSPECIFIED: ICD-10-CM

## 2024-01-16 DIAGNOSIS — I47.20 VENTRICULAR TACHYCARDIA (H): ICD-10-CM

## 2024-01-16 DIAGNOSIS — G56.03 BILATERAL CARPAL TUNNEL SYNDROME: Primary | ICD-10-CM

## 2024-01-16 DIAGNOSIS — I50.9 CONGESTIVE HEART FAILURE, UNSPECIFIED HF CHRONICITY, UNSPECIFIED HEART FAILURE TYPE (H): ICD-10-CM

## 2024-01-16 PROCEDURE — 99214 OFFICE O/P EST MOD 30 MIN: CPT | Performed by: NURSE PRACTITIONER

## 2024-01-16 RX ORDER — RESPIRATORY SYNCYTIAL VIRUS VACCINE 120MCG/0.5
0.5 KIT INTRAMUSCULAR ONCE
Qty: 1 EACH | Refills: 0 | Status: CANCELLED | OUTPATIENT
Start: 2024-01-16 | End: 2024-01-16

## 2024-01-16 ASSESSMENT — PAIN SCALES - GENERAL: PAINLEVEL: NO PAIN (0)

## 2024-01-16 NOTE — PROGRESS NOTES
"  Assessment & Plan     Bilateral carpal tunnel syndrome  Recommend starting with EMG to evaluate symptoms in both upper extremities further. Additionally encouraged to wear the wrist brace when he is able to. If not at night, during the day. If needed further imaging of the cervical spine will be ordered to evaluate if EMG is non diagnostic.   - EMG; Future    Congestive heart failure, unspecified HF chronicity, unspecified heart failure type (H)  Stable, feeling well. Denies symptoms.     Ventricular tachycardia (H)  Stable, no symptoms. Doing well.     Morbid obesity (H)  BMI 38.02 he has been stable with his weight over the last 18 months.     Benign prostatic hyperplasia with lower urinary tract symptoms, symptom details unspecified  Discontinue the finasteride to see if symptoms change from what he is currently experiencing.        BMI:   Estimated body mass index is 38.02 kg/m  as calculated from the following:    Height as of this encounter: 1.778 m (5' 10\").    Weight as of this encounter: 120.2 kg (265 lb).       TOREY Ramos [facility]   Michael is a 63 year old, presenting for the following health issues:  UC Follow-Up and Hand Pain        1/16/2024     7:49 AM   Additional Questions   Roomed by Elisabet FABIAN MA   Accompanied by Self       History of Present Illness       Reason for visit:  Hand pain  Symptom onset:  3-4 weeks ago  Symptoms include:  Pain  Symptom intensity:  Severe  Symptom progression:  Worsening  Had these symptoms before:  No  What makes it worse:  Lying down  What makes it better:  Hands by my side and arm streched    He eats 0-1 servings of fruits and vegetables daily.He consumes 2 sweetened beverage(s) daily.He exercises with enough effort to increase his heart rate 10 to 19 minutes per day.  He exercises with enough effort to increase his heart rate 3 or less days per week.   He is taking medications regularly.     *Patient has just " "been taking tylenol and ibuprofen.   Worsening pain in right wrist that is spreading up to elbow, lack of strength. Painful all the time, especially in the morning. Will have to use other hand to straighten fingers.   Sometimes hand goes numb.  Has a little bit of swelling.     Feels that many of his medications are giving him side effects. He has been looking at his medications that have been causing potential     Heart Failure Follow-up   Are you experiencing any shortness of breath? Yes, with activity only     How would you describe your shortness of breath?  Same as usual  Are you experiencing any swelling in your legs or feet?  No  Are you using more pillows than usual? No  Do you cough at night?  No  Do you check your weight daily?  No  Have you had a weight change recently?  No  Are you having any of the following side effects from your medications? (Select all that apply)  The patient does not report symptoms of dizziness, fatigue, cough, swelling, or slow heart beat.  Since your last visit, how many times have you gone to the cardiologist, urgent care, emergency room, or hospital because of your heart failure?   None  Last Echo: Echo result w/o MOPS: Interpretation Summary Mild aortic stenosis is present.       ED/UC Followup:  Facility:  [facility] ED  Date of visit: 01/10/2024  Reason for visit: Hand pain-right wrist, right hand, right arm  Current Status: Not any improvement. Brace that was given did not help, almost made it worse.       Review of Systems   Constitutional, HEENT, cardiovascular, pulmonary, gi and gu systems are negative, except as otherwise noted.      Objective    BP (!) 144/90   Pulse 73   Temp 97.2  F (36.2  C) (Tympanic)   Resp 18   Ht 1.778 m (5' 10\")   Wt 120.2 kg (265 lb)   SpO2 98%   BMI 38.02 kg/m    Body mass index is 38.02 kg/m .    Physical Exam   GENERAL: healthy, alert and no distress  NECK: no adenopathy, no asymmetry, masses, or scars and thyroid " normal to palpation  RESP: lungs clear to auscultation - no rales, rhonchi or wheezes  CV: regular rate and rhythm, normal S1 S2, no S3 or S4, no murmur, click or rub, no peripheral edema and peripheral pulses strong  ABDOMEN: soft, nontender, no hepatosplenomegaly, no masses and bowel sounds normal  MS: extremities normal- no gross deformities noted. Tinels and Phalens are positive bilaterally right worse than left.   NEURO: Normal strength and tone, mentation intact and speech normal  PSYCH: mentation appears normal, affect normal/bright

## 2024-01-18 ENCOUNTER — TELEPHONE (OUTPATIENT)
Dept: FAMILY MEDICINE | Facility: CLINIC | Age: 64
End: 2024-01-18

## 2024-01-18 DIAGNOSIS — G56.03 BILATERAL CARPAL TUNNEL SYNDROME: Primary | ICD-10-CM

## 2024-01-18 NOTE — TELEPHONE ENCOUNTER
Needing a new referral to [facility] for his EMG. They can get him in tmro 1/19 needing this referral when updated faxed to [phone].    .Salome Handley PSC

## 2024-01-18 NOTE — TELEPHONE ENCOUNTER
Order/Referral Request    Who is requesting: pt    Orders being requested: Pt missed his appointment for an EMG today.  The [place] location is stating that they now need another referral because the original one is now .  They are also saying that they are now booked out into March.  Pt states she can't wait that long to be seen.  Is there anywhere else that does EMG's where he can be seen sooner?        When are orders needed by: asap    Has this been discussed with Provider: Yes  saw Viridiana Anthony on     Does patient have a preference on a Group/Provider/Facility? Anywhere that can get him in      Where to send orders: Place orders within Epic    Could we send this information to you in [facility] or would you prefer to receive a phone call?:   Patient would prefer a phone call   Okay to leave a detailed message?: Yes at Cell number on file:    Telephone Information:   Mobile [phone]       Sarita Harper on 2024 at 11:22 AM

## 2024-01-19 ENCOUNTER — TRANSFERRED RECORDS (OUTPATIENT)
Dept: HEALTH INFORMATION MANAGEMENT | Facility: CLINIC | Age: 64
End: 2024-01-19
Payer: COMMERCIAL

## 2024-01-22 ENCOUNTER — TRANSFERRED RECORDS (OUTPATIENT)
Dept: HEALTH INFORMATION MANAGEMENT | Facility: CLINIC | Age: 64
End: 2024-01-22
Payer: COMMERCIAL

## 2024-01-22 ENCOUNTER — TELEPHONE (OUTPATIENT)
Dept: FAMILY MEDICINE | Facility: CLINIC | Age: 64
End: 2024-01-22
Payer: COMMERCIAL

## 2024-01-22 DIAGNOSIS — G56.03 BILATERAL CARPAL TUNNEL SYNDROME: Primary | ICD-10-CM

## 2024-01-22 NOTE — TELEPHONE ENCOUNTER
Pt is looking for emg results I do not see the test results in the chart. Pt is asking for more pain medication. Offered evisit. pt  States he cannot even hold a fork let alone type. He is refusing the evisit and is irritable.  I recommended [facility] if his pain is too severe to function. He has seen them in the past.  Jessica Miranda RN on 1/22/2024 at 2:36 PM

## 2024-01-23 ENCOUNTER — PATIENT OUTREACH (OUTPATIENT)
Dept: FAMILY MEDICINE | Facility: CLINIC | Age: 64
End: 2024-01-23
Payer: COMMERCIAL

## 2024-01-23 RX ORDER — NAPROXEN 500 MG/1
500 TABLET ORAL 2 TIMES DAILY WITH MEALS
Qty: 60 TABLET | Refills: 0 | Status: SHIPPED | OUTPATIENT
Start: 2024-01-23 | End: 2024-01-29

## 2024-01-23 NOTE — TELEPHONE ENCOUNTER
Patient Quality Outreach    Patient is due for the following:   Physical Annual Wellness Visit    Next Steps:   Schedule a Annual Wellness Visit    Type of outreach:    Sent [employer] message.    Next Steps:  Reach out within 90 days via [employer].    Max number of attempts reached: Yes. Will try again in 90 days if patient still on fail list.    Questions for provider review:    None           Lexis Juarez MA

## 2024-01-23 NOTE — TELEPHONE ENCOUNTER
He told me the EMG was done at a different clinic, I did tell him to try and contact them.  Jessica Miranda RN on 1/23/2024 at 1:41 PM

## 2024-01-23 NOTE — TELEPHONE ENCOUNTER
Naproxen refilled. I don't see the results to give him from the EMG, when did this get completed.  I agree with [facility] evaluation.  TOREY Ramos CNP

## 2024-01-24 ENCOUNTER — TRANSFERRED RECORDS (OUTPATIENT)
Dept: HEALTH INFORMATION MANAGEMENT | Facility: CLINIC | Age: 64
End: 2024-01-24
Payer: COMMERCIAL

## 2024-01-29 ENCOUNTER — OFFICE VISIT (OUTPATIENT)
Dept: FAMILY MEDICINE | Facility: CLINIC | Age: 64
End: 2024-01-29
Payer: COMMERCIAL

## 2024-01-29 VITALS
TEMPERATURE: 98.5 F | DIASTOLIC BLOOD PRESSURE: 70 MMHG | SYSTOLIC BLOOD PRESSURE: 110 MMHG | WEIGHT: 258 LBS | HEART RATE: 92 BPM | RESPIRATION RATE: 20 BRPM | OXYGEN SATURATION: 95 % | BODY MASS INDEX: 36.94 KG/M2 | HEIGHT: 70 IN

## 2024-01-29 DIAGNOSIS — G47.33 OSA (OBSTRUCTIVE SLEEP APNEA): ICD-10-CM

## 2024-01-29 DIAGNOSIS — G56.03 BILATERAL CARPAL TUNNEL SYNDROME: ICD-10-CM

## 2024-01-29 DIAGNOSIS — I10 ESSENTIAL HYPERTENSION: ICD-10-CM

## 2024-01-29 DIAGNOSIS — R39.9 LOWER URINARY TRACT SYMPTOMS (LUTS): ICD-10-CM

## 2024-01-29 DIAGNOSIS — Z01.818 PREOP GENERAL PHYSICAL EXAM: Primary | ICD-10-CM

## 2024-01-29 DIAGNOSIS — I35.0 AORTIC VALVE STENOSIS, ETIOLOGY OF CARDIAC VALVE DISEASE UNSPECIFIED: ICD-10-CM

## 2024-01-29 LAB
ANION GAP SERPL CALCULATED.3IONS-SCNC: 14 MMOL/L (ref 7–15)
BUN SERPL-MCNC: 40.2 MG/DL (ref 8–23)
CALCIUM SERPL-MCNC: 9.4 MG/DL (ref 8.8–10.2)
CHLORIDE SERPL-SCNC: 103 MMOL/L (ref 98–107)
CREAT SERPL-MCNC: 1.23 MG/DL (ref 0.67–1.17)
DEPRECATED HCO3 PLAS-SCNC: 20 MMOL/L (ref 22–29)
EGFRCR SERPLBLD CKD-EPI 2021: 65 ML/MIN/1.73M2
GLUCOSE SERPL-MCNC: 100 MG/DL (ref 70–99)
POTASSIUM SERPL-SCNC: 4.7 MMOL/L (ref 3.4–5.3)
SODIUM SERPL-SCNC: 137 MMOL/L (ref 135–145)

## 2024-01-29 PROCEDURE — 36415 COLL VENOUS BLD VENIPUNCTURE: CPT | Performed by: FAMILY MEDICINE

## 2024-01-29 PROCEDURE — 99214 OFFICE O/P EST MOD 30 MIN: CPT | Mod: 25 | Performed by: FAMILY MEDICINE

## 2024-01-29 PROCEDURE — 93000 ELECTROCARDIOGRAM COMPLETE: CPT | Performed by: FAMILY MEDICINE

## 2024-01-29 PROCEDURE — 80048 BASIC METABOLIC PNL TOTAL CA: CPT | Performed by: FAMILY MEDICINE

## 2024-01-29 RX ORDER — RESPIRATORY SYNCYTIAL VIRUS VACCINE 120MCG/0.5
0.5 KIT INTRAMUSCULAR ONCE
Qty: 1 EACH | Refills: 0 | Status: CANCELLED | OUTPATIENT
Start: 2024-01-29 | End: 2024-01-29

## 2024-01-29 ASSESSMENT — PAIN SCALES - GENERAL: PAINLEVEL: SEVERE PAIN (6)

## 2024-01-29 NOTE — PATIENT INSTRUCTIONS
Preparing for Your Surgery  Getting started  A nurse will call you to review your health history and instructions. They will give you an arrival time based on your scheduled surgery time. Please be ready to share:  Your doctor's clinic name and phone number  Your medical, surgical, and anesthesia history  A list of allergies and sensitivities  A list of medicines, including herbal treatments and over-the-counter drugs  Whether the patient has a legal guardian (ask how to send us the papers in advance)  Please tell us if you're pregnant--or if there's any chance you might be pregnant. Some surgeries may injure a fetus (unborn baby), so they require a pregnancy test. Surgeries that are safe for a fetus don't always need a test, and you can choose whether to have one.   If you have a child who's having surgery, please ask for a copy of Preparing for Your Child's Surgery.    Preparing for surgery  Within 10 to 30 days of surgery: Have a pre-op exam (sometimes called an H&P, or History and Physical). This can be done at a clinic or pre-operative center.  If you're having a , you may not need this exam. Talk to your care team.  At your pre-op exam, talk to your care team about all medicines you take. If you need to stop any medicines before surgery, ask when to start taking them again.  We do this for your safety. Many medicines can make you bleed too much during surgery. Some change how well surgery (anesthesia) drugs work.  Call your insurance company to let them know you're having surgery. (If you don't have insurance, call [phone].)  Call your clinic if there's any change in your health. This includes signs of a cold or flu (sore throat, runny nose, cough, rash, fever). It also includes a scrape or scratch near the surgery site.  If you have questions on the day of surgery, call your hospital or surgery center.  Eating and drinking guidelines  For your safety: Unless your surgeon tells you otherwise,  follow the guidelines below.  Eat and drink as usual until 8 hours before you arrive for surgery. After that, no food or milk.  Drink clear liquids until 2 hours before you arrive. These are liquids you can see through, like water, Gatorade, and Propel Water. They also include plain black coffee and tea (no cream or milk), candy, and breath mints. You can spit out gum when you arrive.  If you drink alcohol: Stop drinking it the night before surgery.  If your care team tells you to take medicine on the morning of surgery, it's okay to take it with a sip of water.  Preventing infection  Shower or bathe the night before and morning of your surgery. Follow the instructions your clinic gave you. (If no instructions, use regular soap.)  Don't shave or clip hair near your surgery site. We'll remove the hair if needed.  Don't smoke or vape the morning of surgery. You may chew nicotine gum up to 2 hours before surgery. A nicotine patch is okay.  Note: Some surgeries require you to completely quit smoking and nicotine. Check with your surgeon.  Your care team will make every effort to keep you safe from infection. We will:  Clean our hands often with soap and water (or an alcohol-based hand rub).  Clean the skin at your surgery site with a special soap that kills germs.  Give you a special gown to keep you warm. (Cold raises the risk of infection.)  Wear special hair covers, masks, gowns and gloves during surgery.  Give antibiotic medicine, if prescribed. Not all surgeries need antibiotics.  What to bring on the day of surgery  Photo ID and insurance card  Copy of your health care directive, if you have one  Glasses and hearing aids (bring cases)  You can't wear contacts during surgery  Inhaler and eye drops, if you use them (tell us about these when you arrive)  CPAP machine or breathing device, if you use them  A few personal items, if spending the night  If you have . . .  A pacemaker, ICD (cardiac defibrillator) or other  implant: Bring the ID card.  An implanted stimulator: Bring the remote control.  A legal guardian: Bring a copy of the certified (court-stamped) guardianship papers.  Please remove any jewelry, including body piercings. Leave jewelry and other valuables at home.  If you're going home the day of surgery  You must have a responsible adult drive you home. They should stay with you overnight as well.  If you don't have someone to stay with you, and you aren't safe to go home alone, we may keep you overnight. Insurance often won't pay for this.  After surgery  If it's hard to control your pain or you need more pain medicine, please call your surgeon's office.  Questions?   If you have any questions for your care team, list them here: _________________________________________________________________________________________________________________________________________________________________________ ____________________________________ ____________________________________ ____________________________________  For informational purposes only. Not to replace the advice of your health care provider. Copyright   2003, 2019 [employer]. All rights reserved. Clinically reviewed by Tawnya Andres MD. [employer]ART[employer] 392924 - REV 12/22.    How to Take Your Medication Before Surgery  Hold lisinopril-hydrochlorothiazide day of procedure.

## 2024-01-29 NOTE — PROGRESS NOTES
Preoperative Evaluation  [facility]  [address] [address]  Phone: [phone]  Primary Provider: Vee Bhatti  Pre-op Performing Provider: BRAXTON ALLEN  Jan 29, 2024       Michael is a 63 year old, presenting for the following:  Pre-Op Exam        1/29/2024    11:10 AM   Additional Questions   Roomed by Viridiana FELICIANO     Surgical Information  Surgery/Procedure: Endoscopic carpal tunnel release  Surgery Location: [facility]  Surgeon: Dr. Otf Lara  Surgery Date: right 2/7/2024 and left 2/21/2024  Time of Surgery: TBD  Where patient plans to recover: At home with family  Fax number for surgical facility: [phone]    Assessment & Plan     The proposed surgical procedure is considered INTERMEDIATE risk.    Preop general physical exam  Mets>4   No chest pain or shortness of breath at rest or with activity.   EKG unchanged from prior.   - EKG 12-lead complete w/read - Clinics  - Basic metabolic panel  (Ca, Cl, CO2, Creat, Gluc, K, Na, BUN)    Bilateral carpal tunnel syndrome  Scheduled for surgery     Aortic valve stenosis, etiology of cardiac valve disease unspecified  Murmur on exam. Stable.     CAROL (obstructive sleep apnea)  CPAP nightly.     LUTS  On tamsulosin 0.4 mg daily.     HTN  Lisinopril-hydrochlorothiazide 20-12.5 mg daily. No issues.   -- check BMP today.      - No identified additional risk factors other than previously addressed    Antiplatelet or Anticoagulation Medication Instructions   - Patient is on no antiplatelet or anticoagulation medications.    Additional Medication Instructions  Hold lisinopril-hydrochlorothiazide day of surgery.     Recommendation  APPROVAL GIVEN to proceed with proposed procedure, without further diagnostic evaluation.      The risks, benefits and treatment options of prescribed medications or other treatments have been discussed with the patient. The patient verbalized their understanding and should call or follow up  if no improvement or if they develop further problems.        Subjective       HPI related to upcoming procedure:       Scheduled for carpal tunnel   Numbness in his hands, pain in the hands.       Mets>4   No chest pain or shortness of breath at rest or with activity.           1/28/2024     3:09 PM   Preop Questions   1. Have you ever had a heart attack or stroke? No   2. Have you ever had surgery on your heart or blood vessels, such as a stent placement, a coronary artery bypass, or surgery on an artery in your head, neck, heart, or legs? No   3. Do you have chest pain with activity? No   4. Do you have a history of  heart failure? No   5. Do you currently have a cold, bronchitis or symptoms of other infection? No   6. Do you have a cough, shortness of breath, or wheezing? No   7. Do you or anyone in your family have previous history of blood clots? No   8. Do you or does anyone in your family have a serious bleeding problem such as prolonged bleeding following surgeries or cuts? No   9. Have you ever had problems with anemia or been told to take iron pills? No   10. Have you had any abnormal blood loss such as black, tarry or bloody stools? No   11. Have you ever had a blood transfusion? No   12. Are you willing to have a blood transfusion if it is medically needed before, during, or after your surgery? Yes   13. Have you or any of your relatives ever had problems with anesthesia? UNKNOWN - nothing personal. Unknown about relatives.    14. Do you have sleep apnea, excessive snoring or daytime drowsiness? YES    14a. Do you have a CPAP machine? Yes   15. Do you have any artifical heart valves or other implanted medical devices like a pacemaker, defibrillator, or continuous glucose monitor? No   16. Do you have artificial joints? YES - hip, right.    17. Are you allergic to latex? No       Health Care Directive  Patient does not have a Health Care Directive or Living Will: Discussed advance care planning with  patient; however, patient declined at this time.    Preoperative Review of    reviewed - no record of controlled substances prescribed.        BPH on tamsulosin 0.4 mg daily.       HTN   Lisinopril-hydrochlorothiazide 20-12.5 mg daily.   BP well controlled.   Denies any chest pain, shortness of breath.   No exertional symptoms.   Underwent CTA coronaries 3/2021, no significant stenosis noted.   Reports doing well since that time.   Follows with Cardiology Dr. Mcallister recommended 3 year follow up from 5/18/2021. Advised he schedule this follow up for this May.       Patient Active Problem List    Diagnosis Date Noted    Ventricular tachycardia (H) 11/16/2023     Priority: Medium    Aortic valve stenosis, etiology of cardiac valve disease unspecified 01/03/2023     Priority: Medium    Rash and nonspecific skin eruption 01/03/2023     Priority: Medium    Degenerative joint disease (DJD) of hip 04/04/2022     Priority: Medium    CHF (congestive heart failure) (H) 03/07/2022     Priority: Medium    CAROL (obstructive sleep apnea) 04/15/2021     Priority: Medium     4/12/2021 [facility] Sleep Study (255.0 lbs) - AHI 30.0, RDI 31.8, Supine AHI 47.1, REM AHI 78.0, Low O2 58.9%, Time Spent ?88% 41.2 minutes / Time Spent ?89% 51.6 minutes.      Morbid obesity (H) 03/09/2021     Priority: Medium    Snoring 08/06/2015     Priority: Medium     Formatting of this note might be different from the original.  8/2015: advise to follow up for sleep study.      Hayfever 08/14/2012     Priority: Medium     Formatting of this note might be different from the original.  Receives DEPO medrol injection 40mg annually and this seems to help him year round.      Glenoid labral tear 04/06/2011     Priority: Medium     Formatting of this note might be different from the original.  He has been doing a lot better since starting Glucosamine- Chondroitin.      Rupture of long head biceps tendon 04/06/2011     Priority: Medium     Supraspinatus tendon tear 2011     Priority: Medium    Hyperlipidemia with target low density lipoprotein (LDL) cholesterol less than 100 mg/dL 2011     Priority: Medium     Formatting of this note is different from the original.  Patient stopped taking statins 2015 as he was concerned about potential side effects. Importance of takign care of modifiable risk factors discussed with Patient. Will continue to address.  Lovaza (Omega-3 PUFA) 2mg twice a day).    Last Lipids:  Chol: 218    2013  T    2013  HDL:   55    2013  LDL:  127    2013   LDL CHOLESTEROL (mg/dL)   Date Value   2013 127     [place] 10-year CHD Risk Score: 8% (11 Total Points)      Pre-diabetes 2011     Priority: Medium     Formatting of this note is different from the original.      HEMOGLOBIN A1C MONITORING (POCT) (%)   Date Value   2011 5.4      GLUCOSE                   (mg/dL)   Date Value   3/25/2013 77      Raynaud's phenomenon 2011     Priority: Medium    Right shoulder pain 2011     Priority: Medium    CARDIOVASCULAR SCREENING; LDL GOAL LESS THAN 160 10/31/2010     Priority: Medium      Past Medical History:   Diagnosis Date    Arthritis     Congestive heart failure (H)     Hyperlipidemia     Hypertension     Obese     Prediabetes     Sleep apnea     doesn't use CPAP     Past Surgical History:   Procedure Laterality Date    ABDOMEN SURGERY      ARTHROPLASTY HIP Right 2022    Procedure: RIGHT TOTAL HIP ARTHROPLASTY;  Surgeon: Timothy Montana MD;  Location: [facility]    CHOLECYSTECTOMY      COLONOSCOPY N/A 2021    Procedure: COLONOSCOPY, WITH POLYPECTOMY AND BIOPSY;  Surgeon: Lito Sweet DO;  Location: [facility]    EYE SURGERY      FINGER SURGERY       Current Outpatient Medications   Medication Sig Dispense Refill    acetaminophen (TYLENOL) 325 MG tablet Take 3 tablets (975 mg) by mouth 3 times daily  0    BETA BLOCKER NOT PRESCRIBED  "(INTENTIONAL) Please choose reason not prescribed from choices below.      Glucosamine Sulfate 1000 MG TABS Take 1,000 mg by mouth daily       ibuprofen (ADVIL/MOTRIN) 200 MG tablet Take 600 mg by mouth every 4 hours as needed for mild pain      lisinopril-hydrochlorothiazide (ZESTORETIC) 20-12.5 MG tablet Take 1 tablet by mouth daily 90 tablet 3    Multiple Vitamin (MULTIVITAMIN ADULT PO) Take 1 tablet by mouth daily       tamsulosin (FLOMAX) 0.4 MG capsule Take 1 capsule (0.4 mg) by mouth daily (with dinner) 90 capsule 1    zinc gluconate 50 MG tablet Take 50 mg by mouth daily      naproxen (NAPROSYN) 500 MG tablet Take 1 tablet (500 mg) by mouth 2 times daily (with meals) 60 tablet 0    triamcinolone (KENALOG) 0.5 % external ointment Apply 1 g topically 2 times daily 15 g 0       No Known Allergies     Social History     Tobacco Use    Smoking status: Former     Packs/day: 4.00     Years: 30.00     Additional pack years: 0.00     Total pack years: 120.00     Types: Cigarettes     Quit date: 1995     Years since quittin.0    Smokeless tobacco: Former     Quit date: 1975   Substance Use Topics    Alcohol use: Yes     Comment: 3-4 drinks a week     History   Drug Use No         Review of Systems    Review of Systems  Constitutional, HEENT, cardiovascular, pulmonary, gi and gu systems are negative, except as otherwise noted.  Objective    /70 (BP Location: Right arm, Patient Position: Chair, Cuff Size: Adult Regular)   Pulse 106   Temp 98.5  F (36.9  C) (Oral)   Resp 20   Ht 1.778 m (5' 10\")   Wt 117 kg (258 lb)   SpO2 95%   BMI 37.02 kg/m     Estimated body mass index is 37.02 kg/m  as calculated from the following:    Height as of this encounter: 1.778 m (5' 10\").    Weight as of this encounter: 117 kg (258 lb).  Physical Exam  GENERAL: alert and no distress  EYES: Eyes grossly normal to inspection, PERRL and conjunctivae and sclerae normal  HENT: ear canals and TM's normal, nose and " mouth without ulcers or lesions  NECK: no adenopathy, no asymmetry, masses, or scars  RESP: lungs clear to auscultation - no rales, rhonchi or wheezes  CV: regular rate and rhythm, systolic murmur present.   ABDOMEN: soft, nontender, no hepatosplenomegaly, no masses and bowel sounds normal  MS: no gross musculoskeletal defects noted, no edema  SKIN: no suspicious lesions or rashes  NEURO: Normal strength and tone, mentation intact and speech normal  PSYCH: mentation appears normal, affect normal/bright  LYMPH: no cervical, supraclavicular, axillary, or inguinal adenopathy    Recent Labs   Lab Test 01/10/24  1406 01/03/23  1442 06/08/22  1341 05/19/22  1032 04/05/22  0632 04/04/22  1103   HGB 16.4  --   --   --  13.1* 16.2     --   --   --   --  246   INR  --   --   --   --   --  1.04   NA  --  144 144  --   --   --    POTASSIUM  --  4.5 4.1  --  5.1* 4.3   CR  --  1.11 1.12  --  1.31* 1.08   A1C  --  5.9*  --  5.4  --   --         Diagnostics  Labs pending at this time.  Results will be reviewed when available.   EKG required for known coronary heart disease and not completed in the last 90 days.      Latest Reference Range & Units 01/29/24 12:11   Sodium 135 - 145 mmol/L 137   Potassium 3.4 - 5.3 mmol/L 4.7   Chloride 98 - 107 mmol/L 103   Carbon Dioxide (CO2) 22 - 29 mmol/L 20 (L)   Urea Nitrogen 8.0 - 23.0 mg/dL 40.2 (H)   Creatinine 0.67 - 1.17 mg/dL 1.23 (H)   GFR Estimate >60 mL/min/1.73m2 65   Calcium 8.8 - 10.2 mg/dL 9.4   Anion Gap 7 - 15 mmol/L 14   Glucose 70 - 99 mg/dL 100 (H)   (L): Data is abnormally low  (H): Data is abnormally high    Revised Cardiac Risk Index (RCRI)  The patient has the following serious cardiovascular risks for perioperative complications:   - No serious cardiac risks = 0 points     RCRI Interpretation: 0 points: Class I (very low risk - 0.4% complication rate)         Signed Electronically by: Victorino Salazar DO  Copy of this evaluation report is provided to requesting  physician.

## 2024-02-01 ENCOUNTER — TELEPHONE (OUTPATIENT)
Dept: CARDIOLOGY | Facility: CLINIC | Age: 64
End: 2024-02-01
Payer: COMMERCIAL

## 2024-02-01 DIAGNOSIS — I35.0 AORTIC VALVE STENOSIS, ETIOLOGY OF CARDIAC VALVE DISEASE UNSPECIFIED: Primary | ICD-10-CM

## 2024-02-01 NOTE — TELEPHONE ENCOUNTER
[facility] Call Center    Phone Message    May a detailed message be left on voicemail: yes     Reason for Call: Other: patient is calling back as he received a call from the cardiology department to schedule a follow up with dr Mcallister with an echo and patient would like the care team to call him to further explain why he needs an echo as well as there is no order so I would be unable to assist the patient with scheduling, please advise,thank you.      Action Taken: Other: cardiology    Travel Screening: Not Applicable  Thank you!  [facility]

## 2024-02-01 NOTE — TELEPHONE ENCOUNTER
"Spoke to patient and discussed need for echocardiogram to check aortic valve due to aortic stenosis.  Patient verbalized understanding.    Pt refused clinic follow-up but did schedule the echo.  States \"if there is a problem with the echo then I will see her\"    Tiny Bajwa RN  Cardiology Care Coordinator  [facility]  [phone] option 1    "

## 2024-02-19 ENCOUNTER — TRANSFERRED RECORDS (OUTPATIENT)
Dept: HEALTH INFORMATION MANAGEMENT | Facility: CLINIC | Age: 64
End: 2024-02-19
Payer: COMMERCIAL

## 2024-02-20 ENCOUNTER — ANCILLARY PROCEDURE (OUTPATIENT)
Dept: GENERAL RADIOLOGY | Facility: CLINIC | Age: 64
End: 2024-02-20
Attending: NURSE PRACTITIONER
Payer: COMMERCIAL

## 2024-02-20 ENCOUNTER — OFFICE VISIT (OUTPATIENT)
Dept: FAMILY MEDICINE | Facility: CLINIC | Age: 64
End: 2024-02-20
Payer: COMMERCIAL

## 2024-02-20 ENCOUNTER — LAB (OUTPATIENT)
Dept: LAB | Facility: CLINIC | Age: 64
End: 2024-02-20
Payer: COMMERCIAL

## 2024-02-20 VITALS
OXYGEN SATURATION: 95 % | WEIGHT: 258 LBS | TEMPERATURE: 97.6 F | HEIGHT: 70 IN | DIASTOLIC BLOOD PRESSURE: 80 MMHG | SYSTOLIC BLOOD PRESSURE: 128 MMHG | BODY MASS INDEX: 36.94 KG/M2 | RESPIRATION RATE: 18 BRPM | HEART RATE: 87 BPM

## 2024-02-20 DIAGNOSIS — M25.512 CHRONIC PAIN OF BOTH SHOULDERS: ICD-10-CM

## 2024-02-20 DIAGNOSIS — M25.511 CHRONIC PAIN OF BOTH SHOULDERS: ICD-10-CM

## 2024-02-20 DIAGNOSIS — M75.01 ADHESIVE CAPSULITIS OF BOTH SHOULDERS: ICD-10-CM

## 2024-02-20 DIAGNOSIS — M25.50 MULTIPLE JOINT PAIN: Primary | ICD-10-CM

## 2024-02-20 DIAGNOSIS — M75.02 ADHESIVE CAPSULITIS OF BOTH SHOULDERS: ICD-10-CM

## 2024-02-20 DIAGNOSIS — M17.12 ARTHRITIS OF LEFT KNEE: ICD-10-CM

## 2024-02-20 DIAGNOSIS — M25.562 CHRONIC PAIN OF LEFT KNEE: ICD-10-CM

## 2024-02-20 DIAGNOSIS — G89.29 CHRONIC PAIN OF BOTH SHOULDERS: ICD-10-CM

## 2024-02-20 DIAGNOSIS — I50.9 CONGESTIVE HEART FAILURE, UNSPECIFIED HF CHRONICITY, UNSPECIFIED HEART FAILURE TYPE (H): ICD-10-CM

## 2024-02-20 DIAGNOSIS — M25.50 MULTIPLE JOINT PAIN: ICD-10-CM

## 2024-02-20 DIAGNOSIS — E78.5 HYPERLIPIDEMIA WITH TARGET LOW DENSITY LIPOPROTEIN (LDL) CHOLESTEROL LESS THAN 100 MG/DL: ICD-10-CM

## 2024-02-20 DIAGNOSIS — G89.29 CHRONIC PAIN OF LEFT KNEE: ICD-10-CM

## 2024-02-20 DIAGNOSIS — M19.019 ARTHRITIS, SHOULDER REGION: ICD-10-CM

## 2024-02-20 LAB
ALBUMIN SERPL BCG-MCNC: 4 G/DL (ref 3.5–5.2)
ALP SERPL-CCNC: 68 U/L (ref 40–150)
ALT SERPL W P-5'-P-CCNC: 27 U/L (ref 0–70)
ANION GAP SERPL CALCULATED.3IONS-SCNC: 11 MMOL/L (ref 7–15)
AST SERPL W P-5'-P-CCNC: 33 U/L (ref 0–45)
B BURGDOR IGG+IGM SER QL: 0.08
BASOPHILS # BLD AUTO: 0 10E3/UL (ref 0–0.2)
BASOPHILS NFR BLD AUTO: 0 %
BILIRUB SERPL-MCNC: 0.3 MG/DL
BUN SERPL-MCNC: 34 MG/DL (ref 8–23)
CALCIUM SERPL-MCNC: 9.3 MG/DL (ref 8.8–10.2)
CHLORIDE SERPL-SCNC: 102 MMOL/L (ref 98–107)
CHOLEST SERPL-MCNC: 189 MG/DL
CREAT SERPL-MCNC: 1.31 MG/DL (ref 0.67–1.17)
CRP SERPL-MCNC: 77.18 MG/L
DEPRECATED HCO3 PLAS-SCNC: 26 MMOL/L (ref 22–29)
EGFRCR SERPLBLD CKD-EPI 2021: 60 ML/MIN/1.73M2
EOSINOPHIL # BLD AUTO: 0.3 10E3/UL (ref 0–0.7)
EOSINOPHIL NFR BLD AUTO: 2 %
ERYTHROCYTE [DISTWIDTH] IN BLOOD BY AUTOMATED COUNT: 12.4 % (ref 10–15)
ERYTHROCYTE [SEDIMENTATION RATE] IN BLOOD BY WESTERGREN METHOD: 47 MM/HR (ref 0–20)
FASTING STATUS PATIENT QL REPORTED: NO
GLUCOSE SERPL-MCNC: 109 MG/DL (ref 70–99)
HCT VFR BLD AUTO: 45.8 % (ref 40–53)
HDLC SERPL-MCNC: 46 MG/DL
HGB BLD-MCNC: 14.9 G/DL (ref 13.3–17.7)
IMM GRANULOCYTES # BLD: 0.1 10E3/UL
IMM GRANULOCYTES NFR BLD: 1 %
LDLC SERPL CALC-MCNC: 122 MG/DL
LYMPHOCYTES # BLD AUTO: 1.4 10E3/UL (ref 0.8–5.3)
LYMPHOCYTES NFR BLD AUTO: 13 %
MCH RBC QN AUTO: 30.3 PG (ref 26.5–33)
MCHC RBC AUTO-ENTMCNC: 32.5 G/DL (ref 31.5–36.5)
MCV RBC AUTO: 93 FL (ref 78–100)
MONOCYTES # BLD AUTO: 1 10E3/UL (ref 0–1.3)
MONOCYTES NFR BLD AUTO: 9 %
NEUTROPHILS # BLD AUTO: 8.5 10E3/UL (ref 1.6–8.3)
NEUTROPHILS NFR BLD AUTO: 76 %
NONHDLC SERPL-MCNC: 143 MG/DL
PLATELET # BLD AUTO: 339 10E3/UL (ref 150–450)
POTASSIUM SERPL-SCNC: 4.8 MMOL/L (ref 3.4–5.3)
PROT SERPL-MCNC: 8.1 G/DL (ref 6.4–8.3)
RBC # BLD AUTO: 4.92 10E6/UL (ref 4.4–5.9)
RHEUMATOID FACT SERPL-ACNC: <10 IU/ML
SODIUM SERPL-SCNC: 139 MMOL/L (ref 135–145)
TRIGL SERPL-MCNC: 103 MG/DL
WBC # BLD AUTO: 11.2 10E3/UL (ref 4–11)

## 2024-02-20 PROCEDURE — 86038 ANTINUCLEAR ANTIBODIES: CPT

## 2024-02-20 PROCEDURE — 80053 COMPREHEN METABOLIC PANEL: CPT

## 2024-02-20 PROCEDURE — 99214 OFFICE O/P EST MOD 30 MIN: CPT | Performed by: NURSE PRACTITIONER

## 2024-02-20 PROCEDURE — 73562 X-RAY EXAM OF KNEE 3: CPT | Mod: TC | Performed by: STUDENT IN AN ORGANIZED HEALTH CARE EDUCATION/TRAINING PROGRAM

## 2024-02-20 PROCEDURE — 85652 RBC SED RATE AUTOMATED: CPT

## 2024-02-20 PROCEDURE — 73030 X-RAY EXAM OF SHOULDER: CPT | Mod: TC | Performed by: STUDENT IN AN ORGANIZED HEALTH CARE EDUCATION/TRAINING PROGRAM

## 2024-02-20 PROCEDURE — 86618 LYME DISEASE ANTIBODY: CPT

## 2024-02-20 PROCEDURE — 36415 COLL VENOUS BLD VENIPUNCTURE: CPT

## 2024-02-20 PROCEDURE — 86140 C-REACTIVE PROTEIN: CPT

## 2024-02-20 PROCEDURE — 85025 COMPLETE CBC W/AUTO DIFF WBC: CPT

## 2024-02-20 PROCEDURE — 80061 LIPID PANEL: CPT

## 2024-02-20 PROCEDURE — 86431 RHEUMATOID FACTOR QUANT: CPT

## 2024-02-20 RX ORDER — RESPIRATORY SYNCYTIAL VIRUS VACCINE 120MCG/0.5
0.5 KIT INTRAMUSCULAR ONCE
Qty: 1 EACH | Refills: 0 | Status: CANCELLED | OUTPATIENT
Start: 2024-02-20 | End: 2024-02-20

## 2024-02-20 NOTE — PROGRESS NOTES
Assessment & Plan     ICD-10-CM    1. Multiple joint pain  M25.50 Comprehensive metabolic panel (BMP + Alb, Alk Phos, ALT, AST, Total. Bili, TP)     CBC with platelets and differential     CRP, inflammation     ESR: Erythrocyte sedimentation rate     Rheumatoid factor     Anti Nuclear Massiel IgG by IFA with Reflex     Physical Therapy Referral     Lyme Disease Total Abs Bld with Reflex to Confirm CLIA      2. Chronic pain of both shoulders  M25.511 Physical Therapy Referral    G89.29 XR Shoulder Right 2 Views    M25.512 XR Shoulder Left 2 Views     Orthopedic  Referral      3. Chronic pain of left knee  M25.562 Physical Therapy Referral    G89.29 XR Knee Left 3 Views     Orthopedic  Referral      4. Adhesive capsulitis of both shoulders  M75.01 Physical Therapy Referral    M75.02 XR Shoulder Right 2 Views     XR Shoulder Left 2 Views     Orthopedic  Referral      5. Arthritis, shoulder region  M19.019 Orthopedic  Referral      6. Arthritis of left knee  M17.12 Orthopedic  Referral      7. Congestive heart failure, unspecified HF chronicity, unspecified heart failure type (H)  I50.9 Lipid panel reflex to direct LDL Non-fasting     Physical Therapy Referral      8. Hyperlipidemia with target low density lipoprotein (LDL) cholesterol less than 100 mg/dL  E78.5         Ongoing joint pain that has been more severe over the last couple months. He is concerned there are other causes that just arthritis. He would like to see orthopedics for the pain in bilateral shoulders as well as left knee. Imaging completed today of the shoulders and knee revealing arthritis. Right shoulder worse than left. This is evident on physical exam as well.     Left knee, joint space loss is noted with likely chondromalacia present. Labs will be checked as ordered above to evaluate for potential other cause of the pain in his shoulders and knee.     He denies any symptoms of CHF, in these terms he is  "doing well. Continues to tolerate medications well.   Cholesterol labs completed today. He is not currently on a statin.                   Erica Rodriguez is a 63 year old, presenting for the following health issues:  Joint Pain        2/20/2024     7:36 AM   Additional Questions   Roomed by Elisabet FABIAN MA   Accompanied by Self     HPI     *Patient requesting additional bloodwork/diagnostics for his joint pain. Worsening pain and stiffness, thinks this is abnormal.     Concern - On-going joint pain  Onset: Has been on-going for a while  Description: Joint pain, feels like he has no range of motion in his joints. Had recent carpal tunnel surgery. Does not feel like his flexibility has improved from that. All joints, especially shoulders and knees. Takes him a while to get out of bed in the morning and just get moving  Intensity: severe  Progression of Symptoms:  worsening  Accompanying Signs & Symptoms: None  Previous history of similar problem: None  Precipitating factors:        Worsened by: Movement  Alleviating factors:        Improved by: Rest  Therapies tried and outcome: Tylenol and ibuprofen    Significant joint pain and discomfort overall in his joints. He has right wrist surgery for carpal tunnel 2 weeks ago. He had his first physical therapy yesterday.     Bilateral shoulder pain with rotator cuff tear that was \"years and years ago, I never did anything about it when I fell off of a ladder\". He believes this was about 25 years ago.      General joint pain has been present he has a hard time getting his body moving. Pain improves as the day goes on. He has noticed significant difficulties with position changes due to the carpal tunnel, bilateral shoulder pain and left knee pain.     He feels in general his worsening arthritis pain has been more sudden since he started having the carpal tunnel syndrome. He has noted that his general activity is significantly reduced due to the pain that worsens with position " "changes.       Review of Systems  Constitutional, HEENT, cardiovascular, pulmonary, gi and gu systems are negative, except as otherwise noted.      Objective    /80   Pulse 87   Temp 97.6  F (36.4  C) (Tympanic)   Resp 18   Ht 1.778 m (5' 10\")   Wt 117 kg (258 lb)   SpO2 95%   BMI 37.02 kg/m    Body mass index is 37.02 kg/m .  Physical Exam   GENERAL: alert and no distress  MS: decreased range of motion of bilateral shoulders. Unable to abduct shoulder to greater than 90 degrees without significant effort and pain. Reduced range of motion with flexion, extension, internal and external rotation of bilateral shoulders. Left knee exam varus and valgus testing negative. Negative anterior, posterior drawer.   NEURO: Normal strength and tone, mentation intact and speech normal  PSYCH: mentation appears normal, affect normal/bright    Xray - Reviewed and interpreted by me. Significant arthritis present in imaged joints. See official radiology read as below.   Left knee: Joint space narrowing present.   Right shoulder: severe AC and glenohumeral joint osteoarthritis present.   Left shoulder: moderate osteoarthritis.     XR Knee Left 3 Views    Result Date: 2/20/2024  XR KNEE LEFT 3 VIEWS 2/20/2024 8:25 AM HISTORY: Chronic pain of left knee; Chronic pain of left knee COMPARISON: None.     IMPRESSION: Tricompartmental osteoarthrosis most significant in the lateral compartment where there is moderate joint space narrowing. Intra-articular bodies at the posterior aspect of the knee. No acute fracture. Small joint effusion. ZOLTAN CR MD   SYSTEM ID:  [id number]    XR Shoulder Right 2 Views    Result Date: 2/20/2024  XR SHOULDER LEFT 2 VIEWS, XR SHOULDER RIGHT 2 VIEWS 2/20/2024 8:17 AM HISTORY: Shoulder pain. COMPARISON: None.     IMPRESSION: Right shoulder: Moderate to severe acromioclavicular and glenohumeral joint osteoarthrosis. No acute fracture or dislocation. Left shoulder: Mild to moderate " acromioclavicular and glenohumeral joint osteoarthrosis. No acute fracture or dislocation. ZOLTAN CR MD   SYSTEM ID:  [id number]    XR Shoulder Left 2 Views    Result Date: 2/20/2024  XR SHOULDER LEFT 2 VIEWS, XR SHOULDER RIGHT 2 VIEWS 2/20/2024 8:17 AM HISTORY: Shoulder pain. COMPARISON: None.     IMPRESSION: Right shoulder: Moderate to severe acromioclavicular and glenohumeral joint osteoarthrosis. No acute fracture or dislocation. Left shoulder: Mild to moderate acromioclavicular and glenohumeral joint osteoarthrosis. No acute fracture or dislocation. ZOLTAN CR MD   SYSTEM ID:  [id number]           Signed Electronically by: TORYE Ramos CNP

## 2024-02-21 LAB — ANA SER QL IF: NEGATIVE

## 2024-03-04 ENCOUNTER — TRANSFERRED RECORDS (OUTPATIENT)
Dept: HEALTH INFORMATION MANAGEMENT | Facility: CLINIC | Age: 64
End: 2024-03-04
Payer: COMMERCIAL

## 2024-03-07 DIAGNOSIS — N40.0 BENIGN PROSTATIC HYPERPLASIA, UNSPECIFIED WHETHER LOWER URINARY TRACT SYMPTOMS PRESENT: ICD-10-CM

## 2024-03-07 RX ORDER — TAMSULOSIN HYDROCHLORIDE 0.4 MG/1
CAPSULE ORAL
Qty: 90 CAPSULE | Refills: 2 | Status: SHIPPED | OUTPATIENT
Start: 2024-03-07

## 2024-03-07 NOTE — TELEPHONE ENCOUNTER
Flomax - Pt needs refill ASAP/TODAY.  No need to call patient back, unless there are questions or problems.

## 2024-03-08 ENCOUNTER — TRANSFERRED RECORDS (OUTPATIENT)
Dept: HEALTH INFORMATION MANAGEMENT | Facility: CLINIC | Age: 64
End: 2024-03-08
Payer: COMMERCIAL

## 2024-03-12 ENCOUNTER — ANCILLARY PROCEDURE (OUTPATIENT)
Dept: CARDIOLOGY | Facility: CLINIC | Age: 64
End: 2024-03-12
Attending: INTERNAL MEDICINE
Payer: COMMERCIAL

## 2024-03-12 DIAGNOSIS — I35.0 AORTIC VALVE STENOSIS, ETIOLOGY OF CARDIAC VALVE DISEASE UNSPECIFIED: ICD-10-CM

## 2024-03-12 LAB — LVEF ECHO: NORMAL

## 2024-03-12 PROCEDURE — 93306 TTE W/DOPPLER COMPLETE: CPT | Performed by: INTERNAL MEDICINE

## 2024-03-12 PROCEDURE — 99207 PR STATISTIC IV PUSH SINGLE INITIAL SUBSTANCE: CPT | Performed by: INTERNAL MEDICINE

## 2024-03-12 RX ADMIN — Medication 7 ML: at 12:00

## 2024-03-15 ENCOUNTER — LAB (OUTPATIENT)
Dept: LAB | Facility: CLINIC | Age: 64
End: 2024-03-15
Payer: COMMERCIAL

## 2024-03-15 ENCOUNTER — OFFICE VISIT (OUTPATIENT)
Dept: FAMILY MEDICINE | Facility: CLINIC | Age: 64
End: 2024-03-15
Payer: COMMERCIAL

## 2024-03-15 VITALS
HEIGHT: 70 IN | SYSTOLIC BLOOD PRESSURE: 132 MMHG | HEART RATE: 78 BPM | TEMPERATURE: 97 F | DIASTOLIC BLOOD PRESSURE: 72 MMHG | RESPIRATION RATE: 16 BRPM | OXYGEN SATURATION: 94 % | BODY MASS INDEX: 36.94 KG/M2 | WEIGHT: 258 LBS

## 2024-03-15 DIAGNOSIS — G47.33 OSA (OBSTRUCTIVE SLEEP APNEA): ICD-10-CM

## 2024-03-15 DIAGNOSIS — G89.29 CHRONIC LEFT SHOULDER PAIN: ICD-10-CM

## 2024-03-15 DIAGNOSIS — Z01.818 PREOP GENERAL PHYSICAL EXAM: Primary | ICD-10-CM

## 2024-03-15 DIAGNOSIS — M19.019 ARTHRITIS, SHOULDER REGION: ICD-10-CM

## 2024-03-15 DIAGNOSIS — I50.9 CONGESTIVE HEART FAILURE, UNSPECIFIED HF CHRONICITY, UNSPECIFIED HEART FAILURE TYPE (H): ICD-10-CM

## 2024-03-15 DIAGNOSIS — M25.512 CHRONIC LEFT SHOULDER PAIN: ICD-10-CM

## 2024-03-15 DIAGNOSIS — E78.5 HYPERLIPIDEMIA WITH TARGET LOW DENSITY LIPOPROTEIN (LDL) CHOLESTEROL LESS THAN 100 MG/DL: ICD-10-CM

## 2024-03-15 DIAGNOSIS — R39.9 LOWER URINARY TRACT SYMPTOMS (LUTS): ICD-10-CM

## 2024-03-15 DIAGNOSIS — Z01.818 PREOP GENERAL PHYSICAL EXAM: ICD-10-CM

## 2024-03-15 DIAGNOSIS — S46.012S TRAUMATIC TEAR OF LEFT ROTATOR CUFF, UNSPECIFIED TEAR EXTENT, SEQUELA: ICD-10-CM

## 2024-03-15 DIAGNOSIS — I10 ESSENTIAL HYPERTENSION: ICD-10-CM

## 2024-03-15 DIAGNOSIS — I35.0 AORTIC VALVE STENOSIS, ETIOLOGY OF CARDIAC VALVE DISEASE UNSPECIFIED: ICD-10-CM

## 2024-03-15 LAB
CREAT SERPL-MCNC: 1.32 MG/DL (ref 0.67–1.17)
EGFRCR SERPLBLD CKD-EPI 2021: 60 ML/MIN/1.73M2
HGB BLD-MCNC: 13.2 G/DL (ref 13.3–17.7)
HOLD SPECIMEN: NORMAL
POTASSIUM SERPL-SCNC: 4.5 MMOL/L (ref 3.4–5.3)

## 2024-03-15 PROCEDURE — 82565 ASSAY OF CREATININE: CPT

## 2024-03-15 PROCEDURE — 99214 OFFICE O/P EST MOD 30 MIN: CPT | Performed by: NURSE PRACTITIONER

## 2024-03-15 PROCEDURE — 84132 ASSAY OF SERUM POTASSIUM: CPT

## 2024-03-15 PROCEDURE — 85018 HEMOGLOBIN: CPT

## 2024-03-15 PROCEDURE — 36415 COLL VENOUS BLD VENIPUNCTURE: CPT

## 2024-03-15 RX ORDER — RESPIRATORY SYNCYTIAL VIRUS VACCINE 120MCG/0.5
0.5 KIT INTRAMUSCULAR ONCE
Qty: 1 EACH | Refills: 0 | Status: CANCELLED | OUTPATIENT
Start: 2024-03-15 | End: 2024-03-15

## 2024-03-15 RX ORDER — TRAMADOL HYDROCHLORIDE 50 MG/1
TABLET ORAL
COMMUNITY
Start: 2024-03-04 | End: 2024-03-15

## 2024-03-15 RX ORDER — TRAMADOL HYDROCHLORIDE 50 MG/1
TABLET ORAL
Qty: 12 TABLET | Refills: 0 | Status: ON HOLD | OUTPATIENT
Start: 2024-03-15 | End: 2024-04-02

## 2024-03-15 ASSESSMENT — PAIN SCALES - GENERAL: PAINLEVEL: NO PAIN (0)

## 2024-03-15 NOTE — PATIENT INSTRUCTIONS
Preparing for Your Surgery  Getting started  A nurse will call you to review your health history and instructions. They will give you an arrival time based on your scheduled surgery time. Please be ready to share:  Your doctor's clinic name and phone number  Your medical, surgical, and anesthesia history  A list of allergies and sensitivities  A list of medicines, including herbal treatments and over-the-counter drugs  Whether the patient has a legal guardian (ask how to send us the papers in advance)  Please tell us if you're pregnant--or if there's any chance you might be pregnant. Some surgeries may injure a fetus (unborn baby), so they require a pregnancy test. Surgeries that are safe for a fetus don't always need a test, and you can choose whether to have one.   If you have a child who's having surgery, please ask for a copy of Preparing for Your Child's Surgery.    Preparing for surgery  Within 10 to 30 days of surgery: Have a pre-op exam (sometimes called an H&P, or History and Physical). This can be done at a clinic or pre-operative center.  If you're having a , you may not need this exam. Talk to your care team.  At your pre-op exam, talk to your care team about all medicines you take. If you need to stop any medicines before surgery, ask when to start taking them again.  We do this for your safety. Many medicines can make you bleed too much during surgery. Some change how well surgery (anesthesia) drugs work.  Call your insurance company to let them know you're having surgery. (If you don't have insurance, call [phone].)  Call your clinic if there's any change in your health. This includes signs of a cold or flu (sore throat, runny nose, cough, rash, fever). It also includes a scrape or scratch near the surgery site.  If you have questions on the day of surgery, call your hospital or surgery center.  Eating and drinking guidelines  For your safety: Unless your surgeon tells you otherwise,  follow the guidelines below.  Eat and drink as usual until 8 hours before you arrive for surgery. After that, no food or milk.  Drink clear liquids until 2 hours before you arrive. These are liquids you can see through, like water, Gatorade, and Propel Water. They also include plain black coffee and tea (no cream or milk), candy, and breath mints. You can spit out gum when you arrive.  If you drink alcohol: Stop drinking it the night before surgery.  If your care team tells you to take medicine on the morning of surgery, it's okay to take it with a sip of water.  Preventing infection  Shower or bathe the night before and morning of your surgery. Follow the instructions your clinic gave you. (If no instructions, use regular soap.)  Don't shave or clip hair near your surgery site. We'll remove the hair if needed.  Don't smoke or vape the morning of surgery. You may chew nicotine gum up to 2 hours before surgery. A nicotine patch is okay.  Note: Some surgeries require you to completely quit smoking and nicotine. Check with your surgeon.  Your care team will make every effort to keep you safe from infection. We will:  Clean our hands often with soap and water (or an alcohol-based hand rub).  Clean the skin at your surgery site with a special soap that kills germs.  Give you a special gown to keep you warm. (Cold raises the risk of infection.)  Wear special hair covers, masks, gowns and gloves during surgery.  Give antibiotic medicine, if prescribed. Not all surgeries need antibiotics.  What to bring on the day of surgery  Photo ID and insurance card  Copy of your health care directive, if you have one  Glasses and hearing aids (bring cases)  You can't wear contacts during surgery  Inhaler and eye drops, if you use them (tell us about these when you arrive)  CPAP machine or breathing device, if you use them  A few personal items, if spending the night  If you have . . .  A pacemaker, ICD (cardiac defibrillator) or other  implant: Bring the ID card.  An implanted stimulator: Bring the remote control.  A legal guardian: Bring a copy of the certified (court-stamped) guardianship papers.  Please remove any jewelry, including body piercings. Leave jewelry and other valuables at home.  If you're going home the day of surgery  You must have a responsible adult drive you home. They should stay with you overnight as well.  If you don't have someone to stay with you, and you aren't safe to go home alone, we may keep you overnight. Insurance often won't pay for this.  After surgery  If it's hard to control your pain or you need more pain medicine, please call your surgeon's office.  Questions?   If you have any questions for your care team, list them here: _________________________________________________________________________________________________________________________________________________________________________ ____________________________________ ____________________________________ ____________________________________  For informational purposes only. Not to replace the advice of your health care provider. Copyright   2003, 2019 [employer]. All rights reserved. Clinically reviewed by Tawnya Andres MD. [employer] 543240 - REV 12/22.    How to Take Your Medication Before Surgery  - HOLD (do not take) lisinopril-hydrochlorothiazide on the day of surgery.   Stop multivitamins 1 week before surgery.   Hold Aleve 4 days before surgery and Advil 1 day prior.       Okay to tylenol

## 2024-03-15 NOTE — PROGRESS NOTES
Preoperative Evaluation  [facility]  [address]  [facility] [address]  Phone: [phone]  Primary Provider: Vee Bhatti  Pre-op Performing Provider: HERMINIA DILLON  Mar 15, 2024       Michael is a 63 year old, presenting for the following:  Pre-Op Exam        3/15/2024     8:01 AM   Additional Questions   Roomed by Elisabet FABIAN MA     Surgical Information  Surgery/Procedure: LEFT REVERSE TOTAL SHOULDER ARTHROPLASTY   Surgery Location: [facility]  Surgeon: Timothy Montana MD  Surgery Date: 04/01/2024   Time of Surgery: 12:00pm  Where patient plans to recover: At home with family  Fax number for surgical facility: Note does not need to be faxed, will be available electronically in Epic.    Assessment & Plan     The proposed surgical procedure is considered LOW risk.    Preop general physical exam  Arthritis, shoulder region  Chronic left shoulder pain  Traumatic tear of left rotator cuff, unspecified tear extent, sequela  Preoperative exam completed today for upcoming left total shoulder arthroplasty due to significant degeneration and chronic pain of the shoulder and previous rotator cuff trauma.  Recent cardiac workup shows stability.  He is cleared for upcoming surgical procedure without further clinical clarification.  - Hemoglobin w/Reflex to Iron Studies; Future  - Potassium; Future  - Creatinine; Future  - traMADol (ULTRAM) 50 MG tablet; TAKE 1 TABLET BY MOUTH EVERY 4 TO 6 HOURS AS NEEDED FOR PAIN . DO NOT EXCEED 5 PER 24 HOURS      Essential hypertension  Well controlled. Will hold lisinopril-hydrochlorothiazide.     CAROL (obstructive sleep apnea)  Wears a cpap    Congestive heart failure, unspecified HF chronicity, unspecified heart failure type (H)  Stable. Recent follow up with cardiology shows no significant changes in general cardiac health. Echo and EKG both recent.     Hyperlipidemia with target low density lipoprotein (LDL)  cholesterol less than 100 mg/dL  Stable doing well. Not currently taking a statin due to side effects.     Aortic valve stenosis, etiology of cardiac valve disease unspecified  Murmur present. No changes. ECHO completed. 3/12/24    Lower urinary tract symptoms (LUTS)  On flomax for symptom management.       - No identified additional risk factors other than previously addressed    Antiplatelet or Anticoagulation Medication Instructions   - Patient is on no antiplatelet or anticoagulation medications.    Additional Medication Instructions   - ACE/ARB: HOLD on day of surgery (minimum 11 hours for general anesthesia).   - Diuretics: HOLD on the day of surgery.   - ibuprofen (Advil, Motrin): HOLD 1 day before surgery.    - naproxen (Aleve, Naprosyn): HOLD 4 days before surgery.    - anticholinergics: Continue without modification.     Recommendation  APPROVAL GIVEN to proceed with proposed procedure, without further diagnostic evaluation.        Subjective       HPI related to upcoming procedure: Left shoulder repair due to arthritis and chronic rotator cuff injury. Failed conservative management. Surgery recommended for repair. He is recovering from carpal tunnel release and this is going well.         3/15/2024     7:56 AM   Preop Questions   1. Have you ever had a heart attack or stroke? No   2. Have you ever had surgery on your heart or blood vessels, such as a stent placement, a coronary artery bypass, or surgery on an artery in your head, neck, heart, or legs? No   3. Do you have chest pain with activity? No   4. Do you have a history of  heart failure? No   5. Do you currently have a cold, bronchitis or symptoms of other infection? YES - recovering from URI he is on day 5    6. Do you have a cough, shortness of breath, or wheezing? YES - recovery from recent uri with a cough. No chest pain or sob.    7. Do you or anyone in your family have previous history of blood clots? No   8. Do you or does anyone in your  family have a serious bleeding problem such as prolonged bleeding following surgeries or cuts? No   9. Have you ever had problems with anemia or been told to take iron pills? No   10. Have you had any abnormal blood loss such as black, tarry or bloody stools? No   11. Have you ever had a blood transfusion? No   12. Are you willing to have a blood transfusion if it is medically needed before, during, or after your surgery? Yes   13. Have you or any of your relatives ever had problems with anesthesia? No   14. Do you have sleep apnea, excessive snoring or daytime drowsiness? YES - wears a CPAP, diagnosed with CAROL   14a. Do you have a CPAP machine? Yes   15. Do you have any artifical heart valves or other implanted medical devices like a pacemaker, defibrillator, or continuous glucose monitor? No   16. Do you have artificial joints? YES - right hip   17. Are you allergic to latex? No       Health Care Directive  Patient does not have a Health Care Directive or Living Will: Patient states has Advance Directive and will bring in a copy to clinic.    Preoperative Review of    reviewed - controlled substances reflected in medication list.      Status of Chronic Conditions:  CHF - Patient has a longstanding history of moderate-severe CHF. Exacerbating conditions include no execerbations. Currently the patient's condition is same. Current treatment regimen includes ACE inhibitor and diuretic. The patient denies chest pain, edema, orthopnea, SOB or recent weight gain. Last Echocardiogram 3/12/24, EKG 1/29/24.     HYPERLIPIDEMIA - Patient has a long history of significant Hyperlipidemia requiring medication for treatment with recent good control. Patient reports no problems or side effects with the medication.     HYPERTENSION - Patient has longstanding history of HTN , currently denies any symptoms referable to elevated blood pressure. Specifically denies chest pain, palpitations, dyspnea, orthopnea, PND or peripheral  edema. Blood pressure readings have been in normal range. Current medication regimen is as listed below. Patient denies any side effects of medication.     SLEEP PROBLEM - Patient has a longstanding history of snoring.. Patient has tried OTC medications with limited success.     Patient Active Problem List    Diagnosis Date Noted    Essential hypertension 01/29/2024     Priority: Medium    Lower urinary tract symptoms (LUTS) 01/29/2024     Priority: Medium    Ventricular tachycardia (H) 11/16/2023     Priority: Medium    Aortic valve stenosis, etiology of cardiac valve disease unspecified 01/03/2023     Priority: Medium    Rash and nonspecific skin eruption 01/03/2023     Priority: Medium    Degenerative joint disease (DJD) of hip 04/04/2022     Priority: Medium    CHF (congestive heart failure) (H) 03/07/2022     Priority: Medium    CAROL (obstructive sleep apnea) 04/15/2021     Priority: Medium     4/12/2021 [facility] Sleep Study (255.0 lbs) - AHI 30.0, RDI 31.8, Supine AHI 47.1, REM AHI 78.0, Low O2 58.9%, Time Spent ?88% 41.2 minutes / Time Spent ?89% 51.6 minutes.      Morbid obesity (H) 03/09/2021     Priority: Medium    Snoring 08/06/2015     Priority: Medium     Formatting of this note might be different from the original.  8/2015: advise to follow up for sleep study.      Hayfever 08/14/2012     Priority: Medium     Formatting of this note might be different from the original.  Receives DEPO medrol injection 40mg annually and this seems to help him year round.      Glenoid labral tear 04/06/2011     Priority: Medium     Formatting of this note might be different from the original.  He has been doing a lot better since starting Glucosamine- Chondroitin.      Rupture of long head biceps tendon 04/06/2011     Priority: Medium    Supraspinatus tendon tear 04/06/2011     Priority: Medium    Hyperlipidemia with target low density lipoprotein (LDL) cholesterol less than 100 mg/dL 04/01/2011     Priority:  Medium     Formatting of this note is different from the original.  Patient stopped taking statins 2015 as he was concerned about potential side effects. Importance of takign care of modifiable risk factors discussed with Patient. Will continue to address.  Lovaza (Omega-3 PUFA) 2mg twice a day).    Last Lipids:  Chol: 218    2013  T    2013  HDL:   55    2013  LDL:  127    2013   LDL CHOLESTEROL (mg/dL)   Date Value   2013 127     [place] 10-year CHD Risk Score: 8% (11 Total Points)      Pre-diabetes 2011     Priority: Medium     Formatting of this note is different from the original.      HEMOGLOBIN A1C MONITORING (POCT) (%)   Date Value   2011 5.4      GLUCOSE                   (mg/dL)   Date Value   3/25/2013 77      Raynaud's phenomenon 2011     Priority: Medium    Right shoulder pain 2011     Priority: Medium    CARDIOVASCULAR SCREENING; LDL GOAL LESS THAN 160 10/31/2010     Priority: Medium      Past Medical History:   Diagnosis Date    Arthritis     Congestive heart failure (H)     Hyperlipidemia     Hypertension     Obese     Prediabetes     Sleep apnea     doesn't use CPAP     Past Surgical History:   Procedure Laterality Date    ABDOMEN SURGERY      ARTHROPLASTY HIP Right 2022    Procedure: RIGHT TOTAL HIP ARTHROPLASTY;  Surgeon: Timothy Montana MD;  Location: [facility] OR    CHOLECYSTECTOMY      COLONOSCOPY N/A 2021    Procedure: COLONOSCOPY, WITH POLYPECTOMY AND BIOPSY;  Surgeon: Lito Sweet DO;  Location: [facility]    EYE SURGERY      FINGER SURGERY       Current Outpatient Medications   Medication Sig Dispense Refill    Glucosamine Sulfate 1000 MG TABS Take 1,000 mg by mouth daily       lisinopril-hydrochlorothiazide (ZESTORETIC) 20-12.5 MG tablet Take 1 tablet by mouth daily 90 tablet 3    Multiple Vitamin (MULTIVITAMIN ADULT PO) Take 1 tablet by mouth daily       tamsulosin (FLOMAX) 0.4 MG capsule TAKE 1  "CAPSULE BY MOUTH ONCE DAILY WITH DINNER 90 capsule 2    zinc gluconate 50 MG tablet Take 50 mg by mouth daily      acetaminophen (TYLENOL) 325 MG tablet Take 3 tablets (975 mg) by mouth 3 times daily (Patient not taking: Reported on 3/15/2024)  0    BETA BLOCKER NOT PRESCRIBED (INTENTIONAL) Please choose reason not prescribed from choices below.      triamcinolone (KENALOG) 0.5 % external ointment Apply 1 g topically 2 times daily (Patient not taking: Reported on 2024) 15 g 0       No Known Allergies     Social History     Tobacco Use    Smoking status: Former     Packs/day: 4.00     Years: 30.00     Additional pack years: 0.00     Total pack years: 120.00     Types: Cigarettes     Quit date: 1995     Years since quittin.2    Smokeless tobacco: Former     Quit date: 1975   Substance Use Topics    Alcohol use: Yes     Comment: 3-4 drinks a week     Family History   Problem Relation Age of Onset    Heart Disease Father         emphasemia    Coronary Artery Disease Father     Emphysema Father     Asthma Father     Skin Cancer Mother      History   Drug Use No         Review of Systems    Review of Systems  Constitutional, HEENT, cardiovascular, pulmonary, GI, , musculoskeletal, neuro, skin, endocrine and psych systems are negative, except as otherwise noted.    Objective    /72   Pulse 78   Temp 97  F (36.1  C) (Tympanic)   Resp 16   Ht 1.778 m (5' 10\")   Wt 117 kg (258 lb)   SpO2 94%   BMI 37.02 kg/m     Estimated body mass index is 37.02 kg/m  as calculated from the following:    Height as of this encounter: 1.778 m (5' 10\").    Weight as of this encounter: 117 kg (258 lb).  Physical Exam  GENERAL: alert and no distress  EYES: Eyes grossly normal to inspection, PERRL and conjunctivae and sclerae normal  HENT: ear canals and TM's normal, nose and mouth without ulcers or lesions  NECK: no adenopathy, no asymmetry, masses, or scars  RESP: lungs clear to auscultation - no rales, rhonchi " or wheezes  CV: regular rate and rhythm, normal S1 S2, no S3 or S4, no murmur, click or rub, no peripheral edema  ABDOMEN: soft, nontender, no hepatosplenomegaly, no masses and bowel sounds normal  MS: no gross musculoskeletal defects noted, no edema  SKIN: no suspicious lesions or rashes  NEURO: Normal strength and tone, mentation intact and speech normal  PSYCH: mentation appears normal, affect normal/bright  LYMPH: no cervical, supraclavicular, axillary, or inguinal adenopathy    Recent Labs   Lab Test 02/20/24  0827 01/29/24  1211 01/10/24  1406 01/03/23  1442 06/08/22  1341 05/19/22  1032 04/05/22  0632 04/04/22  1103   HGB 14.9  --  16.4  --   --   --    < > 16.2     --  271  --   --   --   --  246   INR  --   --   --   --   --   --   --  1.04    137  --  144   < >  --   --   --    POTASSIUM 4.8 4.7  --  4.5   < >  --    < > 4.3   CR 1.31* 1.23*  --  1.11   < >  --    < > 1.08   A1C  --   --   --  5.9*  --  5.4  --   --     < > = values in this interval not displayed.        Diagnostics  Labs pending at this time.  Results will be reviewed when available.   No EKG this visit, completed in the last 90 days.    Revised Cardiac Risk Index (RCRI)  The patient has the following serious cardiovascular risks for perioperative complications:   - No serious cardiac risks = 0 points     RCRI Interpretation: 0 points: Class I (very low risk - 0.4% complication rate)         Signed Electronically by: TOREY Ramos CNP  Copy of this evaluation report is provided to requesting physician.

## 2024-03-15 NOTE — H&P (VIEW-ONLY)
Preoperative Evaluation  [facility]  [address]  [facility] [address]  Phone: [phone]  Primary Provider: Vee Bhatti  Pre-op Performing Provider: HERMINIA DILLON  Mar 15, 2024       Michael is a 63 year old, presenting for the following:  Pre-Op Exam        3/15/2024     8:01 AM   Additional Questions   Roomed by Elisabet FABIAN MA     Surgical Information  Surgery/Procedure: LEFT REVERSE TOTAL SHOULDER ARTHROPLASTY   Surgery Location: [facility]  Surgeon: Timothy Montana MD  Surgery Date: 04/01/2024   Time of Surgery: 12:00pm  Where patient plans to recover: At home with family  Fax number for surgical facility: Note does not need to be faxed, will be available electronically in Epic.    Assessment & Plan     The proposed surgical procedure is considered LOW risk.    Preop general physical exam  Arthritis, shoulder region  Chronic left shoulder pain  Traumatic tear of left rotator cuff, unspecified tear extent, sequela  Preoperative exam completed today for upcoming left total shoulder arthroplasty due to significant degeneration and chronic pain of the shoulder and previous rotator cuff trauma.  Recent cardiac workup shows stability.  He is cleared for upcoming surgical procedure without further clinical clarification.  - Hemoglobin w/Reflex to Iron Studies; Future  - Potassium; Future  - Creatinine; Future  - traMADol (ULTRAM) 50 MG tablet; TAKE 1 TABLET BY MOUTH EVERY 4 TO 6 HOURS AS NEEDED FOR PAIN . DO NOT EXCEED 5 PER 24 HOURS      Essential hypertension  Well controlled. Will hold lisinopril-hydrochlorothiazide.     CAROL (obstructive sleep apnea)  Wears a cpap    Congestive heart failure, unspecified HF chronicity, unspecified heart failure type (H)  Stable. Recent follow up with cardiology shows no significant changes in general cardiac health. Echo and EKG both recent.     Hyperlipidemia with target low density lipoprotein (LDL)  cholesterol less than 100 mg/dL  Stable doing well. Not currently taking a statin due to side effects.     Aortic valve stenosis, etiology of cardiac valve disease unspecified  Murmur present. No changes. ECHO completed. 3/12/24    Lower urinary tract symptoms (LUTS)  On flomax for symptom management.       - No identified additional risk factors other than previously addressed    Antiplatelet or Anticoagulation Medication Instructions   - Patient is on no antiplatelet or anticoagulation medications.    Additional Medication Instructions   - ACE/ARB: HOLD on day of surgery (minimum 11 hours for general anesthesia).   - Diuretics: HOLD on the day of surgery.   - ibuprofen (Advil, Motrin): HOLD 1 day before surgery.    - naproxen (Aleve, Naprosyn): HOLD 4 days before surgery.    - anticholinergics: Continue without modification.     Recommendation  APPROVAL GIVEN to proceed with proposed procedure, without further diagnostic evaluation.        Subjective       HPI related to upcoming procedure: Left shoulder repair due to arthritis and chronic rotator cuff injury. Failed conservative management. Surgery recommended for repair. He is recovering from carpal tunnel release and this is going well.         3/15/2024     7:56 AM   Preop Questions   1. Have you ever had a heart attack or stroke? No   2. Have you ever had surgery on your heart or blood vessels, such as a stent placement, a coronary artery bypass, or surgery on an artery in your head, neck, heart, or legs? No   3. Do you have chest pain with activity? No   4. Do you have a history of  heart failure? No   5. Do you currently have a cold, bronchitis or symptoms of other infection? YES - recovering from URI he is on day 5    6. Do you have a cough, shortness of breath, or wheezing? YES - recovery from recent uri with a cough. No chest pain or sob.    7. Do you or anyone in your family have previous history of blood clots? No   8. Do you or does anyone in your  family have a serious bleeding problem such as prolonged bleeding following surgeries or cuts? No   9. Have you ever had problems with anemia or been told to take iron pills? No   10. Have you had any abnormal blood loss such as black, tarry or bloody stools? No   11. Have you ever had a blood transfusion? No   12. Are you willing to have a blood transfusion if it is medically needed before, during, or after your surgery? Yes   13. Have you or any of your relatives ever had problems with anesthesia? No   14. Do you have sleep apnea, excessive snoring or daytime drowsiness? YES - wears a CPAP, diagnosed with CAROL   14a. Do you have a CPAP machine? Yes   15. Do you have any artifical heart valves or other implanted medical devices like a pacemaker, defibrillator, or continuous glucose monitor? No   16. Do you have artificial joints? YES - right hip   17. Are you allergic to latex? No       Health Care Directive  Patient does not have a Health Care Directive or Living Will: Patient states has Advance Directive and will bring in a copy to clinic.    Preoperative Review of    reviewed - controlled substances reflected in medication list.      Status of Chronic Conditions:  CHF - Patient has a longstanding history of moderate-severe CHF. Exacerbating conditions include no execerbations. Currently the patient's condition is same. Current treatment regimen includes ACE inhibitor and diuretic. The patient denies chest pain, edema, orthopnea, SOB or recent weight gain. Last Echocardiogram 3/12/24, EKG 1/29/24.     HYPERLIPIDEMIA - Patient has a long history of significant Hyperlipidemia requiring medication for treatment with recent good control. Patient reports no problems or side effects with the medication.     HYPERTENSION - Patient has longstanding history of HTN , currently denies any symptoms referable to elevated blood pressure. Specifically denies chest pain, palpitations, dyspnea, orthopnea, PND or peripheral  edema. Blood pressure readings have been in normal range. Current medication regimen is as listed below. Patient denies any side effects of medication.     SLEEP PROBLEM - Patient has a longstanding history of snoring.. Patient has tried OTC medications with limited success.     Patient Active Problem List    Diagnosis Date Noted    Essential hypertension 01/29/2024     Priority: Medium    Lower urinary tract symptoms (LUTS) 01/29/2024     Priority: Medium    Ventricular tachycardia (H) 11/16/2023     Priority: Medium    Aortic valve stenosis, etiology of cardiac valve disease unspecified 01/03/2023     Priority: Medium    Rash and nonspecific skin eruption 01/03/2023     Priority: Medium    Degenerative joint disease (DJD) of hip 04/04/2022     Priority: Medium    CHF (congestive heart failure) (H) 03/07/2022     Priority: Medium    CAROL (obstructive sleep apnea) 04/15/2021     Priority: Medium     4/12/2021 [facility] Sleep Study (255.0 lbs) - AHI 30.0, RDI 31.8, Supine AHI 47.1, REM AHI 78.0, Low O2 58.9%, Time Spent ?88% 41.2 minutes / Time Spent ?89% 51.6 minutes.      Morbid obesity (H) 03/09/2021     Priority: Medium    Snoring 08/06/2015     Priority: Medium     Formatting of this note might be different from the original.  8/2015: advise to follow up for sleep study.      Hayfever 08/14/2012     Priority: Medium     Formatting of this note might be different from the original.  Receives DEPO medrol injection 40mg annually and this seems to help him year round.      Glenoid labral tear 04/06/2011     Priority: Medium     Formatting of this note might be different from the original.  He has been doing a lot better since starting Glucosamine- Chondroitin.      Rupture of long head biceps tendon 04/06/2011     Priority: Medium    Supraspinatus tendon tear 04/06/2011     Priority: Medium    Hyperlipidemia with target low density lipoprotein (LDL) cholesterol less than 100 mg/dL 04/01/2011     Priority:  Medium     Formatting of this note is different from the original.  Patient stopped taking statins 2015 as he was concerned about potential side effects. Importance of takign care of modifiable risk factors discussed with Patient. Will continue to address.  Lovaza (Omega-3 PUFA) 2mg twice a day).    Last Lipids:  Chol: 218    2013  T    2013  HDL:   55    2013  LDL:  127    2013   LDL CHOLESTEROL (mg/dL)   Date Value   2013 127     [place] 10-year CHD Risk Score: 8% (11 Total Points)      Pre-diabetes 2011     Priority: Medium     Formatting of this note is different from the original.      HEMOGLOBIN A1C MONITORING (POCT) (%)   Date Value   2011 5.4      GLUCOSE                   (mg/dL)   Date Value   3/25/2013 77      Raynaud's phenomenon 2011     Priority: Medium    Right shoulder pain 2011     Priority: Medium    CARDIOVASCULAR SCREENING; LDL GOAL LESS THAN 160 10/31/2010     Priority: Medium      Past Medical History:   Diagnosis Date    Arthritis     Congestive heart failure (H)     Hyperlipidemia     Hypertension     Obese     Prediabetes     Sleep apnea     doesn't use CPAP     Past Surgical History:   Procedure Laterality Date    ABDOMEN SURGERY      ARTHROPLASTY HIP Right 2022    Procedure: RIGHT TOTAL HIP ARTHROPLASTY;  Surgeon: Timothy Montana MD;  Location: [facility] OR    CHOLECYSTECTOMY      COLONOSCOPY N/A 2021    Procedure: COLONOSCOPY, WITH POLYPECTOMY AND BIOPSY;  Surgeon: Lito Sweet DO;  Location: [facility]    EYE SURGERY      FINGER SURGERY       Current Outpatient Medications   Medication Sig Dispense Refill    Glucosamine Sulfate 1000 MG TABS Take 1,000 mg by mouth daily       lisinopril-hydrochlorothiazide (ZESTORETIC) 20-12.5 MG tablet Take 1 tablet by mouth daily 90 tablet 3    Multiple Vitamin (MULTIVITAMIN ADULT PO) Take 1 tablet by mouth daily       tamsulosin (FLOMAX) 0.4 MG capsule TAKE 1  "CAPSULE BY MOUTH ONCE DAILY WITH DINNER 90 capsule 2    zinc gluconate 50 MG tablet Take 50 mg by mouth daily      acetaminophen (TYLENOL) 325 MG tablet Take 3 tablets (975 mg) by mouth 3 times daily (Patient not taking: Reported on 3/15/2024)  0    BETA BLOCKER NOT PRESCRIBED (INTENTIONAL) Please choose reason not prescribed from choices below.      triamcinolone (KENALOG) 0.5 % external ointment Apply 1 g topically 2 times daily (Patient not taking: Reported on 2024) 15 g 0       No Known Allergies     Social History     Tobacco Use    Smoking status: Former     Packs/day: 4.00     Years: 30.00     Additional pack years: 0.00     Total pack years: 120.00     Types: Cigarettes     Quit date: 1995     Years since quittin.2    Smokeless tobacco: Former     Quit date: 1975   Substance Use Topics    Alcohol use: Yes     Comment: 3-4 drinks a week     Family History   Problem Relation Age of Onset    Heart Disease Father         emphasemia    Coronary Artery Disease Father     Emphysema Father     Asthma Father     Skin Cancer Mother      History   Drug Use No         Review of Systems    Review of Systems  Constitutional, HEENT, cardiovascular, pulmonary, GI, , musculoskeletal, neuro, skin, endocrine and psych systems are negative, except as otherwise noted.    Objective    /72   Pulse 78   Temp 97  F (36.1  C) (Tympanic)   Resp 16   Ht 1.778 m (5' 10\")   Wt 117 kg (258 lb)   SpO2 94%   BMI 37.02 kg/m     Estimated body mass index is 37.02 kg/m  as calculated from the following:    Height as of this encounter: 1.778 m (5' 10\").    Weight as of this encounter: 117 kg (258 lb).  Physical Exam  GENERAL: alert and no distress  EYES: Eyes grossly normal to inspection, PERRL and conjunctivae and sclerae normal  HENT: ear canals and TM's normal, nose and mouth without ulcers or lesions  NECK: no adenopathy, no asymmetry, masses, or scars  RESP: lungs clear to auscultation - no rales, rhonchi " or wheezes  CV: regular rate and rhythm, normal S1 S2, no S3 or S4, no murmur, click or rub, no peripheral edema  ABDOMEN: soft, nontender, no hepatosplenomegaly, no masses and bowel sounds normal  MS: no gross musculoskeletal defects noted, no edema  SKIN: no suspicious lesions or rashes  NEURO: Normal strength and tone, mentation intact and speech normal  PSYCH: mentation appears normal, affect normal/bright  LYMPH: no cervical, supraclavicular, axillary, or inguinal adenopathy    Recent Labs   Lab Test 02/20/24  0827 01/29/24  1211 01/10/24  1406 01/03/23  1442 06/08/22  1341 05/19/22  1032 04/05/22  0632 04/04/22  1103   HGB 14.9  --  16.4  --   --   --    < > 16.2     --  271  --   --   --   --  246   INR  --   --   --   --   --   --   --  1.04    137  --  144   < >  --   --   --    POTASSIUM 4.8 4.7  --  4.5   < >  --    < > 4.3   CR 1.31* 1.23*  --  1.11   < >  --    < > 1.08   A1C  --   --   --  5.9*  --  5.4  --   --     < > = values in this interval not displayed.        Diagnostics  Labs pending at this time.  Results will be reviewed when available.   No EKG this visit, completed in the last 90 days.    Revised Cardiac Risk Index (RCRI)  The patient has the following serious cardiovascular risks for perioperative complications:   - No serious cardiac risks = 0 points     RCRI Interpretation: 0 points: Class I (very low risk - 0.4% complication rate)         Signed Electronically by: TOREY Ramos CNP  Copy of this evaluation report is provided to requesting physician.

## 2024-03-22 ENCOUNTER — TELEPHONE (OUTPATIENT)
Dept: CARDIOLOGY | Facility: CLINIC | Age: 64
End: 2024-03-22
Payer: COMMERCIAL

## 2024-03-22 NOTE — TELEPHONE ENCOUNTER
Noted. Patient refusing follow up in clinic with provider.    Alexandra Lemons, RN, BSN  Cardiology RN Care Coordinator   [place]   Phone: [phone]  Fax: [phone] ([place]) [phone] ([place])

## 2024-03-22 NOTE — TELEPHONE ENCOUNTER
Called and spoke to patient to reschedule May appointment with Dr. Mcallister. Patient questioned why appointment is needed. He stated he recently had an echocardiogram done and it was normal. Writer explained that this appointment is to follow-up, discuss results, and check-in with provider. Writer explained that this was Dr. Mcallister's recommendation at his last appointment. Patient asked writer to cancel appointment as he does not feel it is necessary. Patient ended phone call.    CARMELITA sent to LINDSEY Conklin

## 2024-03-26 RX ORDER — AMOXICILLIN 500 MG/1
500 CAPSULE ORAL PRN
COMMUNITY
Start: 2023-10-15

## 2024-03-27 ENCOUNTER — TRANSFERRED RECORDS (OUTPATIENT)
Dept: HEALTH INFORMATION MANAGEMENT | Facility: CLINIC | Age: 64
End: 2024-03-27
Payer: COMMERCIAL

## 2024-03-27 ENCOUNTER — TELEPHONE (OUTPATIENT)
Dept: FAMILY MEDICINE | Facility: CLINIC | Age: 64
End: 2024-03-27
Payer: COMMERCIAL

## 2024-03-27 DIAGNOSIS — M19.019 ARTHRITIS, SHOULDER REGION: Primary | ICD-10-CM

## 2024-03-27 DIAGNOSIS — M25.512 CHRONIC LEFT SHOULDER PAIN: ICD-10-CM

## 2024-03-27 DIAGNOSIS — S46.012S TRAUMATIC TEAR OF LEFT ROTATOR CUFF, UNSPECIFIED TEAR EXTENT, SEQUELA: ICD-10-CM

## 2024-03-27 DIAGNOSIS — G89.29 CHRONIC LEFT SHOULDER PAIN: ICD-10-CM

## 2024-03-27 RX ORDER — HYDROCODONE BITARTRATE AND ACETAMINOPHEN 5; 325 MG/1; MG/1
1 TABLET ORAL EVERY 6 HOURS PRN
Qty: 10 TABLET | Refills: 0 | Status: SHIPPED | OUTPATIENT
Start: 2024-03-27 | End: 2024-03-30

## 2024-03-27 NOTE — TELEPHONE ENCOUNTER
Limited supply of norco provided for pain management until surgery. Further medication will need to be approved by surgeon or seen for an in person appointment.     TOREY Ramos CNP

## 2024-03-27 NOTE — TELEPHONE ENCOUNTER
Symptoms    Describe your symptoms: Pt rec'd lab results via My Chart and is calling to respond.  Pt states that he does have swelling in his hands and he continues to have pain all over this entire body.  Pt states that he has a hard time getting out of bed.  Pt states that the Tramadol did not work for his pain.  Pt is having shoulder surgery Monday and wants a stronger pain med Rx to get him through until then.  [facility].  Please call patient and advise.      Any pain: Yes:     How long have you been having symptoms: Ongoing      Have you been seen for this:  Yes:     Appointment offered?: No    Triage offered?: Yes:     Home remedies tried:     Preferred Pharmacy:   [facility] [address]   [address] [facility] [address]  Phone: [phone] Fax: [phone]    Could we send this information to you in [facility] or would you prefer to receive a phone call?:   Patient would prefer a phone call   Okay to leave a detailed message?: Yes at Cell number on file:    Telephone Information:   Mobile [phone]

## 2024-04-01 ENCOUNTER — ANESTHESIA (OUTPATIENT)
Dept: SURGERY | Facility: CLINIC | Age: 64
End: 2024-04-01
Payer: COMMERCIAL

## 2024-04-01 ENCOUNTER — APPOINTMENT (OUTPATIENT)
Dept: RADIOLOGY | Facility: CLINIC | Age: 64
End: 2024-04-01
Attending: ORTHOPAEDIC SURGERY
Payer: COMMERCIAL

## 2024-04-01 ENCOUNTER — ANESTHESIA EVENT (OUTPATIENT)
Dept: SURGERY | Facility: CLINIC | Age: 64
End: 2024-04-01
Payer: COMMERCIAL

## 2024-04-01 ENCOUNTER — ANCILLARY PROCEDURE (OUTPATIENT)
Dept: ULTRASOUND IMAGING | Facility: CLINIC | Age: 64
End: 2024-04-01
Attending: STUDENT IN AN ORGANIZED HEALTH CARE EDUCATION/TRAINING PROGRAM
Payer: COMMERCIAL

## 2024-04-01 ENCOUNTER — HOSPITAL ENCOUNTER (OUTPATIENT)
Facility: CLINIC | Age: 64
Discharge: HOME | End: 2024-04-02
Attending: ORTHOPAEDIC SURGERY | Admitting: ORTHOPAEDIC SURGERY
Payer: COMMERCIAL

## 2024-04-01 DIAGNOSIS — Z96.612 S/P REVERSE TOTAL SHOULDER ARTHROPLASTY, LEFT: Primary | ICD-10-CM

## 2024-04-01 PROBLEM — M19.012 DJD OF LEFT SHOULDER: Status: ACTIVE | Noted: 2024-04-01

## 2024-04-01 PROBLEM — T88.4XXA HARD TO INTUBATE: Status: ACTIVE | Noted: 2024-04-01

## 2024-04-01 LAB
CREAT SERPL-MCNC: 1.3 MG/DL (ref 0.67–1.17)
EGFRCR SERPLBLD CKD-EPI 2021: 61 ML/MIN/1.73M2
ERYTHROCYTE [DISTWIDTH] IN BLOOD BY AUTOMATED COUNT: 13.8 % (ref 10–15)
HCT VFR BLD AUTO: 45 % (ref 40–53)
HGB BLD-MCNC: 14.7 G/DL (ref 13.3–17.7)
MCH RBC QN AUTO: 29.4 PG (ref 26.5–33)
MCHC RBC AUTO-ENTMCNC: 32.7 G/DL (ref 31.5–36.5)
MCV RBC AUTO: 90 FL (ref 78–100)
PLATELET # BLD AUTO: 429 10E3/UL (ref 150–450)
POTASSIUM SERPL-SCNC: 4.2 MMOL/L (ref 3.4–5.3)
RBC # BLD AUTO: 5 10E6/UL (ref 4.4–5.9)
WBC # BLD AUTO: 15.2 10E3/UL (ref 4–11)

## 2024-04-01 PROCEDURE — 258N000003 HC RX IP 258 OP 636: Performed by: NURSE ANESTHETIST, CERTIFIED REGISTERED

## 2024-04-01 PROCEDURE — 99204 OFFICE O/P NEW MOD 45 MIN: CPT | Performed by: STUDENT IN AN ORGANIZED HEALTH CARE EDUCATION/TRAINING PROGRAM

## 2024-04-01 PROCEDURE — 250N000009 HC RX 250: Performed by: NURSE ANESTHETIST, CERTIFIED REGISTERED

## 2024-04-01 PROCEDURE — 250N000013 HC RX MED GY IP 250 OP 250 PS 637: Performed by: STUDENT IN AN ORGANIZED HEALTH CARE EDUCATION/TRAINING PROGRAM

## 2024-04-01 PROCEDURE — 250N000011 HC RX IP 250 OP 636: Performed by: NURSE ANESTHETIST, CERTIFIED REGISTERED

## 2024-04-01 PROCEDURE — 250N000011 HC RX IP 250 OP 636: Performed by: PHYSICIAN ASSISTANT

## 2024-04-01 PROCEDURE — 36415 COLL VENOUS BLD VENIPUNCTURE: CPT | Performed by: PHYSICIAN ASSISTANT

## 2024-04-01 PROCEDURE — 999N000065 XR SHOULDER LEFT PORT G/E 2 VIEWS: Mod: LT

## 2024-04-01 PROCEDURE — 258N000001 HC RX 258: Performed by: ORTHOPAEDIC SURGERY

## 2024-04-01 PROCEDURE — 250N000011 HC RX IP 250 OP 636: Performed by: ORTHOPAEDIC SURGERY

## 2024-04-01 PROCEDURE — 250N000025 HC SEVOFLURANE, PER MIN: Performed by: ORTHOPAEDIC SURGERY

## 2024-04-01 PROCEDURE — 250N000013 HC RX MED GY IP 250 OP 250 PS 637: Performed by: ORTHOPAEDIC SURGERY

## 2024-04-01 PROCEDURE — 85014 HEMATOCRIT: CPT | Performed by: PHYSICIAN ASSISTANT

## 2024-04-01 PROCEDURE — 82565 ASSAY OF CREATININE: CPT | Performed by: PHYSICIAN ASSISTANT

## 2024-04-01 PROCEDURE — 84132 ASSAY OF SERUM POTASSIUM: CPT | Performed by: PHYSICIAN ASSISTANT

## 2024-04-01 PROCEDURE — 272N000001 HC OR GENERAL SUPPLY STERILE: Performed by: ORTHOPAEDIC SURGERY

## 2024-04-01 PROCEDURE — C1776 JOINT DEVICE (IMPLANTABLE): HCPCS | Performed by: ORTHOPAEDIC SURGERY

## 2024-04-01 PROCEDURE — 370N000017 HC ANESTHESIA TECHNICAL FEE, PER MIN: Performed by: ORTHOPAEDIC SURGERY

## 2024-04-01 PROCEDURE — 999N000157 HC STATISTIC RCP TIME EA 10 MIN

## 2024-04-01 PROCEDURE — 258N000003 HC RX IP 258 OP 636: Performed by: STUDENT IN AN ORGANIZED HEALTH CARE EDUCATION/TRAINING PROGRAM

## 2024-04-01 PROCEDURE — 999N000141 HC STATISTIC PRE-PROCEDURE NURSING ASSESSMENT: Performed by: ORTHOPAEDIC SURGERY

## 2024-04-01 PROCEDURE — 710N000010 HC RECOVERY PHASE 1, LEVEL 2, PER MIN: Performed by: ORTHOPAEDIC SURGERY

## 2024-04-01 PROCEDURE — 250N000011 HC RX IP 250 OP 636: Performed by: STUDENT IN AN ORGANIZED HEALTH CARE EDUCATION/TRAINING PROGRAM

## 2024-04-01 PROCEDURE — C9290 INJ, BUPIVACAINE LIPOSOME: HCPCS | Performed by: STUDENT IN AN ORGANIZED HEALTH CARE EDUCATION/TRAINING PROGRAM

## 2024-04-01 PROCEDURE — C1713 ANCHOR/SCREW BN/BN,TIS/BN: HCPCS | Performed by: ORTHOPAEDIC SURGERY

## 2024-04-01 PROCEDURE — 360N000077 HC SURGERY LEVEL 4, PER MIN: Performed by: ORTHOPAEDIC SURGERY

## 2024-04-01 PROCEDURE — 250N000013 HC RX MED GY IP 250 OP 250 PS 637: Performed by: PHYSICIAN ASSISTANT

## 2024-04-01 DEVICE — IMPLANTABLE DEVICE
Type: IMPLANTABLE DEVICE | Site: SHOULDER | Status: FUNCTIONAL
Brand: COMPREHENSIVE® SHOULDER SYSTEM

## 2024-04-01 DEVICE — IMPLANTABLE DEVICE
Type: IMPLANTABLE DEVICE | Site: SHOULDER | Status: FUNCTIONAL
Brand: COMPREHENSIVE® PROLONG®

## 2024-04-01 DEVICE — IMPLANTABLE DEVICE
Type: IMPLANTABLE DEVICE | Site: SHOULDER | Status: FUNCTIONAL
Brand: COMPREHENSIVE® REVERSE SHOULDER

## 2024-04-01 DEVICE — IMPLANTABLE DEVICE
Type: IMPLANTABLE DEVICE | Site: SHOULDER | Status: FUNCTIONAL
Brand: COMPREHENSIVE® VERSA-DIAL®

## 2024-04-01 DEVICE — IMPLANTABLE DEVICE
Type: IMPLANTABLE DEVICE | Site: SHOULDER | Status: FUNCTIONAL
Brand: COMPREHENSIVE®

## 2024-04-01 DEVICE — IMPLANTABLE DEVICE
Type: IMPLANTABLE DEVICE | Site: SHOULDER | Status: FUNCTIONAL
Brand: COMPREHENSIVE REVERSE SHOULDER

## 2024-04-01 RX ORDER — ONDANSETRON 4 MG/1
4 TABLET, ORALLY DISINTEGRATING ORAL EVERY 30 MIN PRN
Status: CANCELLED | OUTPATIENT
Start: 2024-04-01

## 2024-04-01 RX ORDER — DEXAMETHASONE SODIUM PHOSPHATE 10 MG/ML
INJECTION, SOLUTION INTRAMUSCULAR; INTRAVENOUS PRN
Status: DISCONTINUED | OUTPATIENT
Start: 2024-04-01 | End: 2024-04-01

## 2024-04-01 RX ORDER — AMOXICILLIN 250 MG
1 CAPSULE ORAL 2 TIMES DAILY
Status: DISCONTINUED | OUTPATIENT
Start: 2024-04-01 | End: 2024-04-02 | Stop reason: HOSPADM

## 2024-04-01 RX ORDER — NALOXONE HYDROCHLORIDE 0.4 MG/ML
0.2 INJECTION, SOLUTION INTRAMUSCULAR; INTRAVENOUS; SUBCUTANEOUS
Status: DISCONTINUED | OUTPATIENT
Start: 2024-04-01 | End: 2024-04-02 | Stop reason: HOSPADM

## 2024-04-01 RX ORDER — FENTANYL CITRATE 50 UG/ML
25 INJECTION, SOLUTION INTRAMUSCULAR; INTRAVENOUS EVERY 5 MIN PRN
Status: DISCONTINUED | OUTPATIENT
Start: 2024-04-01 | End: 2024-04-01 | Stop reason: HOSPADM

## 2024-04-01 RX ORDER — CEFAZOLIN SODIUM/WATER 2 G/20 ML
2 SYRINGE (ML) INTRAVENOUS
Status: DISCONTINUED | OUTPATIENT
Start: 2024-04-01 | End: 2024-04-01

## 2024-04-01 RX ORDER — OXYCODONE HYDROCHLORIDE 5 MG/1
10 TABLET ORAL EVERY 4 HOURS PRN
Status: DISCONTINUED | OUTPATIENT
Start: 2024-04-01 | End: 2024-04-02 | Stop reason: HOSPADM

## 2024-04-01 RX ORDER — SODIUM CHLORIDE, SODIUM LACTATE, POTASSIUM CHLORIDE, CALCIUM CHLORIDE 600; 310; 30; 20 MG/100ML; MG/100ML; MG/100ML; MG/100ML
INJECTION, SOLUTION INTRAVENOUS CONTINUOUS
Status: DISCONTINUED | OUTPATIENT
Start: 2024-04-01 | End: 2024-04-01 | Stop reason: HOSPADM

## 2024-04-01 RX ORDER — HYDROMORPHONE HCL IN WATER/PF 6 MG/30 ML
0.4 PATIENT CONTROLLED ANALGESIA SYRINGE INTRAVENOUS
Status: DISCONTINUED | OUTPATIENT
Start: 2024-04-01 | End: 2024-04-02 | Stop reason: HOSPADM

## 2024-04-01 RX ORDER — ONDANSETRON 4 MG/1
4 TABLET, ORALLY DISINTEGRATING ORAL EVERY 30 MIN PRN
Status: DISCONTINUED | OUTPATIENT
Start: 2024-04-01 | End: 2024-04-01 | Stop reason: HOSPADM

## 2024-04-01 RX ORDER — HYDROMORPHONE HCL IN WATER/PF 6 MG/30 ML
0.4 PATIENT CONTROLLED ANALGESIA SYRINGE INTRAVENOUS EVERY 5 MIN PRN
Status: DISCONTINUED | OUTPATIENT
Start: 2024-04-01 | End: 2024-04-01 | Stop reason: HOSPADM

## 2024-04-01 RX ORDER — OXYCODONE HYDROCHLORIDE 5 MG/1
5 TABLET ORAL
Status: CANCELLED | OUTPATIENT
Start: 2024-04-01

## 2024-04-01 RX ORDER — OXYCODONE HYDROCHLORIDE 5 MG/1
10 TABLET ORAL
Status: CANCELLED | OUTPATIENT
Start: 2024-04-01

## 2024-04-01 RX ORDER — FENTANYL CITRATE 50 UG/ML
100 INJECTION, SOLUTION INTRAMUSCULAR; INTRAVENOUS
Status: DISCONTINUED | OUTPATIENT
Start: 2024-04-01 | End: 2024-04-01

## 2024-04-01 RX ORDER — CEFAZOLIN SODIUM/WATER 2 G/20 ML
2 SYRINGE (ML) INTRAVENOUS SEE ADMIN INSTRUCTIONS
Status: DISCONTINUED | OUTPATIENT
Start: 2024-04-01 | End: 2024-04-01

## 2024-04-01 RX ORDER — ONDANSETRON 2 MG/ML
4 INJECTION INTRAMUSCULAR; INTRAVENOUS EVERY 30 MIN PRN
Status: CANCELLED | OUTPATIENT
Start: 2024-04-01

## 2024-04-01 RX ORDER — OXYCODONE HYDROCHLORIDE 5 MG/1
5 TABLET ORAL EVERY 4 HOURS PRN
Status: DISCONTINUED | OUTPATIENT
Start: 2024-04-01 | End: 2024-04-02 | Stop reason: HOSPADM

## 2024-04-01 RX ORDER — OXYCODONE HYDROCHLORIDE 5 MG/1
5-10 TABLET ORAL EVERY 4 HOURS PRN
Qty: 30 TABLET | Refills: 0 | Status: SHIPPED | OUTPATIENT
Start: 2024-04-01 | End: 2024-04-15

## 2024-04-01 RX ORDER — PROPOFOL 10 MG/ML
INJECTION, EMULSION INTRAVENOUS CONTINUOUS PRN
Status: DISCONTINUED | OUTPATIENT
Start: 2024-04-01 | End: 2024-04-01

## 2024-04-01 RX ORDER — HYDROMORPHONE HCL IN WATER/PF 6 MG/30 ML
0.2 PATIENT CONTROLLED ANALGESIA SYRINGE INTRAVENOUS
Status: DISCONTINUED | OUTPATIENT
Start: 2024-04-01 | End: 2024-04-02 | Stop reason: HOSPADM

## 2024-04-01 RX ORDER — METHOCARBAMOL 500 MG/1
500 TABLET, FILM COATED ORAL EVERY 6 HOURS PRN
Status: DISCONTINUED | OUTPATIENT
Start: 2024-04-01 | End: 2024-04-02 | Stop reason: HOSPADM

## 2024-04-01 RX ORDER — DIPHENHYDRAMINE HCL 12.5 MG/5ML
12.5 SOLUTION ORAL EVERY 6 HOURS PRN
Status: DISCONTINUED | OUTPATIENT
Start: 2024-04-01 | End: 2024-04-02 | Stop reason: HOSPADM

## 2024-04-01 RX ORDER — METHOCARBAMOL 500 MG/1
500 TABLET, FILM COATED ORAL 4 TIMES DAILY PRN
Qty: 60 TABLET | Refills: 1 | Status: SHIPPED | OUTPATIENT
Start: 2024-04-01 | End: 2024-04-15

## 2024-04-01 RX ORDER — FENTANYL CITRATE 50 UG/ML
50 INJECTION, SOLUTION INTRAMUSCULAR; INTRAVENOUS EVERY 5 MIN PRN
Status: DISCONTINUED | OUTPATIENT
Start: 2024-04-01 | End: 2024-04-01 | Stop reason: HOSPADM

## 2024-04-01 RX ORDER — POLYETHYLENE GLYCOL 3350 17 G/17G
17 POWDER, FOR SOLUTION ORAL DAILY
Status: DISCONTINUED | OUTPATIENT
Start: 2024-04-02 | End: 2024-04-02 | Stop reason: HOSPADM

## 2024-04-01 RX ORDER — ASPIRIN 325 MG
325 TABLET, DELAYED RELEASE (ENTERIC COATED) ORAL DAILY
Status: DISCONTINUED | OUTPATIENT
Start: 2024-04-01 | End: 2024-04-02 | Stop reason: HOSPADM

## 2024-04-01 RX ORDER — GLYCOPYRROLATE 0.2 MG/ML
INJECTION, SOLUTION INTRAMUSCULAR; INTRAVENOUS PRN
Status: DISCONTINUED | OUTPATIENT
Start: 2024-04-01 | End: 2024-04-01

## 2024-04-01 RX ORDER — TRANEXAMIC ACID 650 MG/1
1950 TABLET ORAL ONCE
Status: COMPLETED | OUTPATIENT
Start: 2024-04-01 | End: 2024-04-01

## 2024-04-01 RX ORDER — NALOXONE HYDROCHLORIDE 0.4 MG/ML
0.4 INJECTION, SOLUTION INTRAMUSCULAR; INTRAVENOUS; SUBCUTANEOUS
Status: DISCONTINUED | OUTPATIENT
Start: 2024-04-01 | End: 2024-04-02 | Stop reason: HOSPADM

## 2024-04-01 RX ORDER — PROPOFOL 10 MG/ML
INJECTION, EMULSION INTRAVENOUS PRN
Status: DISCONTINUED | OUTPATIENT
Start: 2024-04-01 | End: 2024-04-01

## 2024-04-01 RX ORDER — LIDOCAINE 40 MG/G
CREAM TOPICAL
Status: DISCONTINUED | OUTPATIENT
Start: 2024-04-01 | End: 2024-04-02 | Stop reason: HOSPADM

## 2024-04-01 RX ORDER — SODIUM CHLORIDE, SODIUM LACTATE, POTASSIUM CHLORIDE, CALCIUM CHLORIDE 600; 310; 30; 20 MG/100ML; MG/100ML; MG/100ML; MG/100ML
INJECTION, SOLUTION INTRAVENOUS CONTINUOUS
Status: DISCONTINUED | OUTPATIENT
Start: 2024-04-01 | End: 2024-04-02 | Stop reason: HOSPADM

## 2024-04-01 RX ORDER — CEFAZOLIN SODIUM 2 G/100ML
2 INJECTION, SOLUTION INTRAVENOUS EVERY 8 HOURS
Qty: 200 ML | Refills: 0 | Status: COMPLETED | OUTPATIENT
Start: 2024-04-01 | End: 2024-04-02

## 2024-04-01 RX ORDER — ACETAMINOPHEN 325 MG/1
975 TABLET ORAL ONCE
Status: COMPLETED | OUTPATIENT
Start: 2024-04-01 | End: 2024-04-01

## 2024-04-01 RX ORDER — ONDANSETRON 2 MG/ML
4 INJECTION INTRAMUSCULAR; INTRAVENOUS EVERY 6 HOURS PRN
Status: DISCONTINUED | OUTPATIENT
Start: 2024-04-01 | End: 2024-04-02 | Stop reason: HOSPADM

## 2024-04-01 RX ORDER — FENTANYL CITRATE 50 UG/ML
INJECTION, SOLUTION INTRAMUSCULAR; INTRAVENOUS PRN
Status: DISCONTINUED | OUTPATIENT
Start: 2024-04-01 | End: 2024-04-01

## 2024-04-01 RX ORDER — BUPIVACAINE HYDROCHLORIDE 5 MG/ML
INJECTION, SOLUTION EPIDURAL; INTRACAUDAL
Status: COMPLETED | OUTPATIENT
Start: 2024-04-01 | End: 2024-04-01

## 2024-04-01 RX ORDER — ONDANSETRON 2 MG/ML
4 INJECTION INTRAMUSCULAR; INTRAVENOUS EVERY 30 MIN PRN
Status: DISCONTINUED | OUTPATIENT
Start: 2024-04-01 | End: 2024-04-01 | Stop reason: HOSPADM

## 2024-04-01 RX ORDER — NALOXONE HYDROCHLORIDE 0.4 MG/ML
0.1 INJECTION, SOLUTION INTRAMUSCULAR; INTRAVENOUS; SUBCUTANEOUS
Status: CANCELLED | OUTPATIENT
Start: 2024-04-01

## 2024-04-01 RX ORDER — ACETAMINOPHEN 325 MG/1
975 TABLET ORAL EVERY 8 HOURS
Qty: 27 TABLET | Refills: 0 | Status: DISCONTINUED | OUTPATIENT
Start: 2024-04-01 | End: 2024-04-02 | Stop reason: HOSPADM

## 2024-04-01 RX ORDER — HYDROMORPHONE HCL IN WATER/PF 6 MG/30 ML
0.2 PATIENT CONTROLLED ANALGESIA SYRINGE INTRAVENOUS EVERY 5 MIN PRN
Status: DISCONTINUED | OUTPATIENT
Start: 2024-04-01 | End: 2024-04-01 | Stop reason: HOSPADM

## 2024-04-01 RX ORDER — ACETAMINOPHEN 325 MG/1
650 TABLET ORAL EVERY 4 HOURS PRN
Status: SHIPPED
Start: 2024-04-01 | End: 2024-04-15

## 2024-04-01 RX ORDER — ASPIRIN 325 MG
325 TABLET, DELAYED RELEASE (ENTERIC COATED) ORAL DAILY
Qty: 30 TABLET | Refills: 0 | Status: SHIPPED | OUTPATIENT
Start: 2024-04-01 | End: 2024-06-21

## 2024-04-01 RX ORDER — CEFAZOLIN SODIUM/WATER 2 G/20 ML
2 SYRINGE (ML) INTRAVENOUS
Status: COMPLETED | OUTPATIENT
Start: 2024-04-01 | End: 2024-04-01

## 2024-04-01 RX ORDER — FAMOTIDINE 20 MG/1
20 TABLET, FILM COATED ORAL 2 TIMES DAILY
Status: DISCONTINUED | OUTPATIENT
Start: 2024-04-01 | End: 2024-04-02 | Stop reason: HOSPADM

## 2024-04-01 RX ORDER — CEFAZOLIN SODIUM/WATER 2 G/20 ML
2 SYRINGE (ML) INTRAVENOUS SEE ADMIN INSTRUCTIONS
Status: DISCONTINUED | OUTPATIENT
Start: 2024-04-01 | End: 2024-04-01 | Stop reason: HOSPADM

## 2024-04-01 RX ORDER — PROCHLORPERAZINE MALEATE 5 MG
5 TABLET ORAL EVERY 6 HOURS PRN
Status: DISCONTINUED | OUTPATIENT
Start: 2024-04-01 | End: 2024-04-02 | Stop reason: HOSPADM

## 2024-04-01 RX ORDER — TAMSULOSIN HYDROCHLORIDE 0.4 MG/1
0.4 CAPSULE ORAL EVERY EVENING
Status: DISCONTINUED | OUTPATIENT
Start: 2024-04-01 | End: 2024-04-02 | Stop reason: HOSPADM

## 2024-04-01 RX ORDER — HYDROXYZINE HYDROCHLORIDE 10 MG/1
10 TABLET, FILM COATED ORAL EVERY 6 HOURS PRN
Status: DISCONTINUED | OUTPATIENT
Start: 2024-04-01 | End: 2024-04-02 | Stop reason: HOSPADM

## 2024-04-01 RX ORDER — BISACODYL 10 MG
10 SUPPOSITORY, RECTAL RECTAL DAILY PRN
Status: DISCONTINUED | OUTPATIENT
Start: 2024-04-01 | End: 2024-04-02 | Stop reason: HOSPADM

## 2024-04-01 RX ORDER — LIDOCAINE HYDROCHLORIDE 10 MG/ML
INJECTION, SOLUTION INFILTRATION; PERINEURAL PRN
Status: DISCONTINUED | OUTPATIENT
Start: 2024-04-01 | End: 2024-04-01

## 2024-04-01 RX ORDER — LIDOCAINE 40 MG/G
CREAM TOPICAL
Status: DISCONTINUED | OUTPATIENT
Start: 2024-04-01 | End: 2024-04-01 | Stop reason: HOSPADM

## 2024-04-01 RX ORDER — ONDANSETRON 4 MG/1
4 TABLET, ORALLY DISINTEGRATING ORAL EVERY 6 HOURS PRN
Status: DISCONTINUED | OUTPATIENT
Start: 2024-04-01 | End: 2024-04-02 | Stop reason: HOSPADM

## 2024-04-01 RX ORDER — NALOXONE HYDROCHLORIDE 0.4 MG/ML
0.1 INJECTION, SOLUTION INTRAMUSCULAR; INTRAVENOUS; SUBCUTANEOUS
Status: DISCONTINUED | OUTPATIENT
Start: 2024-04-01 | End: 2024-04-01 | Stop reason: HOSPADM

## 2024-04-01 RX ORDER — LISINOPRIL AND HYDROCHLOROTHIAZIDE 12.5; 2 MG/1; MG/1
1 TABLET ORAL DAILY
Status: DISCONTINUED | OUTPATIENT
Start: 2024-04-02 | End: 2024-04-01

## 2024-04-01 RX ORDER — ACETAMINOPHEN 325 MG/1
650 TABLET ORAL EVERY 4 HOURS PRN
Status: DISCONTINUED | OUTPATIENT
Start: 2024-04-04 | End: 2024-04-02 | Stop reason: HOSPADM

## 2024-04-01 RX ORDER — AMOXICILLIN 250 MG
1-2 CAPSULE ORAL 2 TIMES DAILY
Status: SHIPPED
Start: 2024-04-01 | End: 2024-04-15

## 2024-04-01 RX ADMIN — HYDROMORPHONE HYDROCHLORIDE 0.4 MG: 0.2 INJECTION, SOLUTION INTRAMUSCULAR; INTRAVENOUS; SUBCUTANEOUS at 17:24

## 2024-04-01 RX ADMIN — BUPIVACAINE HYDROCHLORIDE 10 ML: 5 INJECTION, SOLUTION EPIDURAL; INTRACAUDAL; PERINEURAL at 12:42

## 2024-04-01 RX ADMIN — FENTANYL CITRATE 50 MCG: 50 INJECTION, SOLUTION INTRAMUSCULAR; INTRAVENOUS at 16:52

## 2024-04-01 RX ADMIN — PHENYLEPHRINE HYDROCHLORIDE 100 MCG: 10 INJECTION INTRAVENOUS at 15:09

## 2024-04-01 RX ADMIN — ASPIRIN 325 MG: 325 TABLET ORAL at 18:42

## 2024-04-01 RX ADMIN — FENTANYL CITRATE 50 MCG: 50 INJECTION INTRAMUSCULAR; INTRAVENOUS at 14:26

## 2024-04-01 RX ADMIN — FAMOTIDINE 20 MG: 20 TABLET, FILM COATED ORAL at 20:09

## 2024-04-01 RX ADMIN — GLYCOPYRROLATE 0.2 MG: 0.2 INJECTION INTRAMUSCULAR; INTRAVENOUS at 14:38

## 2024-04-01 RX ADMIN — ROCURONIUM BROMIDE 20 MG: 10 INJECTION, SOLUTION INTRAVENOUS at 15:49

## 2024-04-01 RX ADMIN — LIDOCAINE HYDROCHLORIDE 3 ML: 10 INJECTION, SOLUTION INFILTRATION; PERINEURAL at 14:26

## 2024-04-01 RX ADMIN — Medication 2 G: at 14:11

## 2024-04-01 RX ADMIN — FENTANYL CITRATE 100 MCG: 50 INJECTION, SOLUTION INTRAMUSCULAR; INTRAVENOUS at 12:37

## 2024-04-01 RX ADMIN — DEXAMETHASONE SODIUM PHOSPHATE 4 MG: 10 INJECTION, SOLUTION INTRAMUSCULAR; INTRAVENOUS at 14:26

## 2024-04-01 RX ADMIN — HYDROMORPHONE HYDROCHLORIDE 0.4 MG: 0.2 INJECTION, SOLUTION INTRAMUSCULAR; INTRAVENOUS; SUBCUTANEOUS at 16:57

## 2024-04-01 RX ADMIN — CEFAZOLIN SODIUM 2 G: 2 INJECTION, SOLUTION INTRAVENOUS at 21:20

## 2024-04-01 RX ADMIN — PROPOFOL 100 MCG/KG/MIN: 10 INJECTION, EMULSION INTRAVENOUS at 14:34

## 2024-04-01 RX ADMIN — BUPIVACAINE 10 ML: 13.3 INJECTION, SUSPENSION, LIPOSOMAL INFILTRATION at 12:42

## 2024-04-01 RX ADMIN — PHENYLEPHRINE HYDROCHLORIDE 0.4 MCG/KG/MIN: 10 INJECTION INTRAVENOUS at 15:11

## 2024-04-01 RX ADMIN — MIDAZOLAM HYDROCHLORIDE 2 MG: 1 INJECTION, SOLUTION INTRAMUSCULAR; INTRAVENOUS at 12:37

## 2024-04-01 RX ADMIN — TRANEXAMIC ACID 1950 MG: 650 TABLET ORAL at 10:41

## 2024-04-01 RX ADMIN — PHENYLEPHRINE HYDROCHLORIDE 100 MCG: 10 INJECTION INTRAVENOUS at 15:48

## 2024-04-01 RX ADMIN — HYDROMORPHONE HYDROCHLORIDE 0.4 MG: 0.2 INJECTION, SOLUTION INTRAMUSCULAR; INTRAVENOUS; SUBCUTANEOUS at 17:04

## 2024-04-01 RX ADMIN — PHENYLEPHRINE HYDROCHLORIDE 100 MCG: 10 INJECTION INTRAVENOUS at 14:57

## 2024-04-01 RX ADMIN — SUGAMMADEX 200 MG: 100 INJECTION, SOLUTION INTRAVENOUS at 16:18

## 2024-04-01 RX ADMIN — PHENYLEPHRINE HYDROCHLORIDE 200 MCG: 10 INJECTION INTRAVENOUS at 14:48

## 2024-04-01 RX ADMIN — ACETAMINOPHEN 975 MG: 325 TABLET ORAL at 10:41

## 2024-04-01 RX ADMIN — OXYCODONE 10 MG: 5 TABLET ORAL at 17:33

## 2024-04-01 RX ADMIN — PHENYLEPHRINE HYDROCHLORIDE 200 MCG: 10 INJECTION INTRAVENOUS at 15:01

## 2024-04-01 RX ADMIN — SODIUM CHLORIDE, POTASSIUM CHLORIDE, SODIUM LACTATE AND CALCIUM CHLORIDE: 600; 310; 30; 20 INJECTION, SOLUTION INTRAVENOUS at 11:27

## 2024-04-01 RX ADMIN — ACETAMINOPHEN 975 MG: 325 TABLET ORAL at 20:08

## 2024-04-01 RX ADMIN — SODIUM CHLORIDE, POTASSIUM CHLORIDE, SODIUM LACTATE AND CALCIUM CHLORIDE: 600; 310; 30; 20 INJECTION, SOLUTION INTRAVENOUS at 15:40

## 2024-04-01 RX ADMIN — TAMSULOSIN HYDROCHLORIDE 0.4 MG: 0.4 CAPSULE ORAL at 20:08

## 2024-04-01 RX ADMIN — HYDROMORPHONE HYDROCHLORIDE 0.4 MG: 0.2 INJECTION, SOLUTION INTRAMUSCULAR; INTRAVENOUS; SUBCUTANEOUS at 17:15

## 2024-04-01 RX ADMIN — SENNOSIDES AND DOCUSATE SODIUM 1 TABLET: 8.6; 5 TABLET ORAL at 20:09

## 2024-04-01 RX ADMIN — PHENYLEPHRINE HYDROCHLORIDE 200 MCG: 10 INJECTION INTRAVENOUS at 14:37

## 2024-04-01 RX ADMIN — FENTANYL CITRATE 50 MCG: 50 INJECTION INTRAMUSCULAR; INTRAVENOUS at 14:23

## 2024-04-01 RX ADMIN — PROPOFOL 180 MG: 10 INJECTION, EMULSION INTRAVENOUS at 14:26

## 2024-04-01 RX ADMIN — HYDROMORPHONE HYDROCHLORIDE 0.4 MG: 0.2 INJECTION, SOLUTION INTRAMUSCULAR; INTRAVENOUS; SUBCUTANEOUS at 17:10

## 2024-04-01 RX ADMIN — ROCURONIUM BROMIDE 50 MG: 10 INJECTION, SOLUTION INTRAVENOUS at 14:26

## 2024-04-01 RX ADMIN — FENTANYL CITRATE 50 MCG: 50 INJECTION, SOLUTION INTRAMUSCULAR; INTRAVENOUS at 16:47

## 2024-04-01 ASSESSMENT — ACTIVITIES OF DAILY LIVING (ADL)
ADLS_ACUITY_SCORE: 21

## 2024-04-01 NOTE — CONSULTS
[facility] MEDICINE CONSULT NOTE   Physician requesting consult: Timothy Montana MD    Reason for consult: Postoperative medical management of medical co-morbidities as below    Identification/Summary:   Michael Jimenez is a 63 year old male with a PMH of HTN, CAROL, CHF, HLD, aortic stenosis. Underwent L shoulder arthroplasty on 4/1/2024.    Assessment and Plan:  Essential hypertension  Preoperative evaluation, lisinopril hydrochlorothiazide was discontinued  -Monitor    CKD stage II  -Monitor with morning BMP    Leukocytosis  Looks chronically elevated since 1/10/2024.  No clinical signs of infection.  -CBC with diff in the morning     CAROL (obstructive sleep apnea)  He rarely uses CPAP because it is difficult for him to fall asleep with multiple joint pain and CPAP leaking  -CPAP ordered, patient is aware of the risk of not using yet     Congestive heart failure, unspecified HF chronicity, unspecified heart failure type (H)  Stable.   -Echo on 3/12/2024 showed the ejection fraction is 50-55%. Mid  to distal anterior and anteroseptal hypokinesis.     Hyperlipidemia with target low density lipoprotein (LDL) cholesterol less than 100 mg/dL  Stable doing well. Not currently taking a statin due to side effects.      Aortic valve stenosis  -Mild on echo 3/12/2024     Lower urinary tract symptoms (LUTS)  -Continue tamsulosin    Anticoagulation.  Deferred to surgery      Code status:Full Code   [facility] [place] asked to evaluate patient for postoperative medical management as follows below. Please resume the home medications as reconciled and further noted with ordered hold parameters.  Thank you for this consult; we will continue to follow this patient until discharge.    Procedure(s):  LEFT REVERSE TOTAL SHOULDER ARTHROPLASTY  Day of Surgery  Estimated Blood Loss:  150 mL  Hospital Problem List   No problem-specific Assessment & Plan notes found for this encounter.    Principal Problem:    DJD of  "left shoulder  Active Problems:    Hard to intubate      -Reviewed the patient's preoperative H and P and updated missing elements.  -Home medication reconciliation has been reviewed.  Medications have been ordered as noted from the home list and changes are documented above     Clinically Significant Risk Factors Present on Admission                  # Hypertension: Noted on problem list  # Chronic heart failure with preserved ejection fraction: heart failure noted on problem list and last echo with EF >50%     # Obesity: Estimated body mass index is 37.02 kg/m  as calculated from the following:    Height as of this encounter: 1.778 m (5' 10\").    Weight as of this encounter: 117 kg (258 lb).              HISTORY   Michael DEBBIE Jimenez is a 63 year old male with a PMH of HTN, CAROL, CHF, HLD, aortic stenosis. Underwent L shoulder arthroplasty on 4/1/2024.    Patient had some numbness on the left thumb greater than prior to surgery.  He denies dyspnea, chest pain, nausea or abdominal pain.  He had 1 loose stool today.  He denies wound, cough, headache, sore throat, or IV access.        Past Medical History     Past Medical History:  No date: Arthritis  No date: Congestive heart failure (H)  No date: Heart murmur  No date: Hyperlipidemia  No date: Hypertension  No date: Obese  No date: Prediabetes  No date: Sleep apnea      Comment:  doesn't use CPAP     Patient Active Problem List    Diagnosis Date Noted    Hard to intubate 04/01/2024     Priority: Medium     See intubation note      DJD of left shoulder 04/01/2024     Priority: Medium    Essential hypertension 01/29/2024     Priority: Medium    Lower urinary tract symptoms (LUTS) 01/29/2024     Priority: Medium    Ventricular tachycardia (H) 11/16/2023     Priority: Medium    Aortic valve stenosis, etiology of cardiac valve disease unspecified 01/03/2023     Priority: Medium    Rash and nonspecific skin eruption 01/03/2023     Priority: Medium    Degenerative joint disease " (DJD) of hip 2022     Priority: Medium    CHF (congestive heart failure) (H) 2022     Priority: Medium    ACROL (obstructive sleep apnea) 04/15/2021     Priority: Medium     2021 [place] Diagnostic Sleep Study (255.0 lbs) - AHI 30.0, RDI 31.8, Supine AHI 47.1, REM AHI 78.0, Low O2 58.9%, Time Spent ?88% 41.2 minutes / Time Spent ?89% 51.6 minutes.      Morbid obesity (H) 2021     Priority: Medium    Snoring 2015     Priority: Medium     Formatting of this note might be different from the original.  2015: advise to follow up for sleep study.      Hayfever 2012     Priority: Medium     Formatting of this note might be different from the original.  Receives DEPO medrol injection 40mg annually and this seems to help him year round.      Glenoid labral tear 2011     Priority: Medium     Formatting of this note might be different from the original.  He has been doing a lot better since starting Glucosamine- Chondroitin.      Rupture of long head biceps tendon 2011     Priority: Medium    Supraspinatus tendon tear 2011     Priority: Medium    Hyperlipidemia with target low density lipoprotein (LDL) cholesterol less than 100 mg/dL 2011     Priority: Medium     Formatting of this note is different from the original.  Patient stopped taking statins 2015 as he was concerned about potential side effects. Importance of takign care of modifiable risk factors discussed with Patient. Will continue to address.  Lovaza (Omega-3 PUFA) 2mg twice a day).    Last Lipids:  Chol: 218    2013  T    2013  HDL:   55    2013  LDL:  127    2013   LDL CHOLESTEROL (mg/dL)   Date Value   2013 127     [place] 10-year CHD Risk Score: 8% (11 Total Points)      Pre-diabetes 2011     Priority: Medium     Formatting of this note is different from the original.      HEMOGLOBIN A1C MONITORING (POCT) (%)   Date Value   2011 5.4      GLUCOSE                    (mg/dL)   Date Value   3/25/2013 77      Raynaud's phenomenon 2011     Priority: Medium    Right shoulder pain 2011     Priority: Medium    CARDIOVASCULAR SCREENING; LDL GOAL LESS THAN 160 10/31/2010     Priority: Medium     Surgical History     Past Surgical History:   Procedure Laterality Date    ABDOMEN SURGERY      ARTHROPLASTY HIP Right 2022    Procedure: RIGHT TOTAL HIP ARTHROPLASTY;  Surgeon: Timothy Montana MD;  Location: [facility] Main OR    CHOLECYSTECTOMY      COLONOSCOPY N/A 2021    Procedure: COLONOSCOPY, WITH POLYPECTOMY AND BIOPSY;  Surgeon: Lito Sweet DO;  Location: [facility]    EYE SURGERY      FINGER SURGERY       Family History      Family History   Problem Relation Age of Onset    Heart Disease Father         emphasemia    Coronary Artery Disease Father     Emphysema Father     Asthma Father     Skin Cancer Mother       Social History      Social History     Tobacco Use    Smoking status: Former     Packs/day: 4.00     Years: 30.00     Additional pack years: 0.00     Total pack years: 120.00     Types: Cigarettes     Quit date: 1995     Years since quittin.2    Smokeless tobacco: Former     Quit date: 1975   Vaping Use    Vaping Use: Never used   Substance Use Topics    Alcohol use: Yes     Comment: 3-4 drinks a week    Drug use: No      Allergies   No Known Allergies    Prior to Admission Medications      Prior to Admission Medications   Prescriptions Last Dose Informant Patient Reported? Taking?   BETA BLOCKER NOT PRESCRIBED (INTENTIONAL)   Yes No   Sig: Please choose reason not prescribed from choices below.   Glucosamine Sulfate 1000 MG TABS 3/25/2024 at am  Yes Yes   Sig: Take 1,000 mg by mouth daily    Multiple Vitamin (MULTIVITAMIN ADULT PO) 3/25/2024 at am  Yes Yes   Sig: Take 1 tablet by mouth daily    amoxicillin (AMOXIL) 500 MG capsule   Yes Yes   Sig: Take 500 mg by mouth as needed (DENTAL PROCEDURES)    lisinopril-hydrochlorothiazide (ZESTORETIC) 20-12.5 MG tablet 3/31/2024 at am  No Yes   Sig: Take 1 tablet by mouth daily   tamsulosin (FLOMAX) 0.4 MG capsule 3/31/2024 at pm  No Yes   Sig: TAKE 1 CAPSULE BY MOUTH ONCE DAILY WITH DINNER   traMADol (ULTRAM) 50 MG tablet prn at prn  No Yes   Sig: TAKE 1 TABLET BY MOUTH EVERY 4 TO 6 HOURS AS NEEDED FOR PAIN . DO NOT EXCEED 5 PER 24 HOURS   zinc gluconate 50 MG tablet 3/25/2024 at am  Yes Yes   Sig: Take 50 mg by mouth daily      Facility-Administered Medications: None      Review of Systems     A 12 point comprehensive review of systems was negative except as noted above in HPI.    OBJECTIVE         Physical Exam   Temp:  [97.2  F (36.2  C)-98.2  F (36.8  C)] 98.2  F (36.8  C)  Pulse:  [70-86] 72  Resp:  [13-27] 17  BP: (111-169)/(56-90) 114/61  SpO2:  [93 %-99 %] 95 %  Body mass index is 37.02 kg/m .  General Appearance: Alert and wake, not in distress  Respiratory: clear lungs, no crackles or wheezing  Cardiovascular: rhythmic, normal S1 and S2, no murmur  Neurology: oriented x 3  Psych: cooperative and calm, normal affect    I reviewed patient's preoperative evaluation note on 3/15/2024  I reviewed patient's preoperative lab tests on 4/1/2024      Thank you for this consultation.  Appreciate the opportunity to participate in the care of Michael Jimenez, please feel free to contact us for any questions or concerns.    ANDREA RODRIGUEZ MD  [facility]  [facility]  Phone: #[phone]

## 2024-04-01 NOTE — OP NOTE
[]Nikhil copied text    []Kia for details    Procedure Date: 08/22/22     PREOPERATIVE DIAGNOSIS:  Left shoulder rotator cuff tear with shoulder degenerative arthritis.     POSTOPERATIVE DIAGNOSIS:  same     PROCEDURE PERFORMED:  Left Reverse  total shoulder arthroplasty utilizing a Biomet size 12 mini humeral stem, standard glenoid baseplate with central fixation screww and 4 locking screws, Standard 40mm glenosphere in offset postion B 40mm  Standard humeral tray with +0 40mm humeral bearing surface.     SURGEON:  Timothy Montana MD     ASSISTANT:  Alcides Flores PA-C.  Alcides Flores PA-C, was present throughout the procedure, critical for patient positioning, prepping, draping, safe progression of procedure, wound closure, and sling placement.     DESCRIPTION OF PROCEDURE:  After informed risks, benefits, alternatives, and expected outcomes of the procedure, the patient desired to proceed.  She was brought to the operating suite where he was placed under general anesthesia, received 2 grams of Ancef, 1 gram of tranexamic acid.  A timeout verification step was completed.  He was positioned in low beach chair position.  Her right upper extremity was prepped and draped in a manner appropriate for procedure.     A deltopectoral approach was taken to the shoulder.  The cephalic vein was protected and retracted laterally.  Cobell retractor was placed.  Conjoined tendon was identified.  Clavipectoral fascia was divided, and the biceps tendon was not  identified within the groove.   Attention was then directed towards the subscapularis tendon, it was elevated off the lesser tuberosity, tagged with a #1 Ethibond stitches and retracted.  It was then divided inferiorly.  Leash of vessels was ligated.  Retractor was placed between the subscap and the joint capsule to protect the axillary nerve throughout these procedures.  External rotation was maintained to protect the axillary nerve.  The inferior capsule was  then sharply divided with Metzenbaum scissors.  The subscap was retracted, and attention was directed towards completing a proximal humeral resection.  A 30-degree retroverted x 45-degree proximal resection was completed using the Biomet guide.  This was followed by placement of Fukuda retractor, circumferential labral excision.      The anterior labrum was cleared of soft tissue, and a Bankart retractor was placed.  The modified rabbit ear retractor was placed posteriorly and excellent exposure of the glenoid was achieved.  The central pin was placed utilizing a  glenoid guide.  This was reamed to correct slight retroversion.  A standard baseplate was then positioned over the guidepin all seating was demonstrated in the central lobe it was utilized 30 mm central fixation screw was then secured there was taken to him and straight complete seating of the central screw.  Peripheral locking screws were then placed 25 mm inferior superior and 20 and15 mm locking screws were placed utilizing the fixed we will guide for each.  Table fixation was felt to been achieved.  A standard 40 mm glenosphere in the B offset position was then assembled on the back table and secured to the glenoid baseplate and checked for stability.     The humeral head could then be fully exposed using a brown retractor and Aston retractors, canal finder, sequential reaming up to 15 mm, followed by broaching up to 14 mm was completed.  A trial standard humeral tray and  +0, 40 mm standard bearing surface were positioned excellent range of motion stability and tensioning of the conjoined tendon with 90 degrees of internal rotation and 90 degrees of abduction demonstrated.  Final 14 mm mini humeral stem was secured the standard humeral tray with +0 40mm humeral bearing surface was assembled secured to the humeral stem.      Wounds were copiously irrigated and subcutaneous was closed with 2-0 Vicryl.  Skin was closed with a 3-0 Stratafix,  Steri-Strips, and Aquacel dressing.  The patient tolerated the procedure well.  Returned to recovery in stable condition.     ESTIMATED BLOOD LOSS:  150 mL.     Timothy Montana MD

## 2024-04-01 NOTE — ANESTHESIA PREPROCEDURE EVALUATION
Anesthesia Pre-Procedure Evaluation    Patient: Michael Jimenez   MRN: [id number] : 1960        Procedure : Procedure(s):  LEFT REVERSE TOTAL SHOULDER ARTHROPLASTY          Past Medical History:   Diagnosis Date    Arthritis     Congestive heart failure (H)     Heart murmur     Hyperlipidemia     Hypertension     Obese     Prediabetes     Sleep apnea     doesn't use CPAP      Past Surgical History:   Procedure Laterality Date    ABDOMEN SURGERY      ARTHROPLASTY HIP Right 2022    Procedure: RIGHT TOTAL HIP ARTHROPLASTY;  Surgeon: Timohty Montana MD;  Location: [facility] Main OR    CHOLECYSTECTOMY      COLONOSCOPY N/A 2021    Procedure: COLONOSCOPY, WITH POLYPECTOMY AND BIOPSY;  Surgeon: Lito Sweet DO;  Location: [facility]    EYE SURGERY      FINGER SURGERY        No Known Allergies   Social History     Tobacco Use    Smoking status: Former     Packs/day: 4.00     Years: 30.00     Additional pack years: 0.00     Total pack years: 120.00     Types: Cigarettes     Quit date: 1995     Years since quittin.2    Smokeless tobacco: Former     Quit date: 1975   Substance Use Topics    Alcohol use: Yes     Comment: 3-4 drinks a week      Wt Readings from Last 1 Encounters:   24 117 kg (258 lb)        Anesthesia Evaluation            ROS/MED HX  ENT/Pulmonary:     (+) sleep apnea,                                       Neurologic:       Cardiovascular: Comment: 3/2024    Interpretation Summary     Left ventricular function is borderline. The ejection fraction is 50-55%. Mid  to distal anterior and anteroseptal hypokinesis.  Global right ventricular function is normal.  Mild aortic stenosis.  The inferior vena cava is normal.  Borderline dilation of Sinuses of Valsalva, 4.1 cm.     This study was compared with the study from 3/10/21. LVEF is lower with subtle  wall motion abnormalities on today's study. Aortic valve doppler assessment is  stable.      (+)  hypertension- -   -   - -      CHF                                METS/Exercise Tolerance:     Hematologic:       Musculoskeletal:       GI/Hepatic:       Renal/Genitourinary:       Endo:     (+)               Obesity,       Psychiatric/Substance Use:       Infectious Disease:       Malignancy:       Other:            Physical Exam    Airway        Mallampati: II   TM distance: > 3 FB   Neck ROM: full     Respiratory Devices and Support         Dental       (+) Modest Abnormalities - crowns, retainers, 1 or 2 missing teeth      Cardiovascular   cardiovascular exam normal          Pulmonary   pulmonary exam normal            OUTSIDE LABS:  CBC:   Lab Results   Component Value Date    WBC 15.2 (H) 04/01/2024    WBC 11.2 (H) 02/20/2024    HGB 14.7 04/01/2024    HGB 13.2 (L) 03/15/2024    HCT 45.0 04/01/2024    HCT 45.8 02/20/2024     04/01/2024     02/20/2024     BMP:   Lab Results   Component Value Date     02/20/2024     01/29/2024    POTASSIUM 4.2 04/01/2024    POTASSIUM 4.5 03/15/2024    CHLORIDE 102 02/20/2024    CHLORIDE 103 01/29/2024    CO2 26 02/20/2024    CO2 20 (L) 01/29/2024    BUN 34.0 (H) 02/20/2024    BUN 40.2 (H) 01/29/2024    CR 1.30 (H) 04/01/2024    CR 1.32 (H) 03/15/2024     (H) 02/20/2024     (H) 01/29/2024     COAGS:   Lab Results   Component Value Date    PTT 30 01/04/2011    INR 1.04 04/04/2022     POC:   Lab Results   Component Value Date     (H) 07/09/2009     HEPATIC:   Lab Results   Component Value Date    ALBUMIN 4.0 02/20/2024    PROTTOTAL 8.1 02/20/2024    ALT 27 02/20/2024    AST 33 02/20/2024    ALKPHOS 68 02/20/2024    BILITOTAL 0.3 02/20/2024     OTHER:   Lab Results   Component Value Date    A1C 5.9 (H) 01/03/2023    SKYLER 9.3 02/20/2024    TSH 1.77 05/19/2022    CRP 0.7 02/22/2010    SED 47 (H) 02/20/2024       Anesthesia Plan    ASA Status:  3       Anesthesia Type: General.     - Airway: ETT              Consents    Anesthesia Plan(s) and associated risks,  "benefits, and realistic alternatives discussed. Questions answered and patient/representative(s) expressed understanding.     - Discussed: Risks, Benefits and Alternatives for BOTH SEDATION and the PROCEDURE were discussed     - Discussed with:  Patient      - Extended Intubation/Ventilatory Support Discussed: No.      - Patient is DNR/DNI Status: No     Use of blood products discussed: No .     Postoperative Care    Pain management: IV analgesics, Oral pain medications, Peripheral nerve block (Single Shot).   PONV prophylaxis: Ondansetron (or other 5HT-3), Dexamethasone or Solumedrol     Comments:             Adelso Dominguez DO    I have reviewed the pertinent notes and labs in the chart from the past 30 days and (re)examined the patient.  Any updates or changes from those notes are reflected in this note.              # Obesity: Estimated body mass index is 37.02 kg/m  as calculated from the following:    Height as of this encounter: 1.778 m (5' 10\").    Weight as of this encounter: 117 kg (258 lb).      "

## 2024-04-01 NOTE — ANESTHESIA POSTPROCEDURE EVALUATION
Patient: Michael Jimenez    Procedure: Procedure(s):  LEFT REVERSE TOTAL SHOULDER ARTHROPLASTY       Anesthesia Type:  General    Note:  Disposition: Inpatient   Postop Pain Control:             Sign Out: Well controlled pain   PONV: No   Neuro/Psych:             Sign Out: Acceptable/Baseline neuro status   Airway/Respiratory:             Sign Out: Acceptable/Baseline resp. status   CV/Hemodynamics:             Sign Out: Acceptable CV status   Other NRE: NONE   DID A NON-ROUTINE EVENT OCCUR?            Last vitals:  Vitals Value Taken Time   /78 04/01/24 1751   Temp 36.8  C (98.2  F) 04/01/24 1740   Pulse 73 04/01/24 1756   Resp 21 04/01/24 1750   SpO2 94 % 04/01/24 1756   Vitals shown include unfiled device data.    Electronically Signed By: Kaushik Gonzalez MD  April 1, 2024  6:14 PM

## 2024-04-01 NOTE — PROVIDER NOTIFICATION
Dr. Montana and CLARY Mcgrath notified of patient's abnormal lab values. Dr. Dominguez also notified. Per Dr. Montana, okay for patient to proceed with procedure. PA verbalized understanding of 15.2 WBC and 1.30 Creat.

## 2024-04-01 NOTE — ANESTHESIA PROCEDURE NOTES
Airway         Procedure Start/Stop Times: 4/1/2024 2:31 PM  Staff -        Anesthesiologist:  Adelso Dominguez DO       CRNA: Don Cross APRN CRNA       Performed By: CRNA  Consent for Airway        Urgency: elective  Indications and Patient Condition       Indications for airway management: davian-procedural       Induction type:intravenous       Mask difficulty assessment: 2 - vent by mask + OA or adjuvant +/- NMBA    Final Airway Details       Final airway type: endotracheal airway       Successful airway: ETT - single  Endotracheal Airway Details        ETT size (mm): 8.0       Cuffed: yes       Successful intubation technique: direct laryngoscopy and video laryngoscopy       DL Blade Type: Haas 2       VL Blade Size: Other (comments)       Grade View of Cords: 3       Adjucts: stylet and exchange catheter       Position: Right       Measured from: gums/teeth       Secured at (cm): 26       Bite block used: Soft    Post intubation assessment        Placement verified by: capnometry, equal breath sounds and chest rise        Number of attempts at approach: 3       Number of other approaches attempted: 1       Secured with: tape       Ease of procedure: difficult (see Intubation note)       Dentition: Intact and Lips/oral mucosa injury (see intubation naote)       Dental guard used and removed. Dental Guard Type: Standard White.    Medication(s) Administered   Medication Administration Time: 4/1/2024 2:31 PM    Additional Comments       Attempted DL intubation, unable to see glottic opening, called for [place] Scope, difficulty with getting GS blade into proper position, though once in had good view but ETT was difficult to get around teeth.  Once ETT in place, cuff would not inflate, though able to ventilate we felt that tube needed to be replaced,  Using an exchange catheter, ETT replaced with another 8.0 and good inflation of the cuff.  Noted small amounts of blood on the pt right mouth after  intubation complete.

## 2024-04-01 NOTE — ANESTHESIA PROCEDURE NOTES
"Brachial plexus Procedure Note    Pre-Procedure   Staff -        Anesthesiologist:  Adelso Dominguez DO       Performed By: anesthesiologist       Procedure Start/Stop Times: 4/1/2024 12:42 PM and 4/1/2024 12:48 PM       Pre-Anesthestic Checklist: patient identified, IV checked, site marked, risks and benefits discussed, informed consent, monitors and equipment checked, pre-op evaluation, at physician/surgeon's request and post-op pain management  Timeout:       Correct Patient: Yes        Correct Procedure: Yes        Correct Site: Yes        Correct Position: Yes        Correct Laterality: Yes        Site Marked: Yes  Procedure Documentation  Procedure: Brachial plexus       Laterality: left       Patient Position: supine (interscalene approach).       Needle Type: insulated       Needle Gauge: 21.        Needle Length (millimeters): 100        Ultrasound guided       1. Ultrasound was used to identify targeted nerve, plexus, vascular marker, or fascial plane and place a needle adjacent to it in real-time.       2. Ultrasound was used to visualize the spread of anesthetic in close proximity to the above referenced structure.       3. A permanent image is entered into the patient's record.    Assessment/Narrative         The placement was negative for: blood aspirated, painful injection and site bleeding       Paresthesias: No.       Bolus given via needle..        Secured via.        Insertion/Infusion Method: Single Shot       Complications: none    Medication(s) Administered   Bupivacaine 0.5% PF (Infiltration) - Infiltration   10 mL - 4/1/2024 12:42:00 PM  Bupivacaine liposome (Exparel) 1.3% LA inj susp (Infiltration) - Infiltration   10 mL - 4/1/2024 12:42:00 PM  Medication Administration Time: 4/1/2024 12:42 PM      FOR [facility] ([facility]) ONLY:   Pain Team Contact information: please page the Pain Team Via [employer]. Search \"Pain\". During daytime hours, please page the attending first. At night please " page the resident first.

## 2024-04-01 NOTE — PHARMACY-ADMISSION MEDICATION HISTORY
Pharmacist Admission Medication History    Admission medication history is complete. The information provided in this note is only as accurate as the sources available at the time of the update.    Information Source(s): Patient and CareEverywhere/SureScripts via in-person    Pertinent Information:      Changes made to PTA medication list:  Added: None  Deleted: None  Changed: None    Allergies reviewed with patient and updates made in EHR: yes    Medication History Completed By: Nasir Brush [facility] 4/1/2024 11:38 AM    PTA Med List   Medication Sig Last Dose    amoxicillin (AMOXIL) 500 MG capsule Take 500 mg by mouth as needed (DENTAL PROCEDURES)     Glucosamine Sulfate 1000 MG TABS Take 1,000 mg by mouth daily  3/25/2024 at am    lisinopril-hydrochlorothiazide (ZESTORETIC) 20-12.5 MG tablet Take 1 tablet by mouth daily 3/31/2024 at am    Multiple Vitamin (MULTIVITAMIN ADULT PO) Take 1 tablet by mouth daily  3/25/2024 at am    tamsulosin (FLOMAX) 0.4 MG capsule TAKE 1 CAPSULE BY MOUTH ONCE DAILY WITH DINNER 3/31/2024 at pm    traMADol (ULTRAM) 50 MG tablet TAKE 1 TABLET BY MOUTH EVERY 4 TO 6 HOURS AS NEEDED FOR PAIN . DO NOT EXCEED 5 PER 24 HOURS prn at prn    zinc gluconate 50 MG tablet Take 50 mg by mouth daily 3/25/2024 at am

## 2024-04-01 NOTE — ANESTHESIA CARE TRANSFER NOTE
Patient: Michael A Klar    Procedure: Procedure(s):  LEFT REVERSE TOTAL SHOULDER ARTHROPLASTY       Diagnosis: Left shoulder pain [M25.512]  Osteoarthritis of shoulder [M19.019]  Diagnosis Additional Information: No value filed.    Anesthesia Type:   General     Note:    Oropharynx: oropharynx clear of all foreign objects and spontaneously breathing  Level of Consciousness: drowsy  Oxygen Supplementation: face mask  Level of Supplemental Oxygen (L/min / FiO2): 8  Independent Airway: airway patency satisfactory and stable  Dentition: dentition unchanged  Vital Signs Stable: post-procedure vital signs reviewed and stable  Report to RN Given: handoff report given  Patient transferred to: PACU    Handoff Report: Identifed the Patient, Identified the Reponsible Provider, Reviewed the pertinent medical history, Discussed the surgical course, Reviewed Intra-OP anesthesia mangement and issues during anesthesia, Set expectations for post-procedure period and Allowed opportunity for questions and acknowledgement of understanding      Vitals:  Vitals Value Taken Time   /76 04/01/24 1635   Temp 36.7  C (98  F) 04/01/24 1635   Pulse 84 04/01/24 1635   Resp 24 04/01/24 1635   SpO2 98 % 04/01/24 1635       Electronically Signed By: TOREY LOZA CRNA  April 1, 2024  4:36 PM

## 2024-04-01 NOTE — BRIEF OP NOTE
"[facility]    Brief Operative Note    Pre-operative diagnosis: Left shoulder pain [M25.512]  Osteoarthritis of shoulder [M19.019]  Post-operative diagnosis Same as pre-operative diagnosis    Procedure: LEFT REVERSE TOTAL SHOULDER ARTHROPLASTY, Left - Shoulder    Surgeon: Surgeon(s) and Role:     * Timothy Montana MD - Primary     * Alcides Flores PA-C - Assisting  Anesthesia: Choice   Estimated Blood Loss: 150cc    Drains: None  Specimens: * No specimens in log *  Findings:   None.  Complications: None.  Implants:   Implant Name Type Inv. Item Serial No.  Lot No. LRB No. Used Action   PIN STEINMANN BIOMET REV SHLDER 3.2MM 9\" THRD SS [id number] - [id number] Wire PIN STEINMANN BIOMET REV SHLDER 3.2MM 9\" THRD SS [id number]  [employer] [id number] Left 1 Used as a Supply   IMP BASEPLATE MINI GLENOSPHERE BIOM REV SHLDR 25MM [id number] - [id number] Total Joint Component/Insert IMP BASEPLATE MINI GLENOSPHERE BIOM REV SHLDR 25MM [id number]  [employer] [id number] Left 1 Implanted   IMP SCR CENTRAL BIOMET REV SHLDR 6.5X30MM 591669 - [id number] Metallic Hardware/[place] IMP SCR CENTRAL BIOMET REV SHLDR 6.5X30MM [id number]  [employer] [id number] Left 1 Implanted   IMP SCR LOCKING BIOM REV SHLDR 3.5 HEX 4.83D48QO 746570 - [id number] Metallic Hardware/[place] IMP SCR LOCKING BIOM REV SHLDR 3.5 HEX [id number] [id number]  [employer] [id number] Left 1 Implanted   IMP SCR LOCKING BIOM REV SHLDR 3.5 HEX 4.52F96CR 059684 - [id number] Total Joint Component/Insert IMP SCR LOCKING BIOM REV SHLDR 3.5 HEX 4.84T94FY 581993  [employer] [id number] Left 1 Implanted   IMP SCR LOCKING BIOM REV SHLDR 3.5 HEX 4.65U28XF 997728 - [id number] Metallic Hardware/[place] IMP SCR LOCKING BIOM REV SHLDR 3.5 HEX 4.70S32DG 638843  [employer] [id number] Left 1 Implanted   IMP SCR LOCKING BIOM REV SHLDR 3.5 HEX 4.53K05DQ 308007 - [id number] Metallic Hardware/[place] IMP SCR LOCKING BIOM REV SHLDR 3.5 HEX 4.57D26GV " [id number]  [employer] [id number] Left 1 Implanted   IMP GLENOSPHERE ZIM VERSA-DIAL 40MM STD [id number] - [id number] Total Joint Component/Insert IMP GLENOSPHERE ZIM VERSA-DIAL 40MM STD [id number]  [employer] [id number] Left 1 Implanted   IMP STEM MINI HUMERAL BIOM SHLDR 14MM 915383 - [id number] Total Joint Component/Insert IMP STEM MINI HUMERAL BIOM SHLDR 14MM [id number]  [employer] [id number] Left 1 Implanted   IMP HUMERAL TRAY ZIM RVRS SHLDR STD [id number] - [id number] Total Joint Component/Insert IMP HUMERAL TRAY ZIM RVRS SHLDR STD [id number]  [employer] [id number] Left 1 Implanted   IMP BEARING HUMERAL ZIM 40MM STD PRLNG [id number] - [id number] Total Joint Component/Insert IMP BEARING HUMERAL ZIM 40MM STD PRLNG [id number]  [employer] [id number] Left 1 Implanted

## 2024-04-02 ENCOUNTER — APPOINTMENT (OUTPATIENT)
Dept: OCCUPATIONAL THERAPY | Facility: CLINIC | Age: 64
End: 2024-04-02
Attending: ORTHOPAEDIC SURGERY
Payer: COMMERCIAL

## 2024-04-02 VITALS
TEMPERATURE: 97.9 F | OXYGEN SATURATION: 97 % | RESPIRATION RATE: 18 BRPM | WEIGHT: 258 LBS | HEIGHT: 70 IN | SYSTOLIC BLOOD PRESSURE: 142 MMHG | BODY MASS INDEX: 36.94 KG/M2 | HEART RATE: 70 BPM | DIASTOLIC BLOOD PRESSURE: 83 MMHG

## 2024-04-02 LAB
ANION GAP SERPL CALCULATED.3IONS-SCNC: 8 MMOL/L (ref 7–15)
BASOPHILS # BLD AUTO: 0 10E3/UL (ref 0–0.2)
BASOPHILS NFR BLD AUTO: 0 %
BUN SERPL-MCNC: 30.8 MG/DL (ref 8–23)
CALCIUM SERPL-MCNC: 9.1 MG/DL (ref 8.8–10.2)
CHLORIDE SERPL-SCNC: 101 MMOL/L (ref 98–107)
CREAT SERPL-MCNC: 1.07 MG/DL (ref 0.67–1.17)
DEPRECATED HCO3 PLAS-SCNC: 28 MMOL/L (ref 22–29)
EGFRCR SERPLBLD CKD-EPI 2021: 77 ML/MIN/1.73M2
EOSINOPHIL # BLD AUTO: 0 10E3/UL (ref 0–0.7)
EOSINOPHIL NFR BLD AUTO: 0 %
ERYTHROCYTE [DISTWIDTH] IN BLOOD BY AUTOMATED COUNT: 13.8 % (ref 10–15)
GLUCOSE SERPL-MCNC: 117 MG/DL (ref 70–99)
HCT VFR BLD AUTO: 38.8 % (ref 40–53)
HGB BLD-MCNC: 12.8 G/DL (ref 13.3–17.7)
IMM GRANULOCYTES # BLD: 0.1 10E3/UL
IMM GRANULOCYTES NFR BLD: 1 %
LYMPHOCYTES # BLD AUTO: 1.3 10E3/UL (ref 0.8–5.3)
LYMPHOCYTES NFR BLD AUTO: 8 %
MCH RBC QN AUTO: 29.8 PG (ref 26.5–33)
MCHC RBC AUTO-ENTMCNC: 33 G/DL (ref 31.5–36.5)
MCV RBC AUTO: 90 FL (ref 78–100)
MONOCYTES # BLD AUTO: 1.5 10E3/UL (ref 0–1.3)
MONOCYTES NFR BLD AUTO: 9 %
NEUTROPHILS # BLD AUTO: 12.9 10E3/UL (ref 1.6–8.3)
NEUTROPHILS NFR BLD AUTO: 81 %
NRBC # BLD AUTO: 0 10E3/UL
NRBC BLD AUTO-RTO: 0 /100
PLATELET # BLD AUTO: 330 10E3/UL (ref 150–450)
POTASSIUM SERPL-SCNC: 4.6 MMOL/L (ref 3.4–5.3)
RBC # BLD AUTO: 4.3 10E6/UL (ref 4.4–5.9)
SODIUM SERPL-SCNC: 137 MMOL/L (ref 135–145)
WBC # BLD AUTO: 15.9 10E3/UL (ref 4–11)

## 2024-04-02 PROCEDURE — 250N000011 HC RX IP 250 OP 636: Performed by: ORTHOPAEDIC SURGERY

## 2024-04-02 PROCEDURE — 97110 THERAPEUTIC EXERCISES: CPT | Mod: GO

## 2024-04-02 PROCEDURE — 258N000003 HC RX IP 258 OP 636: Performed by: ORTHOPAEDIC SURGERY

## 2024-04-02 PROCEDURE — 36415 COLL VENOUS BLD VENIPUNCTURE: CPT | Performed by: STUDENT IN AN ORGANIZED HEALTH CARE EDUCATION/TRAINING PROGRAM

## 2024-04-02 PROCEDURE — 97535 SELF CARE MNGMENT TRAINING: CPT | Mod: GO

## 2024-04-02 PROCEDURE — 97166 OT EVAL MOD COMPLEX 45 MIN: CPT | Mod: GO

## 2024-04-02 PROCEDURE — 250N000013 HC RX MED GY IP 250 OP 250 PS 637: Performed by: ORTHOPAEDIC SURGERY

## 2024-04-02 PROCEDURE — 99232 SBSQ HOSP IP/OBS MODERATE 35: CPT | Performed by: INTERNAL MEDICINE

## 2024-04-02 PROCEDURE — 85025 COMPLETE CBC W/AUTO DIFF WBC: CPT | Performed by: STUDENT IN AN ORGANIZED HEALTH CARE EDUCATION/TRAINING PROGRAM

## 2024-04-02 PROCEDURE — 80048 BASIC METABOLIC PNL TOTAL CA: CPT | Performed by: STUDENT IN AN ORGANIZED HEALTH CARE EDUCATION/TRAINING PROGRAM

## 2024-04-02 RX ADMIN — HYDROXYZINE HYDROCHLORIDE 10 MG: 10 TABLET, FILM COATED ORAL at 11:15

## 2024-04-02 RX ADMIN — CEFAZOLIN SODIUM 2 G: 2 INJECTION, SOLUTION INTRAVENOUS at 06:41

## 2024-04-02 RX ADMIN — SODIUM CHLORIDE, POTASSIUM CHLORIDE, SODIUM LACTATE AND CALCIUM CHLORIDE: 600; 310; 30; 20 INJECTION, SOLUTION INTRAVENOUS at 00:18

## 2024-04-02 RX ADMIN — ASPIRIN 325 MG: 325 TABLET ORAL at 10:14

## 2024-04-02 RX ADMIN — OXYCODONE 5 MG: 5 TABLET ORAL at 00:17

## 2024-04-02 RX ADMIN — ACETAMINOPHEN 975 MG: 325 TABLET ORAL at 11:13

## 2024-04-02 RX ADMIN — ACETAMINOPHEN 975 MG: 325 TABLET ORAL at 04:36

## 2024-04-02 ASSESSMENT — ACTIVITIES OF DAILY LIVING (ADL)
ADLS_ACUITY_SCORE: 21
ADLS_ACUITY_SCORE: 22
ADLS_ACUITY_SCORE: 21
ADLS_ACUITY_SCORE: 22
ADLS_ACUITY_SCORE: 21
ADLS_ACUITY_SCORE: 21
ADLS_ACUITY_SCORE: 22
ADLS_ACUITY_SCORE: 21
ADLS_ACUITY_SCORE: 21

## 2024-04-02 NOTE — PROGRESS NOTES
04/02/24 0950   Appointment Info   Signing Clinician's Name / Credentials (OT) Marlee Jason, OTR/L   Quick Adds   Quick Adds Certification   Living Environment   People in Home alone   Current Living Arrangements house   Living Environment Comments Has tub shower and standard toilet   Self-Care   Usual Activity Tolerance good   Current Activity Tolerance good   Activity/Exercise/Self-Care Comment Pt was independent with ADLS and IADLS prior to admit   Instrumental Activities of Daily Living (IADL)   IADL Comments Pt  plans to do everything himself at home   General Information   Onset of Illness/Injury or Date of Surgery 04/01/24   Referring Physician Timothy Montana   Patient/Family Therapy Goal Statement (OT) Heal well so i can travel soon   Additional Occupational Profile Info/Pertinent History of Current Problem Michael DEBBIE Jimenez is a 63 year old male with a PMH of HTN, CAROL, CHF, HLD, aortic stenosis. Underwent L shoulder arthroplasty on 4/1/2024.   Existing Precautions/Restrictions shoulder   Left Lower Extremity (Weight-bearing Status) non weight-bearing (NWB)   Cognitive Status Examination   Affect/Mental Status (Cognitive) WFL   Range of Motion Comprehensive   Comment, General Range of Motion Left arm immobilized   Strength Comprehensive (MMT)   Comment, General Manual Muscle Testing (MMT) Assessment Left arm immobilized   Bed Mobility   Comment (Bed Mobility) sba   Transfers   Transfer Comments sba   Activities of Daily Living   BADL Assessment/Intervention upper body dressing;lower body dressing;toileting   Upper Body Dressing Assessment/Training   [place] Level (Upper Body Dressing) moderate assist (50% patient effort)   Lower Body Dressing Assessment/Training   [place] Level (Lower Body Dressing) minimum assist (75% patient effort)   Toileting   Knox Level (Toileting) minimum assist (75% patient effort)   Clinical Impression   Criteria for Skilled Therapeutic Interventions Met (OT) Yes,  treatment indicated   OT Diagnosis Decreased independence with adls   OT Problem List-Impairments impacting ADL post-surgical precautions   Assessment of Occupational Performance 3-5 Performance Deficits   Identified Performance Deficits total body dressing, bed mobility, transfers, toileting   Planned Therapy Interventions (OT) ADL retraining;transfer training;home program guidelines;progressive activity/exercise   Clinical Decision Making Complexity (OT) detailed assessment/moderate complexity   Risk & Benefits of therapy have been explained evaluation/treatment results reviewed;care plan/treatment goals reviewed;risks/benefits reviewed;current/potential barriers reviewed;participants voiced agreement with care plan;participants included;patient   OT Total Evaluation Time   OT Eval, Moderate Complexity Minutes (64683) 15   Therapy Certification   Medical Diagnosis Left reverse TSA   Start of Care Date 04/02/24   Certification date from 04/02/24   Certification date to 04/09/24   OT Goals   Therapy Frequency (OT) One time eval and treatment   OT Goals Upper Body Dressing;Lower Body Dressing;Bed Mobility;Toilet Transfer/Toileting;OT Goal 1   OT: Upper Body Dressing Modified independent;within precautions;Goal Met;Completed   OT: Lower Body Dressing Modified independent;within precautions   OT: Bed Mobility Modified independent;supine to/from sitting;within precautions;Goal Met;Completed   OT: Toilet Transfer/Toileting Modified independent;toilet transfer;cleaning and garment management;within precautions;Goal Met;Completed   OT: Goal 1 Pt will complete home ex program:   Codman s, elbow, wrist and hand with mod I, met, completed   Interventions   Interventions Quick Adds Self-Care/Home Management;Therapeutic Procedures/Exercise   Self-Care/Home Management   Self-Care/Home Mgmt/ADL, Compensatory, Meal Prep Minutes (15546) 30   Symptoms Noted During/After Treatment (Meal Preparation/Planning Training) none   Treatment  Detail/Skilled Intervention Taught pt. how to doff and don immobilizer, dress upper and lower body, complete hygiene tasks, transfer to toilet, bed, chair, and car all while using armaan techniques.   Pt is mod I with all after OT intervention.  . Answered questions from pt. Handouts given.   Therapeutic Procedures/Exercise   Therapeutic Procedure: strength, endurance, ROM, flexibillity minutes (63263) 15   Symptoms Noted During/After Treatment none   Treatment Detail/Skilled Intervention Taught pt. how to perform Codmans, elbow, wrist and hand AROM ex s.  Gave handout.  Answered questions to pts satisfaction.   OT Discharge Planning   OT Plan Dc OT   OT Rationale for DC Rec Pt is movng well and has met his OT goals   OT Brief overview of current status Pt is mod I with basic adls and functional mobility after OT intervention.   Total Session Time   Timed Code Treatment Minutes 45   Total Session Time (sum of timed and untimed services) 60      [facility]  OUTPATIENT OCCUPATIONAL THERAPY  EVALUATION  PLAN OF TREATMENT FOR OUTPATIENT REHABILITATION  (COMPLETE FOR INITIAL CLAIMS ONLY)  Patient's Last Name, First Name, M.I.  YOB: 1960  Michael Jimenez                          Provider's Name  [facility] Medical Record No.  [id number]                             Onset Date:  04/01/24   Start of Care Date:  04/02/24   Type:     ___PT   _X_OT   ___SLP Medical Diagnosis:  Left reverse TSA                    OT Diagnosis:  Decreased independence with adls Visits from SOC:  1     See note for plan of treatment, functional goals and certification details    I CERTIFY THE NEED FOR THESE SERVICES FURNISHED UNDER        THIS PLAN OF TREATMENT AND WHILE UNDER MY CARE     (Physician co-signature of this document indicates review and certification of the therapy plan).            Timothy Montana MD

## 2024-04-02 NOTE — PROGRESS NOTES
"[facility] Orthopaedics Progress Note      Post-operative Day: 1 Day Post-Op    Procedure(s):  LEFT REVERSE TOTAL SHOULDER ARTHROPLASTY      Subjective: Patient seen up resting in bedside chair, no acute events overnight. Pain tolerable on PO analgesics. Tolerating a regular diet with no nausea or vomiting. Voiding without difficulty, passing small amounts of gas. Feeling ready to discharge home.     Pain: mild/moderate  Chest pain, SOB:  No      Objective:  Blood pressure (!) 142/83, pulse 70, temperature 97.9  F (36.6  C), temperature source Oral, resp. rate 18, height 1.778 m (5' 10\"), weight 117 kg (258 lb), SpO2 97%.    Patient Vitals for the past 24 hrs:   BP Temp Temp src Pulse Resp SpO2   04/02/24 0739 (!) 142/83 97.9  F (36.6  C) Oral 70 18 97 %   04/02/24 0530 (!) (P) 141/69 (P) 97.5  F (36.4  C) (P) Oral (P) 68 (P) 20 (P) 98 %   04/02/24 0130 (!) 141/71 98.9  F (37.2  C) Oral 71 22 92 %   04/01/24 2130 136/73 98.3  F (36.8  C) Oral 75 18 95 %   04/01/24 2030 125/65 98.9  F (37.2  C) Oral 72 17 97 %   04/01/24 1930 131/88 97.2  F (36.2  C) Oral 78 21 94 %   04/01/24 1900 120/69 98.3  F (36.8  C) Oral 77 21 95 %   04/01/24 1830 114/61 98.2  F (36.8  C) Oral 72 17 95 %   04/01/24 1750 123/78 -- -- 73 21 93 %   04/01/24 1740 130/77 98.2  F (36.8  C) Core 77 23 95 %   04/01/24 1733 -- -- -- -- -- 93 %   04/01/24 1730 111/68 -- -- 75 17 95 %   04/01/24 1720 115/67 -- -- 73 27 95 %   04/01/24 1710 113/59 -- -- 79 18 95 %   04/01/24 1700 117/56 -- -- 80 15 93 %   04/01/24 1650 119/62 -- -- 86 23 97 %   04/01/24 1643 130/71 -- -- 86 20 97 %   04/01/24 1640 130/71 -- -- 86 23 98 %   04/01/24 1635 (!) 143/76 98  F (36.7  C) Temporal 84 24 98 %   04/01/24 1400 132/79 97.7  F (36.5  C) -- 71 21 --   04/01/24 1335 (!) 169/76 97.7  F (36.5  C) -- 70 19 98 %   04/01/24 1330 128/71 97.5  F (36.4  C) -- 74 21 96 %   04/01/24 1315 136/79 97.5  F (36.4  C) -- 71 19 98 %   04/01/24 1310 139/81 97.5  F (36.4  C) -- 73 19 98 % "   04/01/24 1305 137/77 97.5  F (36.4  C) -- 73 20 98 %   04/01/24 1300 134/81 97.5  F (36.4  C) -- 73 17 98 %   04/01/24 1255 129/73 97.5  F (36.4  C) -- 73 16 98 %   04/01/24 1250 130/76 97.7  F (36.5  C) -- 80 25 97 %   04/01/24 1245 130/76 97.9  F (36.6  C) -- 79 19 99 %   04/01/24 1240 (!) 145/86 98.1  F (36.7  C) -- 77 20 97 %   04/01/24 1235 (!) 149/90 98.1  F (36.7  C) -- 75 13 99 %       Wt Readings from Last 4 Encounters:   04/01/24 117 kg (258 lb)   03/15/24 117 kg (258 lb)   02/20/24 117 kg (258 lb)   01/29/24 117 kg (258 lb)       General: Alert and orientated, NAD  Respiratory: Non-labored breathing on RA  LUE in sling motor function, sensation, and circulation intact   Yes Grasp equal and strong. Dorsiflexion/plantarflexion intact and equal bilaterally. Moves all other extremities with ease and purpose   Wound status: incisions are clean dry and intact. Yes  Calf tenderness: Bilateral  No    Pertinent Labs   Lab Results: personally reviewed.     Recent Labs   Lab Test 04/02/24  0617 04/01/24  1118 03/15/24  0845 02/20/24  0827 01/29/24  1211 04/05/22  0632 04/04/22  1103   INR  --   --   --   --   --   --  1.04   WBC 15.9* 15.2*  --  11.2*  --    < > 9.9   HGB 12.8* 14.7 13.2* 14.9  --    < > 16.2   HCT 38.8* 45.0  --  45.8  --    < > 47.4   MCV 90 90  --  93  --    < > 91    429  --  339  --    < > 246     --   --  139 137   < >  --     < > = values in this interval not displayed.       Plan:   Anticoagulation protocol:  mg daily  x 30  days  Pain medications:  scopainmedication: oxycodone, tylenol, and vistaril  Weight bearing status:  PHILIP OLIVERA  Disposition:  Home pending therapies and clearance from hospital medicine team    Continue cares and rehabilitation     Report completed by:  TOREY Wayne CNP  Date: 4/2/2024  Time: 11:12 AM

## 2024-04-02 NOTE — PROGRESS NOTES
Occupational Therapy Discharge Summary    Reason for therapy discharge:    All goals and outcomes met, no further needs identified.    Progress towards therapy goal(s). See goals on Care Plan in [facility] electronic health record for goal details.  Goals met    Therapy recommendation(s):    No further therapy is recommended.

## 2024-04-02 NOTE — DISCHARGE SUMMARY
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    Following the procedure noted above the patient was transferred to the post-op floor and started on:    Therapy:  physical therapy and occupational therapy  Anticoagulation Plan:  mg daily  for 30 days  Pain Management: scopainmedication: oxycodone, tylenol, and vistaril  Weight bearing status: Non-weight bearing     The patient was followed by Orthopedics during the inpatient treatment course:  Complications:  None  Additional consultations:  Hospitalist      Pertinent Labs   Lab Results: personally reviewed.     Recent Labs   Lab Test 04/02/24  0617 04/01/24  1118 03/15/24  0845 02/20/24  0827 01/29/24  1211 04/05/22  0632 04/04/22  1103   INR  --   --   --   --   --   --  1.04   WBC 15.9* 15.2*  --  11.2*  --    < > 9.9   HGB 12.8* 14.7 13.2* 14.9  --    < > 16.2   HCT 38.8* 45.0  --  45.8  --    < > 47.4   MCV 90 90  --  93  --    < > 91    429  --  339  --    < > 246     --   --  139 137   < >  --     < > = values in this interval not displayed.          Discharge Information:  Condition at discharge: Stable  Discharge destination:  Discharged to home     Medications at discharge:  Current Discharge  Medication List        START taking these medications    Details   acetaminophen (TYLENOL) 325 MG tablet Take 2 tablets (650 mg) by mouth every 4 hours as needed for other (mild pain)    Associated Diagnoses: S/P reverse total shoulder arthroplasty, left      aspirin (ASA) 325 MG EC tablet Take 1 tablet (325 mg) by mouth daily  Qty: 30 tablet, Refills: 0    Associated Diagnoses: S/P reverse total shoulder arthroplasty, left      methocarbamol (ROBAXIN) 500 MG tablet Take 1 tablet (500 mg) by mouth 4 times daily as needed for other  Qty: 60 tablet, Refills: 1    Associated Diagnoses: S/P reverse total shoulder arthroplasty, left      oxyCODONE (ROXICODONE) 5 MG tablet Take 1-2 tablets (5-10 mg) by mouth every 4 hours as needed for moderate to severe pain  Qty: 30 tablet, Refills: 0    Associated Diagnoses: S/P reverse total shoulder arthroplasty, left      senna-docusate (SENOKOT-S/PERICOLACE) 8.6-50 MG tablet Take 1-2 tablets by mouth 2 times daily Take while on oral narcotics to prevent or treat constipation.    Comments: While taking narcotics  Associated Diagnoses: S/P reverse total shoulder arthroplasty, left           CONTINUE these medications which have NOT CHANGED    Details   amoxicillin (AMOXIL) 500 MG capsule Take 500 mg by mouth as needed (DENTAL PROCEDURES)      Glucosamine Sulfate 1000 MG TABS Take 1,000 mg by mouth daily       lisinopril-hydrochlorothiazide (ZESTORETIC) 20-12.5 MG tablet Take 1 tablet by mouth daily  Qty: 90 tablet, Refills: 3    Associated Diagnoses: Essential hypertension      Multiple Vitamin (MULTIVITAMIN ADULT PO) Take 1 tablet by mouth daily       tamsulosin (FLOMAX) 0.4 MG capsule TAKE 1 CAPSULE BY MOUTH ONCE DAILY WITH DINNER  Qty: 90 capsule, Refills: 2    Associated Diagnoses: Benign prostatic hyperplasia, unspecified whether lower urinary tract symptoms present      zinc gluconate 50 MG tablet Take 50 mg by mouth daily      BETA BLOCKER NOT PRESCRIBED (INTENTIONAL) Please  choose reason not prescribed from choices below.           STOP taking these medications       traMADol (ULTRAM) 50 MG tablet Comments:   Reason for Stopping:                          Follow-Up Care:  Patient should be seen in the office in 14 days by the Orthopedic Surgeon/Physician Assistant.  Call [phone] for appointment or questions.    It was my pleasure to take care of the above patient.  TOREY Wayne CNP

## 2024-04-02 NOTE — PLAN OF CARE
Patient vital signs are at baseline: Yes  Patient able to ambulate as they were prior to admission or with assist devices provided by therapies during their stay:  Yes  Patient MUST void prior to discharge:  Yes  Patient able to tolerate oral intake:  Yes  Pain has adequate pain control using Oral analgesics:  Yes  Does patient have an identified :  Yes  Has goal D/C date and time been discussed with patient:  Yes          Patient alert and oriented. VSS. On 2L of oxygen, decline CPAP. Has LR infusing at 125 ml/hr. Tolerating Regular diet. Dressing is CDI. Has shoulder sling in place. Voiding and ambulating. SBA. Pain minimal, no PRN given. Calls appropriately. Call light within reach. Alarms on.

## 2024-04-02 NOTE — PROGRESS NOTES
Patient vital signs are at baseline: Yes  Patient able to ambulate as they were prior to admission or with assist devices provided by therapies during their stay:  Yes  Patient MUST void prior to discharge:  Yes  Patient able to tolerate oral intake:  Yes  Pain has adequate pain control using Oral analgesics:  Yes  Does patient have an identified :  Yes  Has goal D/C date and time been discussed with patient:  Yes     A&Ox4. VSS, on RA. CMS intact, except numbness in right thumb. Rated mild pain. Scheduled and PRN PO pain medications administered. Pt discharged with stabled conditions.

## 2024-04-02 NOTE — PROGRESS NOTES
Patient vital signs are at baseline: Yes  Patient able to ambulate as they were prior to admission or with assist devices provided by therapies during their stay:  Yes  Patient MUST void prior to discharge:  Yes  Patient able to tolerate oral intake:  Yes  Pain has adequate pain control using Oral analgesics:  Yes  Does patient have an identified :  Yes  Has goal D/C date and time been discussed with patient:  Yes     Patient is alert and oriented x4. On 2 L of oxygen at night due to CAROL Hx. Arm sling on LUE. Patient reports numbness in Left fingers due to block. Discharge anticipated 4/2.

## 2024-04-02 NOTE — PLAN OF CARE
Discharge Lounge Note     Patient discharged to home at 1:04 PM via wheel chair. Discharge instructions reviewed with patient opportunity offered to ask questions. Prescriptions sent to patients preferred pharmacy. All belongings sent with patient.    Alcon Petty RN

## 2024-04-02 NOTE — PROGRESS NOTES
[facility] Daily Progress Note    Assessment/Plan:  Essential hypertension  Preoperative evaluation, lisinopril hydrochlorothiazide was discontinued but I had a strong    -Monitor     CKD stage II  -Monitor with morning BMP     Leukocytosis  Looks chronically elevated since 1/10/2024.  No clinical signs of infection.  Follow-up with outpatient.     CAROL (obstructive sleep apnea)  He rarely uses CPAP because it is difficult for him to fall asleep with multiple joint pain and CPAP leaking  -CPAP ordered, patient is aware of the risk of not using yet     Congestive heart failure, unspecified HF chronicity, unspecified heart failure type (H)  Stable.   -Echo on 3/12/2024 showed the ejection fraction is 50-55%. Mid  to distal anterior and anteroseptal hypokinesis.     Hyperlipidemia with target low density lipoprotein (LDL) cholesterol less than 100 mg/dL  Stable doing well. Not currently taking a statin due to side effects.      Aortic valve stenosis  -Mild on echo 3/12/2024     Lower urinary tract symptoms (LUTS)  -Continue tamsulosin     S/p left reverse total shoulder arthroplasty  Continue PT OT, pain control, DVT prophylaxis per orthopedic surgery.    Principal Problem:    DJD of left shoulder  Active Problems:    Hard to intubate     LOS: 0 days     Subjective:  She is doing well.  Denies any complaints.  No shortness of breath, chest pain, urinary or bowel complaints.   acetaminophen  975 mg Oral Q8H    aspirin  325 mg Oral Daily    famotidine  20 mg Oral BID    Or    famotidine  20 mg Intravenous BID    polyethylene glycol  17 g Oral Daily    senna-docusate  1 tablet Oral BID    sodium chloride (PF)  3 mL Intracatheter Q8H    tamsulosin  0.4 mg Oral QPM       Objective:  Vital signs in last 24 hours:  Temp:  [97.2  F (36.2  C)-98.9  F (37.2  C)] 97.9  F (36.6  C)  Pulse:  [68-86] 70  Resp:  [13-27] 18  BP: (111-169)/(56-90) 142/83  SpO2:  [92 %-99 %] 97 %  Weight:   [unfilled]    Intake/Output last 3 shifts:  I/O last  3 completed shifts:  In: 2918 [P.O.:180; I.V.:2738]  Out: 750 [Urine:600; Blood:150]  Intake/Output this shift:  I/O this shift:  In: 480 [P.O.:480]  Out: -     Review of Systems:   As per subjective, all others negative.    Physical Exam:    General Appearance:  Alert, cooperative, no distress, appears stated age   Head:  Normocephalic, without obvious abnormality, atraumatic   Lungs:   Clear to auscultation bilaterally, respirations unlabored   Chest Wall:  No tenderness or deformity   Heart:  Regular rate and rhythm, S1, S2 heard    Abdomen:   Soft, non tender, non distended, bowel sounds present, no guarding or rigidity   Extremities: S/p left shoulder surgery.   Skin: Skin color, texture, turgor normal, no rashes or lesions   Neurologic: Alert and oriented X 3, Moves all 4 extremities       Lab Results:  Personally Reviewed.   Recent Results (from the past 24 hour(s))   CBC with platelets    Collection Time: 04/01/24 11:18 AM   Result Value Ref Range    WBC Count 15.2 (H) 4.0 - 11.0 10e3/uL    RBC Count 5.00 4.40 - 5.90 10e6/uL    Hemoglobin 14.7 13.3 - 17.7 g/dL    Hematocrit 45.0 40.0 - 53.0 %    MCV 90 78 - 100 fL    MCH 29.4 26.5 - 33.0 pg    MCHC 32.7 31.5 - 36.5 g/dL    RDW 13.8 10.0 - 15.0 %    Platelet Count 429 150 - 450 10e3/uL   Potassium    Collection Time: 04/01/24 11:18 AM   Result Value Ref Range    Potassium 4.2 3.4 - 5.3 mmol/L   Creatinine    Collection Time: 04/01/24 11:18 AM   Result Value Ref Range    Creatinine 1.30 (H) 0.67 - 1.17 mg/dL    GFR Estimate 61 >60 mL/min/1.73m2   Basic metabolic panel    Collection Time: 04/02/24  6:17 AM   Result Value Ref Range    Sodium 137 135 - 145 mmol/L    Potassium 4.6 3.4 - 5.3 mmol/L    Chloride 101 98 - 107 mmol/L    Carbon Dioxide (CO2) 28 22 - 29 mmol/L    Anion Gap 8 7 - 15 mmol/L    Urea Nitrogen 30.8 (H) 8.0 - 23.0 mg/dL    Creatinine 1.07 0.67 - 1.17 mg/dL    GFR Estimate 77 >60 mL/min/1.73m2    Calcium 9.1 8.8 - 10.2 mg/dL    Glucose 117 (H)  70 - 99 mg/dL   CBC with platelets and differential    Collection Time: 04/02/24  6:17 AM   Result Value Ref Range    WBC Count 15.9 (H) 4.0 - 11.0 10e3/uL    RBC Count 4.30 (L) 4.40 - 5.90 10e6/uL    Hemoglobin 12.8 (L) 13.3 - 17.7 g/dL    Hematocrit 38.8 (L) 40.0 - 53.0 %    MCV 90 78 - 100 fL    MCH 29.8 26.5 - 33.0 pg    MCHC 33.0 31.5 - 36.5 g/dL    RDW 13.8 10.0 - 15.0 %    Platelet Count 330 150 - 450 10e3/uL    % Neutrophils 81 %    % Lymphocytes 8 %    % Monocytes 9 %    % Eosinophils 0 %    % Basophils 0 %    % Immature Granulocytes 1 %    NRBCs per 100 WBC 0 <1 /100    Absolute Neutrophils 12.9 (H) 1.6 - 8.3 10e3/uL    Absolute Lymphocytes 1.3 0.8 - 5.3 10e3/uL    Absolute Monocytes 1.5 (H) 0.0 - 1.3 10e3/uL    Absolute Eosinophils 0.0 0.0 - 0.7 10e3/uL    Absolute Basophils 0.0 0.0 - 0.2 10e3/uL    Absolute Immature Granulocytes 0.1 <=0.4 10e3/uL    Absolute NRBCs 0.0 10e3/uL         Adry Diego M.D  [facility]  Internal Medicine

## 2024-04-04 ENCOUNTER — TRANSCRIBE ORDERS (OUTPATIENT)
Dept: OTHER | Age: 64
End: 2024-04-04

## 2024-04-04 ENCOUNTER — THERAPY VISIT (OUTPATIENT)
Dept: PHYSICAL THERAPY | Facility: CLINIC | Age: 64
End: 2024-04-04
Attending: NURSE PRACTITIONER
Payer: COMMERCIAL

## 2024-04-04 DIAGNOSIS — Z96.612 S/P REVERSE TOTAL SHOULDER ARTHROPLASTY, LEFT: Primary | ICD-10-CM

## 2024-04-04 PROCEDURE — 97110 THERAPEUTIC EXERCISES: CPT | Mod: GP | Performed by: PHYSICAL THERAPIST

## 2024-04-04 PROCEDURE — 97161 PT EVAL LOW COMPLEX 20 MIN: CPT | Mod: GP | Performed by: PHYSICAL THERAPIST

## 2024-04-04 NOTE — PROGRESS NOTES
PHYSICAL THERAPY EVALUATION  Type of Visit: Evaluation    See electronic medical record for Abuse and Falls Screening details.    Subjective       Presenting condition or subjective complaint:    Date of onset: 04/01/24    Relevant medical history:   has rTSA 4/1.  L side.  Has swelling and some post op pain.  Recheck at 2 weeks with [facility].  Has minimal HEP.  Sleeping fair.  Fell off ladder 25 years ago.  R shoulder is also bad.  Also CTS.  Knee OA.  Lives alone, retired.  Dates & types of surgery:      Prior diagnostic imaging/testing results:       Prior therapy history for the same diagnosis, illness or injury:        Prior Level of Function  Transfers: Independent  Ambulation: Independent  ADL: Independent  IADL:     Living Environment  Social support:   lives alone  Type of home:     Stairs to enter the home:         Ramp:     Stairs inside the home:         Help at home:    Equipment owned:       Employment:    retired  Hobbies/Interests:       Patient goals for therapy:  decrease pain, regain function    Pain assessment: Pain present     Objective   SHOULDER EVALUATION  PAIN: pain 5/10 at rest  INTEGUMENTARY (edema, incisions):  incision bandaged and covered.  Pec/ant shoulder edema  POSTURE:  rounded, forward protracted L in sling  GAIT:   Weightbearing Status:   Assistive Device(s):   Gait Deviations:   BALANCE/PROPRIOCEPTION:   WEIGHTBEARING ALIGNMENT:   ROM:  sup PROM flex 90, scapt 75, ER0 10, elbow limited 10 deg ext    STRENGTH:   FLEXIBILITY:  levator and Uts very tight  SPECIAL TESTS:  no pain with squeeze test  PALPATION:   JOINT MOBILITY:   CERVICAL SCREEN:     Assessment & Plan   CLINICAL IMPRESSIONS  Medical Diagnosis: L rTSA 4/1    Treatment Diagnosis: shoulder pain L   Impression/Assessment:     Clinical Decision Making (Complexity):  Clinical Presentation: Stable/Uncomplicated  Clinical Presentation Rationale: based on medical and personal factors listed in PT evaluation  Clinical Decision  Making (Complexity): Low complexity    PLAN OF CARE  Treatment Interventions:  Interventions: Manual Therapy, Neuromuscular Re-education, Therapeutic Activity, Therapeutic Exercise, Self-Care/Home Management    Long Term Goals     PT Goal 1  Goal Identifier: 1  Goal Description: pt will be able to wash hair  Rationale: to maximize safety and independence with performance of ADLs and functional tasks  Target Date: 05/09/24  PT Goal 2  Goal Identifier: 2  Goal Description: pt will be able to reach bottom shelf  Rationale: to maximize safety and independence with performance of ADLs and functional tasks  Target Date: 05/16/24  PT Goal 3  Goal Identifier: 3  Goal Description: pt will be able to lift milk jug  Rationale: to maximize safety and independence with performance of ADLs and functional tasks  Target Date: 06/13/24      Frequency of Treatment: 1x/wk  Duration of Treatment: 10 wk    Recommended Referrals to Other Professionals:   Education Assessment:   Learner/Method: Patient  Education Comments: healing precautions    Risks and benefits of evaluation/treatment have been explained.   Patient/Family/caregiver agrees with Plan of Care.     Evaluation Time:     PT Eval, Low Complexity Minutes (44529): 25       Signing Clinician: Fernandez Gauthier, PT      [facility]                                                                                   OUTPATIENT PHYSICAL THERAPY      PLAN OF TREATMENT FOR OUTPATIENT REHABILITATION   Patient's Last Name, First Name, Michael Roberts YOB: 1960   Provider's Name   [facility]   Medical Record No.  [id number]     Onset Date: 04/01/24  Start of Care Date: 04/04/24     Medical Diagnosis:  L rTSA 4/1      PT Treatment Diagnosis:  shoulder pain L Plan of Treatment  Frequency/Duration: 1x/wk/ 10 wk    Certification date from 04/04/24 to 06/13/24         See note for plan of treatment details and  functional goals     Fernandez Gauthier, PT                         I CERTIFY THE NEED FOR THESE SERVICES FURNISHED UNDER        THIS PLAN OF TREATMENT AND WHILE UNDER MY CARE .             Physician Signature               Date    X_____________________________________________________                  Referring Provider:  Timothy Montana    Initial Assessment  See Epic Evaluation- Start of Care Date: 04/04/24

## 2024-04-11 ENCOUNTER — THERAPY VISIT (OUTPATIENT)
Dept: PHYSICAL THERAPY | Facility: CLINIC | Age: 64
End: 2024-04-11
Attending: ORTHOPAEDIC SURGERY
Payer: COMMERCIAL

## 2024-04-11 DIAGNOSIS — Z96.612 S/P REVERSE TOTAL SHOULDER ARTHROPLASTY, LEFT: Primary | ICD-10-CM

## 2024-04-11 PROCEDURE — 97110 THERAPEUTIC EXERCISES: CPT | Mod: GP

## 2024-04-15 ENCOUNTER — OFFICE VISIT (OUTPATIENT)
Dept: FAMILY MEDICINE | Facility: CLINIC | Age: 64
End: 2024-04-15
Payer: COMMERCIAL

## 2024-04-15 VITALS
BODY MASS INDEX: 36.36 KG/M2 | DIASTOLIC BLOOD PRESSURE: 68 MMHG | HEIGHT: 70 IN | WEIGHT: 254 LBS | OXYGEN SATURATION: 96 % | SYSTOLIC BLOOD PRESSURE: 118 MMHG | RESPIRATION RATE: 20 BRPM | HEART RATE: 106 BPM | TEMPERATURE: 97.4 F

## 2024-04-15 DIAGNOSIS — Z00.00 ENCOUNTER FOR MEDICARE ANNUAL WELLNESS EXAM: Primary | ICD-10-CM

## 2024-04-15 DIAGNOSIS — R53.83 FATIGUE, UNSPECIFIED TYPE: ICD-10-CM

## 2024-04-15 DIAGNOSIS — Z12.5 SCREENING FOR PROSTATE CANCER: ICD-10-CM

## 2024-04-15 DIAGNOSIS — R73.09 ELEVATED GLUCOSE LEVEL: ICD-10-CM

## 2024-04-15 DIAGNOSIS — M25.50 MULTIPLE JOINT PAIN: ICD-10-CM

## 2024-04-15 DIAGNOSIS — M79.10 MYALGIA: ICD-10-CM

## 2024-04-15 LAB
ALBUMIN UR-MCNC: NEGATIVE MG/DL
APPEARANCE UR: CLEAR
BASOPHILS # BLD AUTO: 0.1 10E3/UL (ref 0–0.2)
BASOPHILS NFR BLD AUTO: 0 %
BILIRUB UR QL STRIP: NEGATIVE
CK SERPL-CCNC: 103 U/L (ref 39–308)
COLOR UR AUTO: YELLOW
CRP SERPL-MCNC: 13.84 MG/L
EOSINOPHIL # BLD AUTO: 0.4 10E3/UL (ref 0–0.7)
EOSINOPHIL NFR BLD AUTO: 3 %
ERYTHROCYTE [DISTWIDTH] IN BLOOD BY AUTOMATED COUNT: 14 % (ref 10–15)
ERYTHROCYTE [SEDIMENTATION RATE] IN BLOOD BY WESTERGREN METHOD: 22 MM/HR (ref 0–20)
GLUCOSE UR STRIP-MCNC: NEGATIVE MG/DL
HBA1C MFR BLD: 6 % (ref 0–5.6)
HCT VFR BLD AUTO: 44.4 % (ref 40–53)
HGB BLD-MCNC: 14 G/DL (ref 13.3–17.7)
HGB UR QL STRIP: NEGATIVE
IMM GRANULOCYTES # BLD: 0 10E3/UL
IMM GRANULOCYTES NFR BLD: 0 %
KETONES UR STRIP-MCNC: NEGATIVE MG/DL
LEUKOCYTE ESTERASE UR QL STRIP: NEGATIVE
LYMPHOCYTES # BLD AUTO: 1.5 10E3/UL (ref 0.8–5.3)
LYMPHOCYTES NFR BLD AUTO: 10 %
MCH RBC QN AUTO: 29 PG (ref 26.5–33)
MCHC RBC AUTO-ENTMCNC: 31.5 G/DL (ref 31.5–36.5)
MCV RBC AUTO: 92 FL (ref 78–100)
MONOCYTES # BLD AUTO: 0.7 10E3/UL (ref 0–1.3)
MONOCYTES NFR BLD AUTO: 5 %
NEUTROPHILS # BLD AUTO: 11.9 10E3/UL (ref 1.6–8.3)
NEUTROPHILS NFR BLD AUTO: 82 %
NITRATE UR QL: NEGATIVE
PH UR STRIP: 5.5 [PH] (ref 5–7)
PLATELET # BLD AUTO: 305 10E3/UL (ref 150–450)
PSA SERPL DL<=0.01 NG/ML-MCNC: 0.51 NG/ML (ref 0–4.5)
RBC # BLD AUTO: 4.82 10E6/UL (ref 4.4–5.9)
RBC #/AREA URNS AUTO: NORMAL /HPF
SP GR UR STRIP: 1.02 (ref 1–1.03)
TSH SERPL DL<=0.005 MIU/L-ACNC: 1 UIU/ML (ref 0.3–4.2)
UROBILINOGEN UR STRIP-ACNC: 0.2 E.U./DL
WBC # BLD AUTO: 14.6 10E3/UL (ref 4–11)
WBC #/AREA URNS AUTO: NORMAL /HPF

## 2024-04-15 PROCEDURE — 84443 ASSAY THYROID STIM HORMONE: CPT | Performed by: NURSE PRACTITIONER

## 2024-04-15 PROCEDURE — 83036 HEMOGLOBIN GLYCOSYLATED A1C: CPT | Performed by: NURSE PRACTITIONER

## 2024-04-15 PROCEDURE — 85652 RBC SED RATE AUTOMATED: CPT | Performed by: NURSE PRACTITIONER

## 2024-04-15 PROCEDURE — 36415 COLL VENOUS BLD VENIPUNCTURE: CPT | Performed by: NURSE PRACTITIONER

## 2024-04-15 PROCEDURE — 86200 CCP ANTIBODY: CPT | Performed by: NURSE PRACTITIONER

## 2024-04-15 PROCEDURE — 86720 LEPTOSPIRA ANTIBODY: CPT | Mod: 90 | Performed by: NURSE PRACTITIONER

## 2024-04-15 PROCEDURE — 82550 ASSAY OF CK (CPK): CPT | Performed by: NURSE PRACTITIONER

## 2024-04-15 PROCEDURE — G0103 PSA SCREENING: HCPCS | Performed by: NURSE PRACTITIONER

## 2024-04-15 PROCEDURE — 99000 SPECIMEN HANDLING OFFICE-LAB: CPT | Performed by: NURSE PRACTITIONER

## 2024-04-15 PROCEDURE — 86140 C-REACTIVE PROTEIN: CPT | Performed by: NURSE PRACTITIONER

## 2024-04-15 PROCEDURE — 81001 URINALYSIS AUTO W/SCOPE: CPT | Performed by: NURSE PRACTITIONER

## 2024-04-15 PROCEDURE — 86753 PROTOZOA ANTIBODY NOS: CPT | Mod: 90 | Performed by: NURSE PRACTITIONER

## 2024-04-15 PROCEDURE — G0402 INITIAL PREVENTIVE EXAM: HCPCS | Performed by: NURSE PRACTITIONER

## 2024-04-15 PROCEDURE — 99214 OFFICE O/P EST MOD 30 MIN: CPT | Mod: 25 | Performed by: NURSE PRACTITIONER

## 2024-04-15 PROCEDURE — 85025 COMPLETE CBC W/AUTO DIFF WBC: CPT | Performed by: NURSE PRACTITIONER

## 2024-04-15 PROCEDURE — 87389 HIV-1 AG W/HIV-1&-2 AB AG IA: CPT | Mod: GZ | Performed by: NURSE PRACTITIONER

## 2024-04-15 RX ORDER — RESPIRATORY SYNCYTIAL VIRUS VACCINE 120MCG/0.5
0.5 KIT INTRAMUSCULAR ONCE
Qty: 1 EACH | Refills: 0 | Status: CANCELLED | OUTPATIENT
Start: 2024-04-15 | End: 2024-04-15

## 2024-04-15 ASSESSMENT — ANXIETY QUESTIONNAIRES
IF YOU CHECKED OFF ANY PROBLEMS ON THIS QUESTIONNAIRE, HOW DIFFICULT HAVE THESE PROBLEMS MADE IT FOR YOU TO DO YOUR WORK, TAKE CARE OF THINGS AT HOME, OR GET ALONG WITH OTHER PEOPLE: NOT DIFFICULT AT ALL
6. BECOMING EASILY ANNOYED OR IRRITABLE: NOT AT ALL
3. WORRYING TOO MUCH ABOUT DIFFERENT THINGS: NOT AT ALL
2. NOT BEING ABLE TO STOP OR CONTROL WORRYING: NOT AT ALL
7. FEELING AFRAID AS IF SOMETHING AWFUL MIGHT HAPPEN: NOT AT ALL
5. BEING SO RESTLESS THAT IT IS HARD TO SIT STILL: NOT AT ALL
1. FEELING NERVOUS, ANXIOUS, OR ON EDGE: NOT AT ALL
GAD7 TOTAL SCORE: 0
GAD7 TOTAL SCORE: 0

## 2024-04-15 ASSESSMENT — PATIENT HEALTH QUESTIONNAIRE - PHQ9
5. POOR APPETITE OR OVEREATING: NOT AT ALL
SUM OF ALL RESPONSES TO PHQ QUESTIONS 1-9: 8

## 2024-04-15 ASSESSMENT — PAIN SCALES - GENERAL: PAINLEVEL: MILD PAIN (2)

## 2024-04-15 NOTE — PATIENT INSTRUCTIONS
Preventive Care Advice   This is general advice given by our system to help you stay healthy. However, your care team may have specific advice just for you. Please talk to your care team about your preventive care needs.  Nutrition  Eat 5 or more servings of fruits and vegetables each day.  Try wheat bread, brown rice and whole grain pasta (instead of white bread, rice, and pasta).  Get enough calcium and vitamin D. Check the label on foods and aim for 100% of the RDA (recommended daily allowance).  Lifestyle  Exercise at least 150 minutes each week   (30 minutes a day, 5 days a week).  Do muscle strengthening activities 2 days a week. These help control your weight and prevent disease.  No smoking.  Wear sunscreen to prevent skin cancer.  Have a dental exam and cleaning every 6 months.  Yearly exams  See your health care team every year to talk about:  Any changes in your health.  Any medicines your care team has prescribed.  Preventive care, family planning, and ways to prevent chronic diseases.  Shots (vaccines)   HPV shots (up to age 26), if you've never had them before.  Hepatitis B shots (up to age 59), if you've never had them before.  COVID-19 shot: Get this shot when it's due.  Flu shot: Get a flu shot every year.  Tetanus shot: Get a tetanus shot every 10 years.  Pneumococcal, hepatitis A, and RSV shots: Ask your care team if you need these based on your risk.  Shingles shot (for age 50 and up).  General health tests  Diabetes screening:  Starting at age 35, Get screened for diabetes at least every 3 years.  If you are younger than age 35, ask your care team if you should be screened for diabetes.  Cholesterol test: At age 39, start having a cholesterol test every 5 years, or more often if advised.  Bone density scan (DEXA): At age 50, ask your care team if you should have this scan for osteoporosis (brittle bones).  Hepatitis C: Get tested at least once in your life.  STIs (sexually transmitted  infections)  Before age 24: Ask your care team if you should be screened for STIs.  After age 24: Get screened for STIs if you're at risk. You are at risk for STIs (including HIV) if:  You are sexually active with more than one person.  You don't use condoms every time.  You or a partner was diagnosed with a sexually transmitted infection.  If you are at risk for HIV, ask about PrEP medicine to prevent HIV.  Get tested for HIV at least once in your life, whether you are at risk for HIV or not.  Cancer screening tests  Cervical cancer screening: If you have a cervix, begin getting regular cervical cancer screening tests at age 21. Most people who have regular screenings with normal results can stop after age 65. Talk about this with your provider.  Breast cancer scan (mammogram): If you've ever had breasts, begin having regular mammograms starting at age 40. This is a scan to check for breast cancer.  Colon cancer screening: It is important to start screening for colon cancer at age 45.  Have a colonoscopy test every 10 years (or more often if you're at risk) Or, ask your provider about stool tests like a FIT test every year or Cologuard test every 3 years.  To learn more about your testing options, visit: https://www.[email or web address]/537562.pdf.  For help making a decision, visit: https://bit.ly/db01085.  Prostate cancer screening test: If you have a prostate and are age 55 to 69, ask your provider if you would benefit from a yearly prostate cancer screening test.  Lung cancer screening: If you are a current or former smoker age 50 to 80, ask your care team if ongoing lung cancer screenings are right for you.  For informational purposes only. Not to replace the advice of your health care provider. Copyright   2023 [employer]. All rights reserved. Clinically reviewed by the [facility] Transitions Program. [employer] 971299 - REV 01/24.    Learning About Depression Screening  What is depression  screening?  Depression screening is a way to see if you have depression symptoms. It may be done by a doctor or counselor. It's often part of a routine checkup. That's because your mental health is just as important as your physical health.  Depression is a mental health condition that affects how you feel, think, and act. You may:  Have less energy.  Lose interest in your daily activities.  Feel sad and grouchy for a long time.  Depression is very common. It affects people of all ages.  Many things can lead to depression. Some people become depressed after they have a stroke or find out they have a major illness like cancer or heart disease. The death of a loved one or a breakup may lead to depression. It can run in families. Most experts believe that a combination of inherited genes and stressful life events can cause it.  What happens during screening?  You may be asked to fill out a form about your depression symptoms. You and the doctor will discuss your answers. The doctor may ask you more questions to learn more about how you think, act, and feel.  What happens after screening?  If you have symptoms of depression, your doctor will talk to you about your options.  Doctors usually treat depression with medicines or counseling. Often, combining the two works best. Many people don't get help because they think that they'll get over the depression on their own. But people with depression may not get better unless they get treatment.  The cause of depression is not well understood. There may be many factors involved. But if you have depression, it's not your fault.  A serious symptom of depression is thinking about death or suicide. If you or someone you care about talks about this or about feeling hopeless, get help right away.  It's important to know that depression can be treated. Medicine, counseling, and self-care may help.  Where can you learn more?  Go to https://www.healthwise.net/patiented  Enter T185 in  "the search box to learn more about \"Learning About Depression Screening.\"  Current as of: June 24, 2023               Content Version: 14.0    2517-1688 [employer].   Care instructions adapted under license by your healthcare professional. If you have questions about a medical condition or this instruction, always ask your healthcare professional. [employer] disclaims any warranty or liability for your use of this information.      "

## 2024-04-15 NOTE — PROGRESS NOTES
Assessment & Plan     Encounter for Medicare annual wellness exam    - PRIMARY CARE FOLLOW-UP SCHEDULING; Future  - Hemoglobin A1c    Screening for prostate cancer  -normal prostate-specific antigen test   - PSA, screen; Future  - PSA, screen    Fatigue, unspecified type  -unclear etiology of his body aches and malaise,  since he recently came back from [place] I recommended screening for infectious diseases including leptospirosis   - CBC with platelets and differential; Future  - Cyclic Citrullinated Peptide Antibody IgG; Future  - CRP, inflammation; Future  - ESR: Erythrocyte sedimentation rate; Future  - HIV Antigen Antibody Combo; Future  - CK total; Future  - TSH with free T4 reflex; Future  - Babesia antibody IgG IgM; Future  - Leptospira IgM; Future  - UA with Microscopic reflex to Culture - lab collect; Future  - CBC with platelets and differential  - Cyclic Citrullinated Peptide Antibody IgG  - CRP, inflammation  - ESR: Erythrocyte sedimentation rate  - HIV Antigen Antibody Combo  - CK total  - TSH with free T4 reflex  - Babesia antibody IgG IgM  - Leptospira IgM  - UA with Microscopic reflex to Culture - lab collect  - UA Microscopic with Reflex to Culture    Myalgia    - CBC with platelets and differential; Future  - Cyclic Citrullinated Peptide Antibody IgG; Future  - CRP, inflammation; Future  - ESR: Erythrocyte sedimentation rate; Future  - HIV Antigen Antibody Combo; Future  - CK total; Future  - TSH with free T4 reflex; Future  - Babesia antibody IgG IgM; Future  - Leptospira IgM; Future  - CBC with platelets and differential  - Cyclic Citrullinated Peptide Antibody IgG  - CRP, inflammation  - ESR: Erythrocyte sedimentation rate  - HIV Antigen Antibody Combo  - CK total  - TSH with free T4 reflex  - Babesia antibody IgG IgM  - Leptospira IgM    Multiple joint pain    - CBC with platelets and differential; Future  - Cyclic Citrullinated Peptide Antibody IgG; Future  - CRP, inflammation; Future  -  ESR: Erythrocyte sedimentation rate; Future  - HIV Antigen Antibody Combo; Future  - CK total; Future  - TSH with free T4 reflex; Future  - Babesia antibody IgG IgM; Future  - Leptospira IgM; Future  - CBC with platelets and differential  - Cyclic Citrullinated Peptide Antibody IgG  - CRP, inflammation  - ESR: Erythrocyte sedimentation rate  - HIV Antigen Antibody Combo  - CK total  - TSH with free T4 reflex  - Babesia antibody IgG IgM  - Leptospira IgM    Elevated glucose level  Lab Results   Component Value Date    A1C 6.0 04/15/2024    A1C 5.9 01/03/2023    A1C 5.4 05/19/2022    A1C 5.8 01/04/2011    -A1C within prediabetic range, recommended to continue to follow low carbohydrate diet   - Hemoglobin A1c; Future  - Hemoglobin A1c    Erica Rodriguez is a 63 year old, presenting for the following health issues: generalized body aches, joint pains, fatigue, symptoms started about 2 months ago after he came back from [facility]. He states he was there for 2 weeks, while in [facility] he visited [place], states he was touching animals at the farms. He reports no fevers, chills after his trip. Denies abdominal pain, diarrhea, nausea and vomiting. Denies headaches, vision changes and dizziness. Denies rash.   He recently had left shoulder replacement surgery, he is recovering well, scheduled with his ortho surgeon for follow up this week.      Arthritis and Wellness Visit        4/15/2024    12:46 PM   Additional Questions   Roomed by rmb   Accompanied by self         4/15/2024    12:46 PM   Patient Reported Additional Medications   Patient reports taking the following new medications none     History of Present Illness       Reason for visit:  Joint and muscle pain    He eats 2-3 servings of fruits and vegetables daily.He consumes 2 sweetened beverage(s) daily.He exercises with enough effort to increase his heart rate 9 or less minutes per day.  He exercises with enough effort to increase his heart rate 3 or less days per  week.   He is taking medications regularly.     Pain History:  When did you first notice your pain? 2023   Have you seen this provider for your pain in the past? Yes   Where in your body do you have pain? Muscle pain  Are you seeing anyone else for your pain? No        4/15/2024    12:51 PM   PHQ-9 SCORE   PHQ-9 Total Score 8           4/15/2024    12:51 PM   DESMOND-7 SCORE   Total Score 0           2022     9:54 AM 2022     9:55 AM 4/15/2024    12:51 PM   PEG Score   PEG Total Score 7.33 7.33 6.33         PDMP Review         Value Time User    State PDMP site checked  Yes 3/15/2024  8:26 AM Viridiana Cruz APRN CNP          Annual Wellness Visit     Patient has been advised of split billing requirements and indicates understanding: Yes   VISION   No corrective lenses  Tool used: Rojas   Right eye:        10/10 (20/20)  Left eye:          10/10 (20/20)  Both 10/10(20/20)   Visual Acuity: Pass       Health Care Directive  Patient does not have a Health Care Directive or Living Will: Patient states has Advance Directive and will bring in a copy to clinic.  In general, how would you rate your overall physical health? good  Do you have a special diet?  Regular (no restrictions)      Do you see a dentist two times every year?  Yes  Have you been more tired than usual lately?  (!) YES   Discussed possible causes of fatigue.   If you drink alcohol do you typically have >3 drinks per day or >7 drinks per week? No  Do you have a current opioid prescription? No  Do you use any other controlled substances or medications that are not prescribed by a provider? None  Social History     Tobacco Use    Smoking status: Former     Current packs/day: 0.00     Average packs/day: 4.0 packs/day for 30.0 years (120.0 ttl pk-yrs)     Types: Cigarettes     Start date: 1965     Quit date: 1995     Years since quittin.3    Smokeless tobacco: Former     Quit date: 1975   Vaping Use    Vaping status: Never  Used   Substance Use Topics    Alcohol use: Yes     Comment: 3-4 drinks a week    Drug use: No     Needs assistance for the following daily activities: no assistance needed  Which of the following safety concerns are present in your home?  none identified   Do you (or your family members) have any concerns about your safety while driving?  No  Do you have any of the following hearing concerns?: No hearing concerns  In the past 6 months, have you been bothered by leaking of urine? No        1/16/2024   Social Factors   Worry food won't last until get money to buy more No   Food not last or not have enough money for food? No   Do you have housing?  Yes   Are you worried about losing your housing? No   Lack of transportation? No   Unable to get utilities (heat,electricity)? No         4/15/2024   Fall Risk   Fallen 2 or more times in the past year? No   Trouble with walking or balance? No            Today's PHQ-2 Score:       1/16/2024     7:41 AM   PHQ-2 ( 1999 Pfizer)   Q1: Little interest or pleasure in doing things 1   Q2: Feeling down, depressed or hopeless 0   PHQ-2 Score 1   Q1: Little interest or pleasure in doing things Several days   Q2: Feeling down, depressed or hopeless Not at all   PHQ-2 Score 1     Last PSA:   PSA   Date Value Ref Range Status   02/18/2021 0.25 0 - 4 ug/L Final     Comment:     Assay Method:  Chemiluminescence using Siemens Vista analyzer     Prostate Specific Antigen Screen   Date Value Ref Range Status   04/15/2024 0.51 0.00 - 4.50 ng/mL Final     ASCVD Risk   The 10-year ASCVD risk score (Brigid CHAVEZ, et al., 2019) is: 13%    Values used to calculate the score:      Age: 63 years      Sex: Male      Is Non- : No      Diabetic: No      Tobacco smoker: No      Systolic Blood Pressure: 118 mmHg      Is BP treated: Yes      HDL Cholesterol: 46 mg/dL      Total Cholesterol: 189 mg/dL    Current providers sharing in care for this patient include:  Patient Care  Team:  Vee Bhatti APRN CNP as PCP - General (Family Medicine)  Viridiana Cruz APRN CNP as Assigned PCP  Leeann Lane MD as Physician (Neurology)    The following health maintenance items are reviewed in Epic and correct as of today:  Health Maintenance   Topic Date Due    HF ACTION PLAN  Never done    COVID-19 Vaccine (1) Never done    Pneumococcal Vaccine: 65+ Years (1 of 2 - PCV) Never done    HEPATITIS A IMMUNIZATION (2 of 2 - Risk 2-dose series) 08/13/2018    RSV VACCINE (Pregnancy & 60+) (1 - 1-dose 60+ series) Never done    INFLUENZA VACCINE (1) Never done    AORTIC ANEURYSM SCREENING (SYSTEM ASSIGNED)  Never done    BMP  10/02/2024    ANNUAL REVIEW OF HM ORDERS  11/16/2024    ALT  02/20/2025    LIPID  02/20/2025    MEDICARE ANNUAL WELLNESS VISIT  04/15/2025    FALL RISK ASSESSMENT  04/15/2025    CBC  04/15/2025    ADVANCE CARE PLANNING  02/11/2026    COLORECTAL CANCER SCREENING  05/28/2026    GLUCOSE  04/02/2027    DTAP/TDAP/TD IMMUNIZATION (3 - Td or Tdap) 02/11/2031    TSH W/FREE T4 REFLEX  Completed    HEPATITIS C SCREENING  Completed    HIV SCREENING  Completed    PHQ-2 (once per calendar year)  Completed    ZOSTER IMMUNIZATION  Completed    HEPATITIS B IMMUNIZATION  Completed    IPV IMMUNIZATION  Aged Out    HPV IMMUNIZATION  Aged Out    MENINGITIS IMMUNIZATION  Aged Out    RSV MONOCLONAL ANTIBODY  Aged Out       Appropriate preventive services were discussed with this patient, including applicable screening as appropriate for fall prevention, nutrition, physical activity, Tobacco-use cessation, weight loss and cognition.  Checklist reviewing preventive services available has been given to the patient.           4/17/2024   Mini Cog   Clock Draw Score 2 Normal   3 Item Recall 3 objects recalled   Mini Cog Total Score 5           Review of Systems  Constitutional, HEENT, cardiovascular, pulmonary, gi and gu systems are negative, except as otherwise noted.      Objective    BP  "118/68   Pulse 106   Temp 97.4  F (36.3  C) (Tympanic)   Resp 20   Ht 1.778 m (5' 10\")   Wt 115.2 kg (254 lb)   SpO2 96%   BMI 36.45 kg/m    Body mass index is 36.45 kg/m .  Physical Exam   GENERAL: alert and no distress  EYES: Eyes grossly normal to inspection, PERRL and conjunctivae and sclerae normal  HENT: ear canals and TM's normal, nose and mouth without ulcers or lesions  NECK: no adenopathy, no asymmetry, masses, or scars  RESP: lungs clear to auscultation - no rales, rhonchi or wheezes  CV: regular rate and rhythm, normal S1 S2, no S3 or S4, no murmur, click or rub, no peripheral edema   MS: no gross musculoskeletal defects noted, no edema  SKIN: no suspicious lesions or rashes  NEURO: Normal strength and tone, mentation intact and speech normal  PSYCH: mentation appears normal, affect normal/bright    Results for orders placed or performed in visit on 04/15/24   Cyclic Citrullinated Peptide Antibody IgG     Status: Normal   Result Value Ref Range    Cyclic Citrullinated Peptide Antibody IgG 1.0 <7.0 U/mL   CRP, inflammation     Status: Abnormal   Result Value Ref Range    CRP Inflammation 13.84 (H) <5.00 mg/L   ESR: Erythrocyte sedimentation rate     Status: Abnormal   Result Value Ref Range    Erythrocyte Sedimentation Rate 22 (H) 0 - 20 mm/hr   HIV Antigen Antibody Combo     Status: Normal   Result Value Ref Range    HIV Antigen Antibody Combo Nonreactive Nonreactive   CK total     Status: Normal   Result Value Ref Range     39 - 308 U/L   TSH with free T4 reflex     Status: Normal   Result Value Ref Range    TSH 1.00 0.30 - 4.20 uIU/mL   PSA, screen     Status: Normal   Result Value Ref Range    Prostate Specific Antigen Screen 0.51 0.00 - 4.50 ng/mL    Narrative    This result is obtained using the Roche Elecsys total PSA method on the delmis [id number] immunoassay analyzer. Results obtained with different assay methods or kits cannot be used interchangeably.   UA with Microscopic reflex to " Culture - lab collect     Status: Normal    Specimen: Urine, Clean Catch   Result Value Ref Range    Color Urine Yellow Colorless, Straw, Light Yellow, Yellow    Appearance Urine Clear Clear    Glucose Urine Negative Negative mg/dL    Bilirubin Urine Negative Negative    Ketones Urine Negative Negative mg/dL    Specific Gravity Urine 1.025 1.003 - 1.035    Blood Urine Negative Negative    pH Urine 5.5 5.0 - 7.0    Protein Albumin Urine Negative Negative mg/dL    Urobilinogen Urine 0.2 0.2, 1.0 E.U./dL    Nitrite Urine Negative Negative    Leukocyte Esterase Urine Negative Negative   Hemoglobin A1c     Status: Abnormal   Result Value Ref Range    Hemoglobin A1C 6.0 (H) 0.0 - 5.6 %   CBC with platelets and differential     Status: Abnormal   Result Value Ref Range    WBC Count 14.6 (H) 4.0 - 11.0 10e3/uL    RBC Count 4.82 4.40 - 5.90 10e6/uL    Hemoglobin 14.0 13.3 - 17.7 g/dL    Hematocrit 44.4 40.0 - 53.0 %    MCV 92 78 - 100 fL    MCH 29.0 26.5 - 33.0 pg    MCHC 31.5 31.5 - 36.5 g/dL    RDW 14.0 10.0 - 15.0 %    Platelet Count 305 150 - 450 10e3/uL    % Neutrophils 82 %    % Lymphocytes 10 %    % Monocytes 5 %    % Eosinophils 3 %    % Basophils 0 %    % Immature Granulocytes 0 %    Absolute Neutrophils 11.9 (H) 1.6 - 8.3 10e3/uL    Absolute Lymphocytes 1.5 0.8 - 5.3 10e3/uL    Absolute Monocytes 0.7 0.0 - 1.3 10e3/uL    Absolute Eosinophils 0.4 0.0 - 0.7 10e3/uL    Absolute Basophils 0.1 0.0 - 0.2 10e3/uL    Absolute Immature Granulocytes 0.0 <=0.4 10e3/uL   UA Microscopic with Reflex to Culture     Status: Normal   Result Value Ref Range    RBC Urine None Seen 0-2 /HPF /HPF    WBC Urine None Seen 0-5 /HPF /HPF    Narrative    Urine Culture not indicated   CBC with platelets and differential     Status: Abnormal    Narrative    The following orders were created for panel order CBC with platelets and differential.  Procedure                               Abnormality         Status                     ---------                                -----------         ------                     CBC with platelets and d...[[id number]]  Abnormal            Final result                 Please view results for these tests on the individual orders.           Signed Electronically by: TOREY Estrada CNP

## 2024-04-16 LAB — HIV 1+2 AB+HIV1 P24 AG SERPL QL IA: NONREACTIVE

## 2024-04-17 ENCOUNTER — TRANSFERRED RECORDS (OUTPATIENT)
Dept: HEALTH INFORMATION MANAGEMENT | Facility: CLINIC | Age: 64
End: 2024-04-17
Payer: COMMERCIAL

## 2024-04-17 LAB — CCP AB SER IA-ACNC: 1 U/ML

## 2024-04-18 ENCOUNTER — THERAPY VISIT (OUTPATIENT)
Dept: PHYSICAL THERAPY | Facility: CLINIC | Age: 64
End: 2024-04-18
Attending: ORTHOPAEDIC SURGERY
Payer: COMMERCIAL

## 2024-04-18 DIAGNOSIS — Z96.612 S/P REVERSE TOTAL SHOULDER ARTHROPLASTY, LEFT: Primary | ICD-10-CM

## 2024-04-18 LAB
B MICROTI IGG TITR SER: NORMAL {TITER}
B MICROTI IGM TITR SER: NORMAL {TITER}

## 2024-04-18 PROCEDURE — 97110 THERAPEUTIC EXERCISES: CPT | Mod: GP | Performed by: PHYSICAL THERAPIST

## 2024-04-21 LAB — LEPTOSPIRA IGM SER QL: NEGATIVE

## 2024-04-26 ENCOUNTER — THERAPY VISIT (OUTPATIENT)
Dept: PHYSICAL THERAPY | Facility: CLINIC | Age: 64
End: 2024-04-26
Attending: ORTHOPAEDIC SURGERY
Payer: COMMERCIAL

## 2024-04-26 DIAGNOSIS — Z96.612 S/P REVERSE TOTAL SHOULDER ARTHROPLASTY, LEFT: Primary | ICD-10-CM

## 2024-04-26 PROCEDURE — 97110 THERAPEUTIC EXERCISES: CPT | Mod: GP | Performed by: PHYSICAL THERAPIST

## 2024-05-02 ENCOUNTER — THERAPY VISIT (OUTPATIENT)
Dept: PHYSICAL THERAPY | Facility: CLINIC | Age: 64
End: 2024-05-02
Attending: ORTHOPAEDIC SURGERY
Payer: COMMERCIAL

## 2024-05-02 ENCOUNTER — ALLIED HEALTH/NURSE VISIT (OUTPATIENT)
Dept: FAMILY MEDICINE | Facility: CLINIC | Age: 64
End: 2024-05-02
Payer: COMMERCIAL

## 2024-05-02 DIAGNOSIS — Z96.612 S/P REVERSE TOTAL SHOULDER ARTHROPLASTY, LEFT: Primary | ICD-10-CM

## 2024-05-02 DIAGNOSIS — Z00.00 ENCOUNTER FOR MEDICARE ANNUAL WELLNESS EXAM: Primary | ICD-10-CM

## 2024-05-02 PROCEDURE — 97110 THERAPEUTIC EXERCISES: CPT | Mod: GP | Performed by: PHYSICAL THERAPIST

## 2024-05-02 PROCEDURE — 99207 PR NO CHARGE NURSE ONLY: CPT

## 2024-05-02 NOTE — NURSING NOTE
VISION   No corrective lenses  Tool used: Rojas   Right eye:        10/10 (20/20)  Left eye:          10/10 (20/20)  Both 10/10(20/20)   Visual Acuity: Pass

## 2024-05-09 ENCOUNTER — THERAPY VISIT (OUTPATIENT)
Dept: PHYSICAL THERAPY | Facility: CLINIC | Age: 64
End: 2024-05-09
Attending: ORTHOPAEDIC SURGERY
Payer: COMMERCIAL

## 2024-05-09 DIAGNOSIS — Z96.612 S/P REVERSE TOTAL SHOULDER ARTHROPLASTY, LEFT: Primary | ICD-10-CM

## 2024-05-09 PROCEDURE — 97110 THERAPEUTIC EXERCISES: CPT | Mod: GP | Performed by: PHYSICAL THERAPIST

## 2024-05-12 DIAGNOSIS — I10 ESSENTIAL HYPERTENSION: ICD-10-CM

## 2024-05-15 ENCOUNTER — TRANSFERRED RECORDS (OUTPATIENT)
Dept: HEALTH INFORMATION MANAGEMENT | Facility: CLINIC | Age: 64
End: 2024-05-15
Payer: COMMERCIAL

## 2024-05-20 RX ORDER — LISINOPRIL AND HYDROCHLOROTHIAZIDE 12.5; 2 MG/1; MG/1
1 TABLET ORAL DAILY
Qty: 90 TABLET | Refills: 0 | Status: SHIPPED | OUTPATIENT
Start: 2024-05-20 | End: 2024-08-16

## 2024-05-20 NOTE — TELEPHONE ENCOUNTER
Pending Prescriptions:                       Disp   Refills    lisinopril-hydrochlorothiazide (ZESTORETI*90 tab*0            Sig: Take 1 tablet by mouth once daily      Last visit: 4/14/21    Last filled: 3/7/24    Quantity: 90    Req. Rc'd: 5/17/24 2nd request    Isabella Beasley CMA ([facility])

## 2024-05-20 NOTE — TELEPHONE ENCOUNTER
Name from pharmacy: Lisinopril-hydroCHLOROthiazide 20-12.5 MG Oral Tablet          Will file in chart as: lisinopril-hydrochlorothiazide (ZESTORETIC) 20-12.5 MG tablet    Sig: Take 1 tablet by mouth once daily    Disp: 90 tablet    Refills: 0    Start: 5/12/2024    Class: E-Prescribe    Non-formulary For: Essential hypertension    Last ordered: 10 months ago (6/28/2023) by Taye Mcallister MD    Last refill: 3/7/2024    Rx #: [id number]    Diuretics (Including Combos) Protocol [id number]/20/2024 09:59 AM   Protocol Details Recent (12 mo) or future (90 days) visit within the authorizing provider's specialty    Blood pressure under 140/90 in past 12 months    Medication is active on med list    Medication indicated for associated diagnosis    Has GFR on file in past 12 months and most recent value is normal    Patient is age 18 or older   ACE Inhibitors (Including Combos) Protocol Failed   Protocol Details Recent (12 mo) or future (90 days) visit within the authorizing provider's specialty    Blood pressure under 140/90 in past 12 months- Clinicial or Patient Reported    Medication is active on med list    Medication indicated for associated diagnosis    Has GFR on file in past 12 months and most recent value is normal    Patient is age 18 or older    Normal serum potassium on file in past 12 months      To be filled at: [address] - [address]     Patient saw Dr. Mcallister on 4/14/2021. Patient was to switch to lisinopril hydrochlorothiazide 20/12.5 mg. Patient to follow up in 3 years.  Patient has declined following up with Dr. Mcallister. Day refill sent for patient.    Alexandra Lemons, RN, BSN  Cardiology RN Care Coordinator   [place]   Phone: [phone]  Fax: [phone] ([place]) [phone] ([place])

## 2024-05-22 ENCOUNTER — MYC MEDICAL ADVICE (OUTPATIENT)
Dept: CARDIOLOGY | Facility: CLINIC | Age: 64
End: 2024-05-22
Payer: COMMERCIAL

## 2024-05-22 NOTE — TELEPHONE ENCOUNTER
Taye Welch MD Krist, Chana, RN; P  Cardiology Nurse  My original plan was to see him with this echo result.  I am not sure why he canceled.    The best way I think is to schedule a NON urgent  follow-up with me so that we can discuss the results and we can plan to see him in 2 or 3 years.    If he does not want to come to cardiology clinic then the plan is to come back to cardiology clinic in 3 years in order to continue to check his aortic valve which is mild stenosis.  And also to keep an eye on his LVEF.    DR WELCH          Previous Messages       ----- Message -----  From: Alexandra Lemons, RN  Sent: 5/21/2024   2:11 PM CDT  To: Taye Welch MD;  Cardiology Nurse  Subject: RE: cardiology follow up                        Just wanted to confirm. He was last seen in 2021 with the 3 year follow up and echo. He only had echo done this year and cancelled his follow up. Is it okay for him to wait another 3 yerars before being seen in clinic with another echo at that time?  Let me know and then should PCP take over managing his medications?  Thanks for your input!  ----- Message -----  From: Taye Welch MD  Sent: 5/20/2024   3:25 PM CDT  To: Sharon Hoffman CMA;  Cardiology Nurse  Subject: RE: cardiology follow up                        Can you let him that we recommend follow-up in 3 years.  Sorry I forgot to mention that at the result note.  Thank you  DR WELCH.  ----- Message -----  From: Sharon Hoffman CMA  Sent: 5/20/2024   3:05 PM CDT  To: Taye Welch MD;  Cardiology Nurse  Subject: cardiology follow up                            This patient was to have an Echo and a follow up with Dr. Welch. Patient only had the Echo and cancelled with Dr. Welch. I see  this note in Echo report. (Right now we just need to continue monitoring your aortic valve and your heart function.) when is patient to have repeat Echo and follow up. I am assuming patient cancelled follow up after receiving Echo results. I don't know if we will get  him to reschedule at this point.    Sharon

## 2024-05-22 NOTE — TELEPHONE ENCOUNTER
Called and spoke with patient. Patient agreeable with coming in for a follow up with Dr. Mcallister. Appointment scheduled.     Alexandra Lemons, RN, BSN  Cardiology RN Care Coordinator   [place]   Phone: [phone]  Fax: [phone] ([place]) [phone] ([place])

## 2024-05-31 DIAGNOSIS — I10 ESSENTIAL HYPERTENSION: ICD-10-CM

## 2024-05-31 RX ORDER — LISINOPRIL AND HYDROCHLOROTHIAZIDE 12.5; 2 MG/1; MG/1
1 TABLET ORAL DAILY
Qty: 90 TABLET | Refills: 0 | OUTPATIENT
Start: 2024-05-31

## 2024-05-31 NOTE — TELEPHONE ENCOUNTER
lisinopril-hydrochlorothiazide (ZESTORETIC) 20-12.5 MG tablet 90 tablet 0 5/20/2024   Addressed in a different encounter.

## 2024-06-10 ENCOUNTER — TRANSCRIBE ORDERS (OUTPATIENT)
Dept: OTHER | Age: 64
End: 2024-06-10

## 2024-06-10 DIAGNOSIS — Z96.652 S/P TOTAL KNEE ARTHROPLASTY, LEFT: Primary | ICD-10-CM

## 2024-06-21 ENCOUNTER — OFFICE VISIT (OUTPATIENT)
Dept: FAMILY MEDICINE | Facility: CLINIC | Age: 64
End: 2024-06-21
Payer: COMMERCIAL

## 2024-06-21 VITALS
BODY MASS INDEX: 36.29 KG/M2 | RESPIRATION RATE: 16 BRPM | HEART RATE: 96 BPM | WEIGHT: 252.9 LBS | SYSTOLIC BLOOD PRESSURE: 110 MMHG | OXYGEN SATURATION: 95 % | DIASTOLIC BLOOD PRESSURE: 80 MMHG | TEMPERATURE: 98.5 F

## 2024-06-21 DIAGNOSIS — M25.562 CHRONIC PAIN OF LEFT KNEE: ICD-10-CM

## 2024-06-21 DIAGNOSIS — R73.03 PRE-DIABETES: ICD-10-CM

## 2024-06-21 DIAGNOSIS — G47.33 OSA (OBSTRUCTIVE SLEEP APNEA): ICD-10-CM

## 2024-06-21 DIAGNOSIS — G89.29 CHRONIC PAIN OF LEFT KNEE: ICD-10-CM

## 2024-06-21 DIAGNOSIS — Z01.818 PREOP GENERAL PHYSICAL EXAM: Primary | ICD-10-CM

## 2024-06-21 DIAGNOSIS — I50.32 CHRONIC DIASTOLIC CONGESTIVE HEART FAILURE (H): ICD-10-CM

## 2024-06-21 DIAGNOSIS — I10 ESSENTIAL HYPERTENSION: ICD-10-CM

## 2024-06-21 LAB
ANION GAP SERPL CALCULATED.3IONS-SCNC: 13 MMOL/L (ref 7–15)
BASOPHILS # BLD AUTO: 0 10E3/UL (ref 0–0.2)
BASOPHILS NFR BLD AUTO: 0 %
BUN SERPL-MCNC: 22.7 MG/DL (ref 8–23)
CALCIUM SERPL-MCNC: 9.8 MG/DL (ref 8.8–10.2)
CHLORIDE SERPL-SCNC: 104 MMOL/L (ref 98–107)
CREAT SERPL-MCNC: 1.09 MG/DL (ref 0.67–1.17)
DEPRECATED HCO3 PLAS-SCNC: 24 MMOL/L (ref 22–29)
EGFRCR SERPLBLD CKD-EPI 2021: 75 ML/MIN/1.73M2
EOSINOPHIL # BLD AUTO: 0.2 10E3/UL (ref 0–0.7)
EOSINOPHIL NFR BLD AUTO: 2 %
ERYTHROCYTE [DISTWIDTH] IN BLOOD BY AUTOMATED COUNT: 13.3 % (ref 10–15)
ERYTHROCYTE [DISTWIDTH] IN BLOOD BY AUTOMATED COUNT: 13.4 % (ref 10–15)
GLUCOSE SERPL-MCNC: 125 MG/DL (ref 70–99)
HCT VFR BLD AUTO: 49.8 % (ref 40–53)
HCT VFR BLD AUTO: 51.1 % (ref 40–53)
HGB BLD-MCNC: 15.8 G/DL (ref 13.3–17.7)
HGB BLD-MCNC: 16.2 G/DL (ref 13.3–17.7)
IMM GRANULOCYTES # BLD: 0 10E3/UL
IMM GRANULOCYTES NFR BLD: 0 %
LYMPHOCYTES # BLD AUTO: 1.6 10E3/UL (ref 0.8–5.3)
LYMPHOCYTES NFR BLD AUTO: 14 %
MCH RBC QN AUTO: 29.1 PG (ref 26.5–33)
MCH RBC QN AUTO: 29.1 PG (ref 26.5–33)
MCHC RBC AUTO-ENTMCNC: 31.7 G/DL (ref 31.5–36.5)
MCHC RBC AUTO-ENTMCNC: 31.7 G/DL (ref 31.5–36.5)
MCV RBC AUTO: 92 FL (ref 78–100)
MCV RBC AUTO: 92 FL (ref 78–100)
MONOCYTES # BLD AUTO: 0.9 10E3/UL (ref 0–1.3)
MONOCYTES NFR BLD AUTO: 9 %
NEUTROPHILS # BLD AUTO: 8 10E3/UL (ref 1.6–8.3)
NEUTROPHILS NFR BLD AUTO: 75 %
PLATELET # BLD AUTO: 242 10E3/UL (ref 150–450)
PLATELET # BLD AUTO: 258 10E3/UL (ref 150–450)
POTASSIUM SERPL-SCNC: 4.3 MMOL/L (ref 3.4–5.3)
RBC # BLD AUTO: 5.43 10E6/UL (ref 4.4–5.9)
RBC # BLD AUTO: 5.56 10E6/UL (ref 4.4–5.9)
SODIUM SERPL-SCNC: 141 MMOL/L (ref 135–145)
WBC # BLD AUTO: 10.8 10E3/UL (ref 4–11)
WBC # BLD AUTO: 11.3 10E3/UL (ref 4–11)

## 2024-06-21 PROCEDURE — 93000 ELECTROCARDIOGRAM COMPLETE: CPT | Performed by: NURSE PRACTITIONER

## 2024-06-21 PROCEDURE — 99214 OFFICE O/P EST MOD 30 MIN: CPT | Performed by: NURSE PRACTITIONER

## 2024-06-21 PROCEDURE — 36415 COLL VENOUS BLD VENIPUNCTURE: CPT | Performed by: NURSE PRACTITIONER

## 2024-06-21 PROCEDURE — 80048 BASIC METABOLIC PNL TOTAL CA: CPT | Performed by: NURSE PRACTITIONER

## 2024-06-21 PROCEDURE — 85025 COMPLETE CBC W/AUTO DIFF WBC: CPT | Performed by: NURSE PRACTITIONER

## 2024-06-21 RX ORDER — RESPIRATORY SYNCYTIAL VIRUS VACCINE 120MCG/0.5
0.5 KIT INTRAMUSCULAR ONCE
Qty: 1 EACH | Refills: 0 | Status: CANCELLED | OUTPATIENT
Start: 2024-06-21 | End: 2024-06-21

## 2024-06-21 ASSESSMENT — PAIN SCALES - GENERAL: PAINLEVEL: NO PAIN (0)

## 2024-06-21 NOTE — PROGRESS NOTES
Preoperative Evaluation  [facility]  [address] [address]  Phone: [phone]  Primary Provider: TOREY Estrada CNP  Pre-op Performing Provider: TOREY Estrada CNP  Jun 21, 2024 6/21/2024   Surgical Information   What procedure is being done? total left knee replacement   Facility or Hospital where procedure[facility] performed[facility]   Who is [facility] the [facility] surgery? [facility]   Date of surgery / procedure: 7/11/24   Time of surgery / procedure: Not scheduled yet   Where do you plan to recover after surgery? home        Fax number for surgical facility: [phone]    Assessment & Plan     The proposed surgical procedure is considered INTERMEDIATE risk.    Preop general physical exam    - Basic metabolic panel  (Ca, Cl, CO2, Creat, Gluc, K, Na, BUN); Future  - CBC with platelets; Future  - EKG 12-lead complete w/read - Clinics  - EKG 12-lead complete w/read - Clinics  - Basic metabolic panel  (Ca, Cl, CO2, Creat, Gluc, K, Na, BUN)  - CBC with platelets  - CBC with platelets and differential; Future  - CBC with platelets and differential    Chronic pain of left knee  -patient is cleared for surgery     Pre-diabetes  Lab Results   Component Value Date    A1C 6.0 04/15/2024    A1C 5.9 01/03/2023    A1C 5.4 05/19/2022    A1C 5.8 01/04/2011      - Basic metabolic panel  (Ca, Cl, CO2, Creat, Gluc, K, Na, BUN); Future  - Basic metabolic panel  (Ca, Cl, CO2, Creat, Gluc, K, Na, BUN)    Essential hypertension  -well controlled   - Basic metabolic panel  (Ca, Cl, CO2, Creat, Gluc, K, Na, BUN); Future  - EKG 12-lead complete w/read - Clinics  - Basic metabolic panel  (Ca, Cl, CO2, Creat, Gluc, K, Na, BUN)    Chronic diastolic congestive heart failure (H)  -feeling well, EKG without acute changes   - Basic metabolic panel  (Ca, Cl, CO2, Creat, Gluc, K, Na, BUN); Future  - EKG 12-lead complete w/read - Clinics  - Basic metabolic panel  (Ca, Cl, CO2,  Creat, Gluc, K, Na, BUN)    CAROL (obstructive sleep apnea)  -patient is using CPAP every night, but he states mask sometimes does not sit well. I offered referral to sleep clinic, but patient would like to wait and do this after his surgery. He was advised to call clinic for referral        - No identified additional risk factors other than previously addressed    Antiplatelet or Anticoagulation Medication Instructions   - Patient is on no antiplatelet or anticoagulation medications.    Additional Medication Instructions   - Diuretics: May continue due to heart failure.    Recommendation  Approval given to proceed with proposed procedure, without further diagnostic evaluation.    Erica Rodriguez is a 63 year old, presenting for the following:  Pre-Op Exam          6/21/2024    10:22 AM   Additional Questions   Roomed by carter   Accompanied by self         6/21/2024    10:22 AM   Patient Reported Additional Medications   Patient reports taking the following new medications none     HPI related to upcoming procedure: left knee chronic pain        6/21/2024   Pre-Op Questionnaire   Have you ever had a heart attack or stroke? No   Have you ever had surgery on your heart or blood vessels, such as a stent placement, a coronary artery bypass, or surgery on an artery in your head, neck, heart, or legs? No   Do you have chest pain with activity? No   Do you have a history of heart failure? No   Do you currently have a cold, bronchitis or symptoms of other infection? No   Do you have a cough, shortness of breath, or wheezing? No   Do you or anyone in your family have previous history of blood clots? No   Do you or does anyone in your family have a serious bleeding problem such as prolonged bleeding following surgeries or cuts? No   Have you ever had problems with anemia or been told to take iron pills? No   Have you had any abnormal blood loss such as black, tarry or bloody stools? No   Have you ever had a blood  transfusion? No   Are you willing to have a blood transfusion if it is medically needed before, during, or after your surgery? Yes   Have you or any of your relatives ever had problems with anesthesia? No   Do you have sleep apnea, excessive snoring or daytime drowsiness? (!) YES   Do you have a CPAP machine? Yes   Do you have any artifical heart valves or other implanted medical devices like a pacemaker, defibrillator, or continuous glucose monitor? No   Do you have artificial joints? (!) YES   Are you allergic to latex? No      Preoperative Review of    reviewed - no record of controlled substances prescribed.      Status of Chronic Conditions:  See problem list for active medical problems.  Problems all longstanding and stable, except as noted/documented.  See ROS for pertinent symptoms related to these conditions.    Patient Active Problem List    Diagnosis Date Noted    S/P reverse total shoulder arthroplasty, left 04/04/2024     Priority: Medium    Hard to intubate 04/01/2024     Priority: Medium     See intubation note      DJD of left shoulder 04/01/2024     Priority: Medium    Essential hypertension 01/29/2024     Priority: Medium    Lower urinary tract symptoms (LUTS) 01/29/2024     Priority: Medium    Ventricular tachycardia (H) 11/16/2023     Priority: Medium    Aortic valve stenosis, etiology of cardiac valve disease unspecified 01/03/2023     Priority: Medium    Rash and nonspecific skin eruption 01/03/2023     Priority: Medium    Degenerative joint disease (DJD) of hip 04/04/2022     Priority: Medium    CHF (congestive heart failure) (H) 03/07/2022     Priority: Medium    CAROL (obstructive sleep apnea) 04/15/2021     Priority: Medium     4/12/2021 [facility] Sleep Study (255.0 lbs) - AHI 30.0, RDI 31.8, Supine AHI 47.1, REM AHI 78.0, Low O2 58.9%, Time Spent ?88% 41.2 minutes / Time Spent ?89% 51.6 minutes.      Morbid obesity (H) 03/09/2021     Priority: Medium    Snoring 08/06/2015      Priority: Medium     Formatting of this note might be different from the original.  2015: advise to follow up for sleep study.      Hayfever 2012     Priority: Medium     Formatting of this note might be different from the original.  Receives DEPO medrol injection 40mg annually and this seems to help him year round.      Glenoid labral tear 2011     Priority: Medium     Formatting of this note might be different from the original.  He has been doing a lot better since starting Glucosamine- Chondroitin.      Rupture of long head biceps tendon 2011     Priority: Medium    Supraspinatus tendon tear 2011     Priority: Medium    Hyperlipidemia with target low density lipoprotein (LDL) cholesterol less than 100 mg/dL 2011     Priority: Medium     Formatting of this note is different from the original.  Patient stopped taking statins 2015 as he was concerned about potential side effects. Importance of takign care of modifiable risk factors discussed with Patient. Will continue to address.  Lovaza (Omega-3 PUFA) 2mg twice a day).    Last Lipids:  Chol: 218    2013  T    2013  HDL:   55    2013  LDL:  127    2013   LDL CHOLESTEROL (mg/dL)   Date Value   2013 127     [place] 10-year CHD Risk Score: 8% (11 Total Points)      Pre-diabetes 2011     Priority: Medium     Formatting of this note is different from the original.      HEMOGLOBIN A1C MONITORING (POCT) (%)   Date Value   2011 5.4      GLUCOSE                   (mg/dL)   Date Value   3/25/2013 77      Raynaud's phenomenon 2011     Priority: Medium    Right shoulder pain 2011     Priority: Medium    CARDIOVASCULAR SCREENING; LDL GOAL LESS THAN 160 10/31/2010     Priority: Medium      Past Medical History:   Diagnosis Date    Arthritis     Congestive heart failure (H)     Heart murmur     Hyperlipidemia     Hypertension     Obese     Prediabetes     Sleep apnea     doesn't use  CPAP     Past Surgical History:   Procedure Laterality Date    ABDOMEN SURGERY      ARTHROPLASTY HIP Right 2022    Procedure: RIGHT TOTAL HIP ARTHROPLASTY;  Surgeon: Timothy Montana MD;  Location: [facility] Main OR    CHOLECYSTECTOMY      COLONOSCOPY N/A 2021    Procedure: COLONOSCOPY, WITH POLYPECTOMY AND BIOPSY;  Surgeon: Lito Sweet DO;  Location: [facility]    EYE SURGERY      FINGER SURGERY      REVERSE ARTHROPLASTY SHOULDER Left 2024    Procedure: LEFT REVERSE TOTAL SHOULDER ARTHROPLASTY;  Surgeon: Timothy Montana MD;  Location: [facility] Main OR     Current Outpatient Medications   Medication Sig Dispense Refill    amoxicillin (AMOXIL) 500 MG capsule Take 500 mg by mouth as needed (DENTAL PROCEDURES) (Patient not taking: Reported on 4/15/2024)      aspirin (ASA) 325 MG EC tablet Take 1 tablet (325 mg) by mouth daily 30 tablet 0    BETA BLOCKER NOT PRESCRIBED (INTENTIONAL) Please choose reason not prescribed from choices below.      Glucosamine Sulfate 1000 MG TABS Take 1,000 mg by mouth daily       lisinopril-hydrochlorothiazide (ZESTORETIC) 20-12.5 MG tablet Take 1 tablet by mouth once daily 90 tablet 0    Multiple Vitamin (MULTIVITAMIN ADULT PO) Take 1 tablet by mouth daily       tamsulosin (FLOMAX) 0.4 MG capsule TAKE 1 CAPSULE BY MOUTH ONCE DAILY WITH DINNER 90 capsule 2    zinc gluconate 50 MG tablet Take 50 mg by mouth daily         No Known Allergies     Social History     Tobacco Use    Smoking status: Former     Current packs/day: 0.00     Average packs/day: 4.0 packs/day for 30.0 years (120.0 ttl pk-yrs)     Types: Cigarettes     Start date: 1965     Quit date: 1995     Years since quittin.4    Smokeless tobacco: Former     Quit date: 1975   Substance Use Topics    Alcohol use: Yes     Comment: 3-4 drinks a week       History   Drug Use No             Review of Systems  Constitutional, HEENT, cardiovascular, pulmonary, gi and gu systems are negative,  "except as otherwise noted.    Objective    There were no vitals taken for this visit.   Estimated body mass index is 36.45 kg/m  as calculated from the following:    Height as of 4/15/24: 1.778 m (5' 10\").    Weight as of 4/15/24: 115.2 kg (254 lb).  Physical Exam  GENERAL: alert and no distress  EYES: Eyes grossly normal to inspection, PERRL and conjunctivae and sclerae normal  HENT: ear canals and TM's normal, nose and mouth without ulcers or lesions  NECK: no adenopathy, no asymmetry, masses, or scars  RESP: lungs clear to auscultation - no rales, rhonchi or wheezes  CV: regular rate and rhythm, normal S1 S2, no S3 or S4, no murmur, click or rub, no peripheral edema   MS: no gross musculoskeletal defects noted, no edema  SKIN: no suspicious lesions or rashes  NEURO: Normal strength and tone, mentation intact and speech normal  PSYCH: mentation appears normal, affect normal/bright    Recent Labs   Lab Test 04/15/24  1342 04/02/24  0617 04/01/24  1118 03/15/24  0845 02/20/24  0827   HGB 14.0 12.8* 14.7   < > 14.9    330 429  --  339   NA  --  137  --   --  139   POTASSIUM  --  4.6 4.2   < > 4.8   CR  --  1.07 1.30*   < > 1.31*   A1C 6.0*  --   --   --   --     < > = values in this interval not displayed.        Diagnostics  Recent Results (from the past 24 hour(s))   Basic metabolic panel  (Ca, Cl, CO2, Creat, Gluc, K, Na, BUN)    Collection Time: 06/21/24 11:15 AM   Result Value Ref Range    Sodium 141 135 - 145 mmol/L    Potassium 4.3 3.4 - 5.3 mmol/L    Chloride 104 98 - 107 mmol/L    Carbon Dioxide (CO2) 24 22 - 29 mmol/L    Anion Gap 13 7 - 15 mmol/L    Urea Nitrogen 22.7 8.0 - 23.0 mg/dL    Creatinine 1.09 0.67 - 1.17 mg/dL    GFR Estimate 75 >60 mL/min/1.73m2    Calcium 9.8 8.8 - 10.2 mg/dL    Glucose 125 (H) 70 - 99 mg/dL   CBC with platelets    Collection Time: 06/21/24 11:15 AM   Result Value Ref Range    WBC Count 11.3 (H) 4.0 - 11.0 10e3/uL    RBC Count 5.43 4.40 - 5.90 10e6/uL    Hemoglobin 15.8 " 13.3 - 17.7 g/dL    Hematocrit 49.8 40.0 - 53.0 %    MCV 92 78 - 100 fL    MCH 29.1 26.5 - 33.0 pg    MCHC 31.7 31.5 - 36.5 g/dL    RDW 13.3 10.0 - 15.0 %    Platelet Count 242 150 - 450 10e3/uL   CBC with platelets and differential    Collection Time: 06/21/24 11:15 AM   Result Value Ref Range    WBC Count 10.8 4.0 - 11.0 10e3/uL    RBC Count 5.56 4.40 - 5.90 10e6/uL    Hemoglobin 16.2 13.3 - 17.7 g/dL    Hematocrit 51.1 40.0 - 53.0 %    MCV 92 78 - 100 fL    MCH 29.1 26.5 - 33.0 pg    MCHC 31.7 31.5 - 36.5 g/dL    RDW 13.4 10.0 - 15.0 %    Platelet Count 258 150 - 450 10e3/uL    % Neutrophils 75 %    % Lymphocytes 14 %    % Monocytes 9 %    % Eosinophils 2 %    % Basophils 0 %    % Immature Granulocytes 0 %    Absolute Neutrophils 8.0 1.6 - 8.3 10e3/uL    Absolute Lymphocytes 1.6 0.8 - 5.3 10e3/uL    Absolute Monocytes 0.9 0.0 - 1.3 10e3/uL    Absolute Eosinophils 0.2 0.0 - 0.7 10e3/uL    Absolute Basophils 0.0 0.0 - 0.2 10e3/uL    Absolute Immature Granulocytes 0.0 <=0.4 10e3/uL      EKG: appears normal, NSR, normal axis, normal intervals, no acute ST/T changes c/w ischemia, no LVH by voltage criteria, Left Bundle Branch Block, unchanged from previous tracings    Revised Cardiac Risk Index (RCRI)  The patient has the following serious cardiovascular risks for perioperative complications:   - No serious cardiac risks = 0 points     RCRI Interpretation: 0 points: Class I (very low risk - 0.4% complication rate)         Signed Electronically by: TOREY Estrada CNP  Copy of this evaluation report is provided to requesting physician.

## 2024-07-11 ENCOUNTER — TRANSFERRED RECORDS (OUTPATIENT)
Dept: HEALTH INFORMATION MANAGEMENT | Facility: CLINIC | Age: 64
End: 2024-07-11
Payer: COMMERCIAL

## 2024-07-16 ENCOUNTER — THERAPY VISIT (OUTPATIENT)
Dept: PHYSICAL THERAPY | Facility: CLINIC | Age: 64
End: 2024-07-16
Attending: ORTHOPAEDIC SURGERY
Payer: COMMERCIAL

## 2024-07-16 DIAGNOSIS — Z96.652 S/P TOTAL KNEE ARTHROPLASTY, LEFT: Primary | ICD-10-CM

## 2024-07-16 PROCEDURE — 97110 THERAPEUTIC EXERCISES: CPT | Mod: GP | Performed by: PHYSICAL THERAPIST

## 2024-07-16 PROCEDURE — 97161 PT EVAL LOW COMPLEX 20 MIN: CPT | Mod: GP | Performed by: PHYSICAL THERAPIST

## 2024-07-16 ASSESSMENT — ACTIVITIES OF DAILY LIVING (ADL)
STIFFNESS: THE SYMPTOM AFFECTS MY ACTIVITY SEVERELY
HOW_WOULD_YOU_RATE_THE_OVERALL_FUNCTION_OF_YOUR_KNEE_DURING_YOUR_USUAL_DAILY_ACTIVITIES?: SEVERELY ABNORMAL
GO UP STAIRS: ACTIVITY IS VERY DIFFICULT
PAIN: THE SYMPTOM AFFECTS MY ACTIVITY SEVERELY
GO DOWN STAIRS: ACTIVITY IS VERY DIFFICULT
AS_A_RESULT_OF_YOUR_KNEE_INJURY,_HOW_WOULD_YOU_RATE_YOUR_CURRENT_LEVEL_OF_DAILY_ACTIVITY?: SEVERELY ABNORMAL
KNEE_ACTIVITY_OF_DAILY_LIVING_SCORE: 31.43
LIMPING: THE SYMPTOM AFFECTS MY ACTIVITY SEVERELY
KNEEL ON THE FRONT OF YOUR KNEE: I AM UNABLE TO DO THE ACTIVITY
LIMPING: THE SYMPTOM AFFECTS MY ACTIVITY SEVERELY
RISE FROM A CHAIR: ACTIVITY IS FAIRLY DIFFICULT
PLEASE_INDICATE_YOR_PRIMARY_REASON_FOR_REFERRAL_TO_THERAPY:: KNEE
SIT WITH YOUR KNEE BENT: ACTIVITY IS FAIRLY DIFFICULT
RISE FROM A CHAIR: ACTIVITY IS FAIRLY DIFFICULT
PAIN: THE SYMPTOM AFFECTS MY ACTIVITY SEVERELY
SIT WITH YOUR KNEE BENT: ACTIVITY IS FAIRLY DIFFICULT
WALK: ACTIVITY IS FAIRLY DIFFICULT
KNEEL ON THE FRONT OF YOUR KNEE: I AM UNABLE TO DO THE ACTIVITY
SQUAT: I AM UNABLE TO DO THE ACTIVITY
AS_A_RESULT_OF_YOUR_KNEE_INJURY,_HOW_WOULD_YOU_RATE_YOUR_CURRENT_LEVEL_OF_DAILY_ACTIVITY?: SEVERELY ABNORMAL
SQUAT: I AM UNABLE TO DO THE ACTIVITY
SWELLING: THE SYMPTOM AFFECTS MY ACTIVITY SEVERELY
WEAKNESS: THE SYMPTOM AFFECTS MY ACTIVITY SEVERELY
STIFFNESS: THE SYMPTOM AFFECTS MY ACTIVITY SEVERELY
WALK: ACTIVITY IS FAIRLY DIFFICULT
KNEE_ACTIVITY_OF_DAILY_LIVING_SUM: 22
GIVING WAY, BUCKLING OR SHIFTING OF KNEE: I DO NOT HAVE THE SYMPTOM
HOW_WOULD_YOU_RATE_THE_CURRENT_FUNCTION_OF_YOUR_KNEE_DURING_YOUR_USUAL_DAILY_ACTIVITIES_ON_A_SCALE_FROM_0_TO_100_WITH_100_BEING_YOUR_LEVEL_OF_KNEE_FUNCTION_PRIOR_TO_YOUR_INJURY_AND_0_BEING_THE_INABILITY_TO_PERFORM_ANY_OF_YOUR_USUAL_DAILY_ACTIVITIES?: 10
GIVING WAY, BUCKLING OR SHIFTING OF KNEE: I DO NOT HAVE THE SYMPTOM
HOW_WOULD_YOU_RATE_THE_CURRENT_FUNCTION_OF_YOUR_KNEE_DURING_YOUR_USUAL_DAILY_ACTIVITIES_ON_A_SCALE_FROM_0_TO_100_WITH_100_BEING_YOUR_LEVEL_OF_KNEE_FUNCTION_PRIOR_TO_YOUR_INJURY_AND_0_BEING_THE_INABILITY_TO_PERFORM_ANY_OF_YOUR_USUAL_DAILY_ACTIVITIES?: 10
GO DOWN STAIRS: ACTIVITY IS VERY DIFFICULT
STAND: ACTIVITY IS MINIMALLY DIFFICULT
GO UP STAIRS: ACTIVITY IS VERY DIFFICULT
SWELLING: THE SYMPTOM AFFECTS MY ACTIVITY SEVERELY
STAND: ACTIVITY IS MINIMALLY DIFFICULT
HOW_WOULD_YOU_RATE_THE_OVERALL_FUNCTION_OF_YOUR_KNEE_DURING_YOUR_USUAL_DAILY_ACTIVITIES?: SEVERELY ABNORMAL
RAW_SCORE: 22
WEAKNESS: THE SYMPTOM AFFECTS MY ACTIVITY SEVERELY

## 2024-07-16 NOTE — PROGRESS NOTES
PHYSICAL THERAPY EVALUATION  Type of Visit: Evaluation              Subjective Pt had a L TKA on 7/11/24. He came in without the walker at this time. He notes ambulating about 1000' without AD. He notes increased swelling in the ankle and foot even with elevating. He feels as soon as he standing, his foot immediately swelling. PMHx: L TSA and R NAOMI      Presenting condition or subjective complaint: become  more flexible  Date of onset: 07/11/24    Relevant medical history: Arthritis   Dates & types of surgery: to many    Prior diagnostic imaging/testing results: MRI; X-ray     Prior therapy history for the same diagnosis, illness or injury:        Prior Level of Function  Independent with all mobility    Living Environment  Social support: Alone   Type of home: House; 2-story   Stairs to enter the home:         Ramp: No   Stairs inside the home: Yes 16 Is there a railing: Yes     Help at home: None  Equipment owned: Straight Cane; Walker; Crutches     Employment: No    Hobbies/Interests: hiking   traveling   hunting [employer]    Patient goals for therapy: squat   put on socks   kneelwalk up and down stairs normally    Pain assessment: Pain present     Objective   KNEE EVALUATION  PAIN: Pain is Exacerbated By: movement  POSTURE:  slight fwd posture to the area  GAIT: wide SONA, small steps  BALANCE/PROPRIOCEPTION:  able to stand without loss of balance, difficult to bring feet together with tightness  ROM:  0-7-92*  STRENGTH:  hip flexion- 3+/5;  quad- 3/5;  HS- lacking full ROM;  hip abd- 3-/5  FLEXIBILITY:  swelling in the knee limiting motion  FUNCTIONAL TESTS:  STS- use of UE to support    Assessment & Plan   CLINICAL IMPRESSIONS  Medical Diagnosis: L TKA on 7/11    Treatment Diagnosis: L knee pain   Impression/Assessment: Patient is a 63 year old male with L TKA on 7/11/24 complaints.  The following significant findings have been identified: Pain, Decreased ROM/flexibility, Decreased strength, Impaired balance,  Impaired gait, Impaired muscle performance, and Decreased activity tolerance. These impairments interfere with their ability to perform work tasks, recreational activities, household chores, driving , and community mobility as compared to previous level of function.     Clinical Decision Making (Complexity):  Clinical Presentation: Stable/Uncomplicated  Clinical Presentation Rationale: based on medical and personal factors listed in PT evaluation  Clinical Decision Making (Complexity): Low complexity    PLAN OF CARE  Treatment Interventions:  Modalities: E-stim, Ultrasound  Interventions: Gait Training, Manual Therapy, Neuromuscular Re-education, Therapeutic Activity, Therapeutic Exercise, Self-Care/Home Management    Long Term Goals     PT Goal 1  Goal Identifier: STG  Goal Description: Pt will have full knee extension in 4 weeks to ambulate without a limp.  Target Date: 08/13/24  PT Goal 2  Goal Identifier: LTG  Goal Description: Pt will have 110* knee flexion in 8 weeks to comfortbaly rise from a chair and climb stairs.  Target Date: 09/10/24  PT Goal 3  Goal Identifier: LTG  Goal Description: Pt will be able to climb stairs with a reciprocal gait pattern in 8 weeks to tolerate mobility.  Target Date: 09/10/24      Frequency of Treatment: 2x/wk  Duration of Treatment: 8 weeks    Education Assessment:   Learner/Method: Patient;Listening;Demonstration;No Barriers to Learning    Risks and benefits of evaluation/treatment have been explained.   Patient/Family/caregiver agrees with Plan of Care.     Evaluation Time:     PT Eval, Low Complexity Minutes (34640): 15     Signing Clinician: Bren Stephenson PT        [facility]                                                                                   OUTPATIENT PHYSICAL THERAPY      PLAN OF TREATMENT FOR OUTPATIENT REHABILITATION   Patient's Last Name, First Name, Michael Roberts YOB: 1960   Provider's Name   [facility]  [facility]   Medical Record No.  [id number]     Onset Date: 07/11/24  Start of Care Date: 07/16/24     Medical Diagnosis:  L TKA on 7/11      PT Treatment Diagnosis:  L knee pain Plan of Treatment  Frequency/Duration: 2x/wk/ 8 weeks    Certification date from 07/16/24 to 09/10/24         See note for plan of treatment details and functional goals     Bren Stephenson, PT                         I CERTIFY THE NEED FOR THESE SERVICES FURNISHED UNDER        THIS PLAN OF TREATMENT AND WHILE UNDER MY CARE .             Physician Signature               Date    X_____________________________________________________                  Referring Provider:  Timothy Montana MD    Initial Assessment  See Epic Evaluation- Start of Care Date: 07/16/24

## 2024-07-18 ENCOUNTER — THERAPY VISIT (OUTPATIENT)
Dept: PHYSICAL THERAPY | Facility: CLINIC | Age: 64
End: 2024-07-18
Attending: ORTHOPAEDIC SURGERY
Payer: COMMERCIAL

## 2024-07-18 DIAGNOSIS — Z96.652 S/P TOTAL KNEE ARTHROPLASTY, LEFT: Primary | ICD-10-CM

## 2024-07-18 PROCEDURE — 97110 THERAPEUTIC EXERCISES: CPT | Mod: GP | Performed by: PHYSICAL THERAPIST

## 2024-07-22 ENCOUNTER — THERAPY VISIT (OUTPATIENT)
Dept: PHYSICAL THERAPY | Facility: CLINIC | Age: 64
End: 2024-07-22
Attending: ORTHOPAEDIC SURGERY
Payer: COMMERCIAL

## 2024-07-22 DIAGNOSIS — Z96.652 S/P TOTAL KNEE ARTHROPLASTY, LEFT: Primary | ICD-10-CM

## 2024-07-22 PROCEDURE — 97110 THERAPEUTIC EXERCISES: CPT | Mod: GP | Performed by: PHYSICAL THERAPIST

## 2024-07-24 ENCOUNTER — TRANSFERRED RECORDS (OUTPATIENT)
Dept: HEALTH INFORMATION MANAGEMENT | Facility: CLINIC | Age: 64
End: 2024-07-24

## 2024-07-24 ENCOUNTER — THERAPY VISIT (OUTPATIENT)
Dept: PHYSICAL THERAPY | Facility: CLINIC | Age: 64
End: 2024-07-24
Attending: ORTHOPAEDIC SURGERY
Payer: COMMERCIAL

## 2024-07-24 DIAGNOSIS — Z96.652 S/P TOTAL KNEE ARTHROPLASTY, LEFT: Primary | ICD-10-CM

## 2024-07-24 PROCEDURE — 97110 THERAPEUTIC EXERCISES: CPT | Mod: GP | Performed by: PHYSICAL THERAPIST

## 2024-07-31 ENCOUNTER — THERAPY VISIT (OUTPATIENT)
Dept: PHYSICAL THERAPY | Facility: CLINIC | Age: 64
End: 2024-07-31
Attending: ORTHOPAEDIC SURGERY
Payer: COMMERCIAL

## 2024-07-31 DIAGNOSIS — Z96.652 S/P TOTAL KNEE ARTHROPLASTY, LEFT: Primary | ICD-10-CM

## 2024-07-31 PROCEDURE — 97110 THERAPEUTIC EXERCISES: CPT | Mod: GP | Performed by: PHYSICAL THERAPIST

## 2024-08-09 ENCOUNTER — TELEPHONE (OUTPATIENT)
Dept: FAMILY MEDICINE | Facility: CLINIC | Age: 64
End: 2024-08-09
Payer: COMMERCIAL

## 2024-08-09 DIAGNOSIS — J30.1 HAYFEVER: Primary | ICD-10-CM

## 2024-08-09 NOTE — TELEPHONE ENCOUNTER
Reason for Call:  Appointment Request    Patient requesting this type of appt:  RN only for depo medrol injection    Requested provider: RN Visit    Reason patient unable to be scheduled: Appointment requires orders that are not found in chart    When does patient want to be seen/preferred time: 3-7 days    Comments: Patient states he gets a depo medrol injection every year as an RN only visit. I attempted to schedule visit but I am getting blocked from scheduling this visit.     Could we send this information to you in [employer] or would you prefer to receive a phone call?:   Patient would prefer a phone call   Okay to leave a detailed message?: Yes at Cell number on file:    Telephone Information:   Mobile [phone]       Call taken on 8/9/2024 at 2:57 PM by Jennifer Becker   Not applicable

## 2024-08-10 RX ORDER — METHYLPREDNISOLONE SODIUM SUCCINATE 40 MG/ML
80 INJECTION, POWDER, LYOPHILIZED, FOR SOLUTION INTRAMUSCULAR; INTRAVENOUS ONCE
Status: DISCONTINUED | OUTPATIENT
Start: 2024-08-10 | End: 2024-08-14

## 2024-08-12 NOTE — PROGRESS NOTES
Referring provider:TOREY Stewart CNP    HPI: Mr. Michael Jimenez is a 60 year old  male with PMH significant for obesity, former smoker, hypertension (not on treatment) and hyperlipidemia.    Patient recently presented to his primary care physician and reported chest discomfort.  Subsequently he had an EKG which showed left bundle branch block.  He had a Lexiscan stress test on 3/3/2021 which showed reduced LVEF at 42%, prior MI in basal anteroseptal left ventricle. I have reviewed patient's stress test which shows wide QRS tachycardia likely nonsustained VT on the pre- stress EKG.  No VT during drug infusion.    Patient tells me that he was told to have cardiac murmur.  That was the reason that he saw primary care physician in the first place.  Patient recalls having left-sided chest discomfort couple of months ago which lasted for few hours.  He cannot recall what was he doing at that time.  He tells me that he is currently very sedentary partially due to his hip pain.  Denies recurrent chest discomfort since then.  No shortness of breath with activity.  Denies palpitations, dizziness, syncope or lower extremity edema.    He has history of smoking for total of 15 years.  He stopped in his mid 30s.  For 2 years he tells me that he smoked 4 packs/day.  He has history of high blood pressure but never been on treatment.  He was on cholesterol-lowering treatment at some point then he self discontinued.  Recently his BMP showed elevated creatinine with low GFR.  Patient tells me that his brother in his 50s had silent MI.    No history of diabetes. LDL is significantly elevated at 171 (2/18/2021).  Not on statin.  Patient has CKD stage III with GFR at 54.    No prior echocardiogram available to review.    I reviewed patient's EKG 2/11/2021 which shows sinus rhythm and left bundle branch block.    Medications, personal, family, and social history reviewed with patient and revised.    Interval history  4/14/2021:  Patient is being seen today for follow-up on blood pressure control and discuss the tests that I have ordered during my previous visit which include transthoracic echocardiogram and CT coronary angiogram. I have also started him on hypertension treatment with lisinopril 10 mg during my previous visit.  Patient has been monitoring his blood pressure at home and tells me that he has not seen significant change with initiation of lisinopril 10 mg.  He is complaining of abnormal smell in his stool, urine and body smell.  Otherwise denies chest pain, shortness of breath, lower extremity edema, syncope.    Interval history 8/14/2024:  I am seeing patient for follow-up.  He recently underwent a follow-up echocardiogram to recheck his aortic valve function which shows mild aortic stenosis with no change.  He has no symptoms.  He tells me that in the interim since I saw him he had a lot of joint issues.  He underwent bilateral carpal tunnel surgery earlier this year, knee, shoulder and hip replacement.  In his problem list also biceps tendon rupture is noted.  He is not aware of this.  I reviewed patient's echocardiogram which showed mild aortic stenosis, LVH, declined LVEF at maybe 45 to 50% according to my reading.  He has no heart failure symptoms.    PAST MEDICAL HISTORY:  Hypertension  Hyperlipidemia  Nonobstructive coronary artery disease  Mild aortic stenosis  Former heavy smoker  Bundle branch block    CURRENT MEDICATIONS:  Current Outpatient Medications   Medication Sig Dispense Refill    amoxicillin (AMOXIL) 500 MG capsule Take 500 mg by mouth as needed (DENTAL PROCEDURES) (Patient not taking: Reported on 4/15/2024)      BETA BLOCKER NOT PRESCRIBED (INTENTIONAL) Please choose reason not prescribed from choices below.      Glucosamine Sulfate 1000 MG TABS Take 1,000 mg by mouth daily       lisinopril-hydrochlorothiazide (ZESTORETIC) 20-12.5 MG tablet Take 1 tablet by mouth once daily 90 tablet 0     Multiple Vitamin (MULTIVITAMIN ADULT PO) Take 1 tablet by mouth daily       tamsulosin (FLOMAX) 0.4 MG capsule TAKE 1 CAPSULE BY MOUTH ONCE DAILY WITH DINNER 90 capsule 2    zinc gluconate 50 MG tablet Take 50 mg by mouth daily         PAST SURGICAL HISTORY:  Past Surgical History:   Procedure Laterality Date    ABDOMEN SURGERY      ARTHROPLASTY HIP Right 2022    Procedure: RIGHT TOTAL HIP ARTHROPLASTY;  Surgeon: Timothy Montana MD;  Location: [facility] Main OR    CHOLECYSTECTOMY      COLONOSCOPY N/A 2021    Procedure: COLONOSCOPY, WITH POLYPECTOMY AND BIOPSY;  Surgeon: Lito Sweet DO;  Location: [facility]    EYE SURGERY      FINGER SURGERY      REVERSE ARTHROPLASTY SHOULDER Left 2024    Procedure: LEFT REVERSE TOTAL SHOULDER ARTHROPLASTY;  Surgeon: Timothy Montana MD;  Location: [facility] Main OR       ALLERGIES:   No Known Allergies    FAMILY HISTORY:  Family History   Problem Relation Age of Onset    Heart Disease Father         emphasemia    Coronary Artery Disease Father     Emphysema Father     Asthma Father     Skin Cancer Mother          SOCIAL HISTORY:  Social History     Tobacco Use    Smoking status: Former     Current packs/day: 0.00     Average packs/day: 4.0 packs/day for 30.0 years (120.0 ttl pk-yrs)     Types: Cigarettes     Start date: 1965     Quit date: 1995     Years since quittin.6    Smokeless tobacco: Former     Quit date: 1975   Vaping Use    Vaping status: Never Used   Substance Use Topics    Alcohol use: Yes     Comment: 3-4 drinks a week    Drug use: No       ROS:   Constitutional: No fever, chills, or sweats. Weight stable.     Neuro: Complaining of loss of smell and taste+.       Exam:  /78 (BP Location: Right arm, Patient Position: Chair, Cuff Size: Adult Large)   Pulse 100   Wt 115.2 kg (254 lb)   SpO2 97%   BMI 36.45 kg/m    GENERAL APPEARANCE: alert and no distress  HEENT: no icterus, no central cyanosis  CARDIOVASCULAR:  regular rhythm, normal S1, S2, no S3 or S4 , upper parasternal 2/6 systolic ejection murmur radiating to carotids  GI: soft, non tender  EXTREMITIES:  no edema  NEURO: alert, normal speech,and affect   SKIN: no ecchymoses, no rashes     I have reviewed the labs and personally reviewed the imaging below and made my comment in the assessment and plan.    Labs:  CBC RESULTS:   Lab Results   Component Value Date    WBC 11.3 (H) 06/21/2024    WBC 10.8 06/21/2024    RBC 5.43 06/21/2024    RBC 5.56 06/21/2024    HGB 15.8 06/21/2024    HGB 16.2 06/21/2024    HGB 16.9 01/04/2011    HCT 49.8 06/21/2024    HCT 51.1 06/21/2024    MCV 92 06/21/2024    MCV 92 06/21/2024    MCH 29.1 06/21/2024    MCH 29.1 06/21/2024    MCHC 31.7 06/21/2024    MCHC 31.7 06/21/2024    RDW 13.3 06/21/2024    RDW 13.4 06/21/2024     06/21/2024     06/21/2024     01/04/2011       BMP RESULTS:  Lab Results   Component Value Date     06/21/2024     05/11/2021    POTASSIUM 4.3 06/21/2024    POTASSIUM 4.1 06/08/2022    POTASSIUM 4.2 05/11/2021    CHLORIDE 104 06/21/2024    CHLORIDE 111 (H) 06/08/2022    CHLORIDE 101 05/11/2021    CO2 24 06/21/2024    CO2 29 06/08/2022    CO2 31 05/11/2021    ANIONGAP 13 06/21/2024    ANIONGAP 4 06/08/2022    ANIONGAP 4 05/11/2021     (H) 06/21/2024     (H) 06/08/2022    GLC 92 05/11/2021    BUN 22.7 06/21/2024    BUN 27 06/08/2022    BUN 23 05/11/2021    CR 1.09 06/21/2024    CR 1.14 05/11/2021    GFRESTIMATED 75 06/21/2024    GFRESTIMATED 68 05/11/2021    GFRESTBLACK 79 05/11/2021    SKYLER 9.8 06/21/2024    SKYLER 8.8 05/11/2021        INR RESULTS:  Lab Results   Component Value Date    INR 1.04 04/04/2022    INR 0.92 01/04/2011     Transthoracic echocardiogram 3/12/2024:  Left ventricular function is borderline. The ejection fraction is 50-55%. Mid  to distal anterior and anteroseptal hypokinesis.  Global right ventricular function is normal.  Mild aortic stenosis.  The inferior  vena cava is normal.  Borderline dilation of Sinuses of Valsalva, 4.1 cm.     This study was compared with the study from 3/10/21. LVEF is lower with subtle  wall motion abnormalities on today's study. Aortic valve doppler assessment is  stable.    Lexiscan stress test 3/3/2021:  The nuclear stress test is abnormal.    There is a small area of a mild degree of infarction in the mid to basal anteroseptal segment(s) of the left ventricle.    Left ventricular function is mildly reduced.    The left ventricular ejection fraction at rest is 42%.  The left ventricular ejection fraction at stress is 45%.    One episode of 4 beat run on NSVT at rest and 4/10 chest pain with stress. Consider further testing with CT coronary angiogram if clinical suspicion of CAD is high.    There is no prior study for comparison.       EKG 2/11/2021 personally reviewed sinus rhythm with left bundle branch block      CT coronary angiogram 3/12/2021  1.  This study was limited by patient motion artifact. Suggest  coronary angiogram if clinical suspicion for obstructive lesions is  high.  2.  There is no evidence of significant stenosis in the left main and  proximal segments of the LAD, LCx and RCA.   3.  Total Agatston score 416 placing the patient in the 85th  percentile when compared to age and gender matched control group.    Echocardiogram 3/10/20 21  Mild aortic stenosis   LVEF 55 to 60%    Assessment and Plan:   Patient is being seen today for follow-up. He is currently asymptomatic from cardiac standpoint    # Nonobstructive coronary artery disease by CT coronary angiogram in 2021  -Start aspirin 81 mg  -Start Crestor 10 mg.  Unfortunately in the past he could not tolerate statin but is willing to try it again.  If he cannot tolerate he is going to let us know and we will consider PCSK9 inhibitor    #Nonsustained VT (Lexiscan stress test in 2021)  # Hypertension well-controlled  #Hyperlipidemia, not on statin  -Start Crestor 10 mg    #  EKG abnormality: Sinus rhythm, left bundle branch block (not new finding, known since 2021)  # Mild reduced LVEF at 50 to 55% on recent echo  # LVH on recent echocardiogram, infiltrative cardiomyopathy?  Cardiac amyloid?  # History of bilateral carpal tunnel syndrome status post recent surgery  -LVEF borderline declined.  Prior echo few years ago showed LVEF 55 to 60%.  Unclear why his LVEF is declining could be due to dyssynchrony from left bundle branch block.  On my review it appears myocardium is speckling and mild LVH.  RV wall thickness is also increased.  Given bilateral carpal tunnel syndrome I would like to rule out cardiac amyloid.  Will proceed with cardiac MRI.  -Cardiac MRI with stress.  -Continue lisinopril hydrochlorothiazide 20/12.5 mg daily.    #Mild aortic stenosis  -Recheck aortic valve gradients in 3 years    #Sleep disturbance  #Obesity, short neck  -Patient has obstructive sleep apnea.    Return to clinic 1 year or earlier as needed.    Total time spent today for this visit is 81 minutes including precharting, face-to-face clinic visit, review of labs/imaging and medical documentation.    Taye WELCH MD  [facility] Division of Cardiology  Pager [phone]

## 2024-08-13 DIAGNOSIS — I10 ESSENTIAL HYPERTENSION: ICD-10-CM

## 2024-08-14 ENCOUNTER — OFFICE VISIT (OUTPATIENT)
Dept: CARDIOLOGY | Facility: CLINIC | Age: 64
End: 2024-08-14
Payer: COMMERCIAL

## 2024-08-14 ENCOUNTER — ALLIED HEALTH/NURSE VISIT (OUTPATIENT)
Dept: FAMILY MEDICINE | Facility: CLINIC | Age: 64
End: 2024-08-14
Payer: COMMERCIAL

## 2024-08-14 VITALS
HEART RATE: 100 BPM | BODY MASS INDEX: 36.45 KG/M2 | SYSTOLIC BLOOD PRESSURE: 110 MMHG | WEIGHT: 254 LBS | OXYGEN SATURATION: 97 % | DIASTOLIC BLOOD PRESSURE: 78 MMHG

## 2024-08-14 DIAGNOSIS — I25.10 CORONARY ARTERY DISEASE INVOLVING NATIVE CORONARY ARTERY OF NATIVE HEART WITHOUT ANGINA PECTORIS: ICD-10-CM

## 2024-08-14 DIAGNOSIS — I44.7 LEFT BUNDLE BRANCH BLOCK (LBBB) DETERMINED BY ELECTROCARDIOGRAPHY: Primary | ICD-10-CM

## 2024-08-14 DIAGNOSIS — J30.1 HAY FEVER: Primary | ICD-10-CM

## 2024-08-14 DIAGNOSIS — I35.0 MILD AORTIC STENOSIS: ICD-10-CM

## 2024-08-14 DIAGNOSIS — J30.1 HAYFEVER: Primary | ICD-10-CM

## 2024-08-14 PROCEDURE — 99417 PROLNG OP E/M EACH 15 MIN: CPT | Performed by: INTERNAL MEDICINE

## 2024-08-14 PROCEDURE — 99207 PR NO CHARGE NURSE ONLY: CPT

## 2024-08-14 PROCEDURE — 99205 OFFICE O/P NEW HI 60 MIN: CPT | Performed by: INTERNAL MEDICINE

## 2024-08-14 PROCEDURE — 96372 THER/PROPH/DIAG INJ SC/IM: CPT | Performed by: NURSE PRACTITIONER

## 2024-08-14 RX ORDER — ASPIRIN 81 MG/1
81 TABLET ORAL DAILY
COMMUNITY
Start: 2024-08-14

## 2024-08-14 RX ORDER — ROSUVASTATIN CALCIUM 10 MG/1
10 TABLET, COATED ORAL DAILY
Qty: 90 TABLET | Refills: 3 | Status: SHIPPED | OUTPATIENT
Start: 2024-08-14

## 2024-08-14 RX ORDER — METHYLPREDNISOLONE ACETATE 80 MG/ML
80 INJECTION, SUSPENSION INTRA-ARTICULAR; INTRALESIONAL; INTRAMUSCULAR; SOFT TISSUE ONCE
Status: COMPLETED | OUTPATIENT
Start: 2024-08-14 | End: 2024-08-14

## 2024-08-14 RX ADMIN — METHYLPREDNISOLONE ACETATE 80 MG: 80 INJECTION, SUSPENSION INTRA-ARTICULAR; INTRALESIONAL; INTRAMUSCULAR; SOFT TISSUE at 15:24

## 2024-08-14 NOTE — NURSING NOTE
"Chief Complaint   Patient presents with    RECHECK     Return general cardiology for 3 year follow-up       Initial /78 (BP Location: Right arm, Patient Position: Chair, Cuff Size: Adult Large)   Pulse 100   Wt 115.2 kg (254 lb)   SpO2 97%   BMI 36.45 kg/m   Estimated body mass index is 36.45 kg/m  as calculated from the following:    Height as of 4/15/24: 1.778 m (5' 10\").    Weight as of this encounter: 115.2 kg (254 lb)..  BP completed using cuff size: MELANIE Garnica    "

## 2024-08-14 NOTE — PROGRESS NOTES
I called  Inpatient pharmacy and spoke with Pharmacist Maren  before I drew up injection to verify with pharmacist how to administer. I also had to have Vee Bhatti NP fix order to say methylPREDNISolone (DEPO-Medrol) injection 80 mg , this was done and pharmacist was read off vial she states this does not need to be mixed and can be given as is. Pharmacist stated that preferred site to administer is gluteus. This was done . Charly EASTMAN CMA

## 2024-08-14 NOTE — PROGRESS NOTES
Called IP pharmacy regarding methylprednisolone as order states is for sodium succinate and vial states acetate.  Per pharmacy either is appropriate for IM injection.     Also questioned reconstitution order. Per pharmacy this is already reconstituted.       Myles Perez RN

## 2024-08-14 NOTE — LETTER
8/14/2024      RE: Michael Jimenez  [address] [place] [address]       Dear Colleague,    Thank you for the opportunity to participate in the care of your patient, Michael Jimenez, at the [facility]. Please see a copy of my visit note below.    Referring provider:TOREY Stewart CNP    HPI: Mr. Michael Jimenez is a 60 year old  male with PMH significant for obesity, former smoker, hypertension (not on treatment) and hyperlipidemia.    Patient recently presented to his primary care physician and reported chest discomfort.  Subsequently he had an EKG which showed left bundle branch block.  He had a Lexiscan stress test on 3/3/2021 which showed reduced LVEF at 42%, prior MI in basal anteroseptal left ventricle. I have reviewed patient's stress test which shows wide QRS tachycardia likely nonsustained VT on the pre- stress EKG.  No VT during drug infusion.    Patient tells me that he was told to have cardiac murmur.  That was the reason that he saw primary care physician in the first place.  Patient recalls having left-sided chest discomfort couple of months ago which lasted for few hours.  He cannot recall what was he doing at that time.  He tells me that he is currently very sedentary partially due to his hip pain.  Denies recurrent chest discomfort since then.  No shortness of breath with activity.  Denies palpitations, dizziness, syncope or lower extremity edema.    He has history of smoking for total of 15 years.  He stopped in his mid 30s.  For 2 years he tells me that he smoked 4 packs/day.  He has history of high blood pressure but never been on treatment.  He was on cholesterol-lowering treatment at some point then he self discontinued.  Recently his BMP showed elevated creatinine with low GFR.  Patient tells me that his brother in his 50s had silent MI.    No history of diabetes. LDL is significantly elevated at 171  (2/18/2021).  Not on statin.  Patient has CKD stage III with GFR at 54.    No prior echocardiogram available to review.    I reviewed patient's EKG 2/11/2021 which shows sinus rhythm and left bundle branch block.    Medications, personal, family, and social history reviewed with patient and revised.    Interval history 4/14/2021:  Patient is being seen today for follow-up on blood pressure control and discuss the tests that I have ordered during my previous visit which include transthoracic echocardiogram and CT coronary angiogram. I have also started him on hypertension treatment with lisinopril 10 mg during my previous visit.  Patient has been monitoring his blood pressure at home and tells me that he has not seen significant change with initiation of lisinopril 10 mg.  He is complaining of abnormal smell in his stool, urine and body smell.  Otherwise denies chest pain, shortness of breath, lower extremity edema, syncope.    Interval history 8/14/2024:  I am seeing patient for follow-up.  He recently underwent a follow-up echocardiogram to recheck his aortic valve function which shows mild aortic stenosis with no change.  He has no symptoms.  He tells me that in the interim since I saw him he had a lot of joint issues.  He underwent bilateral carpal tunnel surgery earlier this year, knee, shoulder and hip replacement.  In his problem list also biceps tendon rupture is noted.  He is not aware of this.  I reviewed patient's echocardiogram which showed mild aortic stenosis, LVH, declined LVEF at maybe 45 to 50% according to my reading.  He has no heart failure symptoms.    PAST MEDICAL HISTORY:  Hypertension  Hyperlipidemia  Nonobstructive coronary artery disease  Mild aortic stenosis  Former heavy smoker  Bundle branch block    CURRENT MEDICATIONS:  Current Outpatient Medications   Medication Sig Dispense Refill     amoxicillin (AMOXIL) 500 MG capsule Take 500 mg by mouth as needed (DENTAL PROCEDURES) (Patient not  taking: Reported on 4/15/2024)       BETA BLOCKER NOT PRESCRIBED (INTENTIONAL) Please choose reason not prescribed from choices below.       Glucosamine Sulfate 1000 MG TABS Take 1,000 mg by mouth daily        lisinopril-hydrochlorothiazide (ZESTORETIC) 20-12.5 MG tablet Take 1 tablet by mouth once daily 90 tablet 0     Multiple Vitamin (MULTIVITAMIN ADULT PO) Take 1 tablet by mouth daily        tamsulosin (FLOMAX) 0.4 MG capsule TAKE 1 CAPSULE BY MOUTH ONCE DAILY WITH DINNER 90 capsule 2     zinc gluconate 50 MG tablet Take 50 mg by mouth daily         PAST SURGICAL HISTORY:  Past Surgical History:   Procedure Laterality Date     ABDOMEN SURGERY       ARTHROPLASTY HIP Right 2022    Procedure: RIGHT TOTAL HIP ARTHROPLASTY;  Surgeon: Timothy Montana MD;  Location: [facility] Main OR     CHOLECYSTECTOMY       COLONOSCOPY N/A 2021    Procedure: COLONOSCOPY, WITH POLYPECTOMY AND BIOPSY;  Surgeon: Lito Sweet DO;  Location: [facility]     EYE SURGERY       FINGER SURGERY       REVERSE ARTHROPLASTY SHOULDER Left 2024    Procedure: LEFT REVERSE TOTAL SHOULDER ARTHROPLASTY;  Surgeon: Timothy Montana MD;  Location: [facility] Main OR       ALLERGIES:   No Known Allergies    FAMILY HISTORY:  Family History   Problem Relation Age of Onset     Heart Disease Father         emphasemia     Coronary Artery Disease Father      Emphysema Father      Asthma Father      Skin Cancer Mother          SOCIAL HISTORY:  Social History     Tobacco Use     Smoking status: Former     Current packs/day: 0.00     Average packs/day: 4.0 packs/day for 30.0 years (120.0 ttl pk-yrs)     Types: Cigarettes     Start date: 1965     Quit date: 1995     Years since quittin.6     Smokeless tobacco: Former     Quit date: 1975   Vaping Use     Vaping status: Never Used   Substance Use Topics     Alcohol use: Yes     Comment: 3-4 drinks a week     Drug use: No       ROS:   Constitutional: No fever, chills, or  sweats. Weight stable.     Neuro: Complaining of loss of smell and taste+.       Exam:  /78 (BP Location: Right arm, Patient Position: Chair, Cuff Size: Adult Large)   Pulse 100   Wt 115.2 kg (254 lb)   SpO2 97%   BMI 36.45 kg/m    GENERAL APPEARANCE: alert and no distress  HEENT: no icterus, no central cyanosis  CARDIOVASCULAR: regular rhythm, normal S1, S2, no S3 or S4 , upper parasternal 2/6 systolic ejection murmur radiating to carotids  GI: soft, non tender  EXTREMITIES:  no edema  NEURO: alert, normal speech,and affect   SKIN: no ecchymoses, no rashes     I have reviewed the labs and personally reviewed the imaging below and made my comment in the assessment and plan.    Labs:  CBC RESULTS:   Lab Results   Component Value Date    WBC 11.3 (H) 06/21/2024    WBC 10.8 06/21/2024    RBC 5.43 06/21/2024    RBC 5.56 06/21/2024    HGB 15.8 06/21/2024    HGB 16.2 06/21/2024    HGB 16.9 01/04/2011    HCT 49.8 06/21/2024    HCT 51.1 06/21/2024    MCV 92 06/21/2024    MCV 92 06/21/2024    MCH 29.1 06/21/2024    MCH 29.1 06/21/2024    MCHC 31.7 06/21/2024    MCHC 31.7 06/21/2024    RDW 13.3 06/21/2024    RDW 13.4 06/21/2024     06/21/2024     06/21/2024     01/04/2011       BMP RESULTS:  Lab Results   Component Value Date     06/21/2024     05/11/2021    POTASSIUM 4.3 06/21/2024    POTASSIUM 4.1 06/08/2022    POTASSIUM 4.2 05/11/2021    CHLORIDE 104 06/21/2024    CHLORIDE 111 (H) 06/08/2022    CHLORIDE 101 05/11/2021    CO2 24 06/21/2024    CO2 29 06/08/2022    CO2 31 05/11/2021    ANIONGAP 13 06/21/2024    ANIONGAP 4 06/08/2022    ANIONGAP 4 05/11/2021     (H) 06/21/2024     (H) 06/08/2022    GLC 92 05/11/2021    BUN 22.7 06/21/2024    BUN 27 06/08/2022    BUN 23 05/11/2021    CR 1.09 06/21/2024    CR 1.14 05/11/2021    GFRESTIMATED 75 06/21/2024    GFRESTIMATED 68 05/11/2021    GFRESTBLACK 79 05/11/2021    SKYLER 9.8 06/21/2024    SKYLER 8.8 05/11/2021        INR  RESULTS:  Lab Results   Component Value Date    INR 1.04 04/04/2022    INR 0.92 01/04/2011     Transthoracic echocardiogram 3/12/2024:  Left ventricular function is borderline. The ejection fraction is 50-55%. Mid  to distal anterior and anteroseptal hypokinesis.  Global right ventricular function is normal.  Mild aortic stenosis.  The inferior vena cava is normal.  Borderline dilation of Sinuses of Valsalva, 4.1 cm.     This study was compared with the study from 3/10/21. LVEF is lower with subtle  wall motion abnormalities on today's study. Aortic valve doppler assessment is  stable.    Lexiscan stress test 3/3/2021:  The nuclear stress test is abnormal.     There is a small area of a mild degree of infarction in the mid to basal anteroseptal segment(s) of the left ventricle.     Left ventricular function is mildly reduced.     The left ventricular ejection fraction at rest is 42%.  The left ventricular ejection fraction at stress is 45%.     One episode of 4 beat run on NSVT at rest and 4/10 chest pain with stress. Consider further testing with CT coronary angiogram if clinical suspicion of CAD is high.     There is no prior study for comparison.       EKG 2/11/2021 personally reviewed sinus rhythm with left bundle branch block      CT coronary angiogram 3/12/2021  1.  This study was limited by patient motion artifact. Suggest  coronary angiogram if clinical suspicion for obstructive lesions is  high.  2.  There is no evidence of significant stenosis in the left main and  proximal segments of the LAD, LCx and RCA.   3.  Total Agatston score 416 placing the patient in the 85th  percentile when compared to age and gender matched control group.    Echocardiogram 3/10/20 21  Mild aortic stenosis   LVEF 55 to 60%    Assessment and Plan:   Patient is being seen today for follow-up. He is currently asymptomatic from cardiac standpoint    # Nonobstructive coronary artery disease by CT coronary angiogram in 2021  -Start  aspirin 81 mg  -Start Crestor 10 mg.  Unfortunately in the past he could not tolerate statin but is willing to try it again.  If he cannot tolerate he is going to let us know and we will consider PCSK9 inhibitor    #Nonsustained VT (Lexiscan stress test in 2021)  # Hypertension well-controlled  #Hyperlipidemia, not on statin  -Start Crestor 10 mg    # EKG abnormality: Sinus rhythm, left bundle branch block (not new finding, known since 2021)  # Mild reduced LVEF at 50 to 55% on recent echo  # LVH on recent echocardiogram, infiltrative cardiomyopathy?  Cardiac amyloid?  # History of bilateral carpal tunnel syndrome status post recent surgery  -LVEF borderline declined.  Prior echo few years ago showed LVEF 55 to 60%.  Unclear why his LVEF is declining could be due to dyssynchrony from left bundle branch block.  On my review it appears myocardium is speckling and mild LVH.  RV wall thickness is also increased.  Given bilateral carpal tunnel syndrome I would like to rule out cardiac amyloid.  Will proceed with cardiac MRI.  -Cardiac MRI with stress.  -Continue lisinopril hydrochlorothiazide 20/12.5 mg daily.    #Mild aortic stenosis  -Recheck aortic valve gradients in 3 years    #Sleep disturbance  #Obesity, short neck  -Patient has obstructive sleep apnea.    Return to clinic 1 year or earlier as needed.    Total time spent today for this visit is 81 minutes including precharting, face-to-face clinic visit, review of labs/imaging and medical documentation.    Taye WELCH MD  [facility] Division of Cardiology  Pager [phone]        Please do not hesitate to contact me if you have any questions/concerns.     Sincerely,     Taye Welch MD

## 2024-08-14 NOTE — NURSING NOTE
Clinic Administered Medication Documentation      Injectable Medication Documentation    Is there an active order (written within the past 365 days, with administrations remaining, not ) in the chart? Yes.     Patient was given  Methylprednisolone Acetate Injection ,Injectable Suspension 40mg/ml( two vials used to equal 80mg (2ml) .  . Prior to medication administration, verified patient's identity using patient s name and date of birth. Please see MAR and medication order for additional information. Patient instructed to remain in clinic for 15 minutes and report any adverse reaction to staff immediately.    Vial/Syringe: Single dose vial. Was entire vial of medication used? Yes  Was this medication supplied by the patient? No  Is this a medication the patient will need to receive again? No - not necessary to check for refills remaining.Charly EASTMAN CMA

## 2024-08-14 NOTE — PATIENT INSTRUCTIONS
Thank you for coming to the [facility] @ [facility]; please note the following instructions:    1. Cardiac MRI. Please call [phone] to schedule    2. Start: rosuvastatin (CRESTOR) 10 MG tablet daily. Stop if having muscle pain. Call or Mychart us to let us know.    3. Start: Aspirin 81mg daily    4. Cardiology Providers: Dr. Kothari Can would like you to return for a cardiac follow up in 1 year  (August).  We will contact you regarding your appointment when the time draws closer or you may call [phone] option #1 to arrange an appointment.  Mean while, if you should have any questions or concerns regarding your heart health, please contact us.  Thank you for choosing [facility] for your care.         If you have any questions regarding your visit please contact your care team:     Cardiology  Telephone Number   Tiny BARBOUR., RN  Alexandra LAYNE, RN  Sarah WEINBERG, RN  Sarita FALCON, RMA  Sharon BRAVO, RMA  Kathleen HUNTLEY, Visit Facilitator  Isabella EASTMAN [facility] [phone] (option 1)   For scheduling appts:     [phone] (select option 1)       For the Device Clinic (Pacemakers and ICD's)  RN's :  Maxine Lazaro   During business hours: [phone]    *After business hours:  [phone] (select option 4)      Normal test result notifications will be released via [employer] or mailed within 7 business days.  All other test results, will be communicated via telephone once reviewed by your cardiologist.    If you need a medication refill please contact your pharmacy.  Please allow 3 business days for your refill to be completed.    As always, thank you for trusting us with your health care needs!

## 2024-08-16 RX ORDER — LISINOPRIL AND HYDROCHLOROTHIAZIDE 12.5; 2 MG/1; MG/1
1 TABLET ORAL DAILY
Qty: 90 TABLET | Refills: 3 | Status: SHIPPED | OUTPATIENT
Start: 2024-08-16

## 2024-08-16 NOTE — TELEPHONE ENCOUNTER
LVD:  8/14/2024  [facility] Taye Pedroza MD  Cardiovascular Disease     Refilled per protocol.

## 2024-08-21 ENCOUNTER — TRANSFERRED RECORDS (OUTPATIENT)
Dept: HEALTH INFORMATION MANAGEMENT | Facility: CLINIC | Age: 64
End: 2024-08-21
Payer: COMMERCIAL

## 2024-09-05 ENCOUNTER — HOSPITAL ENCOUNTER (OUTPATIENT)
Dept: MRI IMAGING | Facility: HOSPITAL | Age: 64
Discharge: HOME | End: 2024-09-05
Attending: INTERNAL MEDICINE
Payer: COMMERCIAL

## 2024-09-05 VITALS
BODY MASS INDEX: 35.79 KG/M2 | SYSTOLIC BLOOD PRESSURE: 126 MMHG | OXYGEN SATURATION: 96 % | DIASTOLIC BLOOD PRESSURE: 68 MMHG | HEART RATE: 70 BPM | HEIGHT: 70 IN | WEIGHT: 250 LBS

## 2024-09-05 DIAGNOSIS — I51.7 LVH (LEFT VENTRICULAR HYPERTROPHY): ICD-10-CM

## 2024-09-05 DIAGNOSIS — I44.7 LEFT BUNDLE BRANCH BLOCK (LBBB) DETERMINED BY ELECTROCARDIOGRAPHY: ICD-10-CM

## 2024-09-05 DIAGNOSIS — E85.4 CARDIAC AMYLOIDOSIS (H): Primary | ICD-10-CM

## 2024-09-05 DIAGNOSIS — I44.7 LBBB (LEFT BUNDLE BRANCH BLOCK): ICD-10-CM

## 2024-09-05 DIAGNOSIS — I43 CARDIAC AMYLOIDOSIS (H): Primary | ICD-10-CM

## 2024-09-05 LAB
ATRIAL RATE - MUSE: 68 BPM
ATRIAL RATE - MUSE: 71 BPM
DIASTOLIC BLOOD PRESSURE - MUSE: NORMAL MMHG
DIASTOLIC BLOOD PRESSURE - MUSE: NORMAL MMHG
INTERPRETATION ECG - MUSE: NORMAL
INTERPRETATION ECG - MUSE: NORMAL
P AXIS - MUSE: 18 DEGREES
P AXIS - MUSE: 19 DEGREES
PR INTERVAL - MUSE: 162 MS
PR INTERVAL - MUSE: 164 MS
QRS DURATION - MUSE: 148 MS
QRS DURATION - MUSE: 148 MS
QT - MUSE: 410 MS
QT - MUSE: 422 MS
QTC - MUSE: 445 MS
QTC - MUSE: 448 MS
R AXIS - MUSE: -40 DEGREES
R AXIS - MUSE: -44 DEGREES
SYSTOLIC BLOOD PRESSURE - MUSE: NORMAL MMHG
SYSTOLIC BLOOD PRESSURE - MUSE: NORMAL MMHG
T AXIS - MUSE: 14 DEGREES
T AXIS - MUSE: 37 DEGREES
VENTRICULAR RATE- MUSE: 68 BPM
VENTRICULAR RATE- MUSE: 71 BPM

## 2024-09-05 PROCEDURE — A9585 GADOBUTROL INJECTION: HCPCS | Performed by: INTERNAL MEDICINE

## 2024-09-05 PROCEDURE — 93018 CV STRESS TEST I&R ONLY: CPT | Performed by: INTERNAL MEDICINE

## 2024-09-05 PROCEDURE — 93010 ELECTROCARDIOGRAM REPORT: CPT | Mod: 76 | Performed by: STUDENT IN AN ORGANIZED HEALTH CARE EDUCATION/TRAINING PROGRAM

## 2024-09-05 PROCEDURE — 75563 CARD MRI W/STRESS IMG & DYE: CPT | Mod: 26 | Performed by: GENERAL ACUTE CARE HOSPITAL

## 2024-09-05 PROCEDURE — 250N000011 HC RX IP 250 OP 636: Performed by: INTERNAL MEDICINE

## 2024-09-05 PROCEDURE — 255N000002 HC RX 255 OP 636: Performed by: INTERNAL MEDICINE

## 2024-09-05 PROCEDURE — 93016 CV STRESS TEST SUPVJ ONLY: CPT | Performed by: INTERNAL MEDICINE

## 2024-09-05 PROCEDURE — 75563 CARD MRI W/STRESS IMG & DYE: CPT

## 2024-09-05 PROCEDURE — 93005 ELECTROCARDIOGRAM TRACING: CPT

## 2024-09-05 PROCEDURE — 999N000122 MR MYOCARDIUM  OVERREAD

## 2024-09-05 RX ORDER — GADOBUTROL 604.72 MG/ML
20 INJECTION INTRAVENOUS ONCE
Status: COMPLETED | OUTPATIENT
Start: 2024-09-05 | End: 2024-09-05

## 2024-09-05 RX ORDER — CAFFEINE CITRATE 20 MG/ML
60 SOLUTION INTRAVENOUS
Status: DISCONTINUED | OUTPATIENT
Start: 2024-09-05 | End: 2024-09-05 | Stop reason: HOSPADM

## 2024-09-05 RX ORDER — AMINOPHYLLINE 25 MG/ML
50-100 INJECTION, SOLUTION INTRAVENOUS
Status: DISCONTINUED | OUTPATIENT
Start: 2024-09-05 | End: 2024-09-06 | Stop reason: HOSPADM

## 2024-09-05 RX ORDER — REGADENOSON 0.08 MG/ML
0.4 INJECTION, SOLUTION INTRAVENOUS ONCE
Status: COMPLETED | OUTPATIENT
Start: 2024-09-05 | End: 2024-09-05

## 2024-09-05 RX ORDER — CAFFEINE 200 MG
200 TABLET ORAL
Status: DISCONTINUED | OUTPATIENT
Start: 2024-09-05 | End: 2024-09-05 | Stop reason: HOSPADM

## 2024-09-05 RX ORDER — ALBUTEROL SULFATE 90 UG/1
2 AEROSOL, METERED RESPIRATORY (INHALATION) EVERY 5 MIN PRN
Status: DISCONTINUED | OUTPATIENT
Start: 2024-09-05 | End: 2024-09-06 | Stop reason: HOSPADM

## 2024-09-05 RX ADMIN — GADOBUTROL 20 ML: 604.72 INJECTION INTRAVENOUS at 11:15

## 2024-09-05 RX ADMIN — REGADENOSON 0.4 MG: 0.08 INJECTION, SOLUTION INTRAVENOUS at 11:59

## 2024-09-05 NOTE — PROGRESS NOTES
DISCHARGE  Reason for Discharge: pt was leaving the country and then returning to have surgery. Pt was unable to attend. Goals not met but was given additional exercises to continue to work on independently.    Equipment Issued: none    Discharge Plan: Patient to continue home program.    Referring Provider:  Timothy Montana     07/31/24 0500   Appointment Info   Signing clinician's name / credentials Bren Stephenson, PT, DPT   Visits Used 5   Medical Diagnosis L TKA on 7/11   PT Tx Diagnosis L knee pain   Quick Adds Certification   Progress Note/Certification   Start of Care Date 07/16/24   Onset of illness/injury or Date of Surgery 07/11/24   Therapy Frequency 2x/wk   Predicted Duration 8 weeks   Certification date from 07/16/24   Certification date to 09/10/24   Progress Note Due Date 09/10/24   Progress Note Completed Date 07/16/24   GOALS   PT Goals 2;3   PT Goal 1   Goal Identifier STG   Goal Description Pt will have full knee extension in 4 weeks to ambulate without a limp.   Target Date 08/13/24   PT Goal 2   Goal Identifier LTG   Goal Description Pt will have 110* knee flexion in 8 weeks to comfortbaly rise from a chair and climb stairs.   Target Date 09/10/24- MET on 7/31/24 per obj measurement   PT Goal 3   Goal Identifier LTG   Goal Description Pt will be able to climb stairs with a reciprocal gait pattern in 8 weeks to tolerate mobility.   Target Date 09/10/24   Subjective Report   Subjective Report L TKA. removal of bandage today and no scabs noted today. no steri strips on the knee. Pt notes pain at night during this time. He is leaving this weekend for [place] and won't be attending PT for a period of time.   Objective Measure 1   Objective Measure ROM   Details 0-5-113*   Treatment Interventions (PT)   Interventions Therapeutic Procedure/Exercise   Therapeutic Procedure/Exercise   Therapeutic Procedures: strength, endurance, ROM, flexibility minutes (74509) 25   Ther Proc 1 - Details recumbent  "bike- full revolutions 5 minutes for ROM;  fwd step-ups on 6\" step- 20x;  fwd step-downs on 2\" step- 15x; gentle knee flexion on stairs- 2 x 10;  mini squats- 20x   Skilled Intervention used to increase ROM and strengthen   Patient Response/Progress increased discomfort with mini squat today to the area   Education   Learner/Method Patient;Listening;Demonstration;No Barriers to Learning   Plan   Home program papers   Plan for next session progress ROM and strengthening   Total Session Time   Timed Code Treatment Minutes 25   Total Treatment Time (sum of timed and untimed services) 25       "

## 2024-10-22 ENCOUNTER — MYC MEDICAL ADVICE (OUTPATIENT)
Dept: FAMILY MEDICINE | Facility: CLINIC | Age: 64
End: 2024-10-22
Payer: COMMERCIAL

## 2024-10-22 DIAGNOSIS — R82.90 ABNORMAL RESULT ON SCREENING URINE TEST: Primary | ICD-10-CM

## 2024-10-22 NOTE — TELEPHONE ENCOUNTER
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  He has no symptoms of UTI but does have BPH and frequency in urine.  Ordered UA/microscopic urine with reflex culture for further evaluation.  Patient will be notified of results.  Isa Sarmiento NP on 10/22/2024 at 11:24 AM

## 2024-10-29 NOTE — PROGRESS NOTES
Clinic Administered Medication Documentation        Patient was given 80 mg of Depo- Medrol. Prior to medication administration, verified patient's identity using patient s name and date of birth. Please see MAR and medication order for additional information. Patient instructed to remain in clinic for 15 minutes and report any adverse reaction to staff immediately.    Vial/Syringe: Single dose vial. Was entire vial of medication used? Yes    Kaila Bowser MA       ST elevation on ECG

## 2024-10-31 ENCOUNTER — TRANSFERRED RECORDS (OUTPATIENT)
Dept: HEALTH INFORMATION MANAGEMENT | Facility: CLINIC | Age: 64
End: 2024-10-31
Payer: COMMERCIAL

## 2024-11-04 ENCOUNTER — LAB (OUTPATIENT)
Dept: LAB | Facility: CLINIC | Age: 64
End: 2024-11-04
Payer: COMMERCIAL

## 2024-11-04 DIAGNOSIS — R82.90 ABNORMAL RESULT ON SCREENING URINE TEST: Primary | ICD-10-CM

## 2024-11-04 DIAGNOSIS — R82.90 ABNORMAL RESULT ON SCREENING URINE TEST: ICD-10-CM

## 2024-11-04 LAB
ALBUMIN UR-MCNC: NEGATIVE MG/DL
APPEARANCE UR: CLEAR
BACTERIA #/AREA URNS HPF: ABNORMAL /HPF
BILIRUB UR QL STRIP: NEGATIVE
COLOR UR AUTO: YELLOW
GLUCOSE UR STRIP-MCNC: NEGATIVE MG/DL
HGB UR QL STRIP: NEGATIVE
KETONES UR STRIP-MCNC: NEGATIVE MG/DL
LEUKOCYTE ESTERASE UR QL STRIP: ABNORMAL
NITRATE UR QL: NEGATIVE
PH UR STRIP: 5.5 [PH] (ref 5–7)
RBC #/AREA URNS AUTO: ABNORMAL /HPF
SP GR UR STRIP: 1.02 (ref 1–1.03)
SQUAMOUS #/AREA URNS AUTO: ABNORMAL /LPF
UROBILINOGEN UR STRIP-ACNC: 0.2 E.U./DL
WBC #/AREA URNS AUTO: ABNORMAL /HPF

## 2024-11-04 PROCEDURE — 87186 SC STD MICRODIL/AGAR DIL: CPT

## 2024-11-04 PROCEDURE — 81001 URINALYSIS AUTO W/SCOPE: CPT

## 2024-11-04 PROCEDURE — 87086 URINE CULTURE/COLONY COUNT: CPT

## 2024-11-06 LAB — BACTERIA UR CULT: ABNORMAL

## 2024-11-07 DIAGNOSIS — N30.00 ACUTE CYSTITIS WITHOUT HEMATURIA: Primary | ICD-10-CM

## 2024-11-07 RX ORDER — CEPHALEXIN 500 MG/1
500 CAPSULE ORAL 2 TIMES DAILY
Qty: 14 CAPSULE | Refills: 0 | Status: SHIPPED | OUTPATIENT
Start: 2024-11-07 | End: 2024-11-14

## 2024-11-14 DIAGNOSIS — N40.0 BENIGN PROSTATIC HYPERPLASIA, UNSPECIFIED WHETHER LOWER URINARY TRACT SYMPTOMS PRESENT: ICD-10-CM

## 2024-11-14 RX ORDER — TAMSULOSIN HYDROCHLORIDE 0.4 MG/1
CAPSULE ORAL
Qty: 90 CAPSULE | Refills: 1 | Status: SHIPPED | OUTPATIENT
Start: 2024-11-14

## 2024-12-02 ENCOUNTER — TRANSFERRED RECORDS (OUTPATIENT)
Dept: HEALTH INFORMATION MANAGEMENT | Facility: CLINIC | Age: 64
End: 2024-12-02
Payer: COMMERCIAL

## 2024-12-03 ENCOUNTER — TRANSFERRED RECORDS (OUTPATIENT)
Dept: HEALTH INFORMATION MANAGEMENT | Facility: CLINIC | Age: 64
End: 2024-12-03
Payer: COMMERCIAL

## 2024-12-04 ENCOUNTER — OFFICE VISIT (OUTPATIENT)
Dept: FAMILY MEDICINE | Facility: CLINIC | Age: 64
End: 2024-12-04
Payer: COMMERCIAL

## 2024-12-04 DIAGNOSIS — L57.0 KERATOSIS: ICD-10-CM

## 2024-12-04 DIAGNOSIS — N18.31 STAGE 3A CHRONIC KIDNEY DISEASE (H): ICD-10-CM

## 2024-12-04 DIAGNOSIS — Z87.440 PERSONAL HISTORY OF URINARY TRACT INFECTION: ICD-10-CM

## 2024-12-04 DIAGNOSIS — R73.03 PREDIABETES: ICD-10-CM

## 2024-12-04 DIAGNOSIS — E66.01 MORBID OBESITY (H): ICD-10-CM

## 2024-12-04 DIAGNOSIS — I10 ESSENTIAL HYPERTENSION: Primary | ICD-10-CM

## 2024-12-04 LAB
ALBUMIN UR-MCNC: NEGATIVE MG/DL
ANION GAP SERPL CALCULATED.3IONS-SCNC: 11 MMOL/L (ref 7–15)
APPEARANCE UR: CLEAR
BACTERIA #/AREA URNS HPF: ABNORMAL /HPF
BILIRUB UR QL STRIP: NEGATIVE
BUN SERPL-MCNC: 28.6 MG/DL (ref 8–23)
CALCIUM SERPL-MCNC: 9.7 MG/DL (ref 8.8–10.4)
CHLORIDE SERPL-SCNC: 97 MMOL/L (ref 98–107)
COLOR UR AUTO: YELLOW
CREAT SERPL-MCNC: 1.46 MG/DL (ref 0.67–1.17)
EGFRCR SERPLBLD CKD-EPI 2021: 53 ML/MIN/1.73M2
EST. AVERAGE GLUCOSE BLD GHB EST-MCNC: 126 MG/DL
GLUCOSE SERPL-MCNC: 113 MG/DL (ref 70–99)
GLUCOSE UR STRIP-MCNC: NEGATIVE MG/DL
HBA1C MFR BLD: 6 % (ref 0–5.6)
HCO3 SERPL-SCNC: 31 MMOL/L (ref 22–29)
HGB UR QL STRIP: NEGATIVE
KETONES UR STRIP-MCNC: NEGATIVE MG/DL
LEUKOCYTE ESTERASE UR QL STRIP: NEGATIVE
MUCOUS THREADS #/AREA URNS LPF: PRESENT /LPF
NITRATE UR QL: NEGATIVE
PH UR STRIP: 6 [PH] (ref 5–7)
POTASSIUM SERPL-SCNC: 4.2 MMOL/L (ref 3.4–5.3)
RBC #/AREA URNS AUTO: ABNORMAL /HPF
SODIUM SERPL-SCNC: 139 MMOL/L (ref 135–145)
SP GR UR STRIP: 1.02 (ref 1–1.03)
SQUAMOUS #/AREA URNS AUTO: ABNORMAL /LPF
UROBILINOGEN UR STRIP-ACNC: 0.2 E.U./DL
WBC #/AREA URNS AUTO: ABNORMAL /HPF

## 2024-12-04 PROCEDURE — 80048 BASIC METABOLIC PNL TOTAL CA: CPT | Performed by: NURSE PRACTITIONER

## 2024-12-04 PROCEDURE — G2211 COMPLEX E/M VISIT ADD ON: HCPCS | Performed by: NURSE PRACTITIONER

## 2024-12-04 PROCEDURE — 83036 HEMOGLOBIN GLYCOSYLATED A1C: CPT | Performed by: NURSE PRACTITIONER

## 2024-12-04 PROCEDURE — 36415 COLL VENOUS BLD VENIPUNCTURE: CPT | Performed by: NURSE PRACTITIONER

## 2024-12-04 PROCEDURE — 99214 OFFICE O/P EST MOD 30 MIN: CPT | Performed by: NURSE PRACTITIONER

## 2024-12-04 PROCEDURE — 81001 URINALYSIS AUTO W/SCOPE: CPT | Performed by: NURSE PRACTITIONER

## 2024-12-04 NOTE — PROGRESS NOTES
Assessment & Plan     Essential hypertension  -well controlled  -stopped Zestoretic due to worsening renal function, will continue Lisinopril only. Recheck BP with RN in 2 weeks and recheck BMP panel   - BASIC METABOLIC PANEL; Future  - BASIC METABOLIC PANEL  - lisinopril (ZESTRIL) 20 MG tablet; Take 1 tablet (20 mg) by mouth daily.  - Basic metabolic panel  (Ca, Cl, CO2, Creat, Gluc, K, Na, BUN); Future    Morbid obesity (H)    - BASIC METABOLIC PANEL; Future  - BASIC METABOLIC PANEL    Prediabetes    - BASIC METABOLIC PANEL; Future  - Hemoglobin A1c; Future  - BASIC METABOLIC PANEL  - Hemoglobin A1c    Personal history of urinary tract infection  -resolved   - REVIEW OF HEALTH MAINTENANCE PROTOCOL ORDERS  - UA with Microscopic reflex to Culture - lab collect; Future  - UA with Microscopic reflex to Culture - lab collect  - UA Microscopic with Reflex to Culture    Actinic keratosis    - Adult Dermatology  Referral; Future    Stage 3a chronic kidney disease (H)  -recheck BMP in 2 weeks   GFR Estimate   Date Value Ref Range Status   12/04/2024 53 (L) >60 mL/min/1.73m2 Final     Comment:     eGFR calculated using 2021 CKD-EPI equation.   05/11/2021 68 >60 mL/min/[1.73_m2] Final     Comment:     Non  GFR Calc  Starting 12/18/2018, serum creatinine based estimated GFR (eGFR) will be   calculated using the Chronic Kidney Disease Epidemiology Collaboration   (CKD-EPI) equation.        - Basic metabolic panel  (Ca, Cl, CO2, Creat, Gluc, K, Na, BUN); Future      Subjective   Michael is a 64 year old, presenting for the following health issues:  Results (Wants to go over recent lab results ), Derm Problem, and Weight Problem (Wants to discuss getting on ozempic)        12/4/2024     4:09 PM   Additional Questions   Roomed by carter   Accompanied by self         12/4/2024     4:09 PM   Patient Reported Additional Medications   Patient reports taking the following new medications none     HPI  "    DERM CONCERNS   Onset/Duration: 3 weeks- has a few red bumps that itch   Description  Location: left arm   Character: red dots   Itching: no  Intensity:  mild  Progression of Symptoms:  same  Accompanying signs and symptoms: Has a lump on left forearm. Would like a skin check   Fever: No  Body aches or joint pain: No  Sore throat symptoms: No  Recent cold symptoms: No  History:           Previous episodes of similar rash: None  New exposures:  None  Recent travel: No  Exposure to similar rash: No  Precipitating or alleviating factors: none   Therapies tried and outcome: Took a needle and tried popping the lump       Review of Systems  Constitutional, HEENT, cardiovascular, pulmonary, gi and gu systems are negative, except as otherwise noted.      Objective    Pulse 110   Temp (!) 96.3  F (35.7  C) (Tympanic)   Resp 16   Ht 1.778 m (5' 10\")   Wt 116.6 kg (257 lb)   SpO2 94%   BMI 36.88 kg/m    Body mass index is 36.88 kg/m .  Physical Exam   GENERAL: alert and no distress  EYES: Eyes grossly normal to inspection, PERRL and conjunctivae and sclerae normal  RESP: lungs clear to auscultation - no rales, rhonchi or wheezes  CV: regular rate and rhythm, normal S1 S2, no S3 or S4, no murmur, click or rub, no peripheral edema   MS: no gross musculoskeletal defects noted, no edema  SKIN: no suspicious lesions or rashes  keratoses - seborrheic-few small patches on upper and lower back and left side abdominal area   PSYCH: mentation appears normal, affect normal/bright    Results for orders placed or performed in visit on 12/04/24 (from the past 24 hours)   BASIC METABOLIC PANEL   Result Value Ref Range    Sodium 139 135 - 145 mmol/L    Potassium 4.2 3.4 - 5.3 mmol/L    Chloride 97 (L) 98 - 107 mmol/L    Carbon Dioxide (CO2) 31 (H) 22 - 29 mmol/L    Anion Gap 11 7 - 15 mmol/L    Urea Nitrogen 28.6 (H) 8.0 - 23.0 mg/dL    Creatinine 1.46 (H) 0.67 - 1.17 mg/dL    GFR Estimate 53 (L) >60 mL/min/1.73m2    Calcium 9.7 8.8 " - 10.4 mg/dL    Glucose 113 (H) 70 - 99 mg/dL   UA with Microscopic reflex to Culture - lab collect    Specimen: Urine, Midstream   Result Value Ref Range    Color Urine Yellow Colorless, Straw, Light Yellow, Yellow    Appearance Urine Clear Clear    Glucose Urine Negative Negative mg/dL    Bilirubin Urine Negative Negative    Ketones Urine Negative Negative mg/dL    Specific Gravity Urine 1.025 1.003 - 1.035    Blood Urine Negative Negative    pH Urine 6.0 5.0 - 7.0    Protein Albumin Urine Negative Negative mg/dL    Urobilinogen Urine 0.2 0.2, 1.0 E.U./dL    Nitrite Urine Negative Negative    Leukocyte Esterase Urine Negative Negative   Hemoglobin A1c   Result Value Ref Range    Estimated Average Glucose 126 (H) <117 mg/dL    Hemoglobin A1C 6.0 (H) 0.0 - 5.6 %   UA Microscopic with Reflex to Culture   Result Value Ref Range    Bacteria Urine None Seen None Seen /HPF    RBC Urine None Seen 0-2 /HPF /HPF    WBC Urine 0-5 0-5 /HPF /HPF    Squamous Epithelials Urine Few (A) None Seen /LPF    Mucus Urine Present (A) None Seen /LPF    Narrative    Urine Culture not indicated         The longitudinal plan of care for the diagnosis(es)/condition(s) as documented were addressed during this visit. Due to the added complexity in care, I will continue to support Michael in the subsequent management and with ongoing continuity of care.     Signed Electronically by: TOREY Estrada CNP

## 2024-12-05 VITALS
RESPIRATION RATE: 16 BRPM | HEART RATE: 88 BPM | HEIGHT: 70 IN | WEIGHT: 257 LBS | SYSTOLIC BLOOD PRESSURE: 124 MMHG | TEMPERATURE: 96.3 F | DIASTOLIC BLOOD PRESSURE: 70 MMHG | BODY MASS INDEX: 36.79 KG/M2 | OXYGEN SATURATION: 94 %

## 2024-12-05 PROBLEM — N18.31 STAGE 3A CHRONIC KIDNEY DISEASE (H): Status: ACTIVE | Noted: 2024-12-05

## 2024-12-05 RX ORDER — LISINOPRIL 20 MG/1
20 TABLET ORAL DAILY
Qty: 90 TABLET | Refills: 3 | Status: SHIPPED | OUTPATIENT
Start: 2024-12-05

## 2024-12-09 ENCOUNTER — MYC MEDICAL ADVICE (OUTPATIENT)
Dept: FAMILY MEDICINE | Facility: CLINIC | Age: 64
End: 2024-12-09
Payer: COMMERCIAL

## 2024-12-10 ENCOUNTER — HOSPITAL ENCOUNTER (EMERGENCY)
Facility: CLINIC | Age: 64
Discharge: HOME | End: 2024-12-10
Attending: FAMILY MEDICINE | Admitting: FAMILY MEDICINE
Payer: COMMERCIAL

## 2024-12-10 VITALS
OXYGEN SATURATION: 91 % | HEART RATE: 66 BPM | HEIGHT: 70 IN | RESPIRATION RATE: 18 BRPM | BODY MASS INDEX: 36.51 KG/M2 | TEMPERATURE: 97.9 F | DIASTOLIC BLOOD PRESSURE: 80 MMHG | SYSTOLIC BLOOD PRESSURE: 125 MMHG | WEIGHT: 255 LBS

## 2024-12-10 DIAGNOSIS — R31.9 HEMATURIA, UNSPECIFIED TYPE: ICD-10-CM

## 2024-12-10 LAB
ALBUMIN UR-MCNC: NEGATIVE MG/DL
APPEARANCE UR: ABNORMAL
BILIRUB UR QL STRIP: NEGATIVE
COLOR UR AUTO: YELLOW
GLUCOSE UR STRIP-MCNC: NEGATIVE MG/DL
HGB UR QL STRIP: ABNORMAL
KETONES UR STRIP-MCNC: NEGATIVE MG/DL
LEUKOCYTE ESTERASE UR QL STRIP: NEGATIVE
MUCOUS THREADS #/AREA URNS LPF: PRESENT /LPF
NITRATE UR QL: NEGATIVE
PH UR STRIP: 5 [PH] (ref 5–7)
RBC URINE: 33 /HPF
SP GR UR STRIP: 1.02 (ref 1–1.03)
SQUAMOUS EPITHELIAL: <1 /HPF
UROBILINOGEN UR STRIP-MCNC: NORMAL MG/DL
WBC URINE: 5 /HPF

## 2024-12-10 PROCEDURE — 99283 EMERGENCY DEPT VISIT LOW MDM: CPT | Performed by: FAMILY MEDICINE

## 2024-12-10 PROCEDURE — 81001 URINALYSIS AUTO W/SCOPE: CPT | Performed by: FAMILY MEDICINE

## 2024-12-10 RX ORDER — CIPROFLOXACIN 500 MG/1
500 TABLET, FILM COATED ORAL 2 TIMES DAILY
Qty: 20 TABLET | Refills: 0 | Status: SHIPPED | OUTPATIENT
Start: 2024-12-10 | End: 2024-12-20

## 2024-12-10 ASSESSMENT — ACTIVITIES OF DAILY LIVING (ADL)
ADLS_ACUITY_SCORE: 59
ADLS_ACUITY_SCORE: 59

## 2024-12-10 NOTE — DISCHARGE INSTRUCTIONS
Cipro 500 mg p.o. twice daily x 10 days.  Push fluids, rest.  As we discussed further follow-up is required in the form of urology consultation once you have completed therapy even with a repeat urinalysis to confirm the absence of red cells given your history and evaluation both prior to today and today.  Return to the emergency department if worse or changes.  I have placed a  referral to urology for you and you should get a call from them in the next couple of days to schedule a visit.

## 2024-12-10 NOTE — ED TRIAGE NOTES
Last night passed some blood clots in urine. Since then has not had any bloot clots and urine is clear. Denies flank pain, some urning, but no urgency.

## 2024-12-10 NOTE — ED PROVIDER NOTES
"  History     Chief Complaint   Patient presents with    Cystitis     MULU Jimenez is a 64 year old male, past medical history is significant for stage III chronic kidney disease, congestive hypertension, LUTS, V. tach, aortic valve stenosis, CHF, CAROL, morbid obesity, hyperlipidemia, prediabetes, Raynaud's phenomenon, OA, presents to the emergency department with concerns of possible bladder infection.  History is obtained from the patient who states that yesterday evening with voiding he had some bloody appearing urine and a few clots.  No difficulty with voiding may be a little uncomfortable, he has had urinary frequency over the last couple of years, no nausea or vomiting.  Drinking fluids normally.  Only takes baby aspirin.  No other antiplatelet agent or anticoagulant medication.  Denies any trauma or recent injury.  No flank pain no abdominal pain no nausea or vomiting.  He drank fluids urinated once more yesterday before he went to bed with a little bit of clot and then when he woke up this morning has been passing \"\" normal urine\".  He called his clinic and of course was told to come to the emergency department.  The patient had a UTI diagnosed in early November and this was treated with a repeat urinalysis that was felt to represent clearance of the infection.    Allergies:  No Known Allergies    Problem List:    Patient Active Problem List    Diagnosis Date Noted    Stage 3a chronic kidney disease (H) 12/05/2024     Priority: Medium    S/P total knee arthroplasty, left 07/16/2024     Priority: Medium    S/P reverse total shoulder arthroplasty, left 04/04/2024     Priority: Medium    Hard to intubate 04/01/2024     Priority: Medium     See intubation note      DJD of left shoulder 04/01/2024     Priority: Medium    Essential hypertension 01/29/2024     Priority: Medium    Lower urinary tract symptoms (LUTS) 01/29/2024     Priority: Medium    Ventricular tachycardia (H) 11/16/2023     Priority: Medium "    Aortic valve stenosis, etiology of cardiac valve disease unspecified 2023     Priority: Medium    Rash and nonspecific skin eruption 2023     Priority: Medium    Degenerative joint disease (DJD) of hip 2022     Priority: Medium    CHF (congestive heart failure) (H) 2022     Priority: Medium    CAROL (obstructive sleep apnea) 04/15/2021     Priority: Medium     2021 [place] Diagnostic Sleep Study (255.0 lbs) - AHI 30.0, RDI 31.8, Supine AHI 47.1, REM AHI 78.0, Low O2 58.9%, Time Spent <=88% 41.2 minutes / Time Spent <=89% 51.6 minutes.      Morbid obesity (H) 2021     Priority: Medium    Snoring 2015     Priority: Medium     Formatting of this note might be different from the original.  2015: advise to follow up for sleep study.      Hayfever 2012     Priority: Medium     Formatting of this note might be different from the original.  Receives DEPO medrol injection 40mg annually and this seems to help him year round.      Glenoid labral tear 2011     Priority: Medium     Formatting of this note might be different from the original.  He has been doing a lot better since starting Glucosamine- Chondroitin.      Rupture of long head biceps tendon 2011     Priority: Medium    Supraspinatus tendon tear 2011     Priority: Medium    Hyperlipidemia with target low density lipoprotein (LDL) cholesterol less than 100 mg/dL 2011     Priority: Medium     Formatting of this note is different from the original.  Patient stopped taking statins 2015 as he was concerned about potential side effects. Importance of takign care of modifiable risk factors discussed with Patient. Will continue to address.  Lovaza (Omega-3 PUFA) 2mg twice a day).    Last Lipids:  Chol: 218    2013  T    2013  HDL:   55    2013  LDL:  127    2013   LDL CHOLESTEROL (mg/dL)   Date Value   2013 127     Rico 10-year CHD Risk Score: 8% (11 Total  Points)      Prediabetes 2011     Priority: Medium     Formatting of this note is different from the original.      HEMOGLOBIN A1C MONITORING (POCT) (%)   Date Value   2011 5.4      GLUCOSE                   (mg/dL)   Date Value   3/25/2013 77      Raynaud's phenomenon 2011     Priority: Medium    Right shoulder pain 2011     Priority: Medium    CARDIOVASCULAR SCREENING; LDL GOAL LESS THAN 160 10/31/2010     Priority: Medium        Past Medical History:    Past Medical History:   Diagnosis Date    Arthritis     Congestive heart failure (H)     Heart murmur     Hyperlipidemia     Hypertension     Obese     Prediabetes     Sleep apnea        Past Surgical History:    Past Surgical History:   Procedure Laterality Date    ABDOMEN SURGERY      ARTHROPLASTY HIP Right 2022    Procedure: RIGHT TOTAL HIP ARTHROPLASTY;  Surgeon: Timothy Montana MD;  Location: [facility] Main OR    CHOLECYSTECTOMY      COLONOSCOPY N/A 2021    Procedure: COLONOSCOPY, WITH POLYPECTOMY AND BIOPSY;  Surgeon: Lito Sweet DO;  Location: [facility]    EYE SURGERY      FINGER SURGERY      REVERSE ARTHROPLASTY SHOULDER Left 2024    Procedure: LEFT REVERSE TOTAL SHOULDER ARTHROPLASTY;  Surgeon: Timothy Montana MD;  Location: [facility] Main OR       Family History:    Family History   Problem Relation Age of Onset    Heart Disease Father         emphasemia    Coronary Artery Disease Father     Emphysema Father     Asthma Father     Skin Cancer Mother        Social History:  Marital Status:   [4]  Social History     Tobacco Use    Smoking status: Former     Current packs/day: 0.00     Average packs/day: 4.0 packs/day for 30.0 years (120.0 ttl pk-yrs)     Types: Cigarettes     Start date: 1965     Quit date: 1995     Years since quittin.9    Smokeless tobacco: Former     Quit date: 1975   Vaping Use    Vaping status: Never Used   Substance Use Topics    Alcohol use: Yes      "Comment: 3-4 drinks a week    Drug use: No        Medications:    amoxicillin (AMOXIL) 500 MG capsule  aspirin 81 MG EC tablet  BETA BLOCKER NOT PRESCRIBED (INTENTIONAL)  ciprofloxacin (CIPRO) 500 MG tablet  Glucosamine Sulfate 1000 MG TABS  lisinopril (ZESTRIL) 20 MG tablet  Multiple Vitamin (MULTIVITAMIN ADULT PO)  rosuvastatin (CRESTOR) 10 MG tablet  tamsulosin (FLOMAX) 0.4 MG capsule  zinc gluconate 50 MG tablet          Review of Systems   All other systems reviewed and are negative.      Physical Exam   BP: 129/72  Pulse: 86  Temp: 97.9  F (36.6  C)  Resp: 18  Height: 177.8 cm (5' 10\")  Weight: 115.7 kg (255 lb)  SpO2: 95 %      Physical Exam  Vitals and nursing note reviewed.   Constitutional:       General: He is not in acute distress.     Appearance: Normal appearance. He is normal weight. He is not ill-appearing.   Abdominal:      Comments: Soft bowel sounds present nontender no rebound no guarding.  No CVA tenderness.     Neurological:      Mental Status: He is alert.         ED Course        Procedures              Results for orders placed or performed during the hospital encounter of 12/10/24 (from the past 24 hours)   UA Macroscopic with reflex to Microscopic and Culture    Specimen: Urine, Clean Catch   Result Value Ref Range    Color Urine Yellow Colorless, Straw, Light Yellow, Yellow    Appearance Urine Slightly Cloudy (A) Clear    Glucose Urine Negative Negative mg/dL    Bilirubin Urine Negative Negative    Ketones Urine Negative Negative mg/dL    Specific Gravity Urine 1.019 1.003 - 1.035    Blood Urine Large (A) Negative    pH Urine 5.0 5.0 - 7.0    Protein Albumin Urine Negative Negative mg/dL    Urobilinogen Urine Normal Normal, 2.0 mg/dL    Nitrite Urine Negative Negative    Leukocyte Esterase Urine Negative Negative    Mucus Urine Present (A) None Seen /LPF    RBC Urine 33 (H) <=2 /HPF    WBC Urine 5 <=5 /HPF    Squamous Epithelials Urine <1 <=1 /HPF    Narrative    Urine Culture not " indicated   1:22 PM  Reviewed urinalysis in the room with the patient and answered his questions.  I also reviewed the EHR with respect to his urinalysis and treatment of about a month ago.  Reviewed his primary care providers plan with stopping his hydrochlorothiazide and then rechecking BP and CMP in about 2 weeks which is very appropriate.  I am going to cover him with an antibiotic today, requested that he push fluids, I have placed a  referral for urology for follow-up as well.  He will follow-up with his primary and with urology.  All questions answered disposition is to home.    Medications - No data to display    Assessments & Plan (with Medical Decision Making)   Assessment and plan with medical decision making at the time stamp above.      Disclaimer: This note consists of symbols derived from keyboarding, dictation and/or voice recognition software. As a result, there may be errors in the script that have gone undetected. Please consider this when interpreting information found in this chart.      I have reviewed the nursing notes.    I have reviewed the findings, diagnosis, plan and need for follow up with the patient.        New Prescriptions    CIPROFLOXACIN (CIPRO) 500 MG TABLET    Take 1 tablet (500 mg) by mouth 2 times daily for 10 days.       Final diagnoses:   Hematuria, unspecified type - Possible infection.       12/10/2024   [facility] EMERGENCY DEPT       Manny Shrestha MD  12/10/24 [id number]

## 2024-12-18 ENCOUNTER — MYC MEDICAL ADVICE (OUTPATIENT)
Dept: FAMILY MEDICINE | Facility: CLINIC | Age: 64
End: 2024-12-18
Payer: COMMERCIAL

## 2024-12-18 DIAGNOSIS — E66.01 MORBID OBESITY (H): Primary | ICD-10-CM

## 2024-12-19 ENCOUNTER — TELEPHONE (OUTPATIENT)
Dept: FAMILY MEDICINE | Facility: CLINIC | Age: 64
End: 2024-12-19

## 2024-12-19 ENCOUNTER — LAB (OUTPATIENT)
Dept: LAB | Facility: CLINIC | Age: 64
End: 2024-12-19
Payer: COMMERCIAL

## 2024-12-19 ENCOUNTER — ALLIED HEALTH/NURSE VISIT (OUTPATIENT)
Dept: FAMILY MEDICINE | Facility: CLINIC | Age: 64
End: 2024-12-19
Payer: COMMERCIAL

## 2024-12-19 VITALS
SYSTOLIC BLOOD PRESSURE: 122 MMHG | RESPIRATION RATE: 16 BRPM | HEART RATE: 78 BPM | OXYGEN SATURATION: 92 % | DIASTOLIC BLOOD PRESSURE: 84 MMHG

## 2024-12-19 DIAGNOSIS — I10 ESSENTIAL HYPERTENSION: Primary | ICD-10-CM

## 2024-12-19 DIAGNOSIS — N18.31 STAGE 3A CHRONIC KIDNEY DISEASE (H): Primary | ICD-10-CM

## 2024-12-19 DIAGNOSIS — I10 ESSENTIAL HYPERTENSION: ICD-10-CM

## 2024-12-19 LAB
ANION GAP SERPL CALCULATED.3IONS-SCNC: 12 MMOL/L (ref 7–15)
BUN SERPL-MCNC: 22.4 MG/DL (ref 8–23)
CALCIUM SERPL-MCNC: 9.4 MG/DL (ref 8.8–10.4)
CHLORIDE SERPL-SCNC: 104 MMOL/L (ref 98–107)
CREAT SERPL-MCNC: 1.25 MG/DL (ref 0.67–1.17)
CREAT UR-MCNC: 147.1 MG/DL
EGFRCR SERPLBLD CKD-EPI 2021: 64 ML/MIN/1.73M2
GLUCOSE SERPL-MCNC: 93 MG/DL (ref 70–99)
HCO3 SERPL-SCNC: 25 MMOL/L (ref 22–29)
MICROALBUMIN UR-MCNC: 34.4 MG/L
MICROALBUMIN/CREAT UR: 23.39 MG/G CR (ref 0–17)
POTASSIUM SERPL-SCNC: 5.1 MMOL/L (ref 3.4–5.3)
SODIUM SERPL-SCNC: 141 MMOL/L (ref 135–145)

## 2024-12-19 RX ORDER — TIRZEPATIDE 2.5 MG/.5ML
2.5 INJECTION, SOLUTION SUBCUTANEOUS
Qty: 2 ML | Refills: 0 | Status: SHIPPED | OUTPATIENT
Start: 2024-12-19

## 2024-12-19 NOTE — PROGRESS NOTES
Michael Jimenez is a 64 year old year old patient who comes in today for a Blood Pressure check because of ongoing blood pressure monitoring.j  OV 12/4: provider stopped lisinopril-hydrochlorothiazide. Pt continued on Lisinopril 20mg daily   *pt had lab appt today    Vital Signs as repeated by RN   122/86 p78  122/84 p78    Patient is taking medication as prescribed  Patient is tolerating medications well.  Patient is not monitoring Blood Pressure at home.    Current complaints: none  Disposition:  routed to provider for review.    Preferred pharmacy: [facility]

## 2024-12-19 NOTE — TELEPHONE ENCOUNTER
Michael Baxter is a 64 year old year old patient who comes in today for a Blood Pressure check because of ongoing blood pressure monitoring.j  OV 12/4: provider stopped lisinopril-hydrochlorothiazide. Pt continued on Lisinopril 20mg daily   *pt had lab appt today     Vital Signs as repeated by RN   122/86 p78  122/84 p78     Patient is taking medication as prescribed  Patient is tolerating medications well.  Patient is not monitoring Blood Pressure at home.    Current complaints: none  Disposition:  routed to provider for review.     Preferred pharmacy: [facility]

## 2024-12-22 ENCOUNTER — TELEPHONE (OUTPATIENT)
Dept: FAMILY MEDICINE | Facility: CLINIC | Age: 64
End: 2024-12-22
Payer: COMMERCIAL

## 2024-12-22 NOTE — TELEPHONE ENCOUNTER
Prior Authorization Retail Medication Request    Medication/Dose: Mounjaro 2.5 mg  Diagnosis and ICD code (if different than what is on RX):    New/renewal/insurance change PA/secondary ins. PA:  Previously Tried and Failed:    Rationale:      Insurance   Primary: not provided  Insurance ID:  not provided  CMM Key: [id number]    Secondary (if applicable):  Insurance ID:      Pharmacy Information (if different than what is on RX)  Name:    Phone:    Fax:    Clinic Information  Preferred routing pool for dept communication: [phone] [id number]

## 2024-12-23 NOTE — TELEPHONE ENCOUNTER
Central Prior Authorization Team  Phone: [phone]    PA Initiation    Medication: MOUNJARO 2.5 MG/0.5ML SC SOAJ  Insurance Company: [employer] Part D - Phone [phone] Fax [phone]  Pharmacy Filling the Rx: [facility] [address] [place], [place] - [address]  Filling Pharmacy Phone: [phone]  Filling Pharmacy Fax:    Start Date: 12/23/2024

## 2024-12-24 NOTE — TELEPHONE ENCOUNTER
YONY Hooker for mounjaro has been denied.    This is a plan exclusion based on diagnosis.    Maxine Armstrong

## 2024-12-24 NOTE — TELEPHONE ENCOUNTER
PRIOR AUTHORIZATION DENIED    Medication: MOUNJARO 2.5 MG/0.5ML SC SOAJ  Insurance Company: [employer] - Phone [phone] Fax [phone]  Denial Date: 12/23/2024    Denial Reason(s): Plan exclusion     Appeal Information: If provider would like to appeal please provide a letter of medical necessity.     no

## 2025-01-17 ENCOUNTER — MYC MEDICAL ADVICE (OUTPATIENT)
Dept: CARDIOLOGY | Facility: CLINIC | Age: 65
End: 2025-01-17
Payer: COMMERCIAL

## 2025-01-21 ENCOUNTER — HOSPITAL ENCOUNTER (OUTPATIENT)
Dept: CT IMAGING | Facility: CLINIC | Age: 65
Discharge: HOME | End: 2025-01-21
Attending: STUDENT IN AN ORGANIZED HEALTH CARE EDUCATION/TRAINING PROGRAM
Payer: COMMERCIAL

## 2025-01-21 DIAGNOSIS — R31.0 GROSS HEMATURIA: ICD-10-CM

## 2025-01-21 LAB
CREAT BLD-MCNC: 1.3 MG/DL (ref 0.7–1.3)
EGFRCR SERPLBLD CKD-EPI 2021: >60 ML/MIN/1.73M2

## 2025-01-21 PROCEDURE — 74178 CT ABD&PLV WO CNTR FLWD CNTR: CPT

## 2025-01-21 PROCEDURE — 250N000011 HC RX IP 250 OP 636: Performed by: STUDENT IN AN ORGANIZED HEALTH CARE EDUCATION/TRAINING PROGRAM

## 2025-01-21 PROCEDURE — 82565 ASSAY OF CREATININE: CPT

## 2025-01-21 PROCEDURE — 250N000009 HC RX 250: Performed by: STUDENT IN AN ORGANIZED HEALTH CARE EDUCATION/TRAINING PROGRAM

## 2025-01-21 RX ORDER — IOPAMIDOL 755 MG/ML
124 INJECTION, SOLUTION INTRAVASCULAR ONCE
Status: COMPLETED | OUTPATIENT
Start: 2025-01-21 | End: 2025-01-21

## 2025-01-21 RX ADMIN — IOPAMIDOL 124 ML: 755 INJECTION, SOLUTION INTRAVENOUS at 14:41

## 2025-01-21 RX ADMIN — SODIUM CHLORIDE 100 ML: 9 INJECTION, SOLUTION INTRAVENOUS at 14:42

## 2025-01-22 NOTE — TELEPHONE ENCOUNTER
Patient returned call and left voicemail message with update blood pressure reading.      BP Readings from Last 3 Encounters:   12/27/24 (!) 131/94   12/19/24 122/84   12/10/24 125/80       Today's Blood Pressure: 129/76  Today's Heart Rate:   Location: Home BP    Patient reported blood pressure updated in Epic. Blood pressure falls within [place] Community Measures guidelines.  Patient will follow up as previously advised.    MELANIE Hopson

## 2025-01-24 ENCOUNTER — TELEPHONE (OUTPATIENT)
Dept: UROLOGY | Facility: CLINIC | Age: 65
End: 2025-01-24

## 2025-01-24 NOTE — TELEPHONE ENCOUNTER
Patient had CT Urogram 1/21/25 ordered by [place] with result note that mentioned finding appt with [facility].  Radiologist impression of the urogram discusses urothelial malignancy vs renal cell carcinoma    Patient has appt with Sanchez 1/27/25, possibly for cystoscope, was referred by [place].    Patient is calling asking if he should keep his appt with Sanchez, if he still needed cystoscope.     Routed to Dr Sanchez:  Please review and advise on whether patient should keep his 1/27/25 appt with you.    Paul DSOUZA [facility]

## 2025-01-24 NOTE — TELEPHONE ENCOUNTER
[facility] Call Center    Phone Message    May a detailed message be left on voicemail: yes     Reason for Call: Other: Patient calls about appointment with Dr. Moseley that was noted by Sepideh Bean in patient's CT Urogram results. Patient is also wanting to know if he still needs to complete the Cystscopy with Dr. Sanchez if he is going to be scheduled with Dr. Moseley. Patient is requesting a call back.     Action Taken: Message routed to:  Other: Urology    Travel Screening: Not Applicable

## 2025-01-24 NOTE — TELEPHONE ENCOUNTER
Writer called and spoke with patient.  Writer instructed patient that he will still need a cystoscope and he could keep his appt with Dr Sanchez or look to reschedule with Dr Moseley if he would like to speka with Dr Moseley first.  Patient stated understanding and elected to keep appt with Dr Sanchez.  Writer informed patient that Dr Moseley's care team would still be contacting patient to create appt for patient.    HX: Patient had CT Urogoram 1/21/25 in which impression discussed cancer and Sepideh Bean resulted with mention of obtaining visit with Pradip.    Routed to Breana Haas:  Please assist patient in scheduling with Dr Pradip Miller RN [facility]

## 2025-01-24 NOTE — TELEPHONE ENCOUNTER
"Dr Sanchez replied with the following routing comment:  \"Yes still need to rule out the bladder. But if he wants to talk to [place] first or something like that that is fine but someone should do a cysto on him \"    Writer called patient to provide information.  Patient did not answer phone.  Writer left a voice message requesting call back to clinic.    Paul DSOUZA [facility]    "

## 2025-01-24 NOTE — TELEPHONE ENCOUNTER
[facility] Call Center    Phone Message    May a detailed message be left on voicemail: no     Reason for Call: Other: Patient is calling back the clinic due to a voicemail they had left him. Please call patient back to discuss.     Action Taken: Other: [place] [facility]    Travel Screening: Not Applicable

## 2025-01-26 NOTE — PROGRESS NOTES
CYSTOSCOPY      PREOPERATIVE DIAGNOSIS:  Gross hematuria    POSTOPERATIVE DIAGNOSIS:  Same    ATTENDING SURGEONS:  Dr. Sanchez    RESIDENT SURGEONS(S):  None    ANESTHESIA:  Xylocaine jelly    PREPARATION:  Betadine    INDICATIONS FOR PROCEDURE: This 64 year old male patient presents with Gross hematuria.  The procedure, indications, risks and alternatives were discussed.  The patient wished to proceed.    Prior 4 ppd smoker    CTU/US 1.  4 cm mass in the mid left kidney with irregular urothelial thickening. It is unclear if this is urothelial malignancy versus renal cell carcinoma.  2.  No metastatic disease demonstrated.     Cytology Negative for High Grade Urothelial Carcinoma                 Other Findings:                  Human Polyoma virus present.     Most Recent  Urinalysis:  Recent Labs   Lab Test 12/10/24  1151 12/04/24  1643   COLOR Yellow Yellow   APPEARANCE Slightly Cloudy* Clear   URINEGLC Negative Negative   URINEBILI Negative Negative   URINEKETONE Negative Negative   SG 1.019 1.025   UBLD Large* Negative   URINEPH 5.0 6.0   PROTEIN Negative Negative   UROBILINOGEN  --  0.2   NITRITE Negative Negative   LEUKEST Negative Negative   RBCU 33* None Seen   WBCU 5 0-5           PROCEDURE AND FINDINGS:  A time-out was completed verifying correct patient, procedure, site, positioning and implant(s) or special equipment.    After informed consent was obtained, the patient was prepped and draped in the usual manner in the supine frog legged position.  Cystoscopy was carried out using the flexible cystoscope.    The urethra was normal, no verrucous lesions noted within the urethra.  The prostatic urethra with mild BPH minimal obstruction.  The bladder mucosa was normal.  The ureteral orifices were normal in position and configuration.   No external penile lesions or warts noted on examination    No bladder stones, masses, lesions or bleeding noted within the bladder. Left UO noted with efflux, no hematuria       ESTIMATED BLOOD LOSS:  None    COMPLICATIONS:  None    IMPRESSION:    - see attached clinic note for full discussion    Anthony Sanchez MD, MS  Department of Urology  Infertility, Sexual Medicine, Reconstruction  [facility]

## 2025-01-27 ENCOUNTER — OFFICE VISIT (OUTPATIENT)
Dept: UROLOGY | Facility: CLINIC | Age: 65
End: 2025-01-27
Payer: COMMERCIAL

## 2025-01-27 ENCOUNTER — HOSPITAL ENCOUNTER (OUTPATIENT)
Facility: AMBULATORY SURGERY CENTER | Age: 65
End: 2025-01-27
Attending: STUDENT IN AN ORGANIZED HEALTH CARE EDUCATION/TRAINING PROGRAM | Admitting: STUDENT IN AN ORGANIZED HEALTH CARE EDUCATION/TRAINING PROGRAM
Payer: COMMERCIAL

## 2025-01-27 VITALS
OXYGEN SATURATION: 95 % | TEMPERATURE: 98.1 F | SYSTOLIC BLOOD PRESSURE: 105 MMHG | HEART RATE: 79 BPM | DIASTOLIC BLOOD PRESSURE: 72 MMHG

## 2025-01-27 DIAGNOSIS — N28.89 RENAL MASS: ICD-10-CM

## 2025-01-27 DIAGNOSIS — B97.7 HPV IN MALE: Primary | ICD-10-CM

## 2025-01-27 DIAGNOSIS — R31.0 GROSS HEMATURIA: ICD-10-CM

## 2025-01-27 PROCEDURE — 52000 CYSTOURETHROSCOPY: CPT | Performed by: STUDENT IN AN ORGANIZED HEALTH CARE EDUCATION/TRAINING PROGRAM

## 2025-01-27 PROCEDURE — 99215 OFFICE O/P EST HI 40 MIN: CPT | Mod: 25 | Performed by: STUDENT IN AN ORGANIZED HEALTH CARE EDUCATION/TRAINING PROGRAM

## 2025-01-27 NOTE — NURSING NOTE
"Initial /72 (BP Location: Left arm, Patient Position: Sitting, Cuff Size: Adult Regular)   Pulse 79   Temp 98.1  F (36.7  C) (Tympanic)   SpO2 95%  Estimated body mass index is 36.59 kg/m  as calculated from the following:    Height as of 12/10/24: 1.778 m (5' 10\").    Weight as of 12/10/24: 115.7 kg (255 lb). .  Cathleen Gomez MA on 1/27/2025 at 9:53 AM    "

## 2025-01-27 NOTE — PATIENT INSTRUCTIONS
"[facility] [place]     [facility] - [facility] [place]     [facility]    Please fax orders to: [phone]  Scheduling phone: [phone] or [phone]         [facility]    Please fax orders to: [phone]  Scheduling phone: [phone] or [phone]         [facility]    Please fax orders to: [phone]  Scheduling phone: [phone]         [facility]    Please fax orders to: [phone]  Scheduling phone: [phone]        AFTER YOUR CYSTOSCOPY        You have just completed a cystoscopy, or \"cysto\", which allowed your physician to learn more about your bladder (or to remove a stent placed after surgery). We suggest that you continue to avoid caffeine, fruit juice, and alcohol for the next 24 hours, however, you are encouraged to return to your normal activities.         A few things that are considered normal after your cystoscopy:     * Small amount of bleeding (or spotting) that clears within the next 24 hours     * Slight burning sensation with urination     * Sensation to of needing to avoid more frequently     * The feeling of \"air\" in your urine     * Mild discomfort that is relieved with Tylenol        Please contact our office promptly if you:     * Develop a fever above 101 degrees     * Are unable to urinate     * Develop bright red blood that does not stop     * Severe pain or swelling         Please contact our office with any " concerns or questions @[facility].

## 2025-01-27 NOTE — PROGRESS NOTES
UROLOGY RETURN VISIT    Chief Complaint:     Gross hematuria  Renal mass      Referring Provider:  Sepideh Bean    Subjective:      64 year old male patient presents with Gross hematuria.  The procedure, indications, risks and alternatives were discussed.  The patient wished to proceed.     He is currently otherwise asymptomatic no weight loss or B symptoms. Prior 4 ppd smoker heavy.   CT negative for lymphadenopathy and metastatic spread.   Limited evaluation of the chest during MR of heart without acute findings in the chest. No noted urothelial family history.       CTU/US 1.  4 cm mass in the mid left kidney with irregular urothelial thickening. It is unclear if this is urothelial malignancy versus renal cell carcinoma.  2.  No metastatic disease demonstrated.      Cytology Negative for High Grade Urothelial Carcinoma              Other Findings:  Human Polyoma virus present.   No lesions noted on the penis, has some long term voiding issues LUTS, frequency however chronic issue.     Currently the patient has no fever, chills, nausea, vomiting, or other signs/symptoms of acute systemic infection.    PMH  Past Medical History:   Diagnosis Date    Arthritis     Congestive heart failure (H)     Heart murmur     Hyperlipidemia     Hypertension     Obese     Prediabetes     Sleep apnea     doesn't use CPAP     FAMHx  Family History   Problem Relation Age of Onset    Heart Disease Father         emphasemia    Coronary Artery Disease Father     Emphysema Father     Asthma Father     Skin Cancer Mother      ALLERGY  No Known Allergies  MEDICATIONS  has a current medication list which includes the following prescription(s): aspirin, glucosamine, lisinopril, mounjaro, multiple vitamin, rosuvastatin, tamsulosin, zinc gluconate, amoxicillin, and beta blocker not prescribed, and the following Facility-Administered Medications: sodium chloride (pf).  ROS:  Constitutional: negative  Eyes: negative  Ears/Nose/Throat/Mouth:  "negative  Respiratory: negative  Cardiovascular: negative  Gastrointestinal: negative  Genito-Urinary: as documented above in HPI  Musculoskeletal: negative  Neurological: negative  Integumentary: negative      Objective:     /72 (BP Location: Left arm, Patient Position: Sitting, Cuff Size: Adult Regular)   Pulse 79   Temp 98.1  F (36.7  C) (Tympanic)   SpO2 95%    Physical Exam:  GENERAL: Patient is AOx3, in no acute distress  CARDIAC: regular rate and rhythm  LUNG: no use of accessory muscles, no respiratory distress  ABD: soft, nondistended  : see exam in cysto note    LABORATORY:  No results found for: \"CREATININE\"  Lab Results   Component Value Date    PSA 0.51 04/15/2024    PSA 0.25 02/18/2021     Most Recent  Urinalysis:  Recent Labs   Lab Test 12/10/24  1151 12/04/24  1643   COLOR Yellow Yellow   APPEARANCE Slightly Cloudy* Clear   URINEGLC Negative Negative   URINEBILI Negative Negative   URINEKETONE Negative Negative   SG 1.019 1.025   UBLD Large* Negative   URINEPH 5.0 6.0   PROTEIN Negative Negative   UROBILINOGEN  --  0.2   NITRITE Negative Negative   LEUKEST Negative Negative   RBCU 33* None Seen   WBCU 5 0-5         Assessment:     Michael Jimenez is a 64 year old male presents to the office today for a follow up regarding gross hematuria, left renal vs. Urothelial mass on the left kidney.     Plan:     Gross hematuria   Left renal mass vs. Urothelial mass   - Plan CT chest w/wo for further staging given incomplete prior views on MR   - As discussed with patient the appearance is suspicious for UTUC, however not totally clear based on CTU and voided cytology was negative for malignant cells. Therefore after a lengthy discussion recommended a Cystoscopy, Left Retrograde Pyelogram, Left Ureteroscopic renal mass biopsy, cytologic washings and possible stent placement as we must determine if this is urothelial or parenchymal in origin. Discussed risks benefits including bleeding, infection, " ureteral perforation, damage to local structures, non-diagnostic biopsy, tumor seeding need for additional procedures. Discussed in detail the risks, benefits, alternatives and precautions. He voiced understanding of the plan and agrees to proceed.  - In the interim counseled to follow up with partner Dr. Moseley for definitive management. The patient is leaving in late February for several weeks on a trip and would like to expedite his biopsy procedure. He would like to proceed with biopsy next available and will follow up with Dr. Moseley once definitive diagnosis has been obtained    BPH LUTS   - Cont Flomax for now not a large degree of BPH obstruction noted on cysto likely a sensory urgency component, will await results of oncologic workup prior to focusing on LUTS management     HPV on urine cytology   - No noted lesions external or on urethra   - Continue self exam, can remove lesions in future if noted. No verrucous lesions within urethra to explain voiding symptoms, monitor for now. Reiterated female partner notification, as well as protection and GYN follow up   - Recommend adjunct urethritis and STI testing given complaints of ongoing urinary symptoms     40 minutes spent on the date of the encounter doing (chart review/review of outside records/review of test results/interpretation of tests/reviewing imaging/patient visit/documentation/discussion with other provider(s)/discussion with family exclusive of time spent performing procedure.    Anthony Sanchez MD, MS  Department of Urology  Infertility, Sexual Medicine, Reconstruction  [facility]

## 2025-01-30 ENCOUNTER — TELEPHONE (OUTPATIENT)
Dept: UROLOGY | Facility: CLINIC | Age: 65
End: 2025-01-30

## 2025-01-30 ENCOUNTER — ANESTHESIA EVENT (OUTPATIENT)
Dept: SURGERY | Facility: AMBULATORY SURGERY CENTER | Age: 65
End: 2025-01-30
Payer: COMMERCIAL

## 2025-01-30 DIAGNOSIS — N28.89 RENAL MASS: ICD-10-CM

## 2025-01-30 DIAGNOSIS — R31.0 GROSS HEMATURIA: Primary | ICD-10-CM

## 2025-01-30 NOTE — TELEPHONE ENCOUNTER
Spoke with: Patient        Date of surgery:2/7/2025      Location: [facility]      Informed patient they will need a adult : Yes      Pre op with provider: Needs to set one up      H&P Scheduled in PAC NO        Pre procedure covid :NA      Additional imaging: No        Surgery Packet :Sent via [employer]      Additional comments:

## 2025-01-30 NOTE — TELEPHONE ENCOUNTER
Pt called with questions about his procedure. Pt wanting to know how long it will take and how long recovery is at the hospital. BARBARA Toussaint spoke with pt and gave him a rough estimate so he can have a  to pick him up. All questions answered and [place] surgery nurse # given to him.       Arlyn Sanchez   [facility] [place]   [facility]  [phone]

## 2025-01-30 NOTE — TELEPHONE ENCOUNTER
Spoke with: Patient       Date of surgery: 2/7/2025      Location: [facility]      Informed patient they will need a adult : YES      Pre op with provider: Needs to schedule      H&P Scheduled in PAC NO        Pre procedure covid :NO      Additional imaging: NNO        Surgery Packet :Sent via [employer]      Additional comments:

## 2025-02-01 LAB
ALBUMIN UR-MCNC: NEGATIVE MG/DL
APPEARANCE UR: CLEAR
BILIRUB UR QL STRIP: NEGATIVE
COLOR UR AUTO: NORMAL
GLUCOSE UR STRIP-MCNC: NEGATIVE MG/DL
HGB UR QL STRIP: NEGATIVE
KETONES UR STRIP-MCNC: NEGATIVE MG/DL
LEUKOCYTE ESTERASE UR QL STRIP: NEGATIVE
NITRATE UR QL: NEGATIVE
PH UR STRIP: 5 [PH] (ref 5–7)
RBC URINE: <1 /HPF
SP GR UR STRIP: 1.02 (ref 1–1.03)
SQUAMOUS EPITHELIAL: <1 /HPF
UROBILINOGEN UR STRIP-MCNC: NORMAL MG/DL
WBC URINE: 2 /HPF

## 2025-02-02 LAB — BACTERIA UR CULT: NORMAL

## 2025-02-05 ENCOUNTER — OFFICE VISIT (OUTPATIENT)
Dept: FAMILY MEDICINE | Facility: CLINIC | Age: 65
End: 2025-02-05
Payer: COMMERCIAL

## 2025-02-05 VITALS
HEIGHT: 70 IN | SYSTOLIC BLOOD PRESSURE: 110 MMHG | WEIGHT: 254.2 LBS | OXYGEN SATURATION: 91 % | BODY MASS INDEX: 36.39 KG/M2 | RESPIRATION RATE: 16 BRPM | HEART RATE: 74 BPM | TEMPERATURE: 95.2 F | DIASTOLIC BLOOD PRESSURE: 70 MMHG

## 2025-02-05 DIAGNOSIS — I47.20 VENTRICULAR TACHYCARDIA (H): ICD-10-CM

## 2025-02-05 DIAGNOSIS — N18.31 STAGE 3A CHRONIC KIDNEY DISEASE (H): ICD-10-CM

## 2025-02-05 DIAGNOSIS — E66.01 MORBID OBESITY (H): ICD-10-CM

## 2025-02-05 DIAGNOSIS — B97.7 HPV IN MALE: ICD-10-CM

## 2025-02-05 DIAGNOSIS — Z01.818 PREOP GENERAL PHYSICAL EXAM: Primary | ICD-10-CM

## 2025-02-05 DIAGNOSIS — I50.9 CHRONIC CONGESTIVE HEART FAILURE, UNSPECIFIED HEART FAILURE TYPE (H): ICD-10-CM

## 2025-02-05 DIAGNOSIS — N28.89 RENAL MASS: ICD-10-CM

## 2025-02-05 DIAGNOSIS — I35.0 AORTIC VALVE STENOSIS, ETIOLOGY OF CARDIAC VALVE DISEASE UNSPECIFIED: ICD-10-CM

## 2025-02-05 LAB
ANION GAP SERPL CALCULATED.3IONS-SCNC: 10 MMOL/L (ref 7–15)
BASOPHILS # BLD AUTO: 0 10E3/UL (ref 0–0.2)
BASOPHILS NFR BLD AUTO: 0 %
BUN SERPL-MCNC: 25 MG/DL (ref 8–23)
C TRACH DNA SPEC QL NAA+PROBE: NEGATIVE
CALCIUM SERPL-MCNC: 9.3 MG/DL (ref 8.8–10.4)
CHLORIDE SERPL-SCNC: 107 MMOL/L (ref 98–107)
CREAT SERPL-MCNC: 1.09 MG/DL (ref 0.67–1.17)
EGFRCR SERPLBLD CKD-EPI 2021: 75 ML/MIN/1.73M2
EOSINOPHIL # BLD AUTO: 0.2 10E3/UL (ref 0–0.7)
EOSINOPHIL NFR BLD AUTO: 2 %
ERYTHROCYTE [DISTWIDTH] IN BLOOD BY AUTOMATED COUNT: 12.7 % (ref 10–15)
GLUCOSE SERPL-MCNC: 99 MG/DL (ref 70–99)
HCO3 SERPL-SCNC: 25 MMOL/L (ref 22–29)
HCT VFR BLD AUTO: 49.5 % (ref 40–53)
HGB BLD-MCNC: 16.8 G/DL (ref 13.3–17.7)
IMM GRANULOCYTES # BLD: 0 10E3/UL
IMM GRANULOCYTES NFR BLD: 0 %
LYMPHOCYTES # BLD AUTO: 1.6 10E3/UL (ref 0.8–5.3)
LYMPHOCYTES NFR BLD AUTO: 17 %
MCH RBC QN AUTO: 31.1 PG (ref 26.5–33)
MCHC RBC AUTO-ENTMCNC: 33.9 G/DL (ref 31.5–36.5)
MCV RBC AUTO: 92 FL (ref 78–100)
MONOCYTES # BLD AUTO: 0.6 10E3/UL (ref 0–1.3)
MONOCYTES NFR BLD AUTO: 7 %
N GONORRHOEA DNA SPEC QL NAA+PROBE: NEGATIVE
NEUTROPHILS # BLD AUTO: 7 10E3/UL (ref 1.6–8.3)
NEUTROPHILS NFR BLD AUTO: 75 %
PLATELET # BLD AUTO: 224 10E3/UL (ref 150–450)
POTASSIUM SERPL-SCNC: 4.6 MMOL/L (ref 3.4–5.3)
RBC # BLD AUTO: 5.41 10E6/UL (ref 4.4–5.9)
SODIUM SERPL-SCNC: 142 MMOL/L (ref 135–145)
SPECIMEN TYPE: NORMAL
SPECIMEN TYPE: NORMAL
T VAGINALIS DNA SPEC QL NAA+PROBE: NOT DETECTED
WBC # BLD AUTO: 9.4 10E3/UL (ref 4–11)

## 2025-02-05 PROCEDURE — 80048 BASIC METABOLIC PNL TOTAL CA: CPT | Performed by: NURSE PRACTITIONER

## 2025-02-05 PROCEDURE — 87563 M. GENITALIUM AMP PROBE: CPT | Mod: 90 | Performed by: NURSE PRACTITIONER

## 2025-02-05 PROCEDURE — 99214 OFFICE O/P EST MOD 30 MIN: CPT | Performed by: NURSE PRACTITIONER

## 2025-02-05 PROCEDURE — 36415 COLL VENOUS BLD VENIPUNCTURE: CPT | Performed by: NURSE PRACTITIONER

## 2025-02-05 PROCEDURE — 87661 TRICHOMONAS VAGINALIS AMPLIF: CPT | Performed by: NURSE PRACTITIONER

## 2025-02-05 PROCEDURE — 99000 SPECIMEN HANDLING OFFICE-LAB: CPT | Performed by: NURSE PRACTITIONER

## 2025-02-05 PROCEDURE — 87591 N.GONORRHOEAE DNA AMP PROB: CPT | Performed by: NURSE PRACTITIONER

## 2025-02-05 PROCEDURE — 87491 CHLMYD TRACH DNA AMP PROBE: CPT | Performed by: NURSE PRACTITIONER

## 2025-02-05 PROCEDURE — 87798 DETECT AGENT NOS DNA AMP: CPT | Mod: 90 | Performed by: NURSE PRACTITIONER

## 2025-02-05 PROCEDURE — 93000 ELECTROCARDIOGRAM COMPLETE: CPT | Performed by: NURSE PRACTITIONER

## 2025-02-05 PROCEDURE — 85025 COMPLETE CBC W/AUTO DIFF WBC: CPT | Performed by: NURSE PRACTITIONER

## 2025-02-05 NOTE — PROGRESS NOTES
Preoperative Evaluation  [facility]  [address] [address]  Phone: [phone]  Primary Provider: TOREY Estrada CNP  Pre-op Performing Provider: TOREY Estrada CNP  Feb 5, 2025 2/5/2025   Surgical Information   What procedure is being done? Cystoscopy, Left Retrograde Pyelogram, Left Ureteroscopic renal mass biopsy, possible stent placement (Left: Flank)    Facility or Hospital where procedure/surgery will be performed: [facility]    Who is doing the procedure / surgery? not sure    Date of surgery / procedure: 2/7/25    Time of surgery / procedure: 8:00 AM    Where do you plan to recover after surgery? at home with family        Proxy-reported     Fax number for surgical facility: Note does not need to be faxed, will be available electronically in Epic.    Assessment & Plan     The proposed surgical procedure is considered INTERMEDIATE risk.    Preop general physical exam    - EKG 12-lead complete w/read - Clinics  - CBC with platelets and differential; Future  - Basic metabolic panel  (Ca, Cl, CO2, Creat, Gluc, K, Na, BUN); Future  - EKG 12-lead complete w/read - Clinics  - CBC with platelets and differential  - Basic metabolic panel  (Ca, Cl, CO2, Creat, Gluc, K, Na, BUN)    Renal mass  -patient is cleared for scheduled surgery     Stage 3a chronic kidney disease (H)  -stable   - Basic metabolic panel  (Ca, Cl, CO2, Creat, Gluc, K, Na, BUN); Future  - Basic metabolic panel  (Ca, Cl, CO2, Creat, Gluc, K, Na, BUN)      Chronic congestive heart failure, unspecified heart failure type (H)  -stable, no new symptoms, or concerns   - EKG 12-lead complete w/read - Clinics    Ventricular tachycardia (H)  -stable, EKG without acute findings     Morbid obesity (H)  -patient was advised to try to work on weight loss     Aortic valve stenosis, etiology of cardiac valve disease unspecified  -he is asymptomatic, following with cardiology   - EKG 12-lead  complete w/read - Clinics       - No identified additional risk factors other than previously addressed         Recommendation  Approval given to proceed with proposed procedure, without further diagnostic evaluation.    Erica Rodriguez is a 64 year old, presenting for the following:  Pre-Op Exam          2/5/2025     8:31 AM   Additional Questions   Roomed by carter   Accompanied by self         2/5/2025     8:31 AM   Patient Reported Additional Medications   Patient reports taking the following new medications ozempic     HPI related to upcoming procedure: hematuria, renal mass         2/5/2025   Pre-Op Questionnaire   Have you ever had a heart attack or stroke? (!) YES     Have you ever had surgery on your heart or blood vessels, such as a stent placement, a coronary artery bypass, or surgery on an artery in your head, neck, heart, or legs? No    Do you have chest pain with activity? No    Do you have a history of heart failure? No    Do you currently have a cold, bronchitis or symptoms of other infection? No    Do you have a cough, shortness of breath, or wheezing? No    Do you or anyone in your family have previous history of blood clots? No    Do you or does anyone in your family have a serious bleeding problem such as prolonged bleeding following surgeries or cuts? No    Have you ever had problems with anemia or been told to take iron pills? No    Have you had any abnormal blood loss such as black, tarry or bloody stools? No    Have you ever had a blood transfusion? No    Are you willing to have a blood transfusion if it is medically needed before, during, or after your surgery? Yes    Have you or any of your relatives ever had problems with anesthesia? No    Do you have sleep apnea, excessive snoring or daytime drowsiness? (!) YES    Do you have a CPAP machine? Yes    Do you have any artifical heart valves or other implanted medical devices like a pacemaker, defibrillator, or continuous glucose monitor?  No    Do you have artificial joints? (!) YES    Are you allergic to latex? No        Proxy-reported       Preoperative Review of    reviewed - no record of controlled substances prescribed.      Patient Active Problem List    Diagnosis Date Noted    Gross hematuria 01/27/2025     Priority: Medium    Renal mass 01/27/2025     Priority: Medium    Stage 3a chronic kidney disease (H) 12/05/2024     Priority: Medium    S/P total knee arthroplasty, left 07/16/2024     Priority: Medium    S/P reverse total shoulder arthroplasty, left 04/04/2024     Priority: Medium    Hard to intubate 04/01/2024     Priority: Medium     See intubation note      DJD of left shoulder 04/01/2024     Priority: Medium    Essential hypertension 01/29/2024     Priority: Medium    Lower urinary tract symptoms (LUTS) 01/29/2024     Priority: Medium    Ventricular tachycardia (H) 11/16/2023     Priority: Medium    Aortic valve stenosis, etiology of cardiac valve disease unspecified 01/03/2023     Priority: Medium    Rash and nonspecific skin eruption 01/03/2023     Priority: Medium    Degenerative joint disease (DJD) of hip 04/04/2022     Priority: Medium    CHF (congestive heart failure) (H) 03/07/2022     Priority: Medium    CAROL (obstructive sleep apnea) 04/15/2021     Priority: Medium     4/12/2021 [place] [facility] Sleep Study (255.0 lbs) - AHI 30.0, RDI 31.8, Supine AHI 47.1, REM AHI 78.0, Low O2 58.9%, Time Spent <=88% 41.2 minutes / Time Spent <=89% 51.6 minutes.      Morbid obesity (H) 03/09/2021     Priority: Medium    Snoring 08/06/2015     Priority: Medium     Formatting of this note might be different from the original.  8/2015: advise to follow up for sleep study.      Hayfever 08/14/2012     Priority: Medium     Formatting of this note might be different from the original.  Receives DEPO medrol injection 40mg annually and this seems to help him year round.      Glenoid labral tear 04/06/2011     Priority: Medium     Formatting  of this note might be different from the original.  He has been doing a lot better since starting Glucosamine- Chondroitin.      Rupture of long head biceps tendon 2011     Priority: Medium    Supraspinatus tendon tear 2011     Priority: Medium    Hyperlipidemia with target low density lipoprotein (LDL) cholesterol less than 100 mg/dL 2011     Priority: Medium     Formatting of this note is different from the original.  Patient stopped taking statins 2015 as he was concerned about potential side effects. Importance of takign care of modifiable risk factors discussed with Patient. Will continue to address.  Lovaza (Omega-3 PUFA) 2mg twice a day).    Last Lipids:  Chol: 218    2013  T    2013  HDL:   55    2013  LDL:  127    2013   LDL CHOLESTEROL (mg/dL)   Date Value   2013 127     [place] 10-year CHD Risk Score: 8% (11 Total Points)      Prediabetes 2011     Priority: Medium     Formatting of this note is different from the original.      HEMOGLOBIN A1C MONITORING (POCT) (%)   Date Value   2011 5.4      GLUCOSE                   (mg/dL)   Date Value   3/25/2013 77      Raynaud's phenomenon 2011     Priority: Medium    Right shoulder pain 2011     Priority: Medium    CARDIOVASCULAR SCREENING; LDL GOAL LESS THAN 160 10/31/2010     Priority: Medium      Past Medical History:   Diagnosis Date    Arthritis     Congestive heart failure (H)     Heart murmur     Hyperlipidemia     Hypertension     Obese     Prediabetes     Sleep apnea     doesn't use CPAP     Past Surgical History:   Procedure Laterality Date    ABDOMEN SURGERY      ARTHROPLASTY HIP Right 2022    Procedure: RIGHT TOTAL HIP ARTHROPLASTY;  Surgeon: Timothy Montana MD;  Location: [facility] Main OR    CHOLECYSTECTOMY      COLONOSCOPY N/A 2021    Procedure: COLONOSCOPY, WITH POLYPECTOMY AND BIOPSY;  Surgeon: Lito Sweet DO;  Location: [facility]    EYE  "SURGERY      FINGER SURGERY      REVERSE ARTHROPLASTY SHOULDER Left 2024    Procedure: LEFT REVERSE TOTAL SHOULDER ARTHROPLASTY;  Surgeon: Timothy Montana MD;  Location: [facility] Main OR     Current Outpatient Medications   Medication Sig Dispense Refill    amoxicillin (AMOXIL) 500 MG capsule Take 500 mg by mouth as needed (DENTAL PROCEDURES) (Patient not taking: Reported on 2024)      aspirin 81 MG EC tablet Take 1 tablet (81 mg) by mouth daily      BETA BLOCKER NOT PRESCRIBED (INTENTIONAL) Please choose reason not prescribed from choices below. (Patient not taking: Reported on 2024)      Glucosamine Sulfate 1000 MG TABS Take 1,000 mg by mouth daily       lisinopril (ZESTRIL) 20 MG tablet Take 1 tablet (20 mg) by mouth daily. 90 tablet 3    MOUNJARO 2.5 MG/0.5ML SOAJ Inject 0.5 mLs (2.5 mg) subcutaneously every 7 days. 2 mL 0    Multiple Vitamin (MULTIVITAMIN ADULT PO) Take 1 tablet by mouth daily       rosuvastatin (CRESTOR) 10 MG tablet Take 1 tablet (10 mg) by mouth daily 90 tablet 3    tamsulosin (FLOMAX) 0.4 MG capsule TAKE 1 CAPSULE BY MOUTH ONCE DAILY WITH SUPPER 90 capsule 1    zinc gluconate 50 MG tablet Take 50 mg by mouth daily         No Known Allergies     Social History     Tobacco Use    Smoking status: Former     Current packs/day: 0.00     Average packs/day: 4.0 packs/day for 30.0 years (120.0 ttl pk-yrs)     Types: Cigarettes     Start date: 1965     Quit date: 1995     Years since quittin.1    Smokeless tobacco: Former     Quit date: 1975   Substance Use Topics    Alcohol use: Yes     Comment: 3-4 drinks a week       History   Drug Use No             Review of Systems  Constitutional, HEENT, cardiovascular, pulmonary, gi and gu systems are negative, except as otherwise noted.    Objective    There were no vitals taken for this visit.   Estimated body mass index is 36.59 kg/m  as calculated from the following:    Height as of 12/10/24: 1.778 m (5' 10\").    " Weight as of 12/10/24: 115.7 kg (255 lb).  Physical Exam  GENERAL: alert and no distress  EYES: Eyes grossly normal to inspection, PERRL and conjunctivae and sclerae normal  NECK: no adenopathy, no asymmetry, masses, or scars  RESP: lungs clear to auscultation - no rales, rhonchi or wheezes  CV: regular rate and rhythm, normal S1 S2, no S3 or S4, no murmur, click or rub, no peripheral edema   NEURO: Normal strength and tone, mentation intact and speech normal  PSYCH: mentation appears normal, affect normal/bright    Recent Labs   Lab Test 01/21/25  1437 12/19/24  1125 12/04/24  1643 06/21/24  1115 04/15/24  1342   HGB  --   --   --  16.2  15.8 14.0   PLT  --   --   --  258  242 305   NA  --  141 139 141  --    POTASSIUM  --  5.1 4.2 4.3  --    CR 1.3 1.25* 1.46* 1.09  --    A1C  --   --  6.0*  --  6.0*        Diagnostics  Recent Results (from the past 24 hours)   NEISSERIA GONORRHOEA PCR    Collection Time: 02/05/25 11:07 AM    Specimen: Urine, Voided   Result Value Ref Range    Neisseria gonorrhoeae Negative Negative    Neisseria gonorrhoeae Specimen Source Urine, Voided    CHLAMYDIA TRACHOMATIS PCR    Collection Time: 02/05/25 11:07 AM    Specimen: Urine, Voided   Result Value Ref Range    Chlamydia trachomatis Negative Negative    Chlamydia trachomatis Specimen Source Urine, Voided    Trichomonas vaginalis by PCR    Collection Time: 02/05/25 11:07 AM    Specimen: Urine, Voided   Result Value Ref Range    Trichomonas vaginalis by PCR Not Detected Not Detected   Basic metabolic panel  (Ca, Cl, CO2, Creat, Gluc, K, Na, BUN)    Collection Time: 02/05/25 11:07 AM   Result Value Ref Range    Sodium 142 135 - 145 mmol/L    Potassium 4.6 3.4 - 5.3 mmol/L    Chloride 107 98 - 107 mmol/L    Carbon Dioxide (CO2) 25 22 - 29 mmol/L    Anion Gap 10 7 - 15 mmol/L    Urea Nitrogen 25.0 (H) 8.0 - 23.0 mg/dL    Creatinine 1.09 0.67 - 1.17 mg/dL    GFR Estimate 75 >60 mL/min/1.73m2    Calcium 9.3 8.8 - 10.4 mg/dL    Glucose 99 70  - 99 mg/dL   CBC with platelets and differential    Collection Time: 02/05/25 11:07 AM   Result Value Ref Range    WBC Count 9.4 4.0 - 11.0 10e3/uL    RBC Count 5.41 4.40 - 5.90 10e6/uL    Hemoglobin 16.8 13.3 - 17.7 g/dL    Hematocrit 49.5 40.0 - 53.0 %    MCV 92 78 - 100 fL    MCH 31.1 26.5 - 33.0 pg    MCHC 33.9 31.5 - 36.5 g/dL    RDW 12.7 10.0 - 15.0 %    Platelet Count 224 150 - 450 10e3/uL    % Neutrophils 75 %    % Lymphocytes 17 %    % Monocytes 7 %    % Eosinophils 2 %    % Basophils 0 %    % Immature Granulocytes 0 %    Absolute Neutrophils 7.0 1.6 - 8.3 10e3/uL    Absolute Lymphocytes 1.6 0.8 - 5.3 10e3/uL    Absolute Monocytes 0.6 0.0 - 1.3 10e3/uL    Absolute Eosinophils 0.2 0.0 - 0.7 10e3/uL    Absolute Basophils 0.0 0.0 - 0.2 10e3/uL    Absolute Immature Granulocytes 0.0 <=0.4 10e3/uL      EKG: appears normal, NSR, normal axis, normal intervals, no acute ST/T changes c/w ischemia, no LVH by voltage criteria, Left Bundle Branch Block, unchanged from previous tracings    Revised Cardiac Risk Index (RCRI)  The patient has the following serious cardiovascular risks for perioperative complications:   - No serious cardiac risks = 0 points     RCRI Interpretation: 0 points: Class I (very low risk - 0.4% complication rate)         Signed Electronically by: TOREY Estrada CNP  A copy of this evaluation report is provided to the requesting physician.

## 2025-02-06 RX ORDER — HYDROMORPHONE HYDROCHLORIDE 1 MG/ML
0.4 INJECTION, SOLUTION INTRAMUSCULAR; INTRAVENOUS; SUBCUTANEOUS EVERY 5 MIN PRN
Status: CANCELLED | OUTPATIENT
Start: 2025-02-06

## 2025-02-06 RX ORDER — DEXAMETHASONE SODIUM PHOSPHATE 10 MG/ML
4 INJECTION, SOLUTION INTRAMUSCULAR; INTRAVENOUS
Status: CANCELLED | OUTPATIENT
Start: 2025-02-06

## 2025-02-06 RX ORDER — CEFAZOLIN SODIUM 2 G/50ML
2 SOLUTION INTRAVENOUS
Status: DISCONTINUED | OUTPATIENT
Start: 2025-02-06 | End: 2025-02-08 | Stop reason: HOSPADM

## 2025-02-06 RX ORDER — NALOXONE HYDROCHLORIDE 0.4 MG/ML
0.1 INJECTION, SOLUTION INTRAMUSCULAR; INTRAVENOUS; SUBCUTANEOUS
Status: CANCELLED | OUTPATIENT
Start: 2025-02-06

## 2025-02-06 RX ORDER — ONDANSETRON 2 MG/ML
4 INJECTION INTRAMUSCULAR; INTRAVENOUS EVERY 30 MIN PRN
Status: CANCELLED | OUTPATIENT
Start: 2025-02-06

## 2025-02-06 RX ORDER — ONDANSETRON 4 MG/1
4 TABLET, ORALLY DISINTEGRATING ORAL EVERY 30 MIN PRN
Status: CANCELLED | OUTPATIENT
Start: 2025-02-06

## 2025-02-06 RX ORDER — OXYCODONE HYDROCHLORIDE 5 MG/1
5 TABLET ORAL
Status: CANCELLED | OUTPATIENT
Start: 2025-02-06

## 2025-02-06 RX ORDER — FENTANYL CITRATE 50 UG/ML
50 INJECTION, SOLUTION INTRAMUSCULAR; INTRAVENOUS EVERY 5 MIN PRN
Status: CANCELLED | OUTPATIENT
Start: 2025-02-06

## 2025-02-06 RX ORDER — HYDROMORPHONE HYDROCHLORIDE 1 MG/ML
0.2 INJECTION, SOLUTION INTRAMUSCULAR; INTRAVENOUS; SUBCUTANEOUS EVERY 5 MIN PRN
Status: CANCELLED | OUTPATIENT
Start: 2025-02-06

## 2025-02-06 RX ORDER — OXYCODONE HYDROCHLORIDE 5 MG/1
10 TABLET ORAL
Status: CANCELLED | OUTPATIENT
Start: 2025-02-06

## 2025-02-06 RX ORDER — ACETAMINOPHEN 325 MG/1
975 TABLET ORAL ONCE
Status: CANCELLED | OUTPATIENT
Start: 2025-02-06 | End: 2025-02-06

## 2025-02-06 RX ORDER — SODIUM CHLORIDE, SODIUM LACTATE, POTASSIUM CHLORIDE, CALCIUM CHLORIDE 600; 310; 30; 20 MG/100ML; MG/100ML; MG/100ML; MG/100ML
INJECTION, SOLUTION INTRAVENOUS CONTINUOUS
Status: CANCELLED | OUTPATIENT
Start: 2025-02-06

## 2025-02-06 RX ORDER — LIDOCAINE 40 MG/G
CREAM TOPICAL
Status: CANCELLED | OUTPATIENT
Start: 2025-02-06

## 2025-02-06 RX ORDER — FENTANYL CITRATE 50 UG/ML
25 INJECTION, SOLUTION INTRAMUSCULAR; INTRAVENOUS EVERY 5 MIN PRN
Status: CANCELLED | OUTPATIENT
Start: 2025-02-06

## 2025-02-06 NOTE — H&P
Urology Preoperative H&P    Patient information was obtained from patient and medical chart review.  History/Exam limitations: none.    Michael Jimenez is a 64 year old male, here today to undergo Cystoscopy, Left Retrograde Pyelogram, Left Ureteroscopic renal mass biopsy, cytologic washings and possible stent placement.     CTU/US 1.  4 cm mass in the mid left kidney with irregular urothelial thickening. It is unclear if this is urothelial malignancy versus renal cell carcinoma.  2.  No metastatic disease demonstrated.     Denies significant changes to hx since last visit.    Patient Active Problem List    Diagnosis Date Noted    Gross hematuria 01/27/2025     Priority: Medium    Renal mass 01/27/2025     Priority: Medium    Stage 3a chronic kidney disease (H) 12/05/2024     Priority: Medium    S/P total knee arthroplasty, left 07/16/2024     Priority: Medium    S/P reverse total shoulder arthroplasty, left 04/04/2024     Priority: Medium    Hard to intubate 04/01/2024     Priority: Medium     See intubation note      DJD of left shoulder 04/01/2024     Priority: Medium    Essential hypertension 01/29/2024     Priority: Medium    Lower urinary tract symptoms (LUTS) 01/29/2024     Priority: Medium    Ventricular tachycardia (H) 11/16/2023     Priority: Medium    Aortic valve stenosis, etiology of cardiac valve disease unspecified 01/03/2023     Priority: Medium    Rash and nonspecific skin eruption 01/03/2023     Priority: Medium    Degenerative joint disease (DJD) of hip 04/04/2022     Priority: Medium    CHF (congestive heart failure) (H) 03/07/2022     Priority: Medium    CAROL (obstructive sleep apnea) 04/15/2021     Priority: Medium     4/12/2021 [place] [facility] Sleep Study (255.0 lbs) - AHI 30.0, RDI 31.8, Supine AHI 47.1, REM AHI 78.0, Low O2 58.9%, Time Spent <=88% 41.2 minutes / Time Spent <=89% 51.6 minutes.      Morbid obesity (H) 03/09/2021     Priority: Medium    Snoring 08/06/2015     Priority: Medium      Formatting of this note might be different from the original.  2015: advise to follow up for sleep study.      Hayfever 2012     Priority: Medium     Formatting of this note might be different from the original.  Receives DEPO medrol injection 40mg annually and this seems to help him year round.      Glenoid labral tear 2011     Priority: Medium     Formatting of this note might be different from the original.  He has been doing a lot better since starting Glucosamine- Chondroitin.      Rupture of long head biceps tendon 2011     Priority: Medium    Supraspinatus tendon tear 2011     Priority: Medium    Hyperlipidemia with target low density lipoprotein (LDL) cholesterol less than 100 mg/dL 2011     Priority: Medium     Formatting of this note is different from the original.  Patient stopped taking statins 2015 as he was concerned about potential side effects. Importance of takign care of modifiable risk factors discussed with Patient. Will continue to address.  Lovaza (Omega-3 PUFA) 2mg twice a day).    Last Lipids:  Chol: 218    2013  T    2013  HDL:   55    2013  LDL:  127    2013   LDL CHOLESTEROL (mg/dL)   Date Value   2013 127     [place] 10-year CHD Risk Score: 8% (11 Total Points)      Prediabetes 2011     Priority: Medium     Formatting of this note is different from the original.      HEMOGLOBIN A1C MONITORING (POCT) (%)   Date Value   2011 5.4      GLUCOSE                   (mg/dL)   Date Value   3/25/2013 77      Raynaud's phenomenon 2011     Priority: Medium    Right shoulder pain 2011     Priority: Medium    CARDIOVASCULAR SCREENING; LDL GOAL LESS THAN 160 10/31/2010     Priority: Medium     Past Medical History:   Diagnosis Date    Arthritis     Congestive heart failure (H)     Heart murmur     Hyperlipidemia     Hypertension     Obese     Prediabetes     Sleep apnea     doesn't use CPAP      Past  Surgical History:   Procedure Laterality Date    ABDOMEN SURGERY      ARTHROPLASTY HIP Right 2022    Procedure: RIGHT TOTAL HIP ARTHROPLASTY;  Surgeon: Timothy Montana MD;  Location: [facility] Main OR    CHOLECYSTECTOMY      COLONOSCOPY N/A 2021    Procedure: COLONOSCOPY, WITH POLYPECTOMY AND BIOPSY;  Surgeon: Lito Sweet DO;  Location: [facility]    EYE SURGERY      FINGER SURGERY      REVERSE ARTHROPLASTY SHOULDER Left 2024    Procedure: LEFT REVERSE TOTAL SHOULDER ARTHROPLASTY;  Surgeon: Timothy Montana MD;  Location: [facility] Main OR     No Known Allergies   Social History     Tobacco Use    Smoking status: Former     Current packs/day: 0.00     Average packs/day: 4.0 packs/day for 30.0 years (120.0 ttl pk-yrs)     Types: Cigarettes     Start date: 1965     Quit date: 1995     Years since quittin.1    Smokeless tobacco: Former     Quit date: 1975   Substance Use Topics    Alcohol use: Yes     Comment: 3-4 drinks a week      Family History   Problem Relation Age of Onset    Heart Disease Father         emphasemia    Coronary Artery Disease Father     Emphysema Father     Asthma Father     Skin Cancer Mother         Review of Systems  Consitutional: denies fevers, chills  Allergy: as indicated on chart   Respiratory: denies wheezing or shortness of breath   Cardiovascular: denies chest pain or palpitations  : denies changes in urinary symptoms since previous visit    Objective:     There were no vitals taken for this visit.    General Appearance: NAD  Head/Neck: Normocephalic, atraumatic  Eyes: Anicteric, sclera white     Lungs/Chest: Respirations unlabored  Heart: Extremities warm and well-perfused  Abdomen: Soft, non-distended.   Extremities: Extremities without evidence of trauma  Skin:  No jaundice  Neurologic: GCS 15    Data Review: Labs    Reviewed    Images    Reviewed    Assessment:     Pre-Op Diagnosis Codes:      * Gross hematuria [R31.0]     * Renal  mass [N28.89]    Plan:     Urine culture navi <10k      Procedure(s):  Cystoscopy, Left Retrograde Pyelogram, Left Ureteroscopic renal mass biopsy, possible stent placement    Anthony Sanchez MD

## 2025-02-06 NOTE — ANESTHESIA PREPROCEDURE EVALUATION
Anesthesia Pre-Procedure Evaluation    Patient: Michael Jimenez   MRN: [id number] : 1960        Procedure : Procedure(s):  Cystoscopy, Left Retrograde Pyelogram, Left Ureteroscopic renal mass biopsy, possible stent placement          Past Medical History:   Diagnosis Date     Arthritis      Congestive heart failure (H)      Heart murmur      Hyperlipidemia      Hypertension      Obese      Prediabetes      Sleep apnea     doesn't use CPAP      Past Surgical History:   Procedure Laterality Date     ABDOMEN SURGERY       ARTHROPLASTY HIP Right 2022    Procedure: RIGHT TOTAL HIP ARTHROPLASTY;  Surgeon: Timothy Montana MD;  Location: [facility] Main OR     CHOLECYSTECTOMY       COLONOSCOPY N/A 2021    Procedure: COLONOSCOPY, WITH POLYPECTOMY AND BIOPSY;  Surgeon: Lito Sweet DO;  Location: [facility]     EYE SURGERY       FINGER SURGERY       REVERSE ARTHROPLASTY SHOULDER Left 2024    Procedure: LEFT REVERSE TOTAL SHOULDER ARTHROPLASTY;  Surgeon: Timothy Montana MD;  Location: [facility] OR      No Known Allergies   Social History     Tobacco Use     Smoking status: Former     Current packs/day: 0.00     Average packs/day: 4.0 packs/day for 30.0 years (120.0 ttl pk-yrs)     Types: Cigarettes     Start date: 1965     Quit date: 1995     Years since quittin.1     Smokeless tobacco: Former     Quit date: 1975   Substance Use Topics     Alcohol use: Yes     Comment: 3-4 drinks a week      Wt Readings from Last 1 Encounters:   25 115.3 kg (254 lb 3.2 oz)        Anesthesia Evaluation            ROS/MED HX  ENT/Pulmonary:     (+) sleep apnea, severe, doesn't use CPAP,                                      Neurologic:  - neg neurologic ROS     Cardiovascular: Comment: 3/2024    Interpretation Summary     Left ventricular function is borderline. The ejection fraction is 50-55%. Mid  to distal anterior and anteroseptal hypokinesis.  Global right ventricular function  is normal.  Mild aortic stenosis.  The inferior vena cava is normal.  Borderline dilation of Sinuses of Valsalva, 4.1 cm.     This study was compared with the study from 3/10/21. LVEF is lower with subtle  wall motion abnormalities on today's study. Aortic valve doppler assessment is  stable.      (+)  hypertension- -   -  - -      CHF  Last EF: 50 date: 4/2024                  valvular problems/murmurs type: AS mild AS.    Previous cardiac testing   Echo: Date: 3/2024 Results:  Interpretation Summary     Left ventricular function is borderline. The ejection fraction is 50-55%. Mid  to distal anterior and anteroseptal hypokinesis.  Global right ventricular function is normal.  Mild aortic stenosis.  The inferior vena cava is normal.  Borderline dilation of Sinuses of Valsalva, 4.1 cm.     This study was compared with the study from 3/10/21. LVEF is lower with subtle  wall motion abnormalities on today's study. Aortic valve doppler assessment is  stable.    Stress Test:  Date: Results:    ECG Reviewed:  Date: Results:    Cath:  Date: 8/6/2024 Results:  SUMMARY   ==========================================================================================================     1.  Pharmacologic regadenoson stress cardiac MRI is negative for inducible myocardial ischemia.   2.  No evidence of prior myocardial infarction, focal nonvascular myocardial fibrosis or infiltrative  disease. The distal anterior wall is not well-visualized on delayed enhancement imaging due to  susceptibility artifact from a metallic foreign body in the left anterior chest wall.  3.  Left ventricular size and wall thickness are normal. Systolic function is low normal. The quantified  left ventricular ejection fraction is 50%.   4.  Right ventricular chamber size and systolic function are normal.  The quantified right ventricular  ejection fraction is 61%.   5.  No significant valvular abnormalities.      METS/Exercise Tolerance:     Hematologic:        Musculoskeletal:  - neg musculoskeletal ROS     GI/Hepatic:       Renal/Genitourinary: Comment: hematuria    (+) renal disease, type: CRI,            Endo:     (+)               Obesity,       Psychiatric/Substance Use:  - neg psychiatric ROS     Infectious Disease:       Malignancy:       Other:               OUTSIDE LABS:  CBC:   Lab Results   Component Value Date    WBC 9.4 02/05/2025    WBC 11.3 (H) 06/21/2024    WBC 10.8 06/21/2024    HGB 16.8 02/05/2025    HGB 15.8 06/21/2024    HGB 16.2 06/21/2024    HCT 49.5 02/05/2025    HCT 49.8 06/21/2024    HCT 51.1 06/21/2024     02/05/2025     06/21/2024     06/21/2024     BMP:   Lab Results   Component Value Date     02/05/2025     12/19/2024    POTASSIUM 4.6 02/05/2025    POTASSIUM 5.1 12/19/2024    CHLORIDE 107 02/05/2025    CHLORIDE 104 12/19/2024    CO2 25 02/05/2025    CO2 25 12/19/2024    BUN 25.0 (H) 02/05/2025    BUN 22.4 12/19/2024    CR 1.09 02/05/2025    CR 1.3 01/21/2025    GLC 99 02/05/2025    GLC 93 12/19/2024     COAGS:   Lab Results   Component Value Date    PTT 30 01/04/2011    INR 1.04 04/04/2022     POC:   Lab Results   Component Value Date     (H) 07/09/2009     HEPATIC:   Lab Results   Component Value Date    ALBUMIN 4.0 02/20/2024    PROTTOTAL 8.1 02/20/2024    ALT 27 02/20/2024    AST 33 02/20/2024    ALKPHOS 68 02/20/2024    BILITOTAL 0.3 02/20/2024     OTHER:   Lab Results   Component Value Date    A1C 6.0 (H) 12/04/2024    SKYLER 9.3 02/05/2025    TSH 1.00 04/15/2024    CRP 0.7 02/22/2010    SED 22 (H) 04/15/2024       Anesthesia Plan    ASA Status:  3                     Consents            Postoperative Care            Comments:    Other Comments: I discussed with patient the risk of high admission post operatively given his untreated(no CPAP) severe CAROL. His O2 sats 2 days ago was 91 and today is only 94. Given that he requires a GA and likely post op narcotics, he is a very high risk of needing O2  "for awhile after his procedure. Given this is a day surgery center this is not a compatible situation. Our exclusion criteria was consulted and it does state that severe untreated sleep apnea is a diagnosis for exclusion for surgery here at this center.           Albina Christensen MD    I have reviewed the pertinent notes and labs in the chart from the past 30 days and (re)examined the patient.  Any updates or changes from those notes are reflected in this note.    Clinically Significant Risk Factors Present on Admission                   # Hypertension: Noted on problem list  # Chronic heart failure with preserved ejection fraction: heart failure noted on problem list and last echo with EF >50%          # Obesity: Estimated body mass index is 36.47 kg/m  as calculated from the following:    Height as of 2/5/25: 1.778 m (5' 10\").    Weight as of 2/5/25: 115.3 kg (254 lb 3.2 oz).                "

## 2025-02-07 ENCOUNTER — ANESTHESIA (OUTPATIENT)
Dept: SURGERY | Facility: AMBULATORY SURGERY CENTER | Age: 65
End: 2025-02-07
Payer: COMMERCIAL

## 2025-02-07 ENCOUNTER — HOSPITAL ENCOUNTER (OUTPATIENT)
Facility: AMBULATORY SURGERY CENTER | Age: 65
Discharge: HOME | End: 2025-02-07
Attending: STUDENT IN AN ORGANIZED HEALTH CARE EDUCATION/TRAINING PROGRAM | Admitting: STUDENT IN AN ORGANIZED HEALTH CARE EDUCATION/TRAINING PROGRAM
Payer: COMMERCIAL

## 2025-02-07 VITALS
OXYGEN SATURATION: 94 % | BODY MASS INDEX: 36.36 KG/M2 | HEART RATE: 80 BPM | HEIGHT: 70 IN | RESPIRATION RATE: 15 BRPM | TEMPERATURE: 97.5 F | WEIGHT: 254 LBS | DIASTOLIC BLOOD PRESSURE: 66 MMHG | SYSTOLIC BLOOD PRESSURE: 96 MMHG

## 2025-02-07 NOTE — PROGRESS NOTES
Case cancelled- surgery needs to be done in the hospital due to untreated severe CAROL and low oxygen.

## 2025-02-07 NOTE — PROGRESS NOTES
Interval progress note     Patient ready for procedure however anesthesia with concerns regarding CAROL and ability to extubate and O2 requirements at surgery center. They recommend procedure at a location where overnight observation possible and rescheduling surgery. Discussed this with patient who is frustrated but understands.  I in addition to Dr. Moseley and partners are aware of his situation, he is leaving country in 1 week. This was attempt to fit in schedule early. We will work together to try and get him in for diagnostic URS within the next week but this may not be feasible logistically. He understands the risks, benefits, alternatives and precautions of leaving before this biopsy is completed from an oncologic standpoint including metastasis and progression, but is likely to continue with trip.  He voiced understanding of the plan and agrees to proceed.If unable to schedule prior, will plan for diagnostic biopsy upon return to country.

## 2025-02-10 ENCOUNTER — TELEPHONE (OUTPATIENT)
Dept: SURGERY | Facility: CLINIC | Age: 65
End: 2025-02-10
Payer: COMMERCIAL

## 2025-02-10 ENCOUNTER — ANESTHESIA EVENT (OUTPATIENT)
Dept: SURGERY | Facility: CLINIC | Age: 65
End: 2025-02-10
Payer: COMMERCIAL

## 2025-02-10 DIAGNOSIS — N28.89 RENAL MASS: Primary | ICD-10-CM

## 2025-02-10 ASSESSMENT — LIFESTYLE VARIABLES: TOBACCO_USE: 1

## 2025-02-11 ENCOUNTER — ANESTHESIA (OUTPATIENT)
Dept: SURGERY | Facility: CLINIC | Age: 65
End: 2025-02-11
Payer: COMMERCIAL

## 2025-02-11 ENCOUNTER — HOSPITAL ENCOUNTER (OUTPATIENT)
Facility: CLINIC | Age: 65
Discharge: HOME | End: 2025-02-11
Attending: STUDENT IN AN ORGANIZED HEALTH CARE EDUCATION/TRAINING PROGRAM | Admitting: STUDENT IN AN ORGANIZED HEALTH CARE EDUCATION/TRAINING PROGRAM
Payer: COMMERCIAL

## 2025-02-11 ENCOUNTER — APPOINTMENT (OUTPATIENT)
Dept: GENERAL RADIOLOGY | Facility: CLINIC | Age: 65
End: 2025-02-11
Attending: STUDENT IN AN ORGANIZED HEALTH CARE EDUCATION/TRAINING PROGRAM
Payer: COMMERCIAL

## 2025-02-11 VITALS
TEMPERATURE: 97.3 F | OXYGEN SATURATION: 94 % | HEIGHT: 70 IN | BODY MASS INDEX: 35.93 KG/M2 | DIASTOLIC BLOOD PRESSURE: 59 MMHG | SYSTOLIC BLOOD PRESSURE: 109 MMHG | WEIGHT: 251 LBS | RESPIRATION RATE: 16 BRPM | HEART RATE: 66 BPM

## 2025-02-11 DIAGNOSIS — N28.89 RENAL MASS: Primary | ICD-10-CM

## 2025-02-11 DIAGNOSIS — R31.0 GROSS HEMATURIA: ICD-10-CM

## 2025-02-11 PROCEDURE — 370N000017 HC ANESTHESIA TECHNICAL FEE, PER MIN: Performed by: STUDENT IN AN ORGANIZED HEALTH CARE EDUCATION/TRAINING PROGRAM

## 2025-02-11 PROCEDURE — 250N000009 HC RX 250: Performed by: NURSE ANESTHETIST, CERTIFIED REGISTERED

## 2025-02-11 PROCEDURE — 258N000003 HC RX IP 258 OP 636: Performed by: NURSE ANESTHETIST, CERTIFIED REGISTERED

## 2025-02-11 PROCEDURE — 250N000011 HC RX IP 250 OP 636: Performed by: NURSE ANESTHETIST, CERTIFIED REGISTERED

## 2025-02-11 PROCEDURE — C1758 CATHETER, URETERAL: HCPCS | Performed by: STUDENT IN AN ORGANIZED HEALTH CARE EDUCATION/TRAINING PROGRAM

## 2025-02-11 PROCEDURE — 360N000082 HC SURGERY LEVEL 2 W/ FLUORO, PER MIN: Performed by: STUDENT IN AN ORGANIZED HEALTH CARE EDUCATION/TRAINING PROGRAM

## 2025-02-11 PROCEDURE — C2617 STENT, NON-COR, TEM W/O DEL: HCPCS | Performed by: STUDENT IN AN ORGANIZED HEALTH CARE EDUCATION/TRAINING PROGRAM

## 2025-02-11 PROCEDURE — 710N000009 HC RECOVERY PHASE 1, LEVEL 1, PER MIN: Performed by: STUDENT IN AN ORGANIZED HEALTH CARE EDUCATION/TRAINING PROGRAM

## 2025-02-11 PROCEDURE — 272N000002 HC OR SUPPLY OTHER OPNP: Performed by: STUDENT IN AN ORGANIZED HEALTH CARE EDUCATION/TRAINING PROGRAM

## 2025-02-11 PROCEDURE — 272N000001 HC OR GENERAL SUPPLY STERILE: Performed by: STUDENT IN AN ORGANIZED HEALTH CARE EDUCATION/TRAINING PROGRAM

## 2025-02-11 PROCEDURE — C1894 INTRO/SHEATH, NON-LASER: HCPCS | Performed by: STUDENT IN AN ORGANIZED HEALTH CARE EDUCATION/TRAINING PROGRAM

## 2025-02-11 PROCEDURE — 271N000001 HC OR GENERAL SUPPLY NON-STERILE: Performed by: STUDENT IN AN ORGANIZED HEALTH CARE EDUCATION/TRAINING PROGRAM

## 2025-02-11 PROCEDURE — C1769 GUIDE WIRE: HCPCS | Performed by: STUDENT IN AN ORGANIZED HEALTH CARE EDUCATION/TRAINING PROGRAM

## 2025-02-11 PROCEDURE — 710N000012 HC RECOVERY PHASE 2, PER MINUTE: Performed by: STUDENT IN AN ORGANIZED HEALTH CARE EDUCATION/TRAINING PROGRAM

## 2025-02-11 PROCEDURE — 999N000179 XR SURGERY CARM FLUORO LESS THAN 5 MIN W STILLS

## 2025-02-11 PROCEDURE — C1747 HC OR ENDOSCOPE, SGL USE,URINARY TRACT, IMAGING/ILLUMINATION DEVICE: HCPCS | Performed by: STUDENT IN AN ORGANIZED HEALTH CARE EDUCATION/TRAINING PROGRAM

## 2025-02-11 PROCEDURE — 999N000141 HC STATISTIC PRE-PROCEDURE NURSING ASSESSMENT: Performed by: STUDENT IN AN ORGANIZED HEALTH CARE EDUCATION/TRAINING PROGRAM

## 2025-02-11 PROCEDURE — 255N000002 HC RX 255 OP 636: Performed by: STUDENT IN AN ORGANIZED HEALTH CARE EDUCATION/TRAINING PROGRAM

## 2025-02-11 PROCEDURE — 250N000013 HC RX MED GY IP 250 OP 250 PS 637: Performed by: STUDENT IN AN ORGANIZED HEALTH CARE EDUCATION/TRAINING PROGRAM

## 2025-02-11 PROCEDURE — 250N000025 HC SEVOFLURANE, PER MIN: Performed by: STUDENT IN AN ORGANIZED HEALTH CARE EDUCATION/TRAINING PROGRAM

## 2025-02-11 PROCEDURE — 250N000013 HC RX MED GY IP 250 OP 250 PS 637: Performed by: NURSE ANESTHETIST, CERTIFIED REGISTERED

## 2025-02-11 PROCEDURE — 250N000011 HC RX IP 250 OP 636: Performed by: STUDENT IN AN ORGANIZED HEALTH CARE EDUCATION/TRAINING PROGRAM

## 2025-02-11 DEVICE — URETERAL STENT
Type: IMPLANTABLE DEVICE | Site: URETER | Status: FUNCTIONAL
Brand: PERCUFLEX™ PLUS

## 2025-02-11 RX ORDER — KETAMINE HYDROCHLORIDE 10 MG/ML
INJECTION INTRAMUSCULAR; INTRAVENOUS PRN
Status: DISCONTINUED | OUTPATIENT
Start: 2025-02-11 | End: 2025-02-11

## 2025-02-11 RX ORDER — KETOROLAC TROMETHAMINE 10 MG/1
10 TABLET, FILM COATED ORAL EVERY 6 HOURS PRN
Qty: 20 TABLET | Refills: 0 | Status: SHIPPED | OUTPATIENT
Start: 2025-02-11 | End: 2025-02-16

## 2025-02-11 RX ORDER — KETOROLAC TROMETHAMINE 15 MG/ML
15 INJECTION, SOLUTION INTRAMUSCULAR; INTRAVENOUS ONCE
Status: COMPLETED | OUTPATIENT
Start: 2025-02-11 | End: 2025-02-11

## 2025-02-11 RX ORDER — NALOXONE HYDROCHLORIDE 0.4 MG/ML
0.1 INJECTION, SOLUTION INTRAMUSCULAR; INTRAVENOUS; SUBCUTANEOUS
Status: DISCONTINUED | OUTPATIENT
Start: 2025-02-11 | End: 2025-02-11 | Stop reason: HOSPADM

## 2025-02-11 RX ORDER — GLYCOPYRROLATE 0.2 MG/ML
INJECTION, SOLUTION INTRAMUSCULAR; INTRAVENOUS PRN
Status: DISCONTINUED | OUTPATIENT
Start: 2025-02-11 | End: 2025-02-11

## 2025-02-11 RX ORDER — ONDANSETRON 2 MG/ML
4 INJECTION INTRAMUSCULAR; INTRAVENOUS EVERY 30 MIN PRN
Status: DISCONTINUED | OUTPATIENT
Start: 2025-02-11 | End: 2025-02-11 | Stop reason: HOSPADM

## 2025-02-11 RX ORDER — TAMSULOSIN HYDROCHLORIDE 0.4 MG/1
0.4 CAPSULE ORAL ONCE
Status: COMPLETED | OUTPATIENT
Start: 2025-02-11 | End: 2025-02-11

## 2025-02-11 RX ORDER — OXYBUTYNIN CHLORIDE 5 MG/1
5 TABLET ORAL ONCE
Status: COMPLETED | OUTPATIENT
Start: 2025-02-11 | End: 2025-02-11

## 2025-02-11 RX ORDER — PROPOFOL 10 MG/ML
INJECTION, EMULSION INTRAVENOUS PRN
Status: DISCONTINUED | OUTPATIENT
Start: 2025-02-11 | End: 2025-02-11

## 2025-02-11 RX ORDER — DEXAMETHASONE SODIUM PHOSPHATE 10 MG/ML
INJECTION, SOLUTION INTRAMUSCULAR; INTRAVENOUS PRN
Status: DISCONTINUED | OUTPATIENT
Start: 2025-02-11 | End: 2025-02-11

## 2025-02-11 RX ORDER — PHENAZOPYRIDINE HYDROCHLORIDE 200 MG/1
200 TABLET, FILM COATED ORAL 3 TIMES DAILY PRN
Qty: 12 TABLET | Refills: 0 | Status: SHIPPED | OUTPATIENT
Start: 2025-02-11 | End: 2025-02-15

## 2025-02-11 RX ORDER — ONDANSETRON 4 MG/1
4 TABLET, ORALLY DISINTEGRATING ORAL EVERY 30 MIN PRN
Status: DISCONTINUED | OUTPATIENT
Start: 2025-02-11 | End: 2025-02-11 | Stop reason: HOSPADM

## 2025-02-11 RX ORDER — CEFAZOLIN SODIUM/WATER 2 G/20 ML
SYRINGE (ML) INTRAVENOUS PRN
Status: DISCONTINUED | OUTPATIENT
Start: 2025-02-11 | End: 2025-02-11

## 2025-02-11 RX ORDER — SODIUM CHLORIDE, SODIUM LACTATE, POTASSIUM CHLORIDE, CALCIUM CHLORIDE 600; 310; 30; 20 MG/100ML; MG/100ML; MG/100ML; MG/100ML
INJECTION, SOLUTION INTRAVENOUS CONTINUOUS
Status: DISCONTINUED | OUTPATIENT
Start: 2025-02-11 | End: 2025-02-11 | Stop reason: HOSPADM

## 2025-02-11 RX ORDER — OXYCODONE HYDROCHLORIDE 5 MG/1
5 TABLET ORAL ONCE
Status: COMPLETED | OUTPATIENT
Start: 2025-02-11 | End: 2025-02-11

## 2025-02-11 RX ORDER — PROPOFOL 10 MG/ML
INJECTION, EMULSION INTRAVENOUS CONTINUOUS PRN
Status: DISCONTINUED | OUTPATIENT
Start: 2025-02-11 | End: 2025-02-11

## 2025-02-11 RX ORDER — FENTANYL CITRATE 50 UG/ML
25 INJECTION, SOLUTION INTRAMUSCULAR; INTRAVENOUS ONCE
Status: COMPLETED | OUTPATIENT
Start: 2025-02-11 | End: 2025-02-11

## 2025-02-11 RX ORDER — SENNOSIDES 8.6 MG
650 CAPSULE ORAL EVERY 6 HOURS PRN
Qty: 30 TABLET | Refills: 0 | Status: SHIPPED | OUTPATIENT
Start: 2025-02-11

## 2025-02-11 RX ORDER — LIDOCAINE 40 MG/G
CREAM TOPICAL
Status: DISCONTINUED | OUTPATIENT
Start: 2025-02-11 | End: 2025-02-11 | Stop reason: HOSPADM

## 2025-02-11 RX ORDER — ACETAMINOPHEN 325 MG/1
975 TABLET ORAL ONCE
Status: COMPLETED | OUTPATIENT
Start: 2025-02-11 | End: 2025-02-11

## 2025-02-11 RX ORDER — OXYBUTYNIN CHLORIDE 5 MG/1
5 TABLET ORAL 3 TIMES DAILY PRN
Qty: 30 TABLET | Refills: 0 | Status: SHIPPED | OUTPATIENT
Start: 2025-02-11

## 2025-02-11 RX ORDER — ONDANSETRON 2 MG/ML
INJECTION INTRAMUSCULAR; INTRAVENOUS PRN
Status: DISCONTINUED | OUTPATIENT
Start: 2025-02-11 | End: 2025-02-11

## 2025-02-11 RX ORDER — NITROFURANTOIN 25; 75 MG/1; MG/1
100 CAPSULE ORAL 2 TIMES DAILY
Qty: 10 CAPSULE | Refills: 0 | Status: SHIPPED | OUTPATIENT
Start: 2025-02-11 | End: 2025-02-16

## 2025-02-11 RX ORDER — IOPAMIDOL 612 MG/ML
INJECTION, SOLUTION INTRAVASCULAR PRN
Status: DISCONTINUED | OUTPATIENT
Start: 2025-02-11 | End: 2025-02-11 | Stop reason: HOSPADM

## 2025-02-11 RX ORDER — SENNA AND DOCUSATE SODIUM 50; 8.6 MG/1; MG/1
1 TABLET, FILM COATED ORAL DAILY
Qty: 30 TABLET | Refills: 0 | Status: SHIPPED | OUTPATIENT
Start: 2025-02-11

## 2025-02-11 RX ORDER — TAMSULOSIN HYDROCHLORIDE 0.4 MG/1
0.4 CAPSULE ORAL DAILY
Qty: 30 CAPSULE | Refills: 0 | Status: SHIPPED | OUTPATIENT
Start: 2025-02-11 | End: 2025-03-13

## 2025-02-11 RX ORDER — FENTANYL CITRATE 50 UG/ML
25 INJECTION, SOLUTION INTRAMUSCULAR; INTRAVENOUS ONCE
Status: DISCONTINUED | OUTPATIENT
Start: 2025-02-11 | End: 2025-02-11 | Stop reason: HOSPADM

## 2025-02-11 RX ADMIN — KETAMINE HYDROCHLORIDE 10 MG: 10 INJECTION INTRAMUSCULAR; INTRAVENOUS at 16:13

## 2025-02-11 RX ADMIN — TAMSULOSIN HYDROCHLORIDE 0.4 MG: 0.4 CAPSULE ORAL at 19:04

## 2025-02-11 RX ADMIN — PHENYLEPHRINE HYDROCHLORIDE 100 MCG: 10 INJECTION INTRAVENOUS at 15:56

## 2025-02-11 RX ADMIN — PHENYLEPHRINE HYDROCHLORIDE 100 MCG: 10 INJECTION INTRAVENOUS at 16:17

## 2025-02-11 RX ADMIN — OXYCODONE HYDROCHLORIDE 5 MG: 5 TABLET ORAL at 18:23

## 2025-02-11 RX ADMIN — PHENYLEPHRINE HYDROCHLORIDE 100 MCG: 10 INJECTION INTRAVENOUS at 16:22

## 2025-02-11 RX ADMIN — PROPOFOL 100 MG: 10 INJECTION, EMULSION INTRAVENOUS at 15:20

## 2025-02-11 RX ADMIN — PHENYLEPHRINE HYDROCHLORIDE 100 MCG: 10 INJECTION INTRAVENOUS at 16:38

## 2025-02-11 RX ADMIN — MIDAZOLAM 1 MG: 1 INJECTION INTRAMUSCULAR; INTRAVENOUS at 15:09

## 2025-02-11 RX ADMIN — PROPOFOL 150 MCG/KG/MIN: 10 INJECTION, EMULSION INTRAVENOUS at 15:28

## 2025-02-11 RX ADMIN — OXYBUTYNIN CHLORIDE 5 MG: 5 TABLET ORAL at 19:04

## 2025-02-11 RX ADMIN — KETAMINE HYDROCHLORIDE 30 MG: 10 INJECTION INTRAMUSCULAR; INTRAVENOUS at 15:20

## 2025-02-11 RX ADMIN — SODIUM CHLORIDE, POTASSIUM CHLORIDE, SODIUM LACTATE AND CALCIUM CHLORIDE: 600; 310; 30; 20 INJECTION, SOLUTION INTRAVENOUS at 13:07

## 2025-02-11 RX ADMIN — TOPICAL ANESTHETIC 1 SPRAY: 200 SPRAY DENTAL; PERIODONTAL at 15:09

## 2025-02-11 RX ADMIN — Medication 2 G: at 15:11

## 2025-02-11 RX ADMIN — LIDOCAINE HYDROCHLORIDE 0.1 ML: 10 INJECTION, SOLUTION EPIDURAL; INFILTRATION; INTRACAUDAL; PERINEURAL at 13:07

## 2025-02-11 RX ADMIN — PHENYLEPHRINE HYDROCHLORIDE 100 MCG: 10 INJECTION INTRAVENOUS at 16:16

## 2025-02-11 RX ADMIN — PHENYLEPHRINE HYDROCHLORIDE 100 MCG: 10 INJECTION INTRAVENOUS at 16:02

## 2025-02-11 RX ADMIN — PHENYLEPHRINE HYDROCHLORIDE 100 MCG: 10 INJECTION INTRAVENOUS at 16:09

## 2025-02-11 RX ADMIN — GLYCOPYRROLATE 0.2 MG: 0.2 INJECTION, SOLUTION INTRAMUSCULAR; INTRAVENOUS at 15:12

## 2025-02-11 RX ADMIN — ONDANSETRON 4 MG: 2 INJECTION INTRAMUSCULAR; INTRAVENOUS at 15:56

## 2025-02-11 RX ADMIN — ROCURONIUM BROMIDE 10 MG: 50 INJECTION, SOLUTION INTRAVENOUS at 16:13

## 2025-02-11 RX ADMIN — ACETAMINOPHEN 975 MG: 325 TABLET, FILM COATED ORAL at 18:23

## 2025-02-11 RX ADMIN — KETAMINE HYDROCHLORIDE 10 MG: 10 INJECTION INTRAMUSCULAR; INTRAVENOUS at 15:48

## 2025-02-11 RX ADMIN — TOPICAL ANESTHETIC 1 SPRAY: 200 SPRAY DENTAL; PERIODONTAL at 15:20

## 2025-02-11 RX ADMIN — ROCURONIUM BROMIDE 10 MG: 50 INJECTION, SOLUTION INTRAVENOUS at 15:54

## 2025-02-11 RX ADMIN — PHENYLEPHRINE HYDROCHLORIDE 50 MCG: 10 INJECTION INTRAVENOUS at 15:44

## 2025-02-11 RX ADMIN — PHENYLEPHRINE HYDROCHLORIDE 100 MCG: 10 INJECTION INTRAVENOUS at 16:05

## 2025-02-11 RX ADMIN — PHENYLEPHRINE HYDROCHLORIDE 50 MCG: 10 INJECTION INTRAVENOUS at 15:55

## 2025-02-11 RX ADMIN — PROPOFOL 50 MG: 10 INJECTION, EMULSION INTRAVENOUS at 15:24

## 2025-02-11 RX ADMIN — DEXAMETHASONE SODIUM PHOSPHATE 10 MG: 10 INJECTION, SOLUTION INTRAMUSCULAR; INTRAVENOUS at 15:29

## 2025-02-11 RX ADMIN — FENTANYL CITRATE 25 MCG: 50 INJECTION INTRAMUSCULAR; INTRAVENOUS at 18:35

## 2025-02-11 RX ADMIN — PHENYLEPHRINE HYDROCHLORIDE 100 MCG: 10 INJECTION INTRAVENOUS at 16:13

## 2025-02-11 RX ADMIN — KETOROLAC TROMETHAMINE 15 MG: 15 INJECTION, SOLUTION INTRAMUSCULAR; INTRAVENOUS at 19:03

## 2025-02-11 RX ADMIN — PHENYLEPHRINE HYDROCHLORIDE 100 MCG: 10 INJECTION INTRAVENOUS at 15:43

## 2025-02-11 RX ADMIN — Medication 200 MG: at 17:00

## 2025-02-11 RX ADMIN — PHENYLEPHRINE HYDROCHLORIDE 100 MCG: 10 INJECTION INTRAVENOUS at 15:46

## 2025-02-11 RX ADMIN — PHENYLEPHRINE HYDROCHLORIDE 100 MCG: 10 INJECTION INTRAVENOUS at 16:23

## 2025-02-11 RX ADMIN — Medication 100 MG: at 15:24

## 2025-02-11 RX ADMIN — ROCURONIUM BROMIDE 30 MG: 50 INJECTION, SOLUTION INTRAVENOUS at 15:44

## 2025-02-11 ASSESSMENT — ACTIVITIES OF DAILY LIVING (ADL)
ADLS_ACUITY_SCORE: 49
ADLS_ACUITY_SCORE: 50
ADLS_ACUITY_SCORE: 49

## 2025-02-11 NOTE — DISCHARGE INSTRUCTIONS
DC instructions URS  Urology Discharge Instructions FOLLOWING Ureteroscopy    DIET:  You can resume your regular diet. We encourage you to eat well-balanced and nutritious  Meals.    ACTIVITY:  Please restrain from strenuous activity until your follow-up clinic visit. Please walk daily  as much as tolerated, making exercise a part of your daily life. Do not drive while using  narcotics for pain control.    GENERAL EXPECTATIONS:  After your operation, it is common to have some pain on your side, as well as urinary  symptoms (urgency, mild burning) after stone surgery.  This resolves within a few days.    It is also normal to see blood in your urine, including small clots for up to 2 weeks from  the surgery. The urine may clear up entirely, and then turn bloody again a few days later  depending on your activity level; do not be alarmed. Stay well hydrated, as this will help  the urine clear.    STENTS  Your stent is internal and will be exchanged on your subsequent diagnostic procedure    You may have some discomfort specifically while your stent is in place, including:  Urinary frequency, urgency, irritable bladder  Occasional pain on the side of your stent (especially when urinating)  Mild burning or discomfort while urinating  Blood in the urine (as the stent may be irritating your bladder); this may last until the  stent is removed.  If your stent has a string, you are not to engage in sexual activity, or sit/soak in a tub/pool/or hot-tub.     Please be advised that the stent is TEMPORARY, and is placed at the discretion of the  urologist during your surgery. Be sure that you know WHEN the stent should come out  (normally within a few weeks); please call the office if you do not know the timeline for  stent removal. The removal process is simple, does not require anesthesia, and normally  takes less than 5 minutes either at home (if has a string) or in the office.     MEDICATIONS:  1. Use pain medications and  stool softeners (Colace) as directed. Stop taking  Colace if you are no longer taking narcotic medications or if you develop loose stools.   2. Continue your home medications.   3. You have been prescribed Pyridium, a urinary analgesic, to help with bladder  discomfort. This medication will turn your urine orange temporarily; it will resolve once  you stop taking your medications. Next dose: Start this evening if needed  4. You have been prescribed Flomax to treat pain that may be cause by your stent. If  you are not taking Flomax previously, stop taking 2 days after stent removal. Next dose: Start this evening  5. You have been prescribed Ditropan to treat bladder spasms (strong urge to void).  This medicine will cause constipation and sensation of dry mouth as a side effect. Do  not take Ditropan if you have any difficulty voiding. Next dose: Start this evening if needed    WARNING SIGNS:  If you are experiencing these symptoms, call the Urology Clinic    or emergency room:  Fever greater than 101 degrees Fahrenheit, chills, nausea or vomiting  Severe or persistent bleeding in the urine without relief from rest and hydration  Increasing pain unrelieved by medications  Inability to urinate over 6 hours    If you have any concerns or you have not been contacted for your follow up appointment in the next 3 business days, call [facility]  [address], [address], [place] [address]  phone: [phone]  fax: [phone]       FOLLOW-UP:  Please follow up with Dr. Moseley or Dr. Sanchez for a ureteroscopy and biopsy. Please call the clinic to schedule an appointment.                         Same Day Surgery Discharge Instructions  Special Precautions After Surgery - Adult    It is not unusual to feel lightheaded or faint, up to 24 hours after surgery or while taking pain medication.  If you have these symptoms; sit for a few minutes before standing and have someone  assist you when getting up.  You should rest and relax for the next 24 hours and must have someone stay with you for at least 24 hours after your discharge.  DO NOT DRIVE any vehicle or operate mechanical equipment for 24 hours following the end of your surgery.  DO NOT DRIVE while taking narcotic pain medications that have been prescribed by your physician.  If you had a limb operated on, you must be able to use it fully to drive.  DO NOT drink alcoholic beverages for 24 hours following surgery or while taking prescription pain medication.  Drink clear liquids (apple juice, ginger ale, broth, 7-Up, etc.).  Progress to your regular diet as you feel able.  Any questions call your physician and do not make important decisions for 24 hours.    Nausea and Vomiting: Nausea and vomiting can occur any time after receiving anesthesia. If you experience nausea and vomiting we encourage you to move to a clear liquid diet and advance your diet as tolerated. If nausea and vomiting do not improve within 12 hours please call the surgeon or present to the Emergency department.     Break-through Bleeding: If your experience bleeding from your surgical site apply pressure and additional dressing per nurse instruction. For simple problems such as a saturated dressing, you may need to reinforce the dressing with more gauze and tape and put slight pressure on the site. If bleeding does not subside contact the surgeon or present to the Emergency Department.    Post-op Infection: If you develop a fever of 100.4 or greater, have pus like drainage, redness, swelling or severe pain at the surgical site not alleviated with pain medications; please contact the surgeon or present to the Emergency Department.     Medications:  Acetaminophen (Tylenol):  Next dose: Can start at anytime if needed for pain.  Ketorolac (Toradol):  Next dose: Can start at anytime if needed for pain.  Macrobid:  Next dose: Start this evening.  Senna:  Next dose: Start  this evening.   Follow the instructions on the bottle.  __________________________________________________________________________________________________________________________________  IMPORTANT NUMBERS:    [facility] Main Number:  [phone], [phone]  Pharmacy:  [phone]  Same Day Surgery:  [phone], for general post-op questions call Monday - Thursday until 8:30 p.m., Fridays until 6:00 p.m.   Mental Health Mobile Crisis line: [phone]                                                                        Urology: [phone]

## 2025-02-11 NOTE — H&P
Urology Preoperative H&P    Patient information was obtained from patient and medical chart review.  History/Exam limitations: none.    Michael Jimenez is a 64 year old male, here today to undergo Cysto L RPG L URS with renal mass biopsy cytologic washings poss stent placement.    Case reviewed with anesthesia, ok to commence with procedure   CTU/US 1.  4 cm mass in the mid left kidney with irregular urothelial thickening. It is unclear if this is urothelial malignancy versus renal cell carcinoma.  2.  No metastatic disease demonstrated.     Denies significant changes to hx since last visit.    Patient Active Problem List    Diagnosis Date Noted    Gross hematuria 01/27/2025     Priority: Medium    Renal mass 01/27/2025     Priority: Medium    Stage 3a chronic kidney disease (H) 12/05/2024     Priority: Medium    S/P total knee arthroplasty, left 07/16/2024     Priority: Medium    S/P reverse total shoulder arthroplasty, left 04/04/2024     Priority: Medium    Hard to intubate 04/01/2024     Priority: Medium     See intubation note      DJD of left shoulder 04/01/2024     Priority: Medium    Essential hypertension 01/29/2024     Priority: Medium    Lower urinary tract symptoms (LUTS) 01/29/2024     Priority: Medium    Ventricular tachycardia (H) 11/16/2023     Priority: Medium    Aortic valve stenosis, etiology of cardiac valve disease unspecified 01/03/2023     Priority: Medium    Rash and nonspecific skin eruption 01/03/2023     Priority: Medium    Degenerative joint disease (DJD) of hip 04/04/2022     Priority: Medium    CHF (congestive heart failure) (H) 03/07/2022     Priority: Medium    CAROL (obstructive sleep apnea) 04/15/2021     Priority: Medium     4/12/2021 [place] [facility] Sleep Study (255.0 lbs) - AHI 30.0, RDI 31.8, Supine AHI 47.1, REM AHI 78.0, Low O2 58.9%, Time Spent <=88% 41.2 minutes / Time Spent <=89% 51.6 minutes.      Morbid obesity (H) 03/09/2021     Priority: Medium    Snoring 08/06/2015      Priority: Medium     Formatting of this note might be different from the original.  2015: advise to follow up for sleep study.      Hayfever 2012     Priority: Medium     Formatting of this note might be different from the original.  Receives DEPO medrol injection 40mg annually and this seems to help him year round.      Glenoid labral tear 2011     Priority: Medium     Formatting of this note might be different from the original.  He has been doing a lot better since starting Glucosamine- Chondroitin.      Rupture of long head biceps tendon 2011     Priority: Medium    Supraspinatus tendon tear 2011     Priority: Medium    Hyperlipidemia with target low density lipoprotein (LDL) cholesterol less than 100 mg/dL 2011     Priority: Medium     Formatting of this note is different from the original.  Patient stopped taking statins 2015 as he was concerned about potential side effects. Importance of takign care of modifiable risk factors discussed with Patient. Will continue to address.  Lovaza (Omega-3 PUFA) 2mg twice a day).    Last Lipids:  Chol: 218    2013  T    2013  HDL:   55    2013  LDL:  127    2013   LDL CHOLESTEROL (mg/dL)   Date Value   2013 127     [place] 10-year CHD Risk Score: 8% (11 Total Points)      Prediabetes 2011     Priority: Medium     Formatting of this note is different from the original.      HEMOGLOBIN A1C MONITORING (POCT) (%)   Date Value   2011 5.4      GLUCOSE                   (mg/dL)   Date Value   3/25/2013 77      Raynaud's phenomenon 2011     Priority: Medium    Right shoulder pain 2011     Priority: Medium    CARDIOVASCULAR SCREENING; LDL GOAL LESS THAN 160 10/31/2010     Priority: Medium     Past Medical History:   Diagnosis Date    Arthritis     Congestive heart failure (H)     Heart murmur     Hyperlipidemia     Hypertension     Obese     Prediabetes     Sleep apnea     doesn't use  CPAP      Past Surgical History:   Procedure Laterality Date    ABDOMEN SURGERY      ARTHROPLASTY HIP Right 2022    Procedure: RIGHT TOTAL HIP ARTHROPLASTY;  Surgeon: Timothy Montana MD;  Location: [facility] Main OR    CHOLECYSTECTOMY      COLONOSCOPY N/A 2021    Procedure: COLONOSCOPY, WITH POLYPECTOMY AND BIOPSY;  Surgeon: Lito Sweet DO;  Location: [facility]    EYE SURGERY      FINGER SURGERY      REVERSE ARTHROPLASTY SHOULDER Left 2024    Procedure: LEFT REVERSE TOTAL SHOULDER ARTHROPLASTY;  Surgeon: Timothy Montana MD;  Location: [facility] Main OR     No Known Allergies   Social History     Tobacco Use    Smoking status: Former     Current packs/day: 0.00     Average packs/day: 4.0 packs/day for 30.0 years (120.0 ttl pk-yrs)     Types: Cigarettes     Start date: 1965     Quit date: 1995     Years since quittin.1    Smokeless tobacco: Former     Quit date: 1975   Substance Use Topics    Alcohol use: Yes     Comment: 3-4 drinks a week      Family History   Problem Relation Age of Onset    Heart Disease Father         emphasemia    Coronary Artery Disease Father     Emphysema Father     Asthma Father     Skin Cancer Mother         Review of Systems  Consitutional: denies fevers, chills  Allergy: as indicated on chart   Respiratory: denies wheezing or shortness of breath   Cardiovascular: denies chest pain or palpitations  : denies changes in urinary symptoms since previous visit    Objective:     There were no vitals taken for this visit.    General Appearance: NAD  Head/Neck: Normocephalic, atraumatic  Eyes: Anicteric, sclera white     Lungs/Chest: Respirations unlabored  Heart: Extremities warm and well-perfused  Abdomen: Soft, non-distended.   Extremities: Extremities without evidence of trauma  Skin:  No jaundice  Neurologic: GCS 15    Data Review: Labs    Reviewed    Images    Reviewed    Assessment:     Pre-Op Diagnosis Codes:      * Renal mass  [N28.89]    Plan:     Urine culture NGTD       Discussed risks benefits including bleeding infection damage to local structure (ureter) need for future procedures, non-diagnostic samples, need for stent decompression and repeat biopsy, pain.       Procedure(s):  CYSTOURETEROSCOPY, Left Retrograde Pyelogram, Left Ureteroscopic renal mass biopsy, cytologic washings and possible stent placement    Anthony Sanchez MD

## 2025-02-11 NOTE — ANESTHESIA PROCEDURE NOTES
Airway       Patient location during procedure: OR       Procedure Start/Stop Times: 2/11/2025 3:22 PM and 2/11/2025 3:27 PM  Staff -        CRNA: Nicolas Dos Santos APRN CRNA       Other Anesthesia Staff: Juan Ramon Bee APRN CRNA       Performed By: CRNA  Consent for Airway        Urgency: elective  Indications and Patient Condition       Indications for airway management: davian-procedural       Induction type:other (comments) (modified awake)       Mask difficulty assessment: 2 - vent by mask + OA or adjuvant +/- NMBA    Final Airway Details       Final airway type: endotracheal airway       Successful airway: ETT - single  Endotracheal Airway Details        ETT size (mm): 7.5       Cuffed: yes       Cuff volume (mL): 7       Successful intubation technique: direct laryngoscopy       DL Blade Type: Haas 2       Grade View of Cords: 2       Adjucts: bougie       Position: Right       Measured from: lips       Secured at (cm): 23       Bite block used: None    Post intubation assessment        Number of attempts at approach: 2       Number of other approaches attempted: 1       Secured with: tape       Ease of procedure: difficult       Dentition: Intact and Unchanged    Medication(s) Administered   Medication Administration Time: 2/11/2025 3:22 PM    Additional Comments       1st attempted with Coles video, unable to reach glottic opening due to steep angle, lateral view with Haas 2 blade and bougie successful. Recommend Flexible scope for future intubations.

## 2025-02-11 NOTE — OP NOTE
OPERATIVE NOTE    DATE OF SERVICE: 2/11/25    PREOPERATIVE  DIAGNOSIS: Left renal mass versus ureteral mass, gross hematuria  POSTOPERATIVE  DIAGNOSIS: Same    PROCEDURE:     Cystoscopy  left retrograde pyelogram  left ureteroscopy with upper tract cytologic washings  indwelling ureteral stent placement  Interpretation of fluoroscopic imaging intraoperatively    SURGEON:  Anthony Sanchez MD  ASSISTANT : None    ANESTHESIA:  General  ESTIMATED BLOOD LOSS:  3    DRAINS AND TUBES:  Left 6F x 28cm  double pigtail indwelling ureteral stent, no strings attached  SPECIMENS: Left renal pelvis cytologic washings    FINDINGS: Left ureter and kidney non-dilated. Upper tract with no significant hydronephrosis. Large mass not appreciable on retrograde pyelogram. Cytologic washings taken. Sheath was placed until resistance met, proximal ureter tortuous unable to enter the collecting system.   Ureter otherwise without urothelial mass noted, no perforation of defect noted on direct visualization or retrograde pyelography    OPERATIVE INDICATION:  Michael Jimenez is a 64 year old male, Body mass index is 36.01 kg/m ., presenting with gross hematuria found to have left renal vs. Urothelial mass on cross sectional imaging here for definitive diagnosis.  The risks, benefits and alternatives were discussed in detail and the patient wished to proceed.    PROCEDURE:    After fully informed, voluntary consent was obtained, the patient was transported to the operating room and placed supine.  A critical pause was done to confirm correct patient, procedure and surgical site.  Following induction of anesthesia, the patient was repositioned in the dorsal lithotomy position and prepped and draped in the usual sterile fashion.  A pre-procedureal time-out was taken to again confirm correct patient, procedure and surgical site.     A rigid cystoscope with 22.5 Fr sheath was inserted per urethra.  The left ureteral orifice was cannulated with a  sensor wire and 5F angle-tipped catheter.  Gentle retrograde pyelogram  was performed with only 2-3 ml to confirm location of renal pelvis. At this point 5 ml of normal saline was instilled into the renal pelvis and around 3-4 ml of renal pelvis urine was aspirated and sent for cytology.  The guidewire was then advanced to the pelvocaliceal system under fluoroscopic guidance over the 5 Fr which was subsequently withdrawn.     A 6/10F dual lumen ureteral catheter was then advanced over the sensor wire.  A second wire was passed up to the kidney through the second lumen and the dual lumen catheter was removed.   A  11-13F ureteral access sheath was advanced over the working wire, this was only able to be advanced to the mid ureter until significant resistance was met. The ureteroscope was placed through the sheath and was able to be advanced to the proximal ureter, however there was a significant tortuosity that was unable to be advanced past even along side the straight sensor wire.     At this point the sheath was withdrawn and a super stiff wire was replaced. The ureteroscope was advanced over the super stiff wire, however was unable to advance past the ureteral orifice safely due to resistance. The super stiff wire was withdrawn and the scope was navigated into the ureter and was able to advance until the tortuosity however was unable to pass into the renal pelvis. At this time it was decided to place a stent and allow for passive dilation of the UPJ and return for mass biopsy.    Next a 6F x 28cm double pigtail indwelling ureteral stent was advanced over the guidewire.  Good position was confirmed proximally and distally.  No string was left attached to the stent.  The bladder was emptied. Patient tolerated the procedure well, was awakened from anesthesia and transferred to the recovery room in stable condition.  There were no apparent complications.    Plan   - Follow-up cytology, if positive then can likely  undergo RNU with Dr. Moseley  - If negative can discuss with patient regarding repeat ureteroscopic biopsy post dilation once he is back

## 2025-02-11 NOTE — ANESTHESIA CARE TRANSFER NOTE
Patient: Michael A Klar    Procedure: Procedure(s):  CYSTOSCOPY, Left Retrograde Pyelogram, Left Ureteroscopic, cytologic washings and left ureter stent placement       Diagnosis: Renal mass [N28.89]  Diagnosis Additional Information: No value filed.    Anesthesia Type:   General     Note:    Oropharynx: oropharynx clear of all foreign objects  Level of Consciousness: drowsy  Oxygen Supplementation: nasal cannula  Level of Supplemental Oxygen (L/min / FiO2): 4  Independent Airway: airway patency satisfactory and stable  Dentition: dentition unchanged  Vital Signs Stable: post-procedure vital signs reviewed and stable  Report to RN Given: handoff report given  Patient transferred to: PACU  Comments: Oral airway removed in PACU at patient's request.   Handoff Report: Identifed the Patient, Identified the Reponsible Provider, Reviewed the pertinent medical history, Discussed the surgical course, Reviewed Intra-OP anesthesia mangement and issues during anesthesia, Set expectations for post-procedure period and Allowed opportunity for questions and acknowledgement of understanding      Vitals:  Vitals Value Taken Time   BP 82/52 02/11/25 1716   Temp 36.5  C (97.7  F) 02/11/25 1716   Pulse 64 02/11/25 1725   Resp 8 02/11/25 1725   SpO2 94 % 02/11/25 1725   Vitals shown include unfiled device data.    Electronically Signed By: TOREY Pena CRNA  February 11, 2025  5:26 PM

## 2025-02-11 NOTE — ANESTHESIA PREPROCEDURE EVALUATION
Anesthesia Pre-Procedure Evaluation    Patient: Michael Jimenez   MRN: [id number] : 1960        Procedure : Procedure(s):  CYSTOURETEROSCOPY, Left Retrograde Pyelogram, Left Ureteroscopic renal mass biopsy, cytologic washings and possible stent placement          Past Medical History:   Diagnosis Date    Arthritis     Congestive heart failure (H)     Heart murmur     Hyperlipidemia     Hypertension     Obese     Prediabetes     Sleep apnea     doesn't use CPAP      Past Surgical History:   Procedure Laterality Date    ABDOMEN SURGERY      ARTHROPLASTY HIP Right 2022    Procedure: RIGHT TOTAL HIP ARTHROPLASTY;  Surgeon: Timothy Montana MD;  Location: [facility] OR    CHOLECYSTECTOMY      COLONOSCOPY N/A 2021    Procedure: COLONOSCOPY, WITH POLYPECTOMY AND BIOPSY;  Surgeon: Lito Sweet DO;  Location: [facility]    EYE SURGERY      FINGER SURGERY      REVERSE ARTHROPLASTY SHOULDER Left 2024    Procedure: LEFT REVERSE TOTAL SHOULDER ARTHROPLASTY;  Surgeon: Timothy Montana MD;  Location: [facility] OR      No Known Allergies   Social History     Tobacco Use    Smoking status: Former     Current packs/day: 0.00     Average packs/day: 4.0 packs/day for 30.0 years (120.0 ttl pk-yrs)     Types: Cigarettes     Start date: 1965     Quit date: 1995     Years since quittin.1    Smokeless tobacco: Former     Quit date: 1975   Substance Use Topics    Alcohol use: Yes     Comment: 3-4 drinks a week      Wt Readings from Last 1 Encounters:   25 115.2 kg (254 lb)        Anesthesia Evaluation   Pt has had prior anesthetic. Type: General, MAC and Regional.    History of anesthetic complications  - difficult airway.      ROS/MED HX  ENT/Pulmonary:     (+) sleep apnea, severe, doesn't use CPAP,              tobacco use, Past use,  20  Pack-Year Hx,                      Neurologic:       Cardiovascular:     (+) Dyslipidemia hypertension- -   -  - -      CHF etiology:  LVEF low 50%                    valvular problems/murmurs type: AS     Previous cardiac testing   Echo: Date: 3/12/24 Results:  Interpretation Summary     Left ventricular function is borderline. The ejection fraction is 50-55%. Mid  to distal anterior and anteroseptal hypokinesis.  Global right ventricular function is normal.  Mild aortic stenosis.  The inferior vena cava is normal.  Borderline dilation of Sinuses of Valsalva, 4.1 cm.     This study was compared with the study from 3/10/21. LVEF is lower with subtle  wall motion abnormalities on today's study. Aortic valve doppler assessment is  stable.    Stress Test:  Date: 3/3/21 Results:  Result Text        The nuclear stress test is abnormal.     There is a small area of a mild degree of infarction in the mid to basal anteroseptal segment(s) of the left ventricle.     Left ventricular function is mildly reduced.     The left ventricular ejection fraction at rest is 42%.  The left ventricular ejection fraction at stress is 45%.     One episode of 4 beat run on NSVT at rest and 4/10 chest pain with stress. Consider further testing with CT coronary angiogram if clinical suspicion of CAD is high.     There is no prior study for comparison.       ECG Reviewed:  Date: 2/5/25 Results:  Sinus Rhythm   -Left bundle branch block and left axis.  -Left atrial enlargement.    ABNORMAL  Cath:  Date: Results:      METS/Exercise Tolerance:     Hematologic:       Musculoskeletal:   (+)  arthritis,             GI/Hepatic:  - neg GI/hepatic ROS     Renal/Genitourinary:     (+) renal disease, type: CRI,            Endo:     (+)               Obesity,       Psychiatric/Substance Use:       Infectious Disease:  - neg infectious disease ROS     Malignancy:  - neg malignancy ROS     Other:            Physical Exam    Airway        Mallampati: III   TM distance: > 3 FB   Neck ROM: full   Mouth opening: > 3 cm    Respiratory Devices and Support         Dental       (+) Minor  Abnormalities - some fillings, tiny chips    B=Bridge, C=Chipped, L=Loose, M=Missing    Cardiovascular   cardiovascular exam normal       Rhythm and rate: regular and normal     Pulmonary   pulmonary exam normal        breath sounds clear to auscultation           OUTSIDE LABS:  CBC:   Lab Results   Component Value Date    WBC 9.4 02/05/2025    WBC 11.3 (H) 06/21/2024    WBC 10.8 06/21/2024    HGB 16.8 02/05/2025    HGB 15.8 06/21/2024    HGB 16.2 06/21/2024    HCT 49.5 02/05/2025    HCT 49.8 06/21/2024    HCT 51.1 06/21/2024     02/05/2025     06/21/2024     06/21/2024     BMP:   Lab Results   Component Value Date     02/05/2025     12/19/2024    POTASSIUM 4.6 02/05/2025    POTASSIUM 5.1 12/19/2024    CHLORIDE 107 02/05/2025    CHLORIDE 104 12/19/2024    CO2 25 02/05/2025    CO2 25 12/19/2024    BUN 25.0 (H) 02/05/2025    BUN 22.4 12/19/2024    CR 1.09 02/05/2025    CR 1.3 01/21/2025    GLC 99 02/05/2025    GLC 93 12/19/2024     COAGS:   Lab Results   Component Value Date    PTT 30 01/04/2011    INR 1.04 04/04/2022     POC:   Lab Results   Component Value Date     (H) 07/09/2009     HEPATIC:   Lab Results   Component Value Date    ALBUMIN 4.0 02/20/2024    PROTTOTAL 8.1 02/20/2024    ALT 27 02/20/2024    AST 33 02/20/2024    ALKPHOS 68 02/20/2024    BILITOTAL 0.3 02/20/2024     OTHER:   Lab Results   Component Value Date    A1C 6.0 (H) 12/04/2024    SKYLER 9.3 02/05/2025    TSH 1.00 04/15/2024    CRP 0.7 02/22/2010    SED 22 (H) 04/15/2024       Anesthesia Plan    ASA Status:  3    NPO Status:  NPO Appropriate    Anesthesia Type: General.     - Airway: ETT   Induction: Propofol, Intravenous.   Maintenance: Balanced.   Techniques and Equipment:     - Airway: Video-Laryngoscope (Known difficult airway)       Consents    Anesthesia Plan(s) and associated risks, benefits, and realistic alternatives discussed. Questions answered and patient/representative(s) expressed  "understanding.     - Discussed: Risks, Benefits and Alternatives for BOTH SEDATION and the PROCEDURE were discussed     - Discussed with:  Patient      - Extended Intubation/Ventilatory Support Discussed: No.      - Patient is DNR/DNI Status: No     Use of blood products discussed: No .     Postoperative Care    Pain management: IV analgesics.   PONV prophylaxis: Ondansetron (or other 5HT-3), Background Propofol Infusion, Dexamethasone or Solumedrol     Comments:               Wan Valentine, APRN CRNA    I have reviewed the pertinent notes and labs in the chart from the past 30 days and (re)examined the patient.  Any updates or changes from those notes are reflected in this note.    Clinically Significant Risk Factors Present on Admission                   # Hypertension: Noted on problem list  # Chronic heart failure with preserved ejection fraction: heart failure noted on problem list and last echo with EF >50%          # Obesity: Estimated body mass index is 36.45 kg/m  as calculated from the following:    Height as of 2/7/25: 1.778 m (5' 10\").    Weight as of 2/7/25: 115.2 kg (254 lb).                "

## 2025-02-12 ENCOUNTER — TRANSCRIBE ORDERS (OUTPATIENT)
Dept: OTHER | Age: 65
End: 2025-02-12

## 2025-02-12 DIAGNOSIS — M47.816 SPONDYLOSIS OF LUMBAR REGION WITHOUT MYELOPATHY OR RADICULOPATHY: Primary | ICD-10-CM

## 2025-02-12 NOTE — ANESTHESIA POSTPROCEDURE EVALUATION
Patient: Michael A Klar    Procedure: Procedure(s):  CYSTOSCOPY, Left Retrograde Pyelogram, Left Ureteroscopic, cytologic washings and left ureter stent placement       Anesthesia Type:  General    Note:  Disposition: Outpatient   Postop Pain Control: Uneventful            Sign Out: Well controlled pain   PONV: No   Neuro/Psych: Uneventful            Sign Out: Acceptable/Baseline neuro status   Airway/Respiratory: Uneventful            Sign Out: Acceptable/Baseline resp. status   CV/Hemodynamics: Uneventful            Sign Out: Acceptable CV status; No obvious hypovolemia; No obvious fluid overload   Other NRE: NONE   DID A NON-ROUTINE EVENT OCCUR? No           Last vitals:  Vitals Value Taken Time   BP 91/59 02/11/25 1742   Temp 36.3  C (97.3  F) 02/11/25 1742   Pulse 64 02/11/25 1743   Resp 13 02/11/25 1743   SpO2 91 % 02/11/25 1743   Vitals shown include unfiled device data.    Electronically Signed By: TOREY Archibald CRNA  February 11, 2025  7:04 PM

## 2025-02-12 NOTE — ANESTHESIA POSTPROCEDURE EVALUATION
Patient: Michael A Klar    Procedure: Procedure(s):  CYSTOSCOPY, Left Retrograde Pyelogram, Left Ureteroscopic, cytologic washings and left ureter stent placement       Anesthesia Type:  General    Note:  Disposition: Outpatient   Postop Pain Control: Uneventful            Sign Out: Well controlled pain   PONV: No   Neuro/Psych: Uneventful            Sign Out: Acceptable/Baseline neuro status   Airway/Respiratory: Uneventful            Sign Out: Acceptable/Baseline resp. status   CV/Hemodynamics: Uneventful            Sign Out: Acceptable CV status; No obvious hypovolemia; No obvious fluid overload   Other NRE:    DID A NON-ROUTINE EVENT OCCUR?            Last vitals:  Vitals Value Taken Time   BP 91/59 02/11/25 1742   Temp 36.3  C (97.3  F) 02/11/25 1742   Pulse 64 02/11/25 1743   Resp 13 02/11/25 1743   SpO2 91 % 02/11/25 1743   Vitals shown include unfiled device data.    Electronically Signed By: TOREY Menjivar CRNA  February 11, 2025  6:29 PM

## 2025-02-14 ENCOUNTER — TELEPHONE (OUTPATIENT)
Dept: UROLOGY | Facility: CLINIC | Age: 65
End: 2025-02-14

## 2025-02-14 NOTE — TELEPHONE ENCOUNTER
"RN received message from EDWARD Mullins regarding patient calling repeatedly. RN spoke with patient who said he \"needs to get this stent out right away because I have to pee every 10 minutes.\" RN informed him this is expected, however patient states he wants it out ASAP and is travelling out of the country soon.     Based on post-op notes, it appears plan was to leave stent for ureteral dilation and return for biopsy. Patient does not understand the plan of care. RN messaged Dr. Sanchez to call patient.     LIANNA Bernal  Care Coordinator- Urology   [phone]   "

## 2025-02-22 ENCOUNTER — HOSPITAL ENCOUNTER (OUTPATIENT)
Dept: CT IMAGING | Facility: CLINIC | Age: 65
Discharge: HOME | End: 2025-02-22
Attending: STUDENT IN AN ORGANIZED HEALTH CARE EDUCATION/TRAINING PROGRAM | Admitting: STUDENT IN AN ORGANIZED HEALTH CARE EDUCATION/TRAINING PROGRAM
Payer: COMMERCIAL

## 2025-02-22 DIAGNOSIS — R31.0 GROSS HEMATURIA: ICD-10-CM

## 2025-02-22 DIAGNOSIS — N28.89 RENAL MASS: ICD-10-CM

## 2025-02-22 PROCEDURE — 71260 CT THORAX DX C+: CPT

## 2025-02-22 PROCEDURE — 250N000011 HC RX IP 250 OP 636: Performed by: STUDENT IN AN ORGANIZED HEALTH CARE EDUCATION/TRAINING PROGRAM

## 2025-02-22 PROCEDURE — 250N000009 HC RX 250: Performed by: STUDENT IN AN ORGANIZED HEALTH CARE EDUCATION/TRAINING PROGRAM

## 2025-02-22 RX ORDER — IOPAMIDOL 755 MG/ML
75 INJECTION, SOLUTION INTRAVASCULAR ONCE
Status: COMPLETED | OUTPATIENT
Start: 2025-02-22 | End: 2025-02-22

## 2025-02-22 RX ADMIN — SODIUM CHLORIDE 100 ML: 9 INJECTION, SOLUTION INTRAVENOUS at 10:00

## 2025-02-22 RX ADMIN — IOPAMIDOL 75 ML: 755 INJECTION, SOLUTION INTRAVENOUS at 10:00

## 2025-02-23 NOTE — PROGRESS NOTES
CYSTOSCOPY AND URETERAL STENT(s) REMOVAL    PREOPERATIVE DIAGNOSIS:  Indwelling ureteral stent(s)  POSTOPERATIVE DIAGNOSIS:  Same    ATTENDING SURGEON: Anthony Sanchez MD  RESIDENT SURGEON: None    ANESTHESIA:  2% lidocaine jelly topical  PREPARATION:  Betadine    INDICATIONS FOR PROCEDURE: Michael Jimenez is a 64 year old male with a history of left renal mass, is here for stent removal at the request of the patient.  Risks, benefits and alternatives of undergoing cystoscopy with stent removal were discussed, all questions were answered, and informed consent was obtained.      CT Chest   1. No convincing metastatic disease within the thorax.     PROCEDURE AND FINDINGS:  A time-out was completed verifying correct patient, procedure, and site.  The genitals were prepped and draped in the usual manner.  A flexible cystoscope was introduced into the urinary bladder.  The distal curl of the ureteral stent was visualized, grasped, and removed intact.  The patient tolerated the procedure well.                      COMPLICATIONS: None    ASSESSMENT AND PLAN:  Michael Jimenez is a 64 year old male with left renal vs. Urothelial mass, underwent successful ureteral stent removal today.  - counseled on post-stent removal symptoms, expectations, pain and fevers    - discussed considering/using  alpha blocker and, if no contraindications, NSAID over next few days to minimize potential post-stent removal symptoms     - 3 day course of abx to ensure no infection prior to leaving the country  - Had a very long discussion before discontinuing stent regarding secondary procedure for diagnosis purposes and the utility of the stent for passive dilation before upper tract biopsy. Counseled that he may require staged procedure again if unable to reach collecting system if we remove the stent at this point; he understands and is leaving for [facility] [place] this week and cannot tolerate the symptoms of the stent while in [facility] [place]. Counseled  that this may delay his diagnosis and care further and would recommend keeping stent and/or delaying trip if possible for tissue diagnosis. However, he is adamant of leaving for vacation and having stent out.   - At this point with stent out, can follow up with Dr. Moseley for cystoscopy and biopsy in anticipation of possible RNUx. Will contact Dr. Moseley to help coordinate this. Discussed in detail the risks, benefits, alternatives and precautions. He voiced understanding of the plan and agrees to proceed.    Anthony Sanchez MD, MS  Department of Urology  Infertility, Sexual Medicine, Reconstruction  [facility]

## 2025-02-24 ENCOUNTER — OFFICE VISIT (OUTPATIENT)
Dept: UROLOGY | Facility: CLINIC | Age: 65
End: 2025-02-24
Payer: COMMERCIAL

## 2025-02-24 VITALS
TEMPERATURE: 97.1 F | WEIGHT: 251 LBS | SYSTOLIC BLOOD PRESSURE: 129 MMHG | HEIGHT: 70 IN | OXYGEN SATURATION: 96 % | BODY MASS INDEX: 35.93 KG/M2 | HEART RATE: 80 BPM | DIASTOLIC BLOOD PRESSURE: 80 MMHG

## 2025-02-24 DIAGNOSIS — R31.0 GROSS HEMATURIA: Primary | ICD-10-CM

## 2025-02-24 RX ORDER — NITROFURANTOIN 25; 75 MG/1; MG/1
100 CAPSULE ORAL 2 TIMES DAILY
Qty: 6 CAPSULE | Refills: 0 | Status: SHIPPED | OUTPATIENT
Start: 2025-02-24 | End: 2025-02-26

## 2025-02-24 ASSESSMENT — PAIN SCALES - GENERAL: PAINLEVEL_OUTOF10: NO PAIN (0)

## 2025-02-26 ENCOUNTER — HOSPITAL ENCOUNTER (EMERGENCY)
Facility: CLINIC | Age: 65
Discharge: HOME | End: 2025-02-27
Attending: EMERGENCY MEDICINE
Payer: COMMERCIAL

## 2025-02-26 ENCOUNTER — TELEPHONE (OUTPATIENT)
Dept: UROLOGY | Facility: CLINIC | Age: 65
End: 2025-02-26

## 2025-02-26 ENCOUNTER — TELEPHONE (OUTPATIENT)
Dept: UROLOGY | Facility: CLINIC | Age: 65
End: 2025-02-26
Payer: COMMERCIAL

## 2025-02-26 ENCOUNTER — APPOINTMENT (OUTPATIENT)
Dept: CT IMAGING | Facility: CLINIC | Age: 65
End: 2025-02-26
Attending: EMERGENCY MEDICINE
Payer: COMMERCIAL

## 2025-02-26 VITALS
DIASTOLIC BLOOD PRESSURE: 69 MMHG | TEMPERATURE: 98.7 F | SYSTOLIC BLOOD PRESSURE: 126 MMHG | RESPIRATION RATE: 18 BRPM | BODY MASS INDEX: 35.73 KG/M2 | OXYGEN SATURATION: 91 % | HEART RATE: 77 BPM | WEIGHT: 249 LBS

## 2025-02-26 DIAGNOSIS — R31.0 GROSS HEMATURIA: ICD-10-CM

## 2025-02-26 DIAGNOSIS — N28.89 RENAL MASS: ICD-10-CM

## 2025-02-26 DIAGNOSIS — R10.9 LEFT FLANK PAIN: ICD-10-CM

## 2025-02-26 LAB
ALBUMIN UR-MCNC: 10 MG/DL
ANION GAP SERPL CALCULATED.3IONS-SCNC: 11 MMOL/L (ref 7–15)
APPEARANCE UR: ABNORMAL
BASOPHILS # BLD AUTO: 0.1 10E3/UL (ref 0–0.2)
BASOPHILS NFR BLD AUTO: 1 %
BILIRUB UR QL STRIP: NEGATIVE
BUN SERPL-MCNC: 26.4 MG/DL (ref 8–23)
CALCIUM SERPL-MCNC: 9.5 MG/DL (ref 8.8–10.4)
CHLORIDE SERPL-SCNC: 104 MMOL/L (ref 98–107)
COLOR UR AUTO: ABNORMAL
CREAT SERPL-MCNC: 1.47 MG/DL (ref 0.67–1.17)
EGFRCR SERPLBLD CKD-EPI 2021: 52 ML/MIN/1.73M2
EOSINOPHIL # BLD AUTO: 0.3 10E3/UL (ref 0–0.7)
EOSINOPHIL NFR BLD AUTO: 2 %
ERYTHROCYTE [DISTWIDTH] IN BLOOD BY AUTOMATED COUNT: 12.8 % (ref 10–15)
GLUCOSE SERPL-MCNC: 95 MG/DL (ref 70–99)
GLUCOSE UR STRIP-MCNC: NEGATIVE MG/DL
HCO3 SERPL-SCNC: 24 MMOL/L (ref 22–29)
HCT VFR BLD AUTO: 49.9 % (ref 40–53)
HGB BLD-MCNC: 17.2 G/DL (ref 13.3–17.7)
HGB UR QL STRIP: ABNORMAL
HOLD SPECIMEN: NORMAL
IMM GRANULOCYTES # BLD: 0.1 10E3/UL
IMM GRANULOCYTES NFR BLD: 1 %
KETONES UR STRIP-MCNC: NEGATIVE MG/DL
LEUKOCYTE ESTERASE UR QL STRIP: ABNORMAL
LYMPHOCYTES # BLD AUTO: 2.2 10E3/UL (ref 0.8–5.3)
LYMPHOCYTES NFR BLD AUTO: 12 %
MCH RBC QN AUTO: 31.5 PG (ref 26.5–33)
MCHC RBC AUTO-ENTMCNC: 34.5 G/DL (ref 31.5–36.5)
MCV RBC AUTO: 91 FL (ref 78–100)
MONOCYTES # BLD AUTO: 1.3 10E3/UL (ref 0–1.3)
MONOCYTES NFR BLD AUTO: 8 %
NEUTROPHILS # BLD AUTO: 13.3 10E3/UL (ref 1.6–8.3)
NEUTROPHILS NFR BLD AUTO: 77 %
NITRATE UR QL: NEGATIVE
NRBC # BLD AUTO: 0 10E3/UL
NRBC BLD AUTO-RTO: 0 /100
PH UR STRIP: 6 [PH] (ref 5–7)
PLATELET # BLD AUTO: 228 10E3/UL (ref 150–450)
POTASSIUM SERPL-SCNC: 4.4 MMOL/L (ref 3.4–5.3)
RBC # BLD AUTO: 5.46 10E6/UL (ref 4.4–5.9)
RBC URINE: >182 /HPF
SODIUM SERPL-SCNC: 139 MMOL/L (ref 135–145)
SP GR UR STRIP: 1.02 (ref 1–1.03)
SQUAMOUS EPITHELIAL: 1 /HPF
UROBILINOGEN UR STRIP-MCNC: NORMAL MG/DL
WBC # BLD AUTO: 17.3 10E3/UL (ref 4–11)
WBC URINE: 0 /HPF

## 2025-02-26 PROCEDURE — 85048 AUTOMATED LEUKOCYTE COUNT: CPT | Performed by: EMERGENCY MEDICINE

## 2025-02-26 PROCEDURE — 82374 ASSAY BLOOD CARBON DIOXIDE: CPT | Performed by: EMERGENCY MEDICINE

## 2025-02-26 PROCEDURE — 74176 CT ABD & PELVIS W/O CONTRAST: CPT

## 2025-02-26 PROCEDURE — 99284 EMERGENCY DEPT VISIT MOD MDM: CPT | Performed by: EMERGENCY MEDICINE

## 2025-02-26 PROCEDURE — 85004 AUTOMATED DIFF WBC COUNT: CPT | Performed by: EMERGENCY MEDICINE

## 2025-02-26 PROCEDURE — 36415 COLL VENOUS BLD VENIPUNCTURE: CPT | Performed by: EMERGENCY MEDICINE

## 2025-02-26 PROCEDURE — 81003 URINALYSIS AUTO W/O SCOPE: CPT | Performed by: EMERGENCY MEDICINE

## 2025-02-26 PROCEDURE — 250N000013 HC RX MED GY IP 250 OP 250 PS 637: Performed by: EMERGENCY MEDICINE

## 2025-02-26 PROCEDURE — 96374 THER/PROPH/DIAG INJ IV PUSH: CPT

## 2025-02-26 PROCEDURE — 80048 BASIC METABOLIC PNL TOTAL CA: CPT | Performed by: EMERGENCY MEDICINE

## 2025-02-26 PROCEDURE — 250N000011 HC RX IP 250 OP 636: Performed by: EMERGENCY MEDICINE

## 2025-02-26 PROCEDURE — 99285 EMERGENCY DEPT VISIT HI MDM: CPT | Mod: 25

## 2025-02-26 RX ORDER — ACETAMINOPHEN 500 MG
1000 TABLET ORAL ONCE
Status: COMPLETED | OUTPATIENT
Start: 2025-02-26 | End: 2025-02-26

## 2025-02-26 RX ORDER — KETOROLAC TROMETHAMINE 15 MG/ML
15 INJECTION, SOLUTION INTRAMUSCULAR; INTRAVENOUS ONCE
Status: COMPLETED | OUTPATIENT
Start: 2025-02-26 | End: 2025-02-26

## 2025-02-26 RX ORDER — CYCLOBENZAPRINE HCL 10 MG
1 TABLET ORAL
COMMUNITY
Start: 2024-04-17

## 2025-02-26 RX ADMIN — ACETAMINOPHEN 1000 MG: 500 TABLET, FILM COATED ORAL at 22:21

## 2025-02-26 RX ADMIN — KETOROLAC TROMETHAMINE 15 MG: 15 INJECTION, SOLUTION INTRAMUSCULAR; INTRAVENOUS at 22:27

## 2025-02-26 ASSESSMENT — ACTIVITIES OF DAILY LIVING (ADL)
ADLS_ACUITY_SCORE: 58
ADLS_ACUITY_SCORE: 58

## 2025-02-26 ASSESSMENT — COLUMBIA-SUICIDE SEVERITY RATING SCALE - C-SSRS
2. HAVE YOU ACTUALLY HAD ANY THOUGHTS OF KILLING YOURSELF IN THE PAST MONTH?: NO
1. IN THE PAST MONTH, HAVE YOU WISHED YOU WERE DEAD OR WISHED YOU COULD GO TO SLEEP AND NOT WAKE UP?: NO
6. HAVE YOU EVER DONE ANYTHING, STARTED TO DO ANYTHING, OR PREPARED TO DO ANYTHING TO END YOUR LIFE?: NO

## 2025-02-26 NOTE — TELEPHONE ENCOUNTER
[facility] Call Center    Phone Message    May a detailed message be left on voicemail: no     Reason for Call: Other: Patient called and stated that he was supposed to get a call back from Dr Sanchez about a conversation him and Dr Moseley were going to have. Please call patient back to discuss.      Action Taken: Other: [facility]    Travel Screening: Not Applicable     Date of Service:

## 2025-02-26 NOTE — TELEPHONE ENCOUNTER
[facility] Call Center    Phone Message    May a detailed message be left on voicemail: yes     Reason for Call: Other: Pt said he has a lot of blood in his urine. Pt is going out of country soon. Please pt back. Thanks.     Action Taken: Other: [facility]    Travel Screening: Not Applicable     Date of Service:

## 2025-02-27 ENCOUNTER — TELEPHONE (OUTPATIENT)
Dept: SURGERY | Facility: CLINIC | Age: 65
End: 2025-02-27
Payer: COMMERCIAL

## 2025-02-27 DIAGNOSIS — R31.0 GROSS HEMATURIA: Primary | ICD-10-CM

## 2025-02-27 RX ORDER — SULFAMETHOXAZOLE AND TRIMETHOPRIM 800; 160 MG/1; MG/1
1 TABLET ORAL 2 TIMES DAILY
Qty: 14 TABLET | Refills: 0 | Status: SHIPPED | OUTPATIENT
Start: 2025-02-27 | End: 2025-03-06

## 2025-02-27 RX ORDER — OXYCODONE HYDROCHLORIDE 5 MG/1
5 TABLET ORAL EVERY 6 HOURS PRN
Qty: 10 TABLET | Refills: 0 | Status: SHIPPED | OUTPATIENT
Start: 2025-02-27

## 2025-02-27 RX ORDER — OXYCODONE AND ACETAMINOPHEN 5; 325 MG/1; MG/1
1 TABLET ORAL EVERY 6 HOURS PRN
Qty: 6 TABLET | Refills: 0 | Status: SHIPPED | OUTPATIENT
Start: 2025-02-27

## 2025-02-27 NOTE — DISCHARGE INSTRUCTIONS
Take ibuprofen, 400 mg, 4 times per day if needed for pain.  You may add acetaminophen 1000 mg 4 times per day if needed for pain.    You may add oxycodone 5 mg, 1-2 tablets up to every 6 hours if needed for pain.  Try to use this primarily only at night to help with sleep.    Do not use alcohol, operate machinery, drive, or climb on ladders, or perform other complex motor activity or make important decisions for 8 hours after taking oxycodone. Use docusate (100mg) 2 times a day to prevent constipation while on narcotics.    Return to the emergency department if you develop a fever greater than 100.4, vomiting, inability to take food and fluids, or worsening pain.

## 2025-02-27 NOTE — TELEPHONE ENCOUNTER
Had recent ED visit for hematuria, feeling back to closer to baseline health. Has passed 2 clots and now much better, no current pain. No current fevers chills, urine clear now.    Still wants to go to [place]. WBC elevated which unclear reason. Agree with ER doctor have reservations of him out of the country as also iterated in cystoscopy visit. Voiced concerns again adamant on leaving. Given recent stent pull plan for Abx course to avoid potential issues while in [place]. Plan for biopsy with Dr. Moseley on return to country. Discussed in detail the risks, benefits, alternatives and precautions. He voiced understanding of the plan and agrees to proceed.

## 2025-02-27 NOTE — ED TRIAGE NOTES
Left flank pain with hematuria, had similar episode recently and was found to have a tumor, had stint removal on Monday     Triage Assessment (Adult)       Row Name 02/26/25 2123          Triage Assessment    Airway WDL WDL        Respiratory WDL    Respiratory WDL WDL        Peripheral/Neurovascular WDL    Peripheral Neurovascular WDL WDL        Cognitive/Neuro/Behavioral WDL    Cognitive/Neuro/Behavioral WDL WDL

## 2025-02-27 NOTE — ED PROVIDER NOTES
ED Provider Note  [facility]      History     Chief Complaint   Patient presents with    Flank Pain    Hematuria     MULU Jimenez is a 64 year old male who presents to the emergency department with concerns regarding hematuria, in addition to left-sided flank pain.  Patient's history is significant for gross hematuria with renal mass thought to be secondary to kidney versus urothelial origin.  Patient previously had stent which was placed.  However, patient is leaving the country to [place] for approximately 1 month, and his flight leaves in 2 days.  Patient was seen by urology 2 days ago, on Monday, and had stent removal.  Patient tells me that he was doing fine on Monday, and Tuesday.  Was doing fine this morning, up until this afternoon/evening, approximately 5 hours prior to ED arrival.  He had hematuria which was present, and has had 4-5 episodes of urination and the gross hematuria has since begun clearing.  However, increased amounts of left-sided flank pain radiating from the groin to the left flank region.  Therefore, presents to the emergency department currently.  No fever.  Not on blood thinning medication.    I reviewed urology visit from Monday, 2 days ago.  They discussed with him concerns about removal of the stent, and delaying potential for treatment as this likely represents cancer.        Independent Historian:        Review of External Notes:  As above.         Allergies:  No Known Allergies    Problem List:    Patient Active Problem List    Diagnosis Date Noted    Gross hematuria 01/27/2025     Priority: Medium    Renal mass 01/27/2025     Priority: Medium    Stage 3a chronic kidney disease (H) 12/05/2024     Priority: Medium    S/P total knee arthroplasty, left 07/16/2024     Priority: Medium    S/P reverse total shoulder arthroplasty, left 04/04/2024     Priority: Medium    Hard to intubate 04/01/2024     Priority: Medium     See intubation note      DJD  of left shoulder 04/01/2024     Priority: Medium    Essential hypertension 01/29/2024     Priority: Medium    Lower urinary tract symptoms (LUTS) 01/29/2024     Priority: Medium    Ventricular tachycardia (H) 11/16/2023     Priority: Medium    Aortic valve stenosis, etiology of cardiac valve disease unspecified 01/03/2023     Priority: Medium    Rash and nonspecific skin eruption 01/03/2023     Priority: Medium    Degenerative joint disease (DJD) of hip 04/04/2022     Priority: Medium    CHF (congestive heart failure) (H) 03/07/2022     Priority: Medium    CAROL (obstructive sleep apnea) 04/15/2021     Priority: Medium     4/12/2021 [facility] Sleep Study (255.0 lbs) - AHI 30.0, RDI 31.8, Supine AHI 47.1, REM AHI 78.0, Low O2 58.9%, Time Spent <=88% 41.2 minutes / Time Spent <=89% 51.6 minutes.      Morbid obesity (H) 03/09/2021     Priority: Medium    Snoring 08/06/2015     Priority: Medium     Formatting of this note might be different from the original.  8/2015: advise to follow up for sleep study.      Hayfever 08/14/2012     Priority: Medium     Formatting of this note might be different from the original.  Receives DEPO medrol injection 40mg annually and this seems to help him year round.      Glenoid labral tear 04/06/2011     Priority: Medium     Formatting of this note might be different from the original.  He has been doing a lot better since starting Glucosamine- Chondroitin.      Rupture of long head biceps tendon 04/06/2011     Priority: Medium    Supraspinatus tendon tear 04/06/2011     Priority: Medium    Hyperlipidemia with target low density lipoprotein (LDL) cholesterol less than 100 mg/dL 04/01/2011     Priority: Medium     Formatting of this note is different from the original.  Patient stopped taking statins 7/2015 as he was concerned about potential side effects. Importance of takign care of modifiable risk factors discussed with Patient. Will continue to address.  Lovaza (Omega-3 PUFA)  2mg twice a day).    Last Lipids:  Chol: 218    2013  T    2013  HDL:   55    2013  LDL:  127    2013   LDL CHOLESTEROL (mg/dL)   Date Value   2013 127     [place] 10-year CHD Risk Score: 8% (11 Total Points)      Prediabetes 2011     Priority: Medium     Formatting of this note is different from the original.      HEMOGLOBIN A1C MONITORING (POCT) (%)   Date Value   2011 5.4      GLUCOSE                   (mg/dL)   Date Value   3/25/2013 77      Raynaud's phenomenon 2011     Priority: Medium    Right shoulder pain 2011     Priority: Medium    CARDIOVASCULAR SCREENING; LDL GOAL LESS THAN 160 10/31/2010     Priority: Medium        Past Medical History:    Past Medical History:   Diagnosis Date    Arthritis     Congestive heart failure (H)     Heart murmur     Hyperlipidemia     Hypertension     Obese     Prediabetes     Sleep apnea        Past Surgical History:    Past Surgical History:   Procedure Laterality Date    ABDOMEN SURGERY      ARTHROPLASTY HIP Right 2022    Procedure: RIGHT TOTAL HIP ARTHROPLASTY;  Surgeon: Timothy Montana MD;  Location: [facility] OR    CHOLECYSTECTOMY      COLONOSCOPY N/A 2021    Procedure: COLONOSCOPY, WITH POLYPECTOMY AND BIOPSY;  Surgeon: Lito Sweet DO;  Location: [facility]    CYSTOSCOPY, RETROGRADES, EXTRACT STONE, INSERT STENT, COMBINED Left 2025    Procedure: Cystoscopy, left retrograde pyelogram, left ureteroscopy with upper tract cytologic washings, indwelling ureteral stent placement, Interpretation of fluoroscopic imaging intraoperatively;  Surgeon: Anthony Sanchez MD;  Location: [facility] OR    EYE SURGERY      FINGER SURGERY      REVERSE ARTHROPLASTY SHOULDER Left 2024    Procedure: LEFT REVERSE TOTAL SHOULDER ARTHROPLASTY;  Surgeon: Timothy Montana MD;  Location: [facility] Main OR       Family History:    Family History   Problem Relation Age of Onset    Heart Disease Father          emphasemia    Coronary Artery Disease Father     Emphysema Father     Asthma Father     Skin Cancer Mother        Social History:  Marital Status:   [4]  Social History     Tobacco Use    Smoking status: Former     Current packs/day: 0.00     Average packs/day: 4.0 packs/day for 30.0 years (120.0 ttl pk-yrs)     Types: Cigarettes     Start date: 1965     Quit date: 1995     Years since quittin.1    Smokeless tobacco: Former     Quit date: 1975   Vaping Use    Vaping status: Never Used   Substance Use Topics    Alcohol use: Yes     Comment: 3-4 drinks a week    Drug use: No        Medications:    cyclobenzaprine (FLEXERIL) 10 MG tablet  oxyCODONE (ROXICODONE) 5 MG tablet  oxyCODONE-acetaminophen (PERCOCET) 5-325 MG tablet  acetaminophen (TYLENOL) 650 MG CR tablet  aspirin 81 MG EC tablet  BETA BLOCKER NOT PRESCRIBED (INTENTIONAL)  Glucosamine Sulfate 1000 MG TABS  lisinopril (ZESTRIL) 20 MG tablet  MOUNJARO 2.5 MG/0.5ML SOAJ  Multiple Vitamin (MULTIVITAMIN ADULT PO)  rosuvastatin (CRESTOR) 10 MG tablet  SENNA-docusate sodium (SENNA S) 8.6-50 MG tablet  tamsulosin (FLOMAX) 0.4 MG capsule  tirzepatide-Weight Management (ZEPBOUND) 10 MG/0.5ML prefilled pen  zinc gluconate 50 MG tablet          Review of Systems  A medically appropriate review of systems was performed with pertinent positives and negatives noted in the HPI, and all other systems negative.    Physical Exam   Patient Vitals for the past 24 hrs:   BP Temp Temp src Pulse Resp SpO2 Weight   25 2348 126/69 -- -- 77 -- (!) 91 % --   25 2333 132/76 -- -- 81 -- 95 % --   25 2318 (!) 150/87 -- -- 84 -- 96 % --   25 2303 125/60 -- -- 81 -- 93 % --   25 2250 127/67 -- -- -- -- (!) 91 % --   25 2205 (!) 170/94 -- -- -- -- 92 % --   25 (!) 142/85 -- -- 95 -- 94 % --   25 (!) 161/85 98.7  F (37.1  C) Oral 105 18 96 % 112.9 kg (249 lb)          Physical Exam  General: alert and in  mild acute distress on arrival  Head: atraumatic, normocephalic  Lungs:  nonlabored  CV:  extremities warm and perfused  Abd: nondistended.  No LLQ tenderness to palpation with no significant flank tenderness.  Skin: no rashes, no diaphoresis and skin color normal  Neuro: Patient awake, alert, speech is fluent,   Psychiatric: affect/mood normal,        ED Course                 Procedures                 None         Results for orders placed or performed during the hospital encounter of 02/26/25 (from the past 24 hours)   [facility]    Narrative    The following orders were created for panel order [facility].  Procedure                               Abnormality         Status                     ---------                               -----------         ------                     Extra Blue Top Tube[[id number]]                              Final result               Extra Green Top (Lithium...[[id number]]                      Final result               Extra Purple Top Tube[[id number]]                            Final result                 Please view results for these tests on the individual orders.   Extra Blue Top Tube   Result Value Ref Range    Hold Specimen JIC    Extra Green Top (Lithium Heparin) Tube   Result Value Ref Range    Hold Specimen     Extra Purple Top Tube   Result Value Ref Range    Hold Specimen     CBC with platelets differential    Narrative    The following orders were created for panel order CBC with platelets differential.  Procedure                               Abnormality         Status                     ---------                               -----------         ------                     CBC with platelets and d...[[id number]]  Abnormal            Final result                 Please view results for these tests on the individual orders.   Basic metabolic panel   Result Value Ref Range    Sodium 139 135 - 145 mmol/L    Potassium 4.4 3.4 - 5.3 mmol/L    Chloride 104 98 - 107 mmol/L     Carbon Dioxide (CO2) 24 22 - 29 mmol/L    Anion Gap 11 7 - 15 mmol/L    Urea Nitrogen 26.4 (H) 8.0 - 23.0 mg/dL    Creatinine 1.47 (H) 0.67 - 1.17 mg/dL    GFR Estimate 52 (L) >60 mL/min/1.73m2    Calcium 9.5 8.8 - 10.4 mg/dL    Glucose 95 70 - 99 mg/dL   CBC with platelets and differential   Result Value Ref Range    WBC Count 17.3 (H) 4.0 - 11.0 10e3/uL    RBC Count 5.46 4.40 - 5.90 10e6/uL    Hemoglobin 17.2 13.3 - 17.7 g/dL    Hematocrit 49.9 40.0 - 53.0 %    MCV 91 78 - 100 fL    MCH 31.5 26.5 - 33.0 pg    MCHC 34.5 31.5 - 36.5 g/dL    RDW 12.8 10.0 - 15.0 %    Platelet Count 228 150 - 450 10e3/uL    % Neutrophils 77 %    % Lymphocytes 12 %    % Monocytes 8 %    % Eosinophils 2 %    % Basophils 1 %    % Immature Granulocytes 1 %    NRBCs per 100 WBC 0 <1 /100    Absolute Neutrophils 13.3 (H) 1.6 - 8.3 10e3/uL    Absolute Lymphocytes 2.2 0.8 - 5.3 10e3/uL    Absolute Monocytes 1.3 0.0 - 1.3 10e3/uL    Absolute Eosinophils 0.3 0.0 - 0.7 10e3/uL    Absolute Basophils 0.1 0.0 - 0.2 10e3/uL    Absolute Immature Granulocytes 0.1 <=0.4 10e3/uL    Absolute NRBCs 0.0 10e3/uL   UA with Microscopic reflex to Culture    Specimen: Urine, Clean Catch   Result Value Ref Range    Color Urine Brown (A) Colorless, Straw, Light Yellow, Yellow    Appearance Urine Cloudy (A) Clear    Glucose Urine Negative Negative mg/dL    Bilirubin Urine Negative Negative    Ketones Urine Negative Negative mg/dL    Specific Gravity Urine 1.017 1.003 - 1.035    Blood Urine Large (A) Negative    pH Urine 6.0 5.0 - 7.0    Protein Albumin Urine 10 (A) Negative mg/dL    Urobilinogen Urine Normal Normal, 2.0 mg/dL    Nitrite Urine Negative Negative    Leukocyte Esterase Urine Small (A) Negative    RBC Urine >182 (H) <=2 /HPF    WBC Urine 0 <=5 /HPF    Squamous Epithelials Urine 1 <=1 /HPF    Narrative    Urine Culture not indicated   CT Abdomen Pelvis w/o Contrast    Narrative    EXAM: CT ABDOMEN PELVIS W/O CONTRAST  LOCATION: [facility]  [facility]  DATE: 2/26/2025    INDICATION: left flank pain.  recent ureteral stent removal two days ago.  left renal vs. urothelial mass.  COMPARISON: CT urogram 01/21/2025.  TECHNIQUE: CT scan of the abdomen and pelvis was performed without IV contrast. Multiplanar reformats were obtained. Dose reduction techniques were used.  CONTRAST: None.    FINDINGS:   LOWER CHEST: Normal.    HEPATOBILIARY: Diffuse hepatic steatosis. Cholecystectomy.    PANCREAS: Normal.    SPLEEN: Normal.    ADRENAL GLANDS: Normal.    KIDNEYS/BLADDER: Known left midpole renal neoplasm and masslike urothelial thickening within the left collecting system and renal pelvis. On today's examination there is also small blood products within the left renal pelvis. Similar mild left   hydronephrosis. There is also perinephric fat stranding present which may be related to recent ureteral stent. Left ureter is nondilated and there is no evidence of nephroureterolithiasis. No right hydronephrosis.    BOWEL: Colonic diverticulosis. Normal appendix. No bowel obstruction.    LYMPH NODES: Normal.    VASCULATURE: Atherosclerotic calcifications of the aortoiliac vessels without evidence of aneurysmal dilatation.    PELVIC ORGANS: Unremarkable.    MUSCULOSKELETAL: Small fat-containing right inguinal hernia. Right hip arthroplasty. Degenerative changes of the left hip. Multilevel degenerative changes of the thoracic and lumbosacral spine. No acute osseous abnormality or suspicious bony lesion.      Impression    IMPRESSION:   1.  Known left midpole renal neoplasm and masslike urothelial thickening within the left collecting system and renal pelvis. On today's examination there is also small blood products within the left renal pelvis. Similar mild left hydronephrosis. There   is also perinephric fat stranding present which may be related to recent ureteral stent. Left ureter is nondilated and there is no evidence of nephroureterolithiasis.          MEDICATIONS GIVEN IN THE EMERGENCY DEPARTMENT:  Medications   acetaminophen (TYLENOL) tablet 1,000 mg (1,000 mg Oral $Given 2/26/25 2221)   ketorolac (TORADOL) injection 15 mg (15 mg Intravenous $Given 2/26/25 2227)           Independent Interpretation (X-rays, CTs, rhythm strip):  No evidence of kidney stone.  Does have mild left hydronephrosis.  However, no significant ureteral dilatation.    Consultations/Discussion of Management or Tests:  None       Social Determinants of Health affecting care:         Assessments & Plan (with Medical Decision Making)  64 year old male who presents to the Emergency Department for evaluation of left flank pain.  Patient also with hematuria which was present beginning this evening as well.  However, after a few episodes of urination, the bleeding has almost completely resolved according to patient, now with much more clear urine.  He has increased amounts of left-sided flank pain in the context of ureteral stent which was removed on Monday, 2 days ago.  Patient had this removed, despite urologist recommending continuation of the stent.  However, patient was set to leave to [place] this coming Friday, now in 2 days, and therefore desired that the stent be removed.    Patient with increased amounts of left-sided flank pain, prompting ED visit.  Differential broad, however concerns for potential post stent complication, including potential for obstructive type process.  CT scan is ordered, and CT scan shows mild amounts of hydronephrosis, unchanged compared to prior.  Does not have any dilatation of the ureter.  Mild stranding, thought to be secondary to the ureteral stent removal.  Patient was on Keflex post stent removal.  Patient was set to go to [place] in 2 days.  I did discuss with patient my concerns with regards to leaving the country, with ongoing pains, and uncertain exact diagnosis.  Patient will be left to make the decision for himself, however I did  discuss my reservations with leaving the country.    Patient does not have any signs of infection on urinalysis.  White blood cell count is slightly elevated, nonspecific.  CT scan without any acute findings.  Does have likely blood products present in the kidney.  Also with findings consistent with a recent ureteral stent which had been present.    Given the lack of acute findings on imaging or laboratory workup today, I feel it is reasonable for discharge home.  Patient did request pain medications for home which I did provide.  He is instructed to follow-up as needed.       I have reviewed the nursing notes.    I have reviewed the findings, diagnosis, plan and need for follow up with the patient.         Medical Decision Making  The patient's presentation was of high complexity (an acute health issue posing potential threat to life or bodily function).    The patient's evaluation involved:  review of external note(s) from 1 sources (see separate area of note for details)  ordering and/or review of 3+ test(s) in this encounter (see separate area of note for details)  independent interpretation of testing performed by another health professional (see separate area of note for details)    The patient's management necessitated moderate risk (discussed potential need for future complications and decision about travel.  Also did prescribe oxycodone for home.).        NEW PRESCRIPTIONS STARTED AT TODAY'S ER VISIT  Discharge Medication List as of 2/27/2025 12:48 AM        START taking these medications    Details   oxyCODONE (ROXICODONE) 5 MG tablet Take 1 tablet (5 mg) by mouth every 6 hours as needed for severe pain., Disp-10 tablet, R-0, E-Prescribe      oxyCODONE-acetaminophen (PERCOCET) 5-325 MG tablet Take 1 tablet by mouth every 6 hours as needed for severe pain., Disp-6 tablet, R-0, [facility]             Final diagnoses:   Left flank pain   Gross hematuria   Renal mass       2/26/2025   [facility]  EMERGENCY DEPT       Matteo Vera MD  02/27/25 [id number]

## 2025-03-12 ENCOUNTER — MYC MEDICAL ADVICE (OUTPATIENT)
Dept: FAMILY MEDICINE | Facility: CLINIC | Age: 65
End: 2025-03-12
Payer: COMMERCIAL

## 2025-03-12 NOTE — TELEPHONE ENCOUNTER
This patient saw Urology provider on 1/27.  This encounter resolved.    Paul DSOUZA [facility]

## 2025-03-12 NOTE — TELEPHONE ENCOUNTER
Unfortunately we can not prescribe meds to [place]. As far as I know cost of clinic visits in [place] are very affordable, so he can just see a doctor there is ask him/or her to prescribe his meds for short period of time. Some doctors in [place] are english speaking since there are a lot of [place] in [place]. Other option, check at local pharmacy if they will be able to provide small supply for his medications (to my knowledge not all medications in [place] require a prescription).    Vee Bhatti, TOREY CNP

## 2025-03-17 ENCOUNTER — PATIENT OUTREACH (OUTPATIENT)
Dept: CARE COORDINATION | Facility: CLINIC | Age: 65
End: 2025-03-17
Payer: COMMERCIAL

## 2025-03-31 ENCOUNTER — PATIENT OUTREACH (OUTPATIENT)
Dept: CARE COORDINATION | Facility: CLINIC | Age: 65
End: 2025-03-31
Payer: COMMERCIAL

## 2025-04-07 ENCOUNTER — THERAPY VISIT (OUTPATIENT)
Age: 65
End: 2025-04-07
Attending: PHYSICIAN ASSISTANT
Payer: COMMERCIAL

## 2025-04-07 ENCOUNTER — TELEPHONE (OUTPATIENT)
Dept: UROLOGY | Facility: CLINIC | Age: 65
End: 2025-04-07

## 2025-04-07 DIAGNOSIS — G89.29 CHRONIC LOW BACK PAIN: Primary | ICD-10-CM

## 2025-04-07 DIAGNOSIS — M54.50 CHRONIC LOW BACK PAIN: Primary | ICD-10-CM

## 2025-04-07 PROCEDURE — 97161 PT EVAL LOW COMPLEX 20 MIN: CPT | Mod: GP | Performed by: PHYSICAL THERAPIST

## 2025-04-07 PROCEDURE — 97110 THERAPEUTIC EXERCISES: CPT | Mod: GP | Performed by: PHYSICAL THERAPIST

## 2025-04-07 NOTE — PROGRESS NOTES
PHYSICAL THERAPY EVALUATION  Type of Visit: Evaluation       Fall Risk Screen:  Have you fallen 2 or more times in the past year?: No  Have you fallen and had an injury in the past year?: No  Is patient a fall risk?: No    Subjective         Presenting condition or subjective complaint: low back pain  History of ED 2021, RFA 2022, 2023 and most recently 2024 w/o relief. Follows [facility].  History of B NAOMI.  Planning vertiflex procedure. Standing limited to < 10 minutes and walking < 5 minutes. Improves in 30 seconds of sitting.   Of note scheduled for LEFT CYSTOURETEROSCOPY, WITH RETROGRADE PYELOGRAM, RENAL PELVIC BIOPSY Left General AND POSSIBLE LASER FULGURATION AND STENT INSERTION Left on 4/4/25.   Date of onset: 02/12/25 (date of MD order)    Relevant medical history: Arthritis   Dates & types of surgery: hip knee shoulder gallblader carpol tunnel  finger ampuation    Prior diagnostic imaging/testing results: MRI; X-ray     Prior therapy history for the same diagnosis, illness or injury: Yes dont remember    Prior Level of Function  Transfers:   Ambulation:   ADL:   IADL:     Living Environment  Social support: Alone   Type of home: House   Stairs to enter the home: Yes 8 Is there a railing: Yes     Ramp:     Stairs inside the home: Yes   Is there a railing: Yes     Help at home: None  Equipment owned: Straight Cane; Walker; Crutches; Raised toilet seat; Bath bench     Employment: No    Hobbies/Interests: travel    Patient goals for therapy: walk and stand longer   cut my own toe nails excersise    Pain assessment: See objective evaluation for additional pain details     Objective   LUMBAR SPINE EVALUATION  PAIN: Pain Level at Rest: 1/10  Pain Level with Use: 9/10  INTEGUMENTARY (edema, incisions):   POSTURE:  Forward flexed posture  GAIT:   Weightbearing Status:   Assistive Device(s):   Gait Deviations:   BALANCE/PROPRIOCEPTION:   WEIGHTBEARING ALIGNMENT:    NON-WEIGHTBEARING ALIGNMENT:    ROM:   Flexion min restriction  Extension to neutral + pain  SB mod restriction B ( Pain R> L )  PELVIC/SI SCREEN:   STRENGTH:     MYOTOMES: WNL  DTR S:   CORD SIGNS:   DERMATOMES: WNL  NEURAL TENSION:   FLEXIBILITY: decreased hip flexors B  LUMBAR/HIP Special Tests:    PELVIS/SI SPECIAL TESTS:   FUNCTIONAL TESTS:   PALPATION:  ttp paraspinals L2/3/4  SPINAL SEGMENTAL CONCLUSIONS:       Assessment & Plan   CLINICAL IMPRESSIONS  Medical Diagnosis: Spondylosis of lumbar region without myelopathy or radiculopathy (M47.816)  - Primary    Treatment Diagnosis: Chronic low back pain   Impression/Assessment: Patient is a 64 year old male with chronic low back pain complaints.  The following significant findings have been identified: Pain, Decreased ROM/flexibility, Decreased joint mobility, Decreased strength, Impaired gait, Impaired muscle performance, and Decreased activity tolerance. These impairments interfere with their ability to perform self care tasks, recreational activities, household chores, household mobility, and community mobility as compared to previous level of function.     Clinical Decision Making (Complexity):  Clinical Presentation: Stable/Uncomplicated  Clinical Presentation Rationale: based on medical and personal factors listed in PT evaluation  Clinical Decision Making (Complexity): Low complexity    PLAN OF CARE  Treatment Interventions:  Interventions: Gait Training, Manual Therapy, Neuromuscular Re-education, Therapeutic Activity, Therapeutic Exercise, Self-Care/Home Management    Long Term Goals     PT Goal 1  Goal Identifier: 1  Goal Description: Pt will be able to stand > 10 minutes without exacerbation of symptoms  Rationale: to maximize safety and independence within the community;to maximize safety and independence within the home;to maximize safety and independence with performance of ADLs and functional tasks;to maximize safety and independence with self  cares  Target Date: 06/02/25  PT Goal 2  Goal Identifier: 2  Goal Description: Pt will be able to walk > 10 minutes without exacerbation of symptoms  Rationale: to maximize safety and independence within the community;to maximize safety and independence within the home  Target Date: 06/02/25      Frequency of Treatment: 1x/week  Duration of Treatment: 8 weeks    Recommended Referrals to Other Professionals:   Education Assessment:        Risks and benefits of evaluation/treatment have been explained.   Patient/Family/caregiver agrees with Plan of Care.     Evaluation Time:     PT Eval, Low Complexity Minutes (79456): 20       Signing Clinician: Virginia Mcintosh PT        Psychiatric                                                                                   OUTPATIENT PHYSICAL THERAPY      PLAN OF TREATMENT FOR OUTPATIENT REHABILITATION   Patient's Last Name, First Name, Michael Roberts YOB: 1960   Provider's Name   Psychiatric   Medical Record No.  [id number]     Onset Date: 02/12/25 (date of MD order)  Start of Care Date: 04/07/25     Medical Diagnosis:  Spondylosis of lumbar region without myelopathy or radiculopathy (M47.816)  - Primary      PT Treatment Diagnosis:  Chronic low back pain Plan of Treatment  Frequency/Duration: 1x/week/ 8 weeks    Certification date from 04/07/25 to 06/02/25         See note for plan of treatment details and functional goals     Virginia Mcintosh PT                         I CERTIFY THE NEED FOR THESE SERVICES FURNISHED UNDER        THIS PLAN OF TREATMENT AND WHILE UNDER MY CARE .             Physician Signature               Date    X_____________________________________________________                  Referring Provider:  Duyen Schafer    Initial Assessment  See Epic Evaluation- Start of Care Date: 04/07/25

## 2025-04-08 NOTE — CONFIDENTIAL NOTE
Anthony Sanchez MD Riley, Patricia, RN  Caller: Unspecified (Yesterday,  1:07 PM)  I've been over that exact question MULTIPLE times with him. I have several notes in the system. It will determine the type of surgery he has nephroureterectomy or just nephrectomy which is an important distinction. If he wants to avoid stent and just do a Nephroureterectomy that is Luis's call. He chose to leave the country, I and other doctors recommended against it that is why the surgery is further out at this point I would have done his biopsy before he left but he forced me to take out the stent.    Maybe hearing it from someone else will help him.

## 2025-04-08 NOTE — TELEPHONE ENCOUNTER
Spoke with patient and set him up for a virtual visit with Dr Smith to discuss upcoming procedure. Nemo Diaz RN

## 2025-04-14 ENCOUNTER — THERAPY VISIT (OUTPATIENT)
Age: 65
End: 2025-04-14
Payer: COMMERCIAL

## 2025-04-14 DIAGNOSIS — M54.50 CHRONIC LOW BACK PAIN: Primary | ICD-10-CM

## 2025-04-14 DIAGNOSIS — G89.29 CHRONIC LOW BACK PAIN: Primary | ICD-10-CM

## 2025-04-14 PROCEDURE — 97110 THERAPEUTIC EXERCISES: CPT | Mod: GP | Performed by: PHYSICAL THERAPIST

## 2025-04-23 ENCOUNTER — HOSPITAL ENCOUNTER (OUTPATIENT)
Facility: HOSPITAL | Age: 65
Discharge: HOME | End: 2025-04-23
Attending: STUDENT IN AN ORGANIZED HEALTH CARE EDUCATION/TRAINING PROGRAM | Admitting: STUDENT IN AN ORGANIZED HEALTH CARE EDUCATION/TRAINING PROGRAM
Payer: COMMERCIAL

## 2025-04-23 ENCOUNTER — ANESTHESIA (OUTPATIENT)
Dept: SURGERY | Facility: HOSPITAL | Age: 65
End: 2025-04-23
Payer: COMMERCIAL

## 2025-04-23 ENCOUNTER — ANESTHESIA EVENT (OUTPATIENT)
Dept: SURGERY | Facility: HOSPITAL | Age: 65
End: 2025-04-23
Payer: COMMERCIAL

## 2025-04-23 ENCOUNTER — APPOINTMENT (OUTPATIENT)
Dept: RADIOLOGY | Facility: HOSPITAL | Age: 65
End: 2025-04-23
Attending: STUDENT IN AN ORGANIZED HEALTH CARE EDUCATION/TRAINING PROGRAM
Payer: COMMERCIAL

## 2025-04-23 VITALS
DIASTOLIC BLOOD PRESSURE: 75 MMHG | WEIGHT: 235.1 LBS | RESPIRATION RATE: 15 BRPM | HEART RATE: 70 BPM | TEMPERATURE: 98 F | OXYGEN SATURATION: 97 % | SYSTOLIC BLOOD PRESSURE: 125 MMHG | HEIGHT: 71 IN | BODY MASS INDEX: 32.91 KG/M2

## 2025-04-23 DIAGNOSIS — R31.0 GROSS HEMATURIA: Primary | ICD-10-CM

## 2025-04-23 DIAGNOSIS — N28.89 RENAL MASS: Primary | ICD-10-CM

## 2025-04-23 PROCEDURE — 370N000017 HC ANESTHESIA TECHNICAL FEE, PER MIN: Performed by: STUDENT IN AN ORGANIZED HEALTH CARE EDUCATION/TRAINING PROGRAM

## 2025-04-23 PROCEDURE — C1769 GUIDE WIRE: HCPCS | Performed by: STUDENT IN AN ORGANIZED HEALTH CARE EDUCATION/TRAINING PROGRAM

## 2025-04-23 PROCEDURE — 250N000011 HC RX IP 250 OP 636: Performed by: STUDENT IN AN ORGANIZED HEALTH CARE EDUCATION/TRAINING PROGRAM

## 2025-04-23 PROCEDURE — 258N000003 HC RX IP 258 OP 636: Performed by: ANESTHESIOLOGY

## 2025-04-23 PROCEDURE — 999N000182 XR SURGERY CARM FLUORO GREATER THAN 5 MIN

## 2025-04-23 PROCEDURE — C1758 CATHETER, URETERAL: HCPCS | Performed by: STUDENT IN AN ORGANIZED HEALTH CARE EDUCATION/TRAINING PROGRAM

## 2025-04-23 PROCEDURE — C1894 INTRO/SHEATH, NON-LASER: HCPCS | Performed by: STUDENT IN AN ORGANIZED HEALTH CARE EDUCATION/TRAINING PROGRAM

## 2025-04-23 PROCEDURE — 74420 UROGRAPHY RTRGR +-KUB: CPT | Mod: 26 | Performed by: STUDENT IN AN ORGANIZED HEALTH CARE EDUCATION/TRAINING PROGRAM

## 2025-04-23 PROCEDURE — 258N000001 HC RX 258: Performed by: STUDENT IN AN ORGANIZED HEALTH CARE EDUCATION/TRAINING PROGRAM

## 2025-04-23 PROCEDURE — 710N000012 HC RECOVERY PHASE 2, PER MINUTE: Performed by: STUDENT IN AN ORGANIZED HEALTH CARE EDUCATION/TRAINING PROGRAM

## 2025-04-23 PROCEDURE — 255N000002 HC RX 255 OP 636: Performed by: STUDENT IN AN ORGANIZED HEALTH CARE EDUCATION/TRAINING PROGRAM

## 2025-04-23 PROCEDURE — 250N000025 HC SEVOFLURANE, PER MIN: Performed by: STUDENT IN AN ORGANIZED HEALTH CARE EDUCATION/TRAINING PROGRAM

## 2025-04-23 PROCEDURE — 250N000009 HC RX 250: Performed by: ANESTHESIOLOGY

## 2025-04-23 PROCEDURE — 360N000082 HC SURGERY LEVEL 2 W/ FLUORO, PER MIN: Performed by: STUDENT IN AN ORGANIZED HEALTH CARE EDUCATION/TRAINING PROGRAM

## 2025-04-23 PROCEDURE — 88112 CYTOPATH CELL ENHANCE TECH: CPT | Mod: TC | Performed by: STUDENT IN AN ORGANIZED HEALTH CARE EDUCATION/TRAINING PROGRAM

## 2025-04-23 PROCEDURE — 52332 CYSTOSCOPY AND TREATMENT: CPT | Mod: LT | Performed by: STUDENT IN AN ORGANIZED HEALTH CARE EDUCATION/TRAINING PROGRAM

## 2025-04-23 PROCEDURE — 250N000011 HC RX IP 250 OP 636: Performed by: ANESTHESIOLOGY

## 2025-04-23 PROCEDURE — C2617 STENT, NON-COR, TEM W/O DEL: HCPCS | Performed by: STUDENT IN AN ORGANIZED HEALTH CARE EDUCATION/TRAINING PROGRAM

## 2025-04-23 PROCEDURE — 999N000141 HC STATISTIC PRE-PROCEDURE NURSING ASSESSMENT: Performed by: STUDENT IN AN ORGANIZED HEALTH CARE EDUCATION/TRAINING PROGRAM

## 2025-04-23 PROCEDURE — 710N000009 HC RECOVERY PHASE 1, LEVEL 1, PER MIN: Performed by: STUDENT IN AN ORGANIZED HEALTH CARE EDUCATION/TRAINING PROGRAM

## 2025-04-23 PROCEDURE — 250N000013 HC RX MED GY IP 250 OP 250 PS 637: Performed by: STUDENT IN AN ORGANIZED HEALTH CARE EDUCATION/TRAINING PROGRAM

## 2025-04-23 PROCEDURE — 272N000001 HC OR GENERAL SUPPLY STERILE: Performed by: STUDENT IN AN ORGANIZED HEALTH CARE EDUCATION/TRAINING PROGRAM

## 2025-04-23 DEVICE — URETERAL STENT
Type: IMPLANTABLE DEVICE | Site: URETER | Status: FUNCTIONAL
Brand: PERCUFLEX™ PLUS

## 2025-04-23 RX ORDER — OXYCODONE HYDROCHLORIDE 5 MG/1
5 TABLET ORAL
Status: DISCONTINUED | OUTPATIENT
Start: 2025-04-23 | End: 2025-04-23 | Stop reason: HOSPADM

## 2025-04-23 RX ORDER — FENTANYL CITRATE 50 UG/ML
INJECTION, SOLUTION INTRAMUSCULAR; INTRAVENOUS PRN
Status: DISCONTINUED | OUTPATIENT
Start: 2025-04-23 | End: 2025-04-23

## 2025-04-23 RX ORDER — SODIUM CHLORIDE, SODIUM LACTATE, POTASSIUM CHLORIDE, CALCIUM CHLORIDE 600; 310; 30; 20 MG/100ML; MG/100ML; MG/100ML; MG/100ML
INJECTION, SOLUTION INTRAVENOUS CONTINUOUS
Status: DISCONTINUED | OUTPATIENT
Start: 2025-04-23 | End: 2025-04-23 | Stop reason: HOSPADM

## 2025-04-23 RX ORDER — CEFAZOLIN SODIUM/WATER 2 G/20 ML
2 SYRINGE (ML) INTRAVENOUS SEE ADMIN INSTRUCTIONS
Status: DISCONTINUED | OUTPATIENT
Start: 2025-04-23 | End: 2025-04-23 | Stop reason: HOSPADM

## 2025-04-23 RX ORDER — DEXAMETHASONE SODIUM PHOSPHATE 10 MG/ML
INJECTION, SOLUTION INTRAMUSCULAR; INTRAVENOUS PRN
Status: DISCONTINUED | OUTPATIENT
Start: 2025-04-23 | End: 2025-04-23

## 2025-04-23 RX ORDER — ONDANSETRON 2 MG/ML
4 INJECTION INTRAMUSCULAR; INTRAVENOUS EVERY 30 MIN PRN
Status: DISCONTINUED | OUTPATIENT
Start: 2025-04-23 | End: 2025-04-23 | Stop reason: HOSPADM

## 2025-04-23 RX ORDER — LIDOCAINE HYDROCHLORIDE 10 MG/ML
INJECTION, SOLUTION INFILTRATION; PERINEURAL PRN
Status: DISCONTINUED | OUTPATIENT
Start: 2025-04-23 | End: 2025-04-23

## 2025-04-23 RX ORDER — TAMSULOSIN HYDROCHLORIDE 0.4 MG/1
0.4 CAPSULE ORAL DAILY
Qty: 7 CAPSULE | Refills: 0 | Status: SHIPPED | OUTPATIENT
Start: 2025-04-23 | End: 2025-04-30

## 2025-04-23 RX ORDER — AMOXICILLIN 250 MG
1-2 CAPSULE ORAL 2 TIMES DAILY
Qty: 30 TABLET | Refills: 0 | Status: SHIPPED | OUTPATIENT
Start: 2025-04-23

## 2025-04-23 RX ORDER — OXYBUTYNIN CHLORIDE 5 MG/1
5 TABLET, EXTENDED RELEASE ORAL DAILY
Qty: 7 TABLET | Refills: 0 | Status: SHIPPED | OUTPATIENT
Start: 2025-04-23 | End: 2025-04-30

## 2025-04-23 RX ORDER — FENTANYL CITRATE 50 UG/ML
25 INJECTION, SOLUTION INTRAMUSCULAR; INTRAVENOUS EVERY 5 MIN PRN
Status: DISCONTINUED | OUTPATIENT
Start: 2025-04-23 | End: 2025-04-23 | Stop reason: HOSPADM

## 2025-04-23 RX ORDER — ACETAMINOPHEN 650 MG/1
650 SUPPOSITORY RECTAL ONCE
Status: COMPLETED | OUTPATIENT
Start: 2025-04-23 | End: 2025-04-23

## 2025-04-23 RX ORDER — ONDANSETRON 2 MG/ML
INJECTION INTRAMUSCULAR; INTRAVENOUS PRN
Status: DISCONTINUED | OUTPATIENT
Start: 2025-04-23 | End: 2025-04-23

## 2025-04-23 RX ORDER — OXYCODONE HYDROCHLORIDE 5 MG/1
5-10 TABLET ORAL EVERY 4 HOURS PRN
Qty: 6 TABLET | Refills: 0 | Status: SHIPPED | OUTPATIENT
Start: 2025-04-23

## 2025-04-23 RX ORDER — DEXAMETHASONE SODIUM PHOSPHATE 10 MG/ML
4 INJECTION, SOLUTION INTRAMUSCULAR; INTRAVENOUS
Status: DISCONTINUED | OUTPATIENT
Start: 2025-04-23 | End: 2025-04-23 | Stop reason: HOSPADM

## 2025-04-23 RX ORDER — PROPOFOL 10 MG/ML
INJECTION, EMULSION INTRAVENOUS PRN
Status: DISCONTINUED | OUTPATIENT
Start: 2025-04-23 | End: 2025-04-23

## 2025-04-23 RX ORDER — ONDANSETRON 4 MG/1
4 TABLET, ORALLY DISINTEGRATING ORAL EVERY 30 MIN PRN
Status: DISCONTINUED | OUTPATIENT
Start: 2025-04-23 | End: 2025-04-23 | Stop reason: HOSPADM

## 2025-04-23 RX ORDER — ACETAMINOPHEN 325 MG/1
975 TABLET ORAL ONCE
Status: COMPLETED | OUTPATIENT
Start: 2025-04-23 | End: 2025-04-23

## 2025-04-23 RX ORDER — FENTANYL CITRATE 50 UG/ML
50 INJECTION, SOLUTION INTRAMUSCULAR; INTRAVENOUS EVERY 5 MIN PRN
Status: DISCONTINUED | OUTPATIENT
Start: 2025-04-23 | End: 2025-04-23 | Stop reason: HOSPADM

## 2025-04-23 RX ORDER — NALOXONE HYDROCHLORIDE 0.4 MG/ML
0.1 INJECTION, SOLUTION INTRAMUSCULAR; INTRAVENOUS; SUBCUTANEOUS
Status: DISCONTINUED | OUTPATIENT
Start: 2025-04-23 | End: 2025-04-23 | Stop reason: HOSPADM

## 2025-04-23 RX ORDER — LIDOCAINE 40 MG/G
CREAM TOPICAL
Status: DISCONTINUED | OUTPATIENT
Start: 2025-04-23 | End: 2025-04-23 | Stop reason: HOSPADM

## 2025-04-23 RX ORDER — OXYCODONE HYDROCHLORIDE 5 MG/1
10 TABLET ORAL
Status: DISCONTINUED | OUTPATIENT
Start: 2025-04-23 | End: 2025-04-23 | Stop reason: HOSPADM

## 2025-04-23 RX ORDER — CEFAZOLIN SODIUM/WATER 2 G/20 ML
2 SYRINGE (ML) INTRAVENOUS
Status: COMPLETED | OUTPATIENT
Start: 2025-04-23 | End: 2025-04-23

## 2025-04-23 RX ORDER — CIPROFLOXACIN 500 MG/1
500 TABLET, FILM COATED ORAL ONCE
Qty: 1 TABLET | Refills: 0 | Status: SHIPPED | OUTPATIENT
Start: 2025-04-23 | End: 2025-04-23

## 2025-04-23 RX ORDER — ATROPA BELLADONNA AND OPIUM 16.2; 3 MG/1; MG/1
30 SUPPOSITORY RECTAL ONCE
Status: DISCONTINUED | OUTPATIENT
Start: 2025-04-23 | End: 2025-04-23 | Stop reason: HOSPADM

## 2025-04-23 RX ADMIN — PHENYLEPHRINE HYDROCHLORIDE 100 MCG: 10 INJECTION INTRAVENOUS at 09:27

## 2025-04-23 RX ADMIN — ONDANSETRON 4 MG: 2 INJECTION INTRAMUSCULAR; INTRAVENOUS at 10:20

## 2025-04-23 RX ADMIN — SUGAMMADEX 200 MG: 100 INJECTION, SOLUTION INTRAVENOUS at 10:07

## 2025-04-23 RX ADMIN — FENTANYL CITRATE 50 MCG: 50 INJECTION, SOLUTION INTRAMUSCULAR; INTRAVENOUS at 09:17

## 2025-04-23 RX ADMIN — DEXAMETHASONE SODIUM PHOSPHATE 4 MG: 10 INJECTION, SOLUTION INTRAMUSCULAR; INTRAVENOUS at 09:29

## 2025-04-23 RX ADMIN — LIDOCAINE HYDROCHLORIDE 5 ML: 10 INJECTION, SOLUTION INFILTRATION; PERINEURAL at 09:17

## 2025-04-23 RX ADMIN — ROCURONIUM 50 MG: 50 INJECTION, SOLUTION INTRAVENOUS at 09:17

## 2025-04-23 RX ADMIN — Medication 2 G: at 09:06

## 2025-04-23 RX ADMIN — ACETAMINOPHEN 975 MG: 325 TABLET ORAL at 07:46

## 2025-04-23 RX ADMIN — PROPOFOL 180 MG: 10 INJECTION, EMULSION INTRAVENOUS at 09:17

## 2025-04-23 RX ADMIN — SODIUM CHLORIDE, SODIUM LACTATE, POTASSIUM CHLORIDE, AND CALCIUM CHLORIDE: .6; .31; .03; .02 INJECTION, SOLUTION INTRAVENOUS at 07:59

## 2025-04-23 ASSESSMENT — ACTIVITIES OF DAILY LIVING (ADL)
ADLS_ACUITY_SCORE: 49

## 2025-04-23 ASSESSMENT — LIFESTYLE VARIABLES: TOBACCO_USE: 1

## 2025-04-23 NOTE — H&P
Urology H and P Update    I have evaluated Michael Jimenez  today and examined him. He does not note any significant issues since her last evaluation. Based on my evaluation, he is fit to proceed ahead with the planned procedure.    Tavo Smith MD

## 2025-04-23 NOTE — OP NOTE
OPERATIVE NOTE    PREOPERATIVE DIAGNOSIS:  Left sided upper tract urothelial tumor    POSTOPERATIVE DIAGNOSIS:  Left renal tumor    PROCEDURES PERFORMED:   1. Cystourethroscopy  2.  Left ureteroscopy  3.  Left retrograde pyelogram with interpretation of intraoperative fluoroscopic imaging  4.  Left ureteral washings  5.  Left ureteral stent placement      STAFF SURGEON:  Dr. Tavo Smith MD, present for the entire case.     ANESTHESIA:  General    ESTIMATED BLOOD LOSS: 1 cc  DRAINS/TUBES:  4.8 Malay x 26 cm double-J ureteral stent         IV FLUIDS:   Please see dictated anesthesia record  COMPLICATIONS:  None.   SPECIMEN:   Ureteral washings for cytology    SIGNIFICANT FINDINGS:  1.  No evidence of any filling defects on the fluoroscopy During retrograde pyelogram.  2.  Ureteroscopy and renoscopy did not reveal any tumors or filling defects.  Small residual clots were seen  3.  Proximal curl of the ureteral stent seen in the renal pelvis under fluoroscopy and distal curl seen in the bladder fluoroscopically and under direct vision.     BRIEF OPERATIVE INDICATIONS:  Michael Jimenez is a(n) 64 year old male with history of gross hematuria and right renal mass concerns for possible urothelial carcinoma on CT urogram done earlier.  After a discussion of all risks, benefits, and alternatives, the patient elected to proceed with definitive stone management. The patient understands the potential need for more than one procedure to eliminate all stone burden.     DESCRIPTION OF PROCEDURE:  After informed consent was obtained, the patient was transported to the operating room & placed supine on the table. Pneumoboots were applied.  After adequate anesthesia was induced, he was placed in lithotomy and prepped and draped in the usual sterile fashion. A timeout was taken to confirm correct patient, procedure and laterality. Pre-operative IV antibiotics were administered.     A 22-Malay rigid cystoscope was inserted into a  well-lubricated urethra. The anterior urethra was unremarkable. The left ureteral orifice was identified and  cannulated with a Sensor wire and 5 [place] open-ended catheter. The wire passed without resistance into the upper pole 5 [place] open-ended catheter was advanced into the renal pelvis and the wire withdrawn.  We performed ureteral washings with 20 cc of normal saline and sent this for cytology.    A catheter was used to perform a retrograde pyelogram and to establish access with a second, sensor wire. A 11/13 [place] 46-cm ureteral access sheath was advanced up to the proximal ureter and a retrograde pyelogram was performed to serve as a roadmap.     The flexible ureteroscope was used to evaluate the proximal ureter and renal pelvis and the individual calyces.  A complete evaluation of all of the structures did not reveal any significant filling defects/tumors or stones.  There were small multiple degenerative clots which would easily broken into smaller pieces with the scope.  The evaluation of the proximal ureter as well as the mid and the distal ureter also did not reveal any tumors or stones.  Pullback ureteroscopy was performed and showed no retained stone fragments or ureteral injury. A 12.8 [place] 26-cm double-J stent was advanced over the Sensor wire, and a good proximal curl was seen in the renal pelvis fluoroscopically and the distal curl was seen in the bladder fluoroscopically and under direct vision.  The stent was left on a string which was then taped to the penis with Steri-Strips.  The bladder was drained.  The patient tolerated the procedure well and there were no apparent complications. The patient  was transported to the postanesthesia care unit in stable condition.     POST-OPERATIVE PLAN: Following recovery in the PACU, the patient can be discharged.  He can remove the stent on his own in 5 days as instructed.  We will schedule him for a renal biopsy in the next few days with  interventional radiology.      Tavo Smith MD  [facility], Urology

## 2025-04-23 NOTE — ANESTHESIA CARE TRANSFER NOTE
Patient: Michael A Klar    Procedure: Procedure(s):  LEFT CYSTOURETEROSCOPY, WITH RETROGRADE PYELOGRAM,LEFT URETERAL WASHINGS .LEFT URETERAL STENT PLACEMENT.ALDO LASER STANDBY.       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    Anesthesia Type:   General     Note:    Oropharynx: oropharynx clear of all foreign objects and spontaneously breathing  Level of Consciousness: awake  Oxygen Supplementation: face mask  Level of Supplemental Oxygen (L/min / FiO2): 6  Independent Airway: airway patency satisfactory and stable  Dentition: dentition unchanged  Vital Signs Stable: post-procedure vital signs reviewed and stable  Report to RN Given: handoff report given  Patient transferred to: PACU    Handoff Report: Identifed the Patient, Identified the Reponsible Provider, Reviewed the pertinent medical history, Discussed the surgical course, Reviewed Intra-OP anesthesia mangement and issues during anesthesia, Set expectations for post-procedure period and Allowed opportunity for questions and acknowledgement of understanding      Vitals:  Vitals Value Taken Time   /76    Temp 36.1  C (96.98  F) 04/23/25 1027   Pulse 65 04/23/25 1027   Resp 18 04/23/25 1027   SpO2 99 % 04/23/25 1027   Vitals shown include unfiled device data.    Electronically Signed By: TOREY Ruiz CRNA  April 23, 2025  10:28 AM

## 2025-04-23 NOTE — ANESTHESIA POSTPROCEDURE EVALUATION
Patient: Michael A Klar    Procedure: Procedure(s):  LEFT CYSTOURETEROSCOPY, WITH RETROGRADE PYELOGRAM,LEFT URETERAL WASHINGS .LEFT URETERAL STENT PLACEMENT.ALDO LASER STANDBY.       Anesthesia Type:  General    Note:  Disposition: Outpatient   Postop Pain Control: Uneventful            Sign Out: Well controlled pain   PONV: No   Neuro/Psych: Uneventful            Sign Out: Acceptable/Baseline neuro status   Airway/Respiratory: Uneventful            Sign Out: Acceptable/Baseline resp. status   CV/Hemodynamics: Uneventful            Sign Out: Acceptable CV status; No obvious hypovolemia; No obvious fluid overload   Other NRE: NONE   DID A NON-ROUTINE EVENT OCCUR? No           Last vitals:  Vitals Value Taken Time   /74 04/23/25 1030   Temp 36  C (96.8  F) 04/23/25 1033   Pulse 65 04/23/25 1033   Resp 8 04/23/25 1033   SpO2 96 % 04/23/25 1033   Vitals shown include unfiled device data.    Electronically Signed By: Danny Ascencio MD  April 23, 2025  10:34 AM

## 2025-04-23 NOTE — ANESTHESIA POSTPROCEDURE EVALUATION
Patient: Michael A Klar    Procedure: Procedure(s):  LEFT CYSTOURETEROSCOPY, WITH RETROGRADE PYELOGRAM,LEFT URETERAL WASHINGS .LEFT URETERAL STENT PLACEMENT.ALDO LASER STANDBY.       Anesthesia Type:  General    Note:  Disposition: Outpatient   Postop Pain Control: Uneventful            Sign Out: Well controlled pain   PONV: No   Neuro/Psych: Uneventful            Sign Out: Acceptable/Baseline neuro status   Airway/Respiratory: Uneventful            Sign Out: Acceptable/Baseline resp. status   CV/Hemodynamics: Uneventful            Sign Out: Acceptable CV status; No obvious hypovolemia; No obvious fluid overload   Other NRE: NONE   DID A NON-ROUTINE EVENT OCCUR? No           Last vitals:  Vitals Value Taken Time   /86 04/23/25 1050   Temp 36.6  C (97.9  F) 04/23/25 1050   Pulse 67 04/23/25 1056   Resp 19 04/23/25 1040   SpO2 100 % 04/23/25 1056   Vitals shown include unfiled device data.    Electronically Signed By: Danny Ascencio MD  April 23, 2025  11:46 AM

## 2025-04-23 NOTE — ANESTHESIA PROCEDURE NOTES
Airway       Patient location during procedure: OR       Procedure Start/Stop Times: 4/23/2025 9:19 AM  Staff -        CRNA: Parmjit Nguyen APRN CRNA       Performed By: CRNAIndications and Patient Condition       Indications for airway management: davian-procedural       Induction type:intravenous       Mask difficulty assessment: 2 - vent by mask + OA or adjuvant +/- NMBA    Final Airway Details       Final airway type: endotracheal airway       Successful airway: ETT - single  Endotracheal Airway Details        ETT size (mm): 7.5       Cuffed: yes       Successful intubation technique: video laryngoscopy       VL Blade Size: Glidescope 4       Grade View of Cords: 1       Adjucts: stylet       Position: Right       Measured from: gums/teeth       Secured at (cm): 23       Bite block used: None    Post intubation assessment        Placement verified by: capnometry, equal breath sounds and chest rise        Number of attempts at approach: 1       Number of other approaches attempted: 0       Secured with: tape       Ease of procedure: easy       Dentition: Intact and Unchanged       Dental guard used and removed. Dental Guard Type: Standard White.    Medication(s) Administered   Medication Administration Time: 4/23/2025 9:19 AM

## 2025-04-23 NOTE — ANESTHESIA PREPROCEDURE EVALUATION
Anesthesia Pre-Procedure Evaluation    Patient: Michael Jimenez   MRN: [id number] : 1960        Procedure : Procedure(s):  CYSTOURETEROSCOPY, Left Retrograde Pyelogram, Left Ureteroscopic renal mass biopsy, cytologic washings and possible stent placement          Past Medical History:   Diagnosis Date    Aortic valve stenosis, etiology of cardiac valve disease unspecified     Arthritis     Congestive heart failure (H)     Coronary artery disease involving native coronary artery of native heart without angina pectoris     Heart murmur     Hyperlipidemia     Hypertension     Morbid obesity (H)     Obese     Prediabetes     Raynaud's phenomenon     Renal mass     Sleep apnea     doesn't use CPAP    Stage 3a chronic kidney disease (CKD) (H)     Ventricular tachycardia (H) 2023      Past Surgical History:   Procedure Laterality Date    ABDOMEN SURGERY      ARTHROPLASTY HIP Right 2022    Procedure: RIGHT TOTAL HIP ARTHROPLASTY;  Surgeon: Timothy Montana MD;  Location: [facility] OR    CHOLECYSTECTOMY      COLONOSCOPY N/A 2021    Procedure: COLONOSCOPY, WITH POLYPECTOMY AND BIOPSY;  Surgeon: Lito Sweet DO;  Location: [facility] GI    CYSTOSCOPY, RETROGRADES, EXTRACT STONE, INSERT STENT, COMBINED Left 2025    Procedure: Cystoscopy, left retrograde pyelogram, left ureteroscopy with upper tract cytologic washings, indwelling ureteral stent placement, Interpretation of fluoroscopic imaging intraoperatively;  Surgeon: Anthony Sanchez MD;  Location: [facility] OR    EYE SURGERY      FINGER SURGERY      REVERSE ARTHROPLASTY SHOULDER Left 2024    Procedure: LEFT REVERSE TOTAL SHOULDER ARTHROPLASTY;  Surgeon: Timothy Montana MD;  Location: [facility] OR    S/P total knee arthroplasty, left 2024        No Known Allergies   Social History     Tobacco Use    Smoking status: Former     Current packs/day: 0.00     Average packs/day: 4.0 packs/day for 30.0 years (120.0 ttl pk-yrs)      Types: Cigarettes     Start date: 1965     Quit date: 1995     Years since quittin.3    Smokeless tobacco: Former     Quit date: 1975   Substance Use Topics    Alcohol use: Yes     Comment: 3-4 drinks a week      Wt Readings from Last 1 Encounters:   25 106.6 kg (235 lb)        Anesthesia Evaluation   Pt has had prior anesthetic. Type: General, MAC and Regional.    History of anesthetic complications  - difficult airway.      ROS/MED HX  ENT/Pulmonary:     (+) sleep apnea, severe, doesn't use CPAP,              tobacco use, Past use,  20  Pack-Year Hx,                      Neurologic:       Cardiovascular: Comment: Mod AORTIC STENOSIS     (+) Dyslipidemia hypertension- -   -  - -      CHF etiology: LVEF low 50%                    valvular problems/murmurs type: AS     Previous cardiac testing   Echo: Date: 3/12/24 Results:  Interpretation Summary     Left ventricular function is borderline. The ejection fraction is 50-55%. Mid  to distal anterior and anteroseptal hypokinesis.  Global right ventricular function is normal.  Mild aortic stenosis.  The inferior vena cava is normal.  Borderline dilation of Sinuses of Valsalva, 4.1 cm.     This study was compared with the study from 3/10/21. LVEF is lower with subtle  wall motion abnormalities on today's study. Aortic valve doppler assessment is  stable.    Stress Test:  Date: 3/3/21 Results:  Result Text        The nuclear stress test is abnormal.     There is a small area of a mild degree of infarction in the mid to basal anteroseptal segment(s) of the left ventricle.     Left ventricular function is mildly reduced.     The left ventricular ejection fraction at rest is 42%.  The left ventricular ejection fraction at stress is 45%.     One episode of 4 beat run on NSVT at rest and 4/10 chest pain with stress. Consider further testing with CT coronary angiogram if clinical suspicion of CAD is high.     There is no prior study for comparison.        ECG Reviewed:  Date: 2/5/25 Results:  Sinus Rhythm   -Left bundle branch block and left axis.  -Left atrial enlargement.    ABNORMAL  Cath:  Date: Results:      METS/Exercise Tolerance:     Hematologic:       Musculoskeletal:   (+)  arthritis,             GI/Hepatic:  - neg GI/hepatic ROS     Renal/Genitourinary:     (+) renal disease, type: CRI,            Endo:     (+)               Obesity,       Psychiatric/Substance Use:       Infectious Disease: Comment: On glp-1 - neg infectious disease ROS     Malignancy:  - neg malignancy ROS     Other:            Physical Exam    Airway        Mallampati: II   TM distance: > 3 FB   Neck ROM: full   Mouth opening: > 3 cm    Respiratory Devices and Support         Dental       (+) Minor Abnormalities - some fillings, tiny chips    B=Bridge, C=Chipped, L=Loose, M=Missing    Cardiovascular   cardiovascular exam normal       Rhythm and rate: regular and normal     Pulmonary   pulmonary exam normal        breath sounds clear to auscultation           OUTSIDE LABS:  CBC:   Lab Results   Component Value Date    WBC 9.7 04/04/2025    WBC 17.3 (H) 02/26/2025    HGB 16.7 04/04/2025    HGB 17.2 02/26/2025    HCT 49.6 04/04/2025    HCT 49.9 02/26/2025     04/04/2025     02/26/2025     BMP:   Lab Results   Component Value Date     04/04/2025     02/26/2025    POTASSIUM 4.3 04/04/2025    POTASSIUM 4.4 02/26/2025    CHLORIDE 104 04/04/2025    CHLORIDE 104 02/26/2025    CO2 28 04/04/2025    CO2 24 02/26/2025    BUN 23.9 (H) 04/04/2025    BUN 26.4 (H) 02/26/2025    CR 1.23 (H) 04/04/2025    CR 1.47 (H) 02/26/2025     (H) 04/04/2025    GLC 95 02/26/2025     COAGS:   Lab Results   Component Value Date    PTT 30 01/04/2011    INR 1.04 04/04/2022     POC:   Lab Results   Component Value Date     (H) 07/09/2009     HEPATIC:   Lab Results   Component Value Date    ALBUMIN 4.0 02/20/2024    PROTTOTAL 8.1 02/20/2024    ALT 27 04/04/2025    AST 33  "02/20/2024    ALKPHOS 68 02/20/2024    BILITOTAL 0.3 02/20/2024     OTHER:   Lab Results   Component Value Date    A1C 6.0 (H) 12/04/2024    SKYLER 9.7 04/04/2025    TSH 1.00 04/15/2024    CRP 0.7 02/22/2010    SED 22 (H) 04/15/2024       Anesthesia Plan    ASA Status:  3    NPO Status:  NPO Appropriate    Anesthesia Type: General.     - Airway: ETT   Induction: Propofol, Intravenous.   Maintenance: Balanced.   Techniques and Equipment:     - Airway: Video-Laryngoscope (Known difficult airway)       Consents    Anesthesia Plan(s) and associated risks, benefits, and realistic alternatives discussed. Questions answered and patient/representative(s) expressed understanding.     - Discussed: Risks, Benefits and Alternatives for BOTH SEDATION and the PROCEDURE were discussed     - Discussed with:  Patient      - Extended Intubation/Ventilatory Support Discussed: No.      - Patient is DNR/DNI Status: No     Use of blood products discussed: No .     Postoperative Care    Pain management: IV analgesics.   PONV prophylaxis: Ondansetron (or other 5HT-3), Background Propofol Infusion, Dexamethasone or Solumedrol     Comments:               Danny Ascencio MD    I have reviewed the pertinent notes and labs in the chart from the past 30 days and (re)examined the patient.  Any updates or changes from those notes are reflected in this note.    Clinically Significant Risk Factors Present on Admission                   # Hypertension: Noted on problem list  # Chronic heart failure with preserved ejection fraction: heart failure noted on problem list and last echo with EF >50%          # Obesity: Estimated body mass index is 32.78 kg/m  as calculated from the following:    Height as of 4/16/25: 1.803 m (5' 11\").    Weight as of 4/16/25: 106.6 kg (235 lb).                "

## 2025-04-24 ENCOUNTER — THERAPY VISIT (OUTPATIENT)
Age: 65
End: 2025-04-24
Attending: PHYSICIAN ASSISTANT
Payer: COMMERCIAL

## 2025-04-24 DIAGNOSIS — M54.50 CHRONIC LOW BACK PAIN: Primary | ICD-10-CM

## 2025-04-24 DIAGNOSIS — G89.29 CHRONIC LOW BACK PAIN: Primary | ICD-10-CM

## 2025-04-25 ENCOUNTER — TELEPHONE (OUTPATIENT)
Dept: INTERVENTIONAL RADIOLOGY/VASCULAR | Facility: CLINIC | Age: 65
End: 2025-04-25
Payer: COMMERCIAL

## 2025-04-28 ENCOUNTER — MYC MEDICAL ADVICE (OUTPATIENT)
Dept: INTERVENTIONAL RADIOLOGY/VASCULAR | Facility: CLINIC | Age: 65
End: 2025-04-28
Payer: COMMERCIAL

## 2025-04-29 LAB
PATH REPORT.COMMENTS IMP SPEC: ABNORMAL
PATH REPORT.COMMENTS IMP SPEC: YES
PATH REPORT.FINAL DX SPEC: ABNORMAL
PATH REPORT.GROSS SPEC: ABNORMAL
PATH REPORT.MICROSCOPIC SPEC OTHER STN: ABNORMAL

## 2025-04-29 PROCEDURE — 88112 CYTOPATH CELL ENHANCE TECH: CPT | Mod: 26 | Performed by: PATHOLOGY

## 2025-04-29 NOTE — TELEPHONE ENCOUNTER
Writer has spoken with michael regarding planned procedure with IR via telephone.      Michael acknowledges understanding of pre-procedure instructions.         I have provided Michael with IR number  for questions or concerns.    A documented H&P exam is noted in patient EMR with the date 4/4.  Provider name JOEL KC RN, BSN  Interventional Radiology

## 2025-04-30 ENCOUNTER — THERAPY VISIT (OUTPATIENT)
Age: 65
End: 2025-04-30
Attending: PHYSICIAN ASSISTANT
Payer: COMMERCIAL

## 2025-04-30 DIAGNOSIS — M54.50 CHRONIC LOW BACK PAIN: Primary | ICD-10-CM

## 2025-04-30 DIAGNOSIS — G89.29 CHRONIC LOW BACK PAIN: Primary | ICD-10-CM

## 2025-04-30 PROCEDURE — 97110 THERAPEUTIC EXERCISES: CPT | Mod: GP | Performed by: PHYSICAL THERAPIST

## 2025-04-30 PROCEDURE — 97112 NEUROMUSCULAR REEDUCATION: CPT | Mod: GP | Performed by: PHYSICAL THERAPIST

## 2025-05-01 ENCOUNTER — HOSPITAL ENCOUNTER (OUTPATIENT)
Dept: CT IMAGING | Facility: HOSPITAL | Age: 65
Discharge: HOME | End: 2025-05-01
Attending: STUDENT IN AN ORGANIZED HEALTH CARE EDUCATION/TRAINING PROGRAM
Payer: COMMERCIAL

## 2025-05-01 VITALS
TEMPERATURE: 98.1 F | HEART RATE: 70 BPM | SYSTOLIC BLOOD PRESSURE: 143 MMHG | RESPIRATION RATE: 16 BRPM | OXYGEN SATURATION: 95 % | DIASTOLIC BLOOD PRESSURE: 79 MMHG

## 2025-05-01 DIAGNOSIS — Z01.818 PRE-OP EXAM: ICD-10-CM

## 2025-05-01 DIAGNOSIS — N28.89 RENAL MASS: Primary | ICD-10-CM

## 2025-05-01 LAB
HGB BLD-MCNC: 14.8 G/DL (ref 13.3–17.7)
INR PPP: 1.06 (ref 0.85–1.15)
PLATELET # BLD AUTO: 187 10E3/UL (ref 150–450)
PROTHROMBIN TIME: 14 SECONDS (ref 11.8–14.8)

## 2025-05-01 PROCEDURE — 85610 PROTHROMBIN TIME: CPT | Performed by: RADIOLOGY

## 2025-05-01 PROCEDURE — 85049 AUTOMATED PLATELET COUNT: CPT | Performed by: RADIOLOGY

## 2025-05-01 PROCEDURE — 85018 HEMOGLOBIN: CPT | Performed by: RADIOLOGY

## 2025-05-01 PROCEDURE — 36415 COLL VENOUS BLD VENIPUNCTURE: CPT | Performed by: RADIOLOGY

## 2025-05-01 PROCEDURE — 250N000011 HC RX IP 250 OP 636: Mod: JZ | Performed by: RADIOLOGY

## 2025-05-01 PROCEDURE — 272N000431 CT RENAL BIOPSY PERCUTANEOUS

## 2025-05-01 RX ORDER — FLUMAZENIL 0.1 MG/ML
0.2 INJECTION, SOLUTION INTRAVENOUS
Status: ACTIVE | OUTPATIENT
Start: 2025-05-01

## 2025-05-01 RX ORDER — NALOXONE HYDROCHLORIDE 0.4 MG/ML
0.2 INJECTION, SOLUTION INTRAMUSCULAR; INTRAVENOUS; SUBCUTANEOUS
Status: ACTIVE | OUTPATIENT
Start: 2025-05-01

## 2025-05-01 RX ORDER — NALOXONE HYDROCHLORIDE 0.4 MG/ML
0.4 INJECTION, SOLUTION INTRAMUSCULAR; INTRAVENOUS; SUBCUTANEOUS
Status: ACTIVE | OUTPATIENT
Start: 2025-05-01

## 2025-05-01 RX ORDER — FENTANYL CITRATE 50 UG/ML
25-50 INJECTION, SOLUTION INTRAMUSCULAR; INTRAVENOUS EVERY 5 MIN PRN
Status: ACTIVE | OUTPATIENT
Start: 2025-05-01

## 2025-05-01 RX ADMIN — FENTANYL CITRATE 25 MCG: 50 INJECTION, SOLUTION INTRAMUSCULAR; INTRAVENOUS at 09:23

## 2025-05-01 RX ADMIN — MIDAZOLAM HYDROCHLORIDE 1 MG: 1 INJECTION, SOLUTION INTRAMUSCULAR; INTRAVENOUS at 08:46

## 2025-05-01 RX ADMIN — MIDAZOLAM HYDROCHLORIDE 0.5 MG: 1 INJECTION, SOLUTION INTRAMUSCULAR; INTRAVENOUS at 08:54

## 2025-05-01 RX ADMIN — FENTANYL CITRATE 50 MCG: 50 INJECTION, SOLUTION INTRAMUSCULAR; INTRAVENOUS at 08:48

## 2025-05-01 NOTE — PROVIDER NOTIFICATION
Patient verbalized understanding of discharge teaching. Site flat, soft, clean anddry. Ambulatory at discharge.

## 2025-05-01 NOTE — IR NOTE
Patient Name: Michael Jimenez  Medical Record Number: [id number]  Today's Date: 5/1/2025    Procedure: biopsy  Proceduralist: Fahrner    Procedure Start: 0848  Procedure end: 0925  Sedation medications administered: 1.5 mg midazolam and 75 mcg fentanyl   Sedation time: 37 minutes

## 2025-05-01 NOTE — PRE-PROCEDURE
GENERAL PRE-PROCEDURE:   Procedure:  Imaging guided left kidney biopsy with moderate sedation  Date/Time:  5/1/2025 8:15 AM    Verbal consent obtained?: Yes    Written consent obtained?: Yes    Risks and benefits: Risks, benefits and alternatives were discussed    DC Plan: Appropriate discharge home plan in place for patients who are going home after procedure   Consent given by:  Patient  Patient states understanding of procedure being performed: Yes    Patient's understanding of procedure matches consent: Yes    Procedure consent matches procedure scheduled: Yes    Expected level of sedation:  Moderate  Appropriately NPO:  Yes  ASA Class:  1  Mallampati  :  Grade 2- soft palate, base of uvula, tonsillar pillars, and portion of posterior pharyngeal wall visible  Lungs:  Lungs clear with good breath sounds bilaterally  Heart:  Systolic murmur and RRR  History & Physical reviewed:  History and physical reviewed and no updates needed  Statement of review:  I have reviewed the lab findings, diagnostic data, medications, and the plan for sedation

## 2025-05-01 NOTE — PROCEDURES
[facility]    Procedure: CT-guided left kidney biopsy with moderate sedation    Date/Time: 5/1/2025 9:28 AM    Performed by: Fahrner, Lester, MD  Authorized by: Fahrner, Lester, MD  IR Fellow Physician:    Pre Procedure Diagnosis: Renal mass  Post Procedure Diagnosis: Renal mass    UNIVERSAL PROTOCOL   Site Marked: Yes  Prior Images Obtained and Reviewed:  Yes  Required items: Required blood products, implants, devices and special equipment available    Patient identity confirmed:  Verbally with patient and provided demographic data  Patient was reevaluated immediately before administering moderate or deep sedation or anesthesia  Confirmation Checklist:  Patient's identity using two indicators, relevant allergies, procedure was appropriate and matched the consent or emergent situation and correct equipment/implants were available  Time out: Immediately prior to the procedure a time out was called    Universal Protocol: the Joint Commission Universal Protocol was followed    Preparation: Patient was prepped and draped in usual sterile fashion    ESBL (mL):  0     ANESTHESIA    Anesthesia:  Local infiltration  Local Anesthetic:  Lidocaine 1% without epinephrine  Anesthetic Total (mL):  7      SEDATION  Patient Sedated: Yes    Sedation Type:  Moderate (conscious) sedation  Sedation:  Fentanyl, midazolam and see MAR for details  Vital signs: Vital signs monitored during sedation    See dictated procedure note for full details.  Findings: See CT report for details.    Specimens: core needle biopsy specimens sent for pathological analysis    Procedural Complications: None    Condition: Stable      PROCEDURE    Patient Tolerance:  Patient tolerated the procedure well with no immediate complications  Length of time physician/provider present for 1:1 monitoring during sedation:  23-37 min

## 2025-05-07 ENCOUNTER — OFFICE VISIT (OUTPATIENT)
Dept: UROLOGY | Facility: CLINIC | Age: 65
End: 2025-05-07
Payer: COMMERCIAL

## 2025-05-07 ENCOUNTER — THERAPY VISIT (OUTPATIENT)
Age: 65
End: 2025-05-07
Attending: PHYSICIAN ASSISTANT
Payer: COMMERCIAL

## 2025-05-07 VITALS — TEMPERATURE: 97 F | SYSTOLIC BLOOD PRESSURE: 105 MMHG | HEART RATE: 81 BPM | DIASTOLIC BLOOD PRESSURE: 67 MMHG

## 2025-05-07 DIAGNOSIS — C64.2 RENAL CELL CARCINOMA, LEFT (H): Primary | ICD-10-CM

## 2025-05-07 DIAGNOSIS — G89.29 CHRONIC LOW BACK PAIN: Primary | ICD-10-CM

## 2025-05-07 DIAGNOSIS — M54.50 CHRONIC LOW BACK PAIN: Primary | ICD-10-CM

## 2025-05-07 PROCEDURE — 99214 OFFICE O/P EST MOD 30 MIN: CPT | Performed by: STUDENT IN AN ORGANIZED HEALTH CARE EDUCATION/TRAINING PROGRAM

## 2025-05-07 PROCEDURE — 3078F DIAST BP <80 MM HG: CPT | Performed by: STUDENT IN AN ORGANIZED HEALTH CARE EDUCATION/TRAINING PROGRAM

## 2025-05-07 PROCEDURE — 3074F SYST BP LT 130 MM HG: CPT | Performed by: STUDENT IN AN ORGANIZED HEALTH CARE EDUCATION/TRAINING PROGRAM

## 2025-05-07 PROCEDURE — 1125F AMNT PAIN NOTED PAIN PRSNT: CPT | Performed by: STUDENT IN AN ORGANIZED HEALTH CARE EDUCATION/TRAINING PROGRAM

## 2025-05-07 PROCEDURE — 97110 THERAPEUTIC EXERCISES: CPT | Mod: GP | Performed by: PHYSICAL THERAPIST

## 2025-05-07 RX ORDER — ACETAMINOPHEN 325 MG/1
975 TABLET ORAL ONCE
OUTPATIENT
Start: 2025-05-07 | End: 2025-05-07

## 2025-05-07 RX ORDER — ACETAMINOPHEN 650 MG/1
650 SUPPOSITORY RECTAL ONCE
OUTPATIENT
Start: 2025-05-07

## 2025-05-07 RX ORDER — HEPARIN SODIUM 5000 [USP'U]/.5ML
5000 INJECTION, SOLUTION INTRAVENOUS; SUBCUTANEOUS
OUTPATIENT
Start: 2025-05-07

## 2025-05-07 ASSESSMENT — PAIN SCALES - GENERAL: PAINLEVEL_OUTOF10: MILD PAIN (3)

## 2025-05-07 NOTE — PROGRESS NOTES
CHIEF COMPLAINT   It was my pleasure to see Michael Jimenez who is a 64 year old male for follow-up of left renal mass with gross hematuria.      HPI:  Michael Jimenez is a 64 year old male being seen for follow-up following kidney biopsy.  Duration of problem: 4 months  Previous treatments: Ureteroscopy x 2, renal biopsy      Reviewed previous notes   Michael is back after renal biopsy to discuss the results  He did not have any significant issues after the ureteroscopy and stent placement  Concerned about the delay with the surgery    Exam:  /67 (BP Location: Right arm, Patient Position: Sitting, Cuff Size: Adult Large)   Pulse 81   Temp 97  F (36.1  C) (Temporal)   General: age-appropriate appearing male in NAD sitting in an exam chair  Resp: no respiratory distress  CV: heart rate regular  Abdomen: Degree of obesity is mild. Abdomen is soft and nontender. No organomegaly.   : not performed  Neuro: grossly non focal. Normal reflexes  Motor: excellent strength throughout    Review of Imaging:  The following imaging exams were independently viewed and interpreted by me and discussed with patient:  Narrative & Impression   EXAM:  1. LEFT RENAL BIOPSY PERCUTANEOUS  2. CT-GUIDANCE  3. CONSCIOUS SEDATION  LOCATION: [facility]  DATE: 5/1/2025     INDICATION: Left renal mass  TECHNIQUE: Dose reduction techniques were used.     PROCEDURE: Informed consent obtained. Time out performed. The patient was placed into prone position. Right/Left flank was prepped and draped in sterile fashion. In addition to moderate sedation, 10 mL of 1% Xylocaine was infused into the local soft   tissues. Under CT guidance, a 17 gauge guiding needle was placed into the renal cortex. Coaxially, an 18 gauge needle was used to make 8 core biopsies. Tissue submitted to  pathology     No complications.     SEDATION: Versed 1.5 mg. Fentanyl 75 mcg. The procedure was performed with administration intravenous conscious  sedation with appropriate preoperative, intraoperative, and postoperative evaluation.     37 minutes of supervised face to face conscious sedation time was provided by a radiology nurse under my direct supervision.                                                                      IMPRESSION:  1.  Successful CT-guided renal mass biopsy with moderate sedation.       Review of Labs:  The following labs were reviewed by me and discussed with the patient:  Surgical pathology:                        Final Diagnosis   LEFT RENAL MASS, CT-GUIDED NEEDLE BIOPSY:  - RENAL CELL CARCINOMA, CLEAR-CELL TYPE  - JADEN NUCLEAR GRADE 1 OUT OF 4  - PLEASE SEE COMMENT      Electronically signed by Ray Jones MD on 5/2/2025 at  5:28 PM   Comment  [facility]   The combined morphologic and immunophenotypic features of this lesion are most consistent with renal cell carcinoma, clear-cell type.  Suggest correlation with clinical and radiographic findings.   Clinical Information  [facility]   Left renal mass.   Gross Description  NAP   A(A). Kidney, Left, left renal mass:  Biopsy was performed under CT guidance by Dr. Fahrner with 8 pass(es) from which:                  6 Air-dried smear(s)  1 vial with 8 core(s) in 10% Formalin                Specimen in 10% Formalin at 0900 on 5/1/25  1 core block(s) are prepared at [facility].     Assisted by:  RODRIGUEZ Aleman     GROSS DESCRIPTION:  The specimen consists of a 1.0 x 0.7 x 0.1 cm aggregate of tan-pink, irregular and hemorrhagic soft tissue fragments that are filtered and entirely submitted in cassette A1.       IMMEDIATE CYTOLOGIC EVALUATION (CORE IMPRINTS):  The tissue is marked and is adequate as per Dr. Jones on 5/1/2025.   YONY Guzman      Microscopic Description  [facility]   Diff-Quik stained touch imprint slides show predominantly blood, but there are rare fragments of tissue composed mainly of spindle cells with elongate, cigar-shaped or oval nuclei containing finely  granular chromatin and inconspicuous nucleoli.  A rare capillary is identified, and it is characterized by a parallel arrangement of cells with elongate nuclei, that most likely represent endothelial cells.  A small amount of fat is discernible on the touch imprint slides.  H&E-stained core biopsy sections show fragments of partially hemorrhagic renal parenchyma with areas of fibrosis and hyalinization.  Several atrophic tubules are identified.  One-to-two diminutive extraneous fragments of peripheral nerve are identified.  In addition, there is a portion of renal parenchyma that is effaced by proliferating atypical epithelial cells with abundant clear cytoplasm; there is an associated delicate network of anastomosing capillaries in the background.  In order to better characterize the abnormal epithelial cells with clear cytoplasm, as well as the other cellular elements, immunohistochemical stains are performed.  The results are as follows:     PAX8: Strongly positive  Cytokeratin-7: Negative in the vast majority of tumor cells; few atypical renal tubular cells are positive  CD10: Positive  Vimentin: Positive  Carbonic anhydrase: Positive  Smooth muscle actin: Positive in smooth muscle cells within fragments of fibromuscular stroma  CD34: Positive in endothelial cells within capillary network  Ki-67: No significant increase in proliferation index (3+ out of 3 nuclear staining, 1% of tumor cells)          Assessment & Plan     Renal cell carcinoma, left (H)  Based on the biopsy results this appears to be renal cell carcinoma grade 1  I discussed that based on this the treatment options would be partial/radical nephrectomy  There was some concern about collecting system invasion on the first CT however, the ureteroscopy did not reveal anything abnormal.  There is still a possibility that it may be involving the perihilar fat and therefore the options for us would be to either do a repeat CT with renal mass protocol or  to proceed ahead with planning to perform robot-assisted partial nephrectomy with ultrasound guidance with a possibility that a radical nephrectomy may be possible.  Based on the discussions we had he wanted to proceed ahead with surgery as he has had a long wait already for this  I did discuss with him that there is a significant probability that he may end up needing a radical nephrectomy which he is agreeable to at this time  I discussed in detail the procedure, hospital stay, recovery, follow-up and likely issues arising out of the surgery  We specifically discussed bleeding, need for transfusion, urinary tract infections wound infections chest infections, DVT, pulmonary embolism, use of heparin during and after surgery, ileus, injury to bowels large blood vessels as well as spleen and the need for intervention during the main procedure itself  I did explain to him that long-term outcomes are good in the situations.  The tumor is locally confined and he is not going to need any chemotherapy for renal cell carcinoma at this current stage and location  I will have him scheduled for the surgery on the next available date  He is agreeable to this plan  - Case Request: NEPHRECTOMY, PARTIAL, ROBOT-ASSISTED, LAPAROSCOPIC, USING DA GERBER XI, POSSIBLE RADICAL NEPHRECTOMY, INTRAOPERATIVE ULTRASOUND; Standing  - Case Request: NEPHRECTOMY, PARTIAL, ROBOT-ASSISTED, LAPAROSCOPIC, USING DA GERBER XI, POSSIBLE RADICAL NEPHRECTOMY, INTRAOPERATIVE ULTRASOUND      Tavo Smith MD  [facility]      ==========================    Additional Billing and Coding Information:  Review of external notes as documented above   Review of the result(s) of each unique test - surgical pathology                30 minutes spent by me on the date of the encounter doing chart review, review of test results, interpretation of tests, patient visit, and documentation     ==========================

## 2025-05-13 ENCOUNTER — THERAPY VISIT (OUTPATIENT)
Age: 65
End: 2025-05-13
Attending: PHYSICIAN ASSISTANT
Payer: COMMERCIAL

## 2025-05-13 ENCOUNTER — TELEPHONE (OUTPATIENT)
Dept: UROLOGY | Facility: CLINIC | Age: 65
End: 2025-05-13

## 2025-05-13 DIAGNOSIS — M54.50 CHRONIC LOW BACK PAIN: Primary | ICD-10-CM

## 2025-05-13 DIAGNOSIS — G89.29 CHRONIC LOW BACK PAIN: Primary | ICD-10-CM

## 2025-05-13 PROCEDURE — 97110 THERAPEUTIC EXERCISES: CPT | Mod: GP | Performed by: PHYSICAL THERAPIST

## 2025-05-13 PROCEDURE — 97112 NEUROMUSCULAR REEDUCATION: CPT | Mod: GP | Performed by: PHYSICAL THERAPIST

## 2025-05-13 NOTE — TELEPHONE ENCOUNTER
I told the pt I'm assuming he should wait at least 4 weeks for any surgery with less than general anesthesia post nephrectomy, 6 weeks for general anesthesia. I let him know that I am not sure, though, so I will get back to him once Dr. Smith answers by end of the week.

## 2025-05-13 NOTE — PROGRESS NOTES
05/13/25 0500   Appointment Info   Signing clinician's name / credentials Virginia Mcintosh, PT DPT   Total/Authorized Visits 8   Visits Used 6   Medical Diagnosis Spondylosis of lumbar region without myelopathy or radiculopathy (M47.816)  - Primary   PT Tx Diagnosis Chronic low back pain   Other pertinent information 6 sessions PT required pt reports for insurance   Progress Note/Certification   Start of Care Date 04/07/25   Onset of illness/injury or Date of Surgery 02/12/25  (date of MD order)   Therapy Frequency 1x/week   Predicted Duration 8 weeks   Certification date from 04/07/25   Certification date to 06/02/25   GOALS   PT Goals 2   PT Goal 1   Goal Identifier 1   Goal Description Pt will be able to stand > 10 minutes without exacerbation of symptoms   Rationale to maximize safety and independence within the community;to maximize safety and independence within the home;to maximize safety and independence with performance of ADLs and functional tasks;to maximize safety and independence with self cares   Goal Progress LBP increased after 10-15 minutes   Target Date 06/02/25   PT Goal 2   Goal Identifier 2   Goal Description Pt will be able to walk > 10 minutes without exacerbation of symptoms   Rationale to maximize safety and independence within the community;to maximize safety and independence within the home   Goal Progress cont limitations to walking   Target Date 06/02/25   Subjective Report   Subjective Report Pt notes that he is having increased back  pain today after planting flowers. Knee and shoulder also sore and plannning follow up with MD regarding these. Is scheduld to have surgrey 5/30 for kidney. Will be on lifting restrictions after this.   Objective Measures   Objective Measures Objective Measure 1   Treatment Interventions (PT)   Interventions Therapeutic Procedure/Exercise;Neuromuscular Re-education   Therapeutic Procedure/Exercise   Therapeutic Procedures: strength, endurance, ROM,  "flexibility minutes (62829) 30   Ther Proc 1 Nustep   Ther Proc 1 - Details Level 5 x 6 minutes - discuss benefits of activity and connsidrations for community options   Ther Proc 2 HEP   Ther Proc 2 - Details Comprehensive review of HEP, parameters, duration and frequncy and pt in agreement with plan   Ther Proc 3 Leg press   Ther Proc 3 - Details 66# x 15 reps   Ther Proc 4 Shoulder extension   Ther Proc 4 - Details GTB (RTB @ home)   Ther Proc 5 ROW   Ther Proc 5 - Details GTB (RTB @ home)   Ther Proc 6 Hip ext standing   Ther Proc 6 - Details cueing upright trunk   PTRx Ther Proc 1 Hip abduction   PTRx Ther Proc 1 - Details cueing upright trunk   PTRx Ther Proc 2 Forward counter flexion stretch   PTRx Ther Proc 5 Standing Hip Flexor Stretch   PTRx Ther Proc 5 - Details 3 x 10 second holds   Skilled Intervention HEP, improve mobility, gentle strengthening   Patient Response/Progress demonstrates well. I with current HEP and in agreement with plan   Neuromuscular Re-education   Neuromuscular re-ed of mvmt, balance, coord, kinesthetic sense, posture, proprioception minutes (19057) 8   Neuromuscular Re-education Neuro Re-ed 3   Neuro Re-ed 1 narrow and modified tandem stance. EO and with head turns horizontal   Neuro Re-ed 1 - Details HEP   Neuro Re-ed 2 Toe taps   Neuro Re-ed 2 - Details 6\" surface, HEP   Neuro Re-ed 3 Tandem stance   Neuro Re-ed 3 - Details Tandem ambulation   Skilled Intervention balance/stability   Patient Response/Progress demo well, no LOB   Plan   Home program printed ptrx   Updates to plan of care dc to hep   Total Session Time   Timed Code Treatment Minutes 38   Total Treatment Time (sum of timed and untimed services) 38         DISCHARGE  Reason for Discharge: No further expectation of progress.  Change in medical status - upcoming surgery.     Equipment Issued: na     Discharge Plan: Patient to continue home program.    Referring Provider:  Duyen Schafer    "

## 2025-05-14 ENCOUNTER — TELEPHONE (OUTPATIENT)
Dept: FAMILY MEDICINE | Facility: CLINIC | Age: 65
End: 2025-05-14
Payer: COMMERCIAL

## 2025-05-14 NOTE — TELEPHONE ENCOUNTER
Patient Quality Outreach    Patient is due for the following:   Physical Annual Wellness Visit    Action(s) Taken:   Schedule a Annual Wellness Visit    Type of outreach:    Sent [employer] message.    Questions for provider review:    None         Nicolasa Hernandez MA  Chart routed to None.

## 2025-05-23 ENCOUNTER — RESULTS FOLLOW-UP (OUTPATIENT)
Dept: FAMILY MEDICINE | Facility: CLINIC | Age: 65
End: 2025-05-23

## 2025-05-23 RX ORDER — TAMSULOSIN HYDROCHLORIDE 0.4 MG/1
0.4 CAPSULE ORAL DAILY
COMMUNITY

## 2025-05-23 NOTE — PROGRESS NOTES
PTA medications updated by Medication Scribe prior to surgery via phone call with patient (last doses completed by Nurse)     Medication history sources: Patient, Surescripts, and H&P  In the past week, patient estimated taking medication this percent of the time: Greater than 90%      Significant changes made to the medication list:  Patient reports no longer taking the following meds (med scribe removed from PTA med list): ASA, oxycodone, Senokot-S, Zepbound      Additional medication history information:   None    Medication reconciliation completed by provider prior to medication history? No    Time spent in this activity: 30 minutes    The information provided in this note is only as accurate as the sources available at the time of update(s)      Prior to Admission medications    Medication Sig Last Dose Taking? Auth Provider Long Term End Date   acetaminophen (TYLENOL) 650 MG CR tablet Take 1 tablet (650 mg) by mouth every 6 hours as needed for mild pain or fever. Unknown Yes Anthony Sanchez MD No    Glucosamine Sulfate 1000 MG TABS Take 1,000 mg by mouth daily  5/22/2025 Morning Yes Reported, Patient     lisinopril (ZESTRIL) 20 MG tablet Take 1 tablet (20 mg) by mouth daily. Morning Yes Vee Bhatti APRN CNP Yes    Multiple Vitamin (MULTIVITAMIN ADULT PO) Take 1 tablet by mouth daily  5/22/2025 Morning Yes Reported, Patient     rosuvastatin (CRESTOR) 20 MG tablet Take 1 tablet (20 mg) by mouth daily. Morning Yes Vee Bhatti APRN CNP Yes    Semaglutide-Weight Management (WEGOVY) 2.4 MG/0.75ML pen Inject 2.4 mg subcutaneously once a week. (Thursdays) 5/22/2025 Morning Yes Reported, Patient     tamsulosin (FLOMAX) 0.4 MG capsule Take 0.4 mg by mouth daily. Morning Yes Reported, Patient     zinc gluconate 50 MG tablet Take 50 mg by mouth daily 5/22/2025 Morning Yes Reported, Patient     BETA BLOCKER NOT PRESCRIBED (INTENTIONAL) Please choose reason not prescribed from choices  below.  Patient not taking: Reported on 5/23/2025   Viridiana Cruz, TOREY CNP Yes        Medication history completed by: Iveth Hoffman LPN

## 2025-05-24 ENCOUNTER — HEALTH MAINTENANCE LETTER (OUTPATIENT)
Age: 65
End: 2025-05-24

## 2025-05-27 ENCOUNTER — RESULTS FOLLOW-UP (OUTPATIENT)
Dept: UROLOGY | Facility: CLINIC | Age: 65
End: 2025-05-27

## 2025-05-30 ENCOUNTER — HOSPITAL ENCOUNTER (INPATIENT)
Facility: CLINIC | Age: 65
LOS: 1 days | Discharge: HOME | DRG: 657 | End: 2025-05-31
Attending: STUDENT IN AN ORGANIZED HEALTH CARE EDUCATION/TRAINING PROGRAM | Admitting: STUDENT IN AN ORGANIZED HEALTH CARE EDUCATION/TRAINING PROGRAM
Payer: COMMERCIAL

## 2025-05-30 DIAGNOSIS — N28.89 RENAL MASS: Primary | ICD-10-CM

## 2025-05-30 LAB
ABO + RH BLD: NORMAL
ANION GAP SERPL CALCULATED.3IONS-SCNC: 9 MMOL/L (ref 7–15)
BLD GP AB SCN SERPL QL: NEGATIVE
BUN SERPL-MCNC: 26.4 MG/DL (ref 8–23)
CALCIUM SERPL-MCNC: 8.8 MG/DL (ref 8.8–10.4)
CHLORIDE SERPL-SCNC: 107 MMOL/L (ref 98–107)
CREAT SERPL-MCNC: 1.28 MG/DL (ref 0.67–1.17)
EGFRCR SERPLBLD CKD-EPI 2021: 62 ML/MIN/1.73M2
GLUCOSE SERPL-MCNC: 125 MG/DL (ref 70–99)
HCO3 SERPL-SCNC: 23 MMOL/L (ref 22–29)
HGB BLD-MCNC: 15.4 G/DL (ref 13.3–17.7)
MCV RBC AUTO: 90 FL (ref 78–100)
POTASSIUM SERPL-SCNC: 5.2 MMOL/L (ref 3.4–5.3)
SODIUM SERPL-SCNC: 139 MMOL/L (ref 135–145)
SPECIMEN EXP DATE BLD: NORMAL

## 2025-05-30 PROCEDURE — 250N000011 HC RX IP 250 OP 636: Performed by: STUDENT IN AN ORGANIZED HEALTH CARE EDUCATION/TRAINING PROGRAM

## 2025-05-30 PROCEDURE — 370N000017 HC ANESTHESIA TECHNICAL FEE, PER MIN: Performed by: STUDENT IN AN ORGANIZED HEALTH CARE EDUCATION/TRAINING PROGRAM

## 2025-05-30 PROCEDURE — 120N000001 HC R&B MED SURG/OB

## 2025-05-30 PROCEDURE — 250N000011 HC RX IP 250 OP 636: Mod: JZ | Performed by: ANESTHESIOLOGY

## 2025-05-30 PROCEDURE — 36415 COLL VENOUS BLD VENIPUNCTURE: CPT | Performed by: STUDENT IN AN ORGANIZED HEALTH CARE EDUCATION/TRAINING PROGRAM

## 2025-05-30 PROCEDURE — 50545 LAPARO RADICAL NEPHRECTOMY: CPT | Mod: LT | Performed by: STUDENT IN AN ORGANIZED HEALTH CARE EDUCATION/TRAINING PROGRAM

## 2025-05-30 PROCEDURE — 258N000003 HC RX IP 258 OP 636: Performed by: ANESTHESIOLOGY

## 2025-05-30 PROCEDURE — 258N000003 HC RX IP 258 OP 636

## 2025-05-30 PROCEDURE — 272N000002 HC OR SUPPLY OTHER OPNP: Performed by: STUDENT IN AN ORGANIZED HEALTH CARE EDUCATION/TRAINING PROGRAM

## 2025-05-30 PROCEDURE — 250N000013 HC RX MED GY IP 250 OP 250 PS 637: Performed by: STUDENT IN AN ORGANIZED HEALTH CARE EDUCATION/TRAINING PROGRAM

## 2025-05-30 PROCEDURE — 710N000009 HC RECOVERY PHASE 1, LEVEL 1, PER MIN: Performed by: STUDENT IN AN ORGANIZED HEALTH CARE EDUCATION/TRAINING PROGRAM

## 2025-05-30 PROCEDURE — 85018 HEMOGLOBIN: CPT

## 2025-05-30 PROCEDURE — 250N000013 HC RX MED GY IP 250 OP 250 PS 637

## 2025-05-30 PROCEDURE — 258N000001 HC RX 258: Performed by: STUDENT IN AN ORGANIZED HEALTH CARE EDUCATION/TRAINING PROGRAM

## 2025-05-30 PROCEDURE — 250N000025 HC SEVOFLURANE, PER MIN: Performed by: STUDENT IN AN ORGANIZED HEALTH CARE EDUCATION/TRAINING PROGRAM

## 2025-05-30 PROCEDURE — 250N000005 HC OR RX SURGIFLO HEMOSTATIC MATRIX 10ML 199102S OPNP: Performed by: STUDENT IN AN ORGANIZED HEALTH CARE EDUCATION/TRAINING PROGRAM

## 2025-05-30 PROCEDURE — 250N000009 HC RX 250: Performed by: STUDENT IN AN ORGANIZED HEALTH CARE EDUCATION/TRAINING PROGRAM

## 2025-05-30 PROCEDURE — 360N000080 HC SURGERY LEVEL 7, PER MIN: Performed by: STUDENT IN AN ORGANIZED HEALTH CARE EDUCATION/TRAINING PROGRAM

## 2025-05-30 PROCEDURE — 76770 US EXAM ABDO BACK WALL COMP: CPT | Mod: 26 | Performed by: STUDENT IN AN ORGANIZED HEALTH CARE EDUCATION/TRAINING PROGRAM

## 2025-05-30 PROCEDURE — 82374 ASSAY BLOOD CARBON DIOXIDE: CPT

## 2025-05-30 PROCEDURE — 86850 RBC ANTIBODY SCREEN: CPT | Performed by: STUDENT IN AN ORGANIZED HEALTH CARE EDUCATION/TRAINING PROGRAM

## 2025-05-30 PROCEDURE — 272N000001 HC OR GENERAL SUPPLY STERILE: Performed by: STUDENT IN AN ORGANIZED HEALTH CARE EDUCATION/TRAINING PROGRAM

## 2025-05-30 PROCEDURE — 999N000141 HC STATISTIC PRE-PROCEDURE NURSING ASSESSMENT: Performed by: STUDENT IN AN ORGANIZED HEALTH CARE EDUCATION/TRAINING PROGRAM

## 2025-05-30 PROCEDURE — 88307 TISSUE EXAM BY PATHOLOGIST: CPT | Mod: TC | Performed by: STUDENT IN AN ORGANIZED HEALTH CARE EDUCATION/TRAINING PROGRAM

## 2025-05-30 RX ORDER — CEFAZOLIN SODIUM/WATER 2 G/20 ML
2 SYRINGE (ML) INTRAVENOUS SEE ADMIN INSTRUCTIONS
Status: DISCONTINUED | OUTPATIENT
Start: 2025-05-30 | End: 2025-05-30 | Stop reason: HOSPADM

## 2025-05-30 RX ORDER — MAGNESIUM HYDROXIDE 1200 MG/15ML
LIQUID ORAL PRN
Status: DISCONTINUED | OUTPATIENT
Start: 2025-05-30 | End: 2025-05-30 | Stop reason: HOSPADM

## 2025-05-30 RX ORDER — HYDROMORPHONE HCL IN WATER/PF 6 MG/30 ML
0.2 PATIENT CONTROLLED ANALGESIA SYRINGE INTRAVENOUS
Status: DISCONTINUED | OUTPATIENT
Start: 2025-05-30 | End: 2025-05-31 | Stop reason: HOSPADM

## 2025-05-30 RX ORDER — NALOXONE HYDROCHLORIDE 0.4 MG/ML
0.1 INJECTION, SOLUTION INTRAMUSCULAR; INTRAVENOUS; SUBCUTANEOUS
Status: DISCONTINUED | OUTPATIENT
Start: 2025-05-30 | End: 2025-05-30 | Stop reason: HOSPADM

## 2025-05-30 RX ORDER — FENTANYL CITRATE 0.05 MG/ML
25 INJECTION, SOLUTION INTRAMUSCULAR; INTRAVENOUS EVERY 5 MIN PRN
Status: DISCONTINUED | OUTPATIENT
Start: 2025-05-30 | End: 2025-05-30 | Stop reason: HOSPADM

## 2025-05-30 RX ORDER — OXYCODONE HYDROCHLORIDE 5 MG/1
10 TABLET ORAL EVERY 4 HOURS PRN
Status: DISCONTINUED | OUTPATIENT
Start: 2025-05-30 | End: 2025-05-31 | Stop reason: HOSPADM

## 2025-05-30 RX ORDER — TAMSULOSIN HYDROCHLORIDE 0.4 MG/1
0.4 CAPSULE ORAL DAILY
Status: DISCONTINUED | OUTPATIENT
Start: 2025-05-31 | End: 2025-05-31 | Stop reason: HOSPADM

## 2025-05-30 RX ORDER — FENTANYL CITRATE 0.05 MG/ML
50 INJECTION, SOLUTION INTRAMUSCULAR; INTRAVENOUS EVERY 5 MIN PRN
Status: DISCONTINUED | OUTPATIENT
Start: 2025-05-30 | End: 2025-05-30 | Stop reason: HOSPADM

## 2025-05-30 RX ORDER — NALOXONE HYDROCHLORIDE 0.4 MG/ML
0.2 INJECTION, SOLUTION INTRAMUSCULAR; INTRAVENOUS; SUBCUTANEOUS
Status: DISCONTINUED | OUTPATIENT
Start: 2025-05-30 | End: 2025-05-31 | Stop reason: HOSPADM

## 2025-05-30 RX ORDER — ONDANSETRON 2 MG/ML
4 INJECTION INTRAMUSCULAR; INTRAVENOUS EVERY 6 HOURS PRN
Status: DISCONTINUED | OUTPATIENT
Start: 2025-05-30 | End: 2025-05-31 | Stop reason: HOSPADM

## 2025-05-30 RX ORDER — SODIUM CHLORIDE, SODIUM LACTATE, POTASSIUM CHLORIDE, CALCIUM CHLORIDE 600; 310; 30; 20 MG/100ML; MG/100ML; MG/100ML; MG/100ML
INJECTION, SOLUTION INTRAVENOUS CONTINUOUS
Status: DISCONTINUED | OUTPATIENT
Start: 2025-05-30 | End: 2025-05-30 | Stop reason: HOSPADM

## 2025-05-30 RX ORDER — ONDANSETRON 4 MG/1
4 TABLET, ORALLY DISINTEGRATING ORAL EVERY 6 HOURS PRN
Status: DISCONTINUED | OUTPATIENT
Start: 2025-05-30 | End: 2025-05-31 | Stop reason: HOSPADM

## 2025-05-30 RX ORDER — NALOXONE HYDROCHLORIDE 0.4 MG/ML
0.4 INJECTION, SOLUTION INTRAMUSCULAR; INTRAVENOUS; SUBCUTANEOUS
Status: DISCONTINUED | OUTPATIENT
Start: 2025-05-30 | End: 2025-05-31 | Stop reason: HOSPADM

## 2025-05-30 RX ORDER — HEPARIN SODIUM 5000 [USP'U]/.5ML
5000 INJECTION, SOLUTION INTRAVENOUS; SUBCUTANEOUS
Status: COMPLETED | OUTPATIENT
Start: 2025-05-30 | End: 2025-05-30

## 2025-05-30 RX ORDER — CALCIUM CARBONATE 500 MG/1
1000 TABLET, CHEWABLE ORAL 3 TIMES DAILY PRN
Status: DISCONTINUED | OUTPATIENT
Start: 2025-05-30 | End: 2025-05-31 | Stop reason: HOSPADM

## 2025-05-30 RX ORDER — AMOXICILLIN 250 MG
1 CAPSULE ORAL DAILY
Qty: 15 TABLET | Refills: 0 | Status: SHIPPED | OUTPATIENT
Start: 2025-05-30

## 2025-05-30 RX ORDER — DEXAMETHASONE SODIUM PHOSPHATE 4 MG/ML
4 INJECTION, SOLUTION INTRA-ARTICULAR; INTRALESIONAL; INTRAMUSCULAR; INTRAVENOUS; SOFT TISSUE
Status: DISCONTINUED | OUTPATIENT
Start: 2025-05-30 | End: 2025-05-30 | Stop reason: HOSPADM

## 2025-05-30 RX ORDER — AMOXICILLIN 250 MG
1 CAPSULE ORAL 2 TIMES DAILY
Status: DISCONTINUED | OUTPATIENT
Start: 2025-05-30 | End: 2025-05-31 | Stop reason: HOSPADM

## 2025-05-30 RX ORDER — HYDROMORPHONE HCL IN WATER/PF 6 MG/30 ML
0.4 PATIENT CONTROLLED ANALGESIA SYRINGE INTRAVENOUS EVERY 5 MIN PRN
Status: DISCONTINUED | OUTPATIENT
Start: 2025-05-30 | End: 2025-05-30 | Stop reason: HOSPADM

## 2025-05-30 RX ORDER — ONDANSETRON 4 MG/1
4 TABLET, ORALLY DISINTEGRATING ORAL EVERY 30 MIN PRN
Status: DISCONTINUED | OUTPATIENT
Start: 2025-05-30 | End: 2025-05-30 | Stop reason: HOSPADM

## 2025-05-30 RX ORDER — ONDANSETRON 2 MG/ML
4 INJECTION INTRAMUSCULAR; INTRAVENOUS EVERY 30 MIN PRN
Status: DISCONTINUED | OUTPATIENT
Start: 2025-05-30 | End: 2025-05-30 | Stop reason: HOSPADM

## 2025-05-30 RX ORDER — ROSUVASTATIN CALCIUM 20 MG/1
20 TABLET, COATED ORAL DAILY
Status: DISCONTINUED | OUTPATIENT
Start: 2025-05-31 | End: 2025-05-31 | Stop reason: HOSPADM

## 2025-05-30 RX ORDER — SODIUM CHLORIDE 9 MG/ML
INJECTION, SOLUTION INTRAVENOUS CONTINUOUS
Status: ACTIVE | OUTPATIENT
Start: 2025-05-30 | End: 2025-05-31

## 2025-05-30 RX ORDER — ACETAMINOPHEN 325 MG/1
975 TABLET ORAL ONCE
Status: COMPLETED | OUTPATIENT
Start: 2025-05-30 | End: 2025-05-30

## 2025-05-30 RX ORDER — SIMETHICONE 80 MG
80 TABLET,CHEWABLE ORAL 4 TIMES DAILY PRN
Status: DISCONTINUED | OUTPATIENT
Start: 2025-05-30 | End: 2025-05-31 | Stop reason: HOSPADM

## 2025-05-30 RX ORDER — FAMOTIDINE 20 MG/1
20 TABLET, FILM COATED ORAL 2 TIMES DAILY PRN
Status: DISCONTINUED | OUTPATIENT
Start: 2025-05-30 | End: 2025-05-31 | Stop reason: HOSPADM

## 2025-05-30 RX ORDER — ACETAMINOPHEN 325 MG/1
975 TABLET ORAL EVERY 8 HOURS
Status: DISCONTINUED | OUTPATIENT
Start: 2025-05-30 | End: 2025-05-31 | Stop reason: HOSPADM

## 2025-05-30 RX ORDER — CEFAZOLIN SODIUM/WATER 2 G/20 ML
2 SYRINGE (ML) INTRAVENOUS
Status: COMPLETED | OUTPATIENT
Start: 2025-05-30 | End: 2025-05-30

## 2025-05-30 RX ORDER — PROCHLORPERAZINE MALEATE 5 MG/1
5 TABLET ORAL EVERY 6 HOURS PRN
Status: DISCONTINUED | OUTPATIENT
Start: 2025-05-30 | End: 2025-05-31 | Stop reason: HOSPADM

## 2025-05-30 RX ORDER — HYDROMORPHONE HCL IN WATER/PF 6 MG/30 ML
0.2 PATIENT CONTROLLED ANALGESIA SYRINGE INTRAVENOUS EVERY 5 MIN PRN
Status: DISCONTINUED | OUTPATIENT
Start: 2025-05-30 | End: 2025-05-30 | Stop reason: HOSPADM

## 2025-05-30 RX ORDER — ACETAMINOPHEN 650 MG/1
650 SUPPOSITORY RECTAL ONCE
Status: COMPLETED | OUTPATIENT
Start: 2025-05-30 | End: 2025-05-30

## 2025-05-30 RX ORDER — POLYETHYLENE GLYCOL 3350 17 G/17G
17 POWDER, FOR SOLUTION ORAL DAILY
Status: DISCONTINUED | OUTPATIENT
Start: 2025-05-31 | End: 2025-05-31 | Stop reason: HOSPADM

## 2025-05-30 RX ORDER — LABETALOL HYDROCHLORIDE 5 MG/ML
20 INJECTION, SOLUTION INTRAVENOUS EVERY 6 HOURS PRN
Status: DISCONTINUED | OUTPATIENT
Start: 2025-05-30 | End: 2025-05-31 | Stop reason: HOSPADM

## 2025-05-30 RX ORDER — OXYCODONE HYDROCHLORIDE 5 MG/1
5 TABLET ORAL EVERY 6 HOURS PRN
Qty: 12 TABLET | Refills: 0 | Status: SHIPPED | OUTPATIENT
Start: 2025-05-30 | End: 2025-06-02

## 2025-05-30 RX ORDER — LIDOCAINE 40 MG/G
CREAM TOPICAL
Status: DISCONTINUED | OUTPATIENT
Start: 2025-05-30 | End: 2025-05-31 | Stop reason: HOSPADM

## 2025-05-30 RX ORDER — OXYCODONE HYDROCHLORIDE 5 MG/1
5 TABLET ORAL EVERY 4 HOURS PRN
Status: DISCONTINUED | OUTPATIENT
Start: 2025-05-30 | End: 2025-05-31 | Stop reason: HOSPADM

## 2025-05-30 RX ORDER — HYDROMORPHONE HCL IN WATER/PF 6 MG/30 ML
0.4 PATIENT CONTROLLED ANALGESIA SYRINGE INTRAVENOUS
Status: DISCONTINUED | OUTPATIENT
Start: 2025-05-30 | End: 2025-05-31 | Stop reason: HOSPADM

## 2025-05-30 RX ADMIN — SODIUM CHLORIDE, SODIUM LACTATE, POTASSIUM CHLORIDE, AND CALCIUM CHLORIDE: .6; .31; .03; .02 INJECTION, SOLUTION INTRAVENOUS at 16:27

## 2025-05-30 RX ADMIN — HYDROMORPHONE HYDROCHLORIDE 0.4 MG: 0.2 INJECTION, SOLUTION INTRAMUSCULAR; INTRAVENOUS; SUBCUTANEOUS at 17:41

## 2025-05-30 RX ADMIN — ACETAMINOPHEN 975 MG: 325 TABLET, FILM COATED ORAL at 20:16

## 2025-05-30 RX ADMIN — FENTANYL CITRATE 50 MCG: 50 INJECTION, SOLUTION INTRAMUSCULAR; INTRAVENOUS at 16:00

## 2025-05-30 RX ADMIN — FENTANYL CITRATE 50 MCG: 50 INJECTION, SOLUTION INTRAMUSCULAR; INTRAVENOUS at 15:55

## 2025-05-30 RX ADMIN — HEPARIN SODIUM 5000 UNITS: 5000 INJECTION, SOLUTION INTRAVENOUS; SUBCUTANEOUS at 11:03

## 2025-05-30 RX ADMIN — OXYCODONE HYDROCHLORIDE 5 MG: 5 TABLET ORAL at 20:16

## 2025-05-30 RX ADMIN — HYDROMORPHONE HYDROCHLORIDE 0.4 MG: 0.2 INJECTION, SOLUTION INTRAMUSCULAR; INTRAVENOUS; SUBCUTANEOUS at 16:08

## 2025-05-30 RX ADMIN — ACETAMINOPHEN 975 MG: 325 TABLET, FILM COATED ORAL at 11:02

## 2025-05-30 RX ADMIN — SENNOSIDES AND DOCUSATE SODIUM 1 TABLET: 50; 8.6 TABLET ORAL at 20:16

## 2025-05-30 RX ADMIN — SODIUM CHLORIDE: 0.9 INJECTION, SOLUTION INTRAVENOUS at 20:06

## 2025-05-30 RX ADMIN — HYDROMORPHONE HYDROCHLORIDE 0.4 MG: 0.2 INJECTION, SOLUTION INTRAMUSCULAR; INTRAVENOUS; SUBCUTANEOUS at 18:53

## 2025-05-30 ASSESSMENT — ACTIVITIES OF DAILY LIVING (ADL)
ADLS_ACUITY_SCORE: 34
ADLS_ACUITY_SCORE: 32
ADLS_ACUITY_SCORE: 32
ADLS_ACUITY_SCORE: 37
ADLS_ACUITY_SCORE: 32
ADLS_ACUITY_SCORE: 58
ADLS_ACUITY_SCORE: 34
ADLS_ACUITY_SCORE: 32
ADLS_ACUITY_SCORE: 37
ADLS_ACUITY_SCORE: 32
ADLS_ACUITY_SCORE: 34
ADLS_ACUITY_SCORE: 32
ADLS_ACUITY_SCORE: 37
ADLS_ACUITY_SCORE: 34

## 2025-05-30 NOTE — OP NOTE
DATE OF SURGERY: May 30, 2025    PREOPERATIVE DIAGNOSIS: Left Renal Mass     POSTOPERATIVE DIAGNOSIS: Same    PROCEDURES PERFORMED:   Left robot-assisted radical nephrectomy  Intraoperative ultrasound evaluation    STAFF : Tavo Smith MD  RESIDENT: Jordan Moreno MD    ANESTHESIA:  General  ESTIMATED BLOOD LOSS: 150 mL.     SPECIMEN:  LeftKidney     DRAINS AND TUBES: 16 Fr Petty catheter    SIGNIFICANT FINDINGS: Gross examination of the kidney showed renal mass without significant involvement of surrounding organs.    BRIEF OPERATIVE INDICATIONS: Michael Jimenez is a 64 year old year old male with a Left renal mass.  He had history of gross hematuria and was evaluated with ureteroscopy which was normal and a subsequent renal biopsy revealed a renal cell carcinoma therefore has been admitted for possible radical versus partial nephrectomy.  He has decided to proceed with treatment.  He is aware of the risks and benefits of the procedure as well as options such as observation and alternative surgical treatment.    OPERATIIVE DETAILS: Informed consent was obtained and the patient was brought to the operating room where general anesthesia was induced. He was given appropriate preoperative antibiotics. He was initially positioned modified flank position where He was prepped and draped in the standard sterile fashion.  We were careful to pad all pressure points.  We then performed a timeout, verifying the correct patient's site and procedure to be performed.     We began by inserting the Veress needle into the abdomen. After confirmatory drop test, the abdomen was insufflated. We then proceeded to place three 8 mm dilating robotic ports to triangulate the kidney. The 12mm airseal port was placed in the lower midline to allow for specimen extraction later.  We mobilized the colon from  the lateral aspect of the abdominal wall and reflect it medially. Gerota's fascia was then opened and the lower pole of the kidney  mobilized. The gonadal vessels were identified and initially preserved. The ureter was also identified and lifted. We then moved cephalad to the renal hilum. We then identified the following vessels:  Renal Vein: 1 and Renal Artery: 2.  There was a large lumbar vein which was obscuring the view of the renal artery and therefore this was stapled using 35 mm Endo KELBY staple with vascular load.  These were carefully dissected and freed from surrounding strctures.  After confirming adequate space for possible application of vascular clamps, we proceeded ahead with intraoperative ultrasound with laparoscopic ultrasound drop-down probes to evaluate the renal mass.  During evaluation it was seen that the mass was involving the renal sinus fat and therefore not ideal for proceeding ahead with partial nephrectomy.  We therefore decided to proceed ahead with radical nephrectomy we divided the renal artery with a 35 mm Endo-KELBY stapler.  The second renal artery was small and therefore divided between Hem-o-regan clips.  A second load of the Endo KELBY stapler was used for the vein. We then continued to march up towards the upper pole where the remaining attachments were divided using cautery.  We were able to spare the adrenal.  We then continued to divide the lateral attachments until the kidney was completely free. At this point it was placed into a 15 mm EndoCatch bag. Hemostasis was obtained. The specimen was extracted through a upper  midline incision after extending the AirSeal port and sent to pathology for permanent pathology.  The abdomen was irrigated. The  midline incision was closed with #1 PDS for the fascia. The skin incisions were closed with 4-0 Monocryl.  A combination of lidocaine and bupivacaine was used for local anesthesia.  The wounds were dressed with Dermabond. The patient was then awakened and taken to the PACU in stable condition.    PLAN:   Post operative stay on the hospital   Follow-up in clinic in 2  weeks for wound check and pathology review    Tavojaylyn Smith MD

## 2025-05-31 VITALS
TEMPERATURE: 97.7 F | HEART RATE: 68 BPM | BODY MASS INDEX: 32.47 KG/M2 | HEIGHT: 71 IN | OXYGEN SATURATION: 94 % | RESPIRATION RATE: 18 BRPM | WEIGHT: 231.9 LBS | DIASTOLIC BLOOD PRESSURE: 67 MMHG | SYSTOLIC BLOOD PRESSURE: 118 MMHG

## 2025-05-31 LAB
ANION GAP SERPL CALCULATED.3IONS-SCNC: 7 MMOL/L (ref 7–15)
BUN SERPL-MCNC: 27.1 MG/DL (ref 8–23)
CALCIUM SERPL-MCNC: 8.5 MG/DL (ref 8.8–10.4)
CHLORIDE SERPL-SCNC: 104 MMOL/L (ref 98–107)
CREAT SERPL-MCNC: 1.65 MG/DL (ref 0.67–1.17)
EGFRCR SERPLBLD CKD-EPI 2021: 46 ML/MIN/1.73M2
ERYTHROCYTE [DISTWIDTH] IN BLOOD BY AUTOMATED COUNT: 12.4 % (ref 10–15)
GLUCOSE SERPL-MCNC: 130 MG/DL (ref 70–99)
HCO3 SERPL-SCNC: 26 MMOL/L (ref 22–29)
HCT VFR BLD AUTO: 40.7 % (ref 40–53)
HGB BLD-MCNC: 14.1 G/DL (ref 13.3–17.7)
MCH RBC QN AUTO: 31.3 PG (ref 26.5–33)
MCHC RBC AUTO-ENTMCNC: 34.6 G/DL (ref 31.5–36.5)
MCV RBC AUTO: 90 FL (ref 78–100)
PLATELET # BLD AUTO: 187 10E3/UL (ref 150–450)
POTASSIUM SERPL-SCNC: 4.8 MMOL/L (ref 3.4–5.3)
RBC # BLD AUTO: 4.5 10E6/UL (ref 4.4–5.9)
SODIUM SERPL-SCNC: 137 MMOL/L (ref 135–145)
WBC # BLD AUTO: 14.7 10E3/UL (ref 4–11)

## 2025-05-31 PROCEDURE — 0TT14ZZ RESECTION OF LEFT KIDNEY, PERCUTANEOUS ENDOSCOPIC APPROACH: ICD-10-PCS | Performed by: STUDENT IN AN ORGANIZED HEALTH CARE EDUCATION/TRAINING PROGRAM

## 2025-05-31 PROCEDURE — 36415 COLL VENOUS BLD VENIPUNCTURE: CPT

## 2025-05-31 PROCEDURE — 250N000013 HC RX MED GY IP 250 OP 250 PS 637

## 2025-05-31 PROCEDURE — 8E0W4CZ ROBOTIC ASSISTED PROCEDURE OF TRUNK REGION, PERCUTANEOUS ENDOSCOPIC APPROACH: ICD-10-PCS | Performed by: STUDENT IN AN ORGANIZED HEALTH CARE EDUCATION/TRAINING PROGRAM

## 2025-05-31 PROCEDURE — 82374 ASSAY BLOOD CARBON DIOXIDE: CPT

## 2025-05-31 PROCEDURE — 85048 AUTOMATED LEUKOCYTE COUNT: CPT

## 2025-05-31 RX ADMIN — TAMSULOSIN HYDROCHLORIDE 0.4 MG: 0.4 CAPSULE ORAL at 10:30

## 2025-05-31 RX ADMIN — SENNOSIDES AND DOCUSATE SODIUM 1 TABLET: 50; 8.6 TABLET ORAL at 10:30

## 2025-05-31 RX ADMIN — ROSUVASTATIN CALCIUM 20 MG: 20 TABLET, FILM COATED ORAL at 10:30

## 2025-05-31 RX ADMIN — OXYCODONE HYDROCHLORIDE 10 MG: 5 TABLET ORAL at 02:15

## 2025-05-31 RX ADMIN — OXYCODONE HYDROCHLORIDE 5 MG: 5 TABLET ORAL at 14:18

## 2025-05-31 RX ADMIN — ACETAMINOPHEN 975 MG: 325 TABLET, FILM COATED ORAL at 10:30

## 2025-05-31 RX ADMIN — OXYCODONE HYDROCHLORIDE 10 MG: 5 TABLET ORAL at 06:46

## 2025-05-31 RX ADMIN — MELATONIN TAB 3 MG 3 MG: 3 TAB at 02:15

## 2025-05-31 RX ADMIN — ACETAMINOPHEN 975 MG: 325 TABLET, FILM COATED ORAL at 03:52

## 2025-05-31 ASSESSMENT — ACTIVITIES OF DAILY LIVING (ADL)
ADLS_ACUITY_SCORE: 35
ADLS_ACUITY_SCORE: 37
ADLS_ACUITY_SCORE: 35
ADLS_ACUITY_SCORE: 37
ADLS_ACUITY_SCORE: 35

## 2025-05-31 NOTE — DISCHARGE SUMMARY
Physician Discharge Summary     Patient ID:  Michael Jimenez  [id number]  64 year old  1960    Admit date: 5/30/2025    Discharge date and time: 5/31/2025     Admitting Physician: Tavo Smith MD     Discharge Physician: Gustavo Buchanan MD     Admission Diagnoses: Renal cell carcinoma, left (H) [C64.2]  Renal mass [N28.89]    Discharge Diagnoses: same    Admission Condition: good    Discharged Condition: good    Indication for Admission: Postoperative care for left nephrectomy    Hospital Course: Patient underwent uncomplicated robotic assisted left nephrectomy.  On postop day 1, pain is controlled with oral medication, tolerating p.o., ambulating.  Good urine output and labs without concern.    Consults: none    Significant Diagnostic Studies:    Latest Reference Range & Units 05/30/25 15:49 05/31/25 06:02   Sodium 135 - 145 mmol/L 139 137   Potassium 3.4 - 5.3 mmol/L 5.2 4.8   Chloride 98 - 107 mmol/L 107 104   Carbon Dioxide (CO2) 22 - 29 mmol/L 23 26   Urea Nitrogen 8.0 - 23.0 mg/dL 26.4 (H) 27.1 (H)   Creatinine 0.67 - 1.17 mg/dL 1.28 (H) 1.65 (H)   GFR Estimate >60 mL/min/1.73m2 62 46 (L)   Calcium 8.8 - 10.4 mg/dL 8.8 8.5 (L)   Anion Gap 7 - 15 mmol/L 9 7   Glucose 70 - 99 mg/dL 125 (H) 130 (H)   WBC 4.0 - 11.0 10e3/uL  14.7 (H)   Hemoglobin 13.3 - 17.7 g/dL 15.4 14.1   Hematocrit 40.0 - 53.0 %  40.7   Platelet Count 150 - 450 10e3/uL  187   (H): Data is abnormally high  (L): Data is abnormally low    Treatments: surgery: Left robotic assisted nephrectomy    Discharge Exam:  GENERAL APPEARANCE: healthy, alert and no distress  Abdomen: Incisions clean dry and intact, soft, nondistended, appropriate tenderness  Genitourinary: Clear yellow urine in urinary catheter      Disposition: home    Patient Instructions:   Current Discharge Medication List        START taking these medications    Details   oxyCODONE (ROXICODONE) 5 MG tablet Take 1 tablet (5 mg) by mouth every 6 hours as needed for pain.  Qty:  12 tablet, Refills: 0    Associated Diagnoses: Renal mass      senna-docusate (SENOKOT-S/PERICOLACE) 8.6-50 MG tablet Take 1 tablet by mouth daily.  Qty: 15 tablet, Refills: 0    Associated Diagnoses: Renal mass           CONTINUE these medications which have NOT CHANGED    Details   acetaminophen (TYLENOL) 650 MG CR tablet Take 1 tablet (650 mg) by mouth every 6 hours as needed for mild pain or fever.  Qty: 30 tablet, Refills: 0    Associated Diagnoses: Gross hematuria; Renal mass      Glucosamine Sulfate 1000 MG TABS Take 1,000 mg by mouth daily       lisinopril (ZESTRIL) 20 MG tablet Take 1 tablet (20 mg) by mouth daily.  Qty: 90 tablet, Refills: 3    Associated Diagnoses: Essential hypertension      Multiple Vitamin (MULTIVITAMIN ADULT PO) Take 1 tablet by mouth daily       rosuvastatin (CRESTOR) 20 MG tablet Take 1 tablet (20 mg) by mouth daily.  Qty: 90 tablet, Refills: 3    Associated Diagnoses: Coronary artery disease involving native coronary artery of native heart without angina pectoris      Semaglutide-Weight Management (WEGOVY) 2.4 MG/0.75ML pen Inject 2.4 mg subcutaneously once a week. (Thursdays)      tamsulosin (FLOMAX) 0.4 MG capsule Take 0.4 mg by mouth daily.      zinc gluconate 50 MG tablet Take 50 mg by mouth daily      BETA BLOCKER NOT PRESCRIBED (INTENTIONAL) Please choose reason not prescribed from choices below.           Activity: No lifting more than 10 pounds or strenuous exercise for 6 weeks  Diet: regular diet  Wound Care: Do not scrub incisions or submerge wounds for 2 weeks or until seen in follow-up. - If purple dermabond glue was used, avoid applying any lotions or ointments. - Leave incision open to air. Cover with gauze only if needed for comfort or to protect clothing from drainage. - The stitches do not need to be removed, they will dissolve on their own.    Follow-up with Dr. Smith on June 18    Signed:  Gustavo Buchanan MD  5/31/2025  10:33 AM

## 2025-05-31 NOTE — PLAN OF CARE
Goal Outcome Evaluation:      Plan of Care Reviewed With: patient    Overall Patient Progress: improvingOverall Patient Progress: improving    Shift: Arrived from PACU at 7p-7a.  Surgery/POD#: POD 1 from a robotic lap radical nephrectomy.  Behavior & Aggression: Green  Fall Risk: Yes  Orientation: A&O x4  ABNL VS/O2: VSS ex 1L NC - pt reports he is supposed to wear a CPAP at home but doesn't wear one; RA while awake  Tele: NA  ABNL Labs: Cr 1.28  Pain Management: scheduled tylenol, PRN oxy x3  Bowel/Bladder: vieira catheter, bowel sounds hypoactive, not passing flatus, no BM  Drains: vieira catheter  Lines: PIV infusing NS @ 50 ml/hr  Skin: abd laps x4, x1 mini ABELARDO with surgical glue  Diet: Regular, denies N/V  Activity Level: x1 gb  Number of times OUT OF BED this shift: x1 - walked in room  Tests/Procedures: NA  Anticipated  DC Date: pending   Significant Information:   Urology following. IS at the bedside. PCDs & capno on overnight. PRN melatonin given.

## 2025-06-02 ENCOUNTER — PATIENT OUTREACH (OUTPATIENT)
Dept: CARE COORDINATION | Facility: CLINIC | Age: 65
End: 2025-06-02
Payer: COMMERCIAL

## 2025-06-02 NOTE — PROGRESS NOTES
"  [facility]: Transitions of Care Outreach  Chief Complaint   Patient presents with    Clinic Care Coordination - Post Hospital       Most Recent Admission Date: 5/30/2025   Most Recent Admission Diagnosis: Renal cell carcinoma, left (H) - C64.2  Renal mass - N28.89     Most Recent Discharge Date: 5/31/2025   Most Recent Discharge Diagnosis: Renal mass - N28.89     Transitions of Care Assessment    Discharge Assessment  How are you doing now that you are home?: \" I am doing fine \"  How are your symptoms? (Red Flag symptoms escalate to triage hotline per guidelines): Improved  Do you know how to contact your clinic care team if you have future questions or changes to your health status? : Yes  Does the patient have their discharge instructions? : Yes  Does the patient have questions regarding their discharge instructions? : No  Were you started on any new medications or were there changes to any of your previous medications? : Yes  Does the patient have all of their medications?: Yes  Do you have questions regarding any of your medications? : No  Do you have all of your needed medical supplies or equipment (DME)?  (i.e. oxygen tank, CPAP, cane, etc.): Yes    Post-op ([facility] CTA Only)  If the patient had a surgery or procedure, do they have any questions for a nurse?: No         CCRC Explained and offered Care Coordination support to eligible patients: Yes    Patient accepted? No    Follow up Plan     Discharge Follow-Up  Discharge follow up appointment scheduled in alignment with recommended follow up timeframe or Transitions of Risk Category? (Low = within 30 days; Moderate= within 14 days; High= within 7 days): Yes  Discharge Follow Up Appointment Date: 06/16/25  Discharge Follow Up Appointment Scheduled with?: Specialty Care Provider    Future Appointments   Date Time Provider Department Center   6/18/2025  3:30 PM Tavo Smith MD [facility]   9/22/2025  3:30 PM Taye Mcallister MD [facility] " [facility]       Outpatient Plan as outlined on AVS reviewed with patient.    For any urgent concerns, please contact our 24 hour nurse triage line: [phone] ([phone])       FRANCO Moreno

## 2025-06-04 LAB
PATH REPORT.COMMENTS IMP SPEC: ABNORMAL
PATH REPORT.COMMENTS IMP SPEC: ABNORMAL
PATH REPORT.COMMENTS IMP SPEC: YES
PATH REPORT.FINAL DX SPEC: ABNORMAL
PATH REPORT.GROSS SPEC: ABNORMAL
PATH REPORT.MICROSCOPIC SPEC OTHER STN: ABNORMAL
PATH REPORT.RELEVANT HX SPEC: ABNORMAL
PATHOLOGY SYNOPTIC REPORT: ABNORMAL
PHOTO IMAGE: ABNORMAL

## 2025-06-04 PROCEDURE — 88307 TISSUE EXAM BY PATHOLOGIST: CPT | Mod: 26 | Performed by: PATHOLOGY

## 2025-06-05 ENCOUNTER — TELEPHONE (OUTPATIENT)
Dept: UROLOGY | Facility: CLINIC | Age: 65
End: 2025-06-05
Payer: COMMERCIAL

## 2025-06-05 NOTE — TELEPHONE ENCOUNTER
Urology Postop Phone Note:    Michael underwent surgery on 5/30    PROCEDURES PERFORMED:   Left robot-assisted radical nephrectomy  Intraoperative ultrasound evaluation     STAFF : Tavo Smith MD  RESIDENT: Jordan Moreno MD    Postop course:   -1 overnight in hospital. Discharged to home on 5/31.  -Follow-up in clinic in 2 weeks for wound check and pathology review   -Activity: No lifting more than 10 pounds or strenuous exercise for 6 weeks  Wound Care: Do not scrub incisions or submerge wounds for 2 weeks or until seen in follow-up. - If purple dermabond glue was used, avoid applying any lotions or ointments. - Leave incision open to air. Cover with gauze only if needed for comfort or to protect clothing from drainage. - The stitches do not need to be removed, they will dissolve on their own.    Postop Call Questions:  Fevers/chills: Afebrile. No chills or diaphoresis  Eating/drinking: Yes, adequate amounts. No N/V or any other issues.    Urine output: Voiding. Adequate amounts. Clear, yellow. No bleeding.   Drains: none  Incisions: edges approximated, cdi. No drainage or s/s of infection. Open to air. Dermabond    Bowel movement since surgery: Yes, no issues.    Pain: 3 out of 10. Pain goal: 3  Pain med(s): no longer taking oxycodone tylenol.     Follow up:  Follow up appointment scheduled on 6/18 at 3:30 pm with Dr. Smith at:  [facility]   [address] [place] [address], [address]  [place] [address]  [phone]  (Clinic is inside building that says [facility])      Thank you,  PASTOR MosquedaN, RN  Care Coordinator  [facility]  [facility]   [place] [address] [place]  My direct line: [phone]  Clinic/Schedulers: [phone]  Fax #: [phone]  Monday-Friday 8am- 4:15pm      After Hours:  If it is a night, weekend, or holiday call the Surgeon's Clinic phone number. Listen to the recording to get the number for on call Urology  "Resident.   Another option: Call the main hospital phone number where you had surgery and ask the  to page the \"urology resident on call\".    Typically, the on-call provider should return your call within 45 minutes. Please page the on-call provider again if you haven't been contacted as expected.    Rarely, the on-call provider will be unable to promptly return a call due to a hospital emergency.  If you have paged twice and are still not contacted, ask the hospital  to page the \"urology chief-resident on call\".  For emergencies, or if you cannot wait for a call back: call 911 or go to the Emergency Department      Wound Care:  - You may shower and get incisions wet starting 48 hrs after surgery, unless surgeon told you otherwise.   - Do not scrub incisions or submerge wounds (bath, pool, hot tub, etc.) until catheter is removed and wounds have fully healed, typically 4-6 weeks. Blot incisions dry with clean towel, do not rub.   - Remove wound dressing 48 hours after surgery if already not removed.   - Your incisions were closed with dissolvable suture that will not need to be removed.  Commonly, purple dermabond glue is applied over the top of the sutures.  Avoid using lotions or ointments on your incisions.    - Leave incisions open to air.  Cover with gauze only if needed for comfort or to protect clothing from drainage.          "

## 2025-06-18 ENCOUNTER — OFFICE VISIT (OUTPATIENT)
Dept: UROLOGY | Facility: CLINIC | Age: 65
End: 2025-06-18
Payer: COMMERCIAL

## 2025-06-18 DIAGNOSIS — C64.2 RENAL CELL CARCINOMA, LEFT (H): Primary | ICD-10-CM

## 2025-06-18 NOTE — PROGRESS NOTES
CHIEF COMPLAINT   It was my pleasure to see Michael Jimenez who is a 64 year old male for follow-up of left nephrectomy.      HPI:  Michael Jimenez is a 64 year old male being seen for postoperative follow-up.  Duration of problem: 2 weeks  Previous treatments: Left robot-assisted radical nephrectomy      Reviewed previous notes  Michael is doing well after the surgery  Denies any significant side effects      Exam:  There were no vitals taken for this visit.  General: age-appropriate appearing male in NAD sitting in an exam chair  Resp: no respiratory distress  CV: heart rate regular  Abdomen: Degree of obesity is mild. Abdomen is soft and nontender. No organomegaly.  Incisions healing well  : not performed  Neuro: grossly non focal. Normal reflexes  Motor: excellent strength throughout    Review of Imaging:  The following imaging exams were independently viewed and interpreted by me and discussed with patient:    Review of Labs:  The following labs were reviewed by me and discussed with the patient:  KIDNEY: Nephrectomy   8th Edition - Protocol posted: 6/19/2024KIDNEY: NEPHRECTOMY - All Specimens  SPECIMEN   Procedure  Radical nephrectomy   Specimen Laterality  Left   TUMOR   Tumor Focality  Unifocal   Tumor Site  Upper pole   Tumor Size  Greatest Dimension (Centimeters): 3.6 cm   Additional Dimension (Centimeters)  3.5 cm     2.4 cm   Histologic Type  Clear cell renal cell carcinoma   Histologic Grade (WHO / ISUP)  G2, nucleoli conspicuous and visible at 400x magnification, not prominent at 100x magnification   Tumor Extent  Extends into perinephric tissue (beyond renal capsule)     Extends into renal sinus   Histologic Features  Sarcomatoid or rhabdoid features not identified   Tumor Necrosis  Not identified   Lymphatic and / or Vascular Invasion  Present   MARGINS   Margin Status  All margins negative for invasive carcinoma   REGIONAL LYMPH NODES   Regional Lymph Node Status  Not applicable (no regional lymph nodes  submitted or found)   pTNM CLASSIFICATION (AJCC 8th Edition)   Reporting of pT, pN, and (when applicable) pM categories is based on information available to the pathologist at the time the report is issued. As per the AJCC (Chapter 1, 8th Ed.) it is the managing physician's responsibility to establish the final pathologic stage based upon all pertinent information, including but potentially not limited to this pathology report.   pT Category  pT3a   pN Category  pN not assigned (no nodes submitted or found)   ADDITIONAL FINDINGS   Additional Findings in Kidney  Cyst(s): Benign simple cyst   .       Assessment & Plan     Renal cell carcinoma, left (H)  Discussed in detail about the findings of the pathology and the recovery  We also talked about follow-up plan and the role of adjuvant immunotherapy  In view of 3A disease I recommended follow-up with medical oncology to discuss possible adjuvant pembrolizumab  I encouraged him to discuss this with the oncologist so that he can make a decision whether to pursue this or not  I just discussed briefly about likely benefits and risks associated with immunotherapy  I will also see him in 3 months with labs and imaging  - Adult Oncology/Hematology  Referral; Future  - CT Chest/Abdomen/Pelvis w Contrast; Future  - CBC with platelets [[id number]]; Future  - Basic metabolic panel [LAB15]; Future      Tavo Smith MD  [facility]      ==========================    Additional Billing and Coding Information:  Review of external notes as documented above   Review of the result(s) of each unique test - surgical pathology                15 minutes spent by me on the date of the encounter doing chart review, review of test results, interpretation of tests, patient visit, and documentation     ==========================

## 2025-06-19 ENCOUNTER — PATIENT OUTREACH (OUTPATIENT)
Dept: ONCOLOGY | Facility: CLINIC | Age: 65
End: 2025-06-19
Payer: COMMERCIAL

## 2025-06-19 NOTE — PROGRESS NOTES
New Patient Oncology Nurse Navigator Note     Referring provider:   Referred By    Provider Department Location Phone   Tavo Smith MD [facility] [phone]        Referred to (specialty): Medical Oncology     Date Referral Received:   6/18/25     Evaluation for :   Renal cell carcinoma, left (H)  Adjuvant Immunotherapy with Pembrolizumab     Clinical History (per Nurse review of records provided):    Patient is s/p  Left robot-assisted radical nephrectomy, 05/30/2025.  He is referred to medical oncology for discussion of role of adjuvant immunotherapy for pT3a  Disease.    Pertinent urology notes, pathology/labs & imaging bookmarked**      Records Location (Care Everywhere, Media, etc.):   Epic      Writer received referral and chart reviewed.    Referral forwarded on to new patient scheduling   with the following plan:    SCHEDULE: Med onc for kidney cancer @ pt choice of location, NEW, in person or video per template allowance, first available within 2-3 weeks    Jenae Marcano, RN, BSN  New Patient Oncology Nurse Navigator   [facility]  [phone]

## 2025-06-23 NOTE — TELEPHONE ENCOUNTER
RECORDS STATUS - ALL OTHER DIAGNOSIS      RECORDS RECEIVED FROM: [facility]   NOTES STATUS DETAILS   OFFICE NOTE from referring provider Epic 6/18/2025 - Dr. Tavo Smith   DISCHARGE SUMMARY from hospital [facility] 5/30/2025 - [facility]   4/23/2025 - [facility]   2/11/2025 - [facility]   DISCHARGE REPORT from the ER [facility] 2/26/2025 - [place] [facility]   OPERATIVE REPORT [facility] 5/30/2025 - ROBOT-ASSISTED, LAPAROSCOPIC, USING DA GERBER XI, RADICAL LEFT NEPHRECTOMY, INTRAOPERATIVE ULTRASOUND (Left: Pelvis)   4/23/2025 - LEFT CYSTOURETEROSCOPY, WITH RETROGRADE PYELOGRAM,LEFT URETERAL WASHINGS .LEFT URETERAL STENT PLACEMENT.ALDO LASER STANDBY. (Left: Ureter)   2/11/2025 - Cystoscopy, left retrograde pyelogram, left ureteroscopy with upper tract cytologic washings, indwelling ureteral stent placement, Interpretation of fluoroscopic imaging intraoperatively (Left: Urethra)    MEDICATION LIST [facility]    LABS     PATHOLOGY REPORTS [facility] 5/30/2025 - [id number]   5/1/2025 - [id number]   4/23/2025 - [id number]    ANYTHING RELATED TO DIAGNOSIS Epic 5/31/2025   IMAGING (NEED IMAGES & REPORT)     CT SCANS PACS CT Renal Bx: 5/1/2025  CT Abdomen Pelvis: 2/26/2025  CT Chest: 2/22/2025  CT Urogram: 1/21/2025

## 2025-07-02 ENCOUNTER — ONCOLOGY VISIT (OUTPATIENT)
Dept: ONCOLOGY | Facility: CLINIC | Age: 65
End: 2025-07-02
Attending: STUDENT IN AN ORGANIZED HEALTH CARE EDUCATION/TRAINING PROGRAM
Payer: COMMERCIAL

## 2025-07-02 ENCOUNTER — PRE VISIT (OUTPATIENT)
Dept: ONCOLOGY | Facility: CLINIC | Age: 65
End: 2025-07-02
Payer: COMMERCIAL

## 2025-07-02 VITALS
SYSTOLIC BLOOD PRESSURE: 117 MMHG | HEIGHT: 71 IN | RESPIRATION RATE: 16 BRPM | DIASTOLIC BLOOD PRESSURE: 64 MMHG | HEART RATE: 82 BPM | WEIGHT: 230 LBS | OXYGEN SATURATION: 95 % | TEMPERATURE: 98.1 F | BODY MASS INDEX: 32.2 KG/M2

## 2025-07-02 DIAGNOSIS — C64.2 RENAL CELL CARCINOMA, LEFT (H): Primary | ICD-10-CM

## 2025-07-02 PROCEDURE — 99205 OFFICE O/P NEW HI 60 MIN: CPT | Performed by: INTERNAL MEDICINE

## 2025-07-02 PROCEDURE — G2211 COMPLEX E/M VISIT ADD ON: HCPCS | Performed by: INTERNAL MEDICINE

## 2025-07-02 PROCEDURE — G0463 HOSPITAL OUTPT CLINIC VISIT: HCPCS | Performed by: INTERNAL MEDICINE

## 2025-07-02 RX ORDER — DIPHENHYDRAMINE HYDROCHLORIDE 50 MG/ML
25 INJECTION, SOLUTION INTRAMUSCULAR; INTRAVENOUS
Start: 2025-07-10

## 2025-07-02 RX ORDER — HEPARIN SODIUM (PORCINE) LOCK FLUSH IV SOLN 100 UNIT/ML 100 UNIT/ML
5 SOLUTION INTRAVENOUS
OUTPATIENT
Start: 2025-07-10

## 2025-07-02 RX ORDER — LORAZEPAM 2 MG/ML
0.5 INJECTION INTRAMUSCULAR EVERY 4 HOURS PRN
OUTPATIENT
Start: 2025-07-10

## 2025-07-02 RX ORDER — METHYLPREDNISOLONE SODIUM SUCCINATE 40 MG/ML
40 INJECTION INTRAMUSCULAR; INTRAVENOUS
Start: 2025-07-10

## 2025-07-02 RX ORDER — ALBUTEROL SULFATE 0.83 MG/ML
2.5 SOLUTION RESPIRATORY (INHALATION)
OUTPATIENT
Start: 2025-07-10

## 2025-07-02 RX ORDER — HEPARIN SODIUM,PORCINE 10 UNIT/ML
5-20 VIAL (ML) INTRAVENOUS DAILY PRN
OUTPATIENT
Start: 2025-07-10

## 2025-07-02 RX ORDER — DIPHENHYDRAMINE HYDROCHLORIDE 50 MG/ML
50 INJECTION, SOLUTION INTRAMUSCULAR; INTRAVENOUS
Start: 2025-07-10

## 2025-07-02 RX ORDER — EPINEPHRINE 1 MG/ML
0.3 INJECTION, SOLUTION, CONCENTRATE INTRAVENOUS EVERY 5 MIN PRN
OUTPATIENT
Start: 2025-07-10

## 2025-07-02 RX ORDER — MEPERIDINE HYDROCHLORIDE 25 MG/ML
25 INJECTION INTRAMUSCULAR; INTRAVENOUS; SUBCUTANEOUS
OUTPATIENT
Start: 2025-07-10

## 2025-07-02 RX ORDER — ALBUTEROL SULFATE 90 UG/1
1-2 INHALANT RESPIRATORY (INHALATION)
Start: 2025-07-10

## 2025-07-02 ASSESSMENT — PAIN SCALES - GENERAL: PAINLEVEL_OUTOF10: NO PAIN (0)

## 2025-07-02 NOTE — LETTER
"7/2/2025      Michael Jimenez  [address] [facility] [address]      Dear Colleague,    Thank you for referring your patient, Michael Jimenez, to the [facility]. Please see a copy of my visit note below.    Oncology Rooming Note    July 2, 2025 9:06 AM   Michael Jimenez is a 64 year old male who presents for:    Chief Complaint   Patient presents with     Oncology Clinic Visit     Kidney cancer - New consult     Initial Vitals: /64 (BP Location: Right arm, Patient Position: Sitting, Cuff Size: Adult Regular)   Pulse 82   Temp 98.1  F (36.7  C) (Tympanic)   Resp 16   Ht 1.791 m (5' 10.5\")   Wt 104.3 kg (230 lb)   SpO2 95%   BMI 32.54 kg/m   Estimated body mass index is 32.54 kg/m  as calculated from the following:    Height as of this encounter: 1.791 m (5' 10.5\").    Weight as of this encounter: 104.3 kg (230 lb). Body surface area is 2.28 meters squared.  No Pain (0) Comment: Data Unavailable   No LMP for male patient.  Allergies reviewed: Yes  Medications reviewed: Yes    Medications: Medication refills not needed today.  Pharmacy name entered into [employer]:    [facility] - [place], [place] - [address] [facility]    Frailty Screening:   Is the patient here for a new oncology consult visit in cancer care? 1. Yes. Over the past month, have you experienced difficulty or required a caregiver to assist with:   1. Balance, walking or general mobility (including any falls)? NO  2. Completion of self-care tasks such as bathing, dressing, toileting, grooming/hygiene?  NO  3. Concentration or memory that affects your daily life?  NO     PHQ9:  Did this patient require a PHQ9?: No      Clinical concerns:  New consult      Latasha Menendez [facility] Hematology and Oncology Consult Note    Patient: Michael Jimenez  MRN: [id number]  Date of Service: Jul 2, 2025       Reason for Visit    I was consulted by Tavo Smith MD for " consideration of adjuvant immunotherapy for renal cell carcinoma.      Encounter Diagnoses Assessment and Plan:    Problem List Items Addressed This Visit       Renal cell carcinoma, left (H) - Primary    Relevant Orders    Infusion Appointment Request - Adult    CBC with Platelets & Differential     Patient with newly excised renal cell carcinoma stage III (pT3a, pNx, cM0).  He is a candidate for adjuvant therapy with pembrolizumab.  Treatment plan has been entered and plan is for 1 year of therapy.  For  cycle 1 i and 2 treatment will be on a 3-week schedule.  If patient tolerates initial treatment then cycle length will be increased to every 6 weeks.  Revisit is planned for day 1 cycle 1 of pembrolizumab.      ______________________________________________________________________________    Staging History   Cancer Staging   Renal cell carcinoma, left (H)  Staging form: Kidney, AJCC 8th Edition  - Pathologic stage from 5/30/2025: Stage III (pT3a, pNX, cM0) - Signed by Friedell, Peter E, MD on 7/2/2025    ECOG Performance  0 - Independent, fully active, able to carry on all pre-disease performance without restriction.    History of Present Illness    Mr. Michael Jimenez is a 64 year old man with a left robotic assisted laparoscopic radical left nephrectomy on 5/30/2025.  He has a history of painless hematuria beginning in January 2025.  He has been maintaining his weight.  His appetite and digestion are normal.  He has not had chills or fever.  He has not had cough, chest pain or shortness of breath at rest.  He has no change in bowel or bladder habit.  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    Review of systems.  Pertinent Findings are included in the History of Present Illness    Physical Exam    /64 (BP Location: Right arm, Patient Position: Sitting, Cuff Size: Adult Regular)   Pulse 82   Temp 98.1  F (36.7  C) (Tympanic)   Resp 16   Ht 1.791 m (5'  "10.5\")   Wt 104.3 kg (230 lb)   SpO2 95%   BMI 32.54 kg/m       GENERAL APPEARANCE: Healthy appearing man in no apparent distress.  HEAD: Atraumatic; normocephalic; without lesions.  EYES: Conjunctiva, corneas and eyelids normal; pupils equal, round, reactive to light; No Icterus.  MOUTH/OROPHARYNX: Oral mucosa intact  NECK: Supple with no nodes.  LUNGS:  Clear to auscultation and percussion with no extra sounds.  HEART: Regular rhythm and rate; S1 and S2 normal; no murmurs noted.  ABDOMEN: Soft; no masses or tenderness, no hepatosplenomegaly.  NEUROLOGIC: Alert and oriented.  No obvious focal findings.  EXTREMITIES: No cyanosis, or edema.  SKIN: No abnormal bruising or bleeding. No suspicious lesions noted on exposed skin.  PSYCHIATRIC: Mental status normal; no apparent psychiatric issues    Medications:    Current Outpatient Medications   Medication Sig Dispense Refill     acetaminophen (TYLENOL) 650 MG CR tablet Take 1 tablet (650 mg) by mouth every 6 hours as needed for mild pain or fever. 30 tablet 0     Coenzyme Q10 (CO Q 10 PO) Take 200 mg by mouth daily.       Glucosamine Sulfate 1000 MG TABS Take 1,000 mg by mouth daily        IVERMECTIN PO Take by mouth.       lisinopril (ZESTRIL) 20 MG tablet Take 1 tablet (20 mg) by mouth daily. 90 tablet 3     Multiple Vitamin (MULTIVITAMIN ADULT PO) Take 1 tablet by mouth daily        rosuvastatin (CRESTOR) 20 MG tablet Take 1 tablet (20 mg) by mouth daily. 90 tablet 3     Semaglutide-Weight Management (WEGOVY) 2.4 MG/0.75ML pen Inject 2.4 mg subcutaneously once a week. (Thursdays)       zinc gluconate 50 MG tablet Take 50 mg by mouth daily       BETA BLOCKER NOT PRESCRIBED (INTENTIONAL) Please choose reason not prescribed from choices below. (Patient not taking: Reported on 8/14/2024)       senna-docusate (SENOKOT-S/PERICOLACE) 8.6-50 MG tablet Take 1 tablet by mouth daily. (Patient not taking: No sig reported) 15 tablet 0     tamsulosin (FLOMAX) 0.4 MG capsule " Take 0.4 mg by mouth daily. (Patient not taking: No sig reported)       No current facility-administered medications for this visit.     Facility-Administered Medications Ordered in Other Visits   Medication Dose Route Frequency Provider Last Rate Last Admin     sodium chloride (PF) 0.9% PF flush 10 mL  10 mL Intracatheter Once Can, MD Taye               Past History    Past Medical History:   Diagnosis Date     Aortic valve stenosis, etiology of cardiac valve disease unspecified      Arthritis      Congestive heart failure (H)      Coronary artery disease involving native coronary artery of native heart without angina pectoris      Heart murmur      Hyperlipidemia      Hypertension      Morbid obesity (H)      Obese      Prediabetes      Raynaud's phenomenon      Renal mass      Sleep apnea     doesn't use CPAP     Stage 3a chronic kidney disease (CKD) (H)      Ventricular tachycardia (H) 11/16/2023     Past Surgical History:   Procedure Laterality Date     ABDOMEN SURGERY       ARTHROPLASTY HIP Right 04/04/2022    Procedure: RIGHT TOTAL HIP ARTHROPLASTY;  Surgeon: Timothy Montana MD;  Location: [facility] OR     CHOLECYSTECTOMY       COLONOSCOPY N/A 05/28/2021    Procedure: COLONOSCOPY, WITH POLYPECTOMY AND BIOPSY;  Surgeon: Lito Sweet DO;  Location: [facility]     COMBINED CYSTOSCOPY, RETROGRADES, URETEROSCOPY, INSERT STENT Left 4/23/2025    Procedure: LEFT CYSTOURETEROSCOPY, WITH RETROGRADE PYELOGRAM,LEFT URETERAL WASHINGS .LEFT URETERAL STENT PLACEMENT.ALDO LASER STANDBY.;  Surgeon: Tavo Smith MD;  [facility]     CYSTOSCOPY, RETROGRADES, EXTRACT STONE, INSERT STENT, COMBINED Left 02/11/2025    Procedure: Cystoscopy, left retrograde pyelogram, left ureteroscopy with upper tract cytologic washings, indwelling ureteral stent placement, Interpretation of fluoroscopic imaging intraoperatively;  Surgeon: Anthony Sanchez MD;  Location: [facility] OR     DAVINCI NEPHRECTOMY  PARTIAL Left 2025    Procedure: ROBOT-ASSISTED, LAPAROSCOPIC, USING DA GERBER XI, RADICAL LEFT NEPHRECTOMY, INTRAOPERATIVE ULTRASOUND;  Surgeon: Tavo Smith MD;  Location: [facility] OR     EYE SURGERY       FINGER SURGERY       REVERSE ARTHROPLASTY SHOULDER Left 2024    Procedure: LEFT REVERSE TOTAL SHOULDER ARTHROPLASTY;  Surgeon: Timothy Montana MD;  Location: [facility] OR     S/P total knee arthroplasty, left 2024       No Known Allergies  Family History   Problem Relation Age of Onset     Heart Disease Father         emphasemia     Coronary Artery Disease Father      Emphysema Father      Asthma Father      Skin Cancer Mother      Social History     Socioeconomic History     Marital status:    Tobacco Use     Smoking status: Former     Current packs/day: 0.00     Average packs/day: 4.0 packs/day for 30.0 years (120.0 ttl pk-yrs)     Types: Cigarettes     Start date: 1965     Quit date: 1995     Years since quittin.5     Smokeless tobacco: Former     Quit date: 1975   Vaping Use     Vaping status: Never Used   Substance and Sexual Activity     Alcohol use: Yes     Comment: 3-4 drinks a week     Drug use: No     Sexual activity: Not Currently     Partners: Male     Birth control/protection: Male Surgical   Other Topics Concern     Parent/sibling w/ CABG, MI or angioplasty before 65F 55M? Yes     Social Drivers of Health     Financial Resource Strain: Low Risk  (2025)    Financial Resource Strain      Within the past 12 months, have you or your family members you live with been unable to get utilities (heat, electricity) when it was really needed?: No   Food Insecurity: Low Risk  (2025)    Food Insecurity      Within the past 12 months, did you worry that your food would run out before you got money to buy more?: No      Within the past 12 months, did the food you bought just not last and you didn t have money to get more?: No   Transportation Needs:  Low Risk  (5/30/2025)    Transportation Needs      Within the past 12 months, has lack of transportation kept you from medical appointments, getting your medicines, non-medical meetings or appointments, work, or from getting things that you need?: No    Received from [facility]    Social Connections   Interpersonal Safety: Low Risk  (5/30/2025)    Interpersonal Safety      Do you feel physically and emotionally safe where you currently live?: Yes      Within the past 12 months, have you been hit, slapped, kicked or otherwise physically hurt by someone?: No      Within the past 12 months, have you been humiliated or emotionally abused in other ways by your partner or ex-partner?: No   Housing Stability: Low Risk  (5/30/2025)    Housing Stability      Do you have housing? : Yes      Are you worried about losing your housing?: No           Lab Results    No results found for this or any previous visit (from the past 240 hours).    Imaging Results    No results found.    The informed consent process has occurred, as I have provided the patient with an explanation of their renal cell carcinoma diagnosis as well as the prognosis of their disease. I discussed the risks, benefits and alternatives to treatment. Risks which are common with this treatment may include, but are not limited to allergic reactions which may affect the lung colon and endocrine glands.. Less common risks with this treatment may include, but are not limited to kidney damage. The patient was given the opportunity to ask questions, and their questions were answered. The patient has consented to pembrolizumab as adjuvant therapy for renal cell carcinoma.      I spent 67 minutes on the patient's visit today.  This included preparation for the visit, face-to-face time with the patient and documentation following the visit.  It did not include teaching or procedure time.    Signed by: Peter E. Friedell, MD          Again,  thank you for allowing me to participate in the care of your patient.        Sincerely,        Peter E. Friedell, MD    Electronically signed

## 2025-07-02 NOTE — PROGRESS NOTES
[facility] Hematology and Oncology Consult Note    Patient: Michael Jimenez  MRN: [id number]  Date of Service: Jul 2, 2025       Reason for Visit    I was consulted by Tavo Smith MD for consideration of adjuvant immunotherapy for renal cell carcinoma.      Encounter Diagnoses Assessment and Plan:    Problem List Items Addressed This Visit       Renal cell carcinoma, left (H) - Primary    Relevant Orders    Infusion Appointment Request - Adult    CBC with Platelets & Differential     Patient with newly excised renal cell carcinoma stage III (pT3a, pNx, cM0).  He is a candidate for adjuvant therapy with pembrolizumab.  Treatment plan has been entered and plan is for 1 year of therapy.  For  cycle 1 i and 2 treatment will be on a 3-week schedule.  If patient tolerates initial treatment then cycle length will be increased to every 6 weeks.  Revisit is planned for day 1 cycle 1 of pembrolizumab.      ______________________________________________________________________________    Staging History   Cancer Staging   Renal cell carcinoma, left (H)  Staging form: Kidney, AJCC 8th Edition  - Pathologic stage from 5/30/2025: Stage III (pT3a, pNX, cM0) - Signed by Friedell, Peter E, MD on 7/2/2025    ECOG Performance  0 - Independent, fully active, able to carry on all pre-disease performance without restriction.    History of Present Illness    Mr. Michael Jimenez is a 64 year old man with a left robotic assisted laparoscopic radical left nephrectomy on 5/30/2025.  He has a history of painless hematuria beginning in January 2025.  He has been maintaining his weight.  His appetite and digestion are normal.  He has not had chills or fever.  He has not had cough, chest pain or shortness of breath at rest.  He has no change in bowel or bladder habit.  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    Review of systems.  Pertinent Findings are included in the History of  "Present Illness    Physical Exam    /64 (BP Location: Right arm, Patient Position: Sitting, Cuff Size: Adult Regular)   Pulse 82   Temp 98.1  F (36.7  C) (Tympanic)   Resp 16   Ht 1.791 m (5' 10.5\")   Wt 104.3 kg (230 lb)   SpO2 95%   BMI 32.54 kg/m       GENERAL APPEARANCE: Healthy appearing man in no apparent distress.  HEAD: Atraumatic; normocephalic; without lesions.  EYES: Conjunctiva, corneas and eyelids normal; pupils equal, round, reactive to light; No Icterus.  MOUTH/OROPHARYNX: Oral mucosa intact  NECK: Supple with no nodes.  LUNGS:  Clear to auscultation and percussion with no extra sounds.  HEART: Regular rhythm and rate; S1 and S2 normal; no murmurs noted.  ABDOMEN: Soft; no masses or tenderness, no hepatosplenomegaly.  NEUROLOGIC: Alert and oriented.  No obvious focal findings.  EXTREMITIES: No cyanosis, or edema.  SKIN: No abnormal bruising or bleeding. No suspicious lesions noted on exposed skin.  PSYCHIATRIC: Mental status normal; no apparent psychiatric issues    Medications:    Current Outpatient Medications   Medication Sig Dispense Refill    acetaminophen (TYLENOL) 650 MG CR tablet Take 1 tablet (650 mg) by mouth every 6 hours as needed for mild pain or fever. 30 tablet 0    Coenzyme Q10 (CO Q 10 PO) Take 200 mg by mouth daily.      Glucosamine Sulfate 1000 MG TABS Take 1,000 mg by mouth daily       IVERMECTIN PO Take by mouth.      lisinopril (ZESTRIL) 20 MG tablet Take 1 tablet (20 mg) by mouth daily. 90 tablet 3    Multiple Vitamin (MULTIVITAMIN ADULT PO) Take 1 tablet by mouth daily       rosuvastatin (CRESTOR) 20 MG tablet Take 1 tablet (20 mg) by mouth daily. 90 tablet 3    Semaglutide-Weight Management (WEGOVY) 2.4 MG/0.75ML pen Inject 2.4 mg subcutaneously once a week. (Thursdays)      zinc gluconate 50 MG tablet Take 50 mg by mouth daily      BETA BLOCKER NOT PRESCRIBED (INTENTIONAL) Please choose reason not prescribed from choices below. (Patient not taking: Reported on " 8/14/2024)      senna-docusate (SENOKOT-S/PERICOLACE) 8.6-50 MG tablet Take 1 tablet by mouth daily. (Patient not taking: No sig reported) 15 tablet 0    tamsulosin (FLOMAX) 0.4 MG capsule Take 0.4 mg by mouth daily. (Patient not taking: No sig reported)       No current facility-administered medications for this visit.     Facility-Administered Medications Ordered in Other Visits   Medication Dose Route Frequency Provider Last Rate Last Admin    sodium chloride (PF) 0.9% PF flush 10 mL  10 mL Intracatheter Once Taye Mcallister MD               Past History    Past Medical History:   Diagnosis Date    Aortic valve stenosis, etiology of cardiac valve disease unspecified     Arthritis     Congestive heart failure (H)     Coronary artery disease involving native coronary artery of native heart without angina pectoris     Heart murmur     Hyperlipidemia     Hypertension     Morbid obesity (H)     Obese     Prediabetes     Raynaud's phenomenon     Renal mass     Sleep apnea     doesn't use CPAP    Stage 3a chronic kidney disease (CKD) (H)     Ventricular tachycardia (H) 11/16/2023     Past Surgical History:   Procedure Laterality Date    ABDOMEN SURGERY      ARTHROPLASTY HIP Right 04/04/2022    Procedure: RIGHT TOTAL HIP ARTHROPLASTY;  Surgeon: Timothy Montana MD;  Location: [facility] OR    CHOLECYSTECTOMY      COLONOSCOPY N/A 05/28/2021    Procedure: COLONOSCOPY, WITH POLYPECTOMY AND BIOPSY;  Surgeon: Lito Sweet DO;  Location: [facility]    COMBINED CYSTOSCOPY, RETROGRADES, URETEROSCOPY, INSERT STENT Left 4/23/2025    Procedure: LEFT CYSTOURETEROSCOPY, WITH RETROGRADE PYELOGRAM,LEFT URETERAL WASHINGS .LEFT URETERAL STENT PLACEMENT.ALDO LASER STANDBY.;  Surgeon: Tavo Smith MD;  Location: [facility] OR    CYSTOSCOPY, RETROGRADES, EXTRACT STONE, INSERT STENT, COMBINED Left 02/11/2025    Procedure: Cystoscopy, left retrograde pyelogram, left ureteroscopy with upper tract cytologic washings,  indwelling ureteral stent placement, Interpretation of fluoroscopic imaging intraoperatively;  Surgeon: Anthony Sanchez MD;  Location: [facility] OR    DAVINCI NEPHRECTOMY PARTIAL Left 2025    Procedure: ROBOT-ASSISTED, LAPAROSCOPIC, USING DA GERBER XI, RADICAL LEFT NEPHRECTOMY, INTRAOPERATIVE ULTRASOUND;  Surgeon: Tavo Smith MD;  Location: [facility] OR    EYE SURGERY      FINGER SURGERY      REVERSE ARTHROPLASTY SHOULDER Left 2024    Procedure: LEFT REVERSE TOTAL SHOULDER ARTHROPLASTY;  Surgeon: Timothy Montana MD;  Location: [facility] OR    S/P total knee arthroplasty, left 2024       No Known Allergies  Family History   Problem Relation Age of Onset    Heart Disease Father         emphasemia    Coronary Artery Disease Father     Emphysema Father     Asthma Father     Skin Cancer Mother      Social History     Socioeconomic History    Marital status:    Tobacco Use    Smoking status: Former     Current packs/day: 0.00     Average packs/day: 4.0 packs/day for 30.0 years (120.0 ttl pk-yrs)     Types: Cigarettes     Start date: 1965     Quit date: 1995     Years since quittin.5    Smokeless tobacco: Former     Quit date: 1975   Vaping Use    Vaping status: Never Used   Substance and Sexual Activity    Alcohol use: Yes     Comment: 3-4 drinks a week    Drug use: No    Sexual activity: Not Currently     Partners: Male     Birth control/protection: Male Surgical   Other Topics Concern    Parent/sibling w/ CABG, MI or angioplasty before 65F 55M? Yes     Social Drivers of Health     Financial Resource Strain: Low Risk  (2025)    Financial Resource Strain     Within the past 12 months, have you or your family members you live with been unable to get utilities (heat, electricity) when it was really needed?: No   Food Insecurity: Low Risk  (2025)    Food Insecurity     Within the past 12 months, did you worry that your food would run out before you got money to buy  more?: No     Within the past 12 months, did the food you bought just not last and you didn t have money to get more?: No   Transportation Needs: Low Risk  (5/30/2025)    Transportation Needs     Within the past 12 months, has lack of transportation kept you from medical appointments, getting your medicines, non-medical meetings or appointments, work, or from getting things that you need?: No    Received from [facility]    Social Connections   Interpersonal Safety: Low Risk  (5/30/2025)    Interpersonal Safety     Do you feel physically and emotionally safe where you currently live?: Yes     Within the past 12 months, have you been hit, slapped, kicked or otherwise physically hurt by someone?: No     Within the past 12 months, have you been humiliated or emotionally abused in other ways by your partner or ex-partner?: No   Housing Stability: Low Risk  (5/30/2025)    Housing Stability     Do you have housing? : Yes     Are you worried about losing your housing?: No           Lab Results    No results found for this or any previous visit (from the past 240 hours).    Imaging Results    No results found.    The informed consent process has occurred, as I have provided the patient with an explanation of their renal cell carcinoma diagnosis as well as the prognosis of their disease. I discussed the risks, benefits and alternatives to treatment. Risks which are common with this treatment may include, but are not limited to allergic reactions which may affect the lung colon and endocrine glands.. Less common risks with this treatment may include, but are not limited to kidney damage. The patient was given the opportunity to ask questions, and their questions were answered. The patient has consented to pembrolizumab as adjuvant therapy for renal cell carcinoma.      I spent 67 minutes on the patient's visit today.  This included preparation for the visit, face-to-face time with the patient  and documentation following the visit.  It did not include teaching or procedure time.    Signed by: Peter E. Friedell, MD

## 2025-07-02 NOTE — PROGRESS NOTES
"Date:   Clinician: Marlon Ngo  Clinician NPI: [id number]  Patient: Francisca Shetty  Patient : 1989  Patient Address: [address] [place] [address], [place] [address]  Patient Phone: [phone]  Visit Protocol: URI  Patient Summary:  Francisca is a 28 year old ( : 1989 ) female who initiated a Visit for cold, sinus infection, or influenza. When asked the question \"Please sign me up to receive news, health information and promotions from [employer].\", Francisca responded \"No\".    Francisca states her symptoms started gradually 10-13 days ago. After her symptoms started, they improved and then got worse again.   Her symptoms consist of myalgia, a headache, rhinitis, tooth pain, a sore throat, facial pain or pressure, nasal congestion, enlarged lymph nodes, malaise, wheezing, ear pain, and a cough. Francisca also feels feverish.   Symptom Details     Nasal secretions: The color of her mucus is blood-tinged, green, and yellow.    Cough: Francisca coughs a few times an hour and her cough is more bothersome at night. Phlegm does not come into her throat when she coughs.     Sore throat: Francisca reports having moderate throat pain, does not have exudate on her tonsils, and is able to swallow liquids. The lymph nodes in her neck are enlarged. She states that rashes have not appeared on the skin since the sore throat started.     Temperature: Her current temperature is 99.3 degrees Fahrenheit.     Wheezing: Francisca has not ever been diagnosed with asthma. The wheezing does not interfere with her normal daily activities.    Facial pain or pressure: The facial pain or pressure feels worse when bending over or leaning forward.     Headache: She states the headache is moderate.     Tooth pain: The tooth pain is not caused by a cavity, recent dental work, or other mouth problems.      Francisca denies having chills and dyspnea. She also denies taking antibiotic medication for the symptoms and having recent facial or sinus " "Oncology Rooming Note    July 2, 2025 9:06 AM   Michael Jimenez is a 64 year old male who presents for:    Chief Complaint   Patient presents with    Oncology Clinic Visit     Kidney cancer - New consult     Initial Vitals: /64 (BP Location: Right arm, Patient Position: Sitting, Cuff Size: Adult Regular)   Pulse 82   Temp 98.1  F (36.7  C) (Tympanic)   Resp 16   Ht 1.791 m (5' 10.5\")   Wt 104.3 kg (230 lb)   SpO2 95%   BMI 32.54 kg/m   Estimated body mass index is 32.54 kg/m  as calculated from the following:    Height as of this encounter: 1.791 m (5' 10.5\").    Weight as of this encounter: 104.3 kg (230 lb). Body surface area is 2.28 meters squared.  No Pain (0) Comment: Data Unavailable   No LMP for male patient.  Allergies reviewed: Yes  Medications reviewed: Yes    Medications: Medication refills not needed today.  Pharmacy name entered into [employer]:    [facility] - [place], [place] - [address] AND [facility]    Frailty Screening:   Is the patient here for a new oncology consult visit in cancer care? 1. Yes. Over the past month, have you experienced difficulty or required a caregiver to assist with:   1. Balance, walking or general mobility (including any falls)? NO  2. Completion of self-care tasks such as bathing, dressing, toileting, grooming/hygiene?  NO  3. Concentration or memory that affects your daily life?  NO     PHQ9:  Did this patient require a PHQ9?: No      Clinical concerns:  New consult      Latasha Menendez CMA            " surgery in the past 60 days.   Within the past week, Francisca has not been exposed to someone with strep throat. She has not recently been exposed to someone with influenza. Francisca has been in close contact with the following high risk individuals: children under the age of 5, immunocompromised people, and people with asthma, heart disease or diabetes.   Francisca had 1 sinus infection within the past year.   Weight: 180 lbs   Francisca does not smoke or use smokeless tobacco.   She denies pregnancy and denies breastfeeding. She has menstruated in the past month.  MEDICATIONS:  Birth control pill, fluoxetine (Prozac, Sarafem), acetaminophen (Tylenol), oxymetazoline (Afrin, Dristan), ibuprofen (Advil, Motrin), and diphenhydramine (Benadryl)  , ALLERGIES:   Levaquin (levofloxacin)/Ciprofloxacin (Cipro) and penicillin/amoxicillin/augmentin    Clinician Response:  Dear Francisca,  Based on the information you have provided, you likely have  acute bacterial sinusitis, otherwise known as a sinus infection.  I am prescribing cefdinir (Omnicef). Take one capsule by mouth two times a day for 10 days. There are no refills with this prescription.   Sinus pressure occurs when the tissues lining your sinuses become swollen and inflamed. Afrin nasal spray decreases the swelling to provide the quickest and most effective relief from sinus pressure. Use oxymetazoline (Afrin, or store brand) nasal spray. Spray once in each nostril twice per day for a maximum of 3 days. Using this medication more frequently or longer than recommended may cause nasal congestion to reoccur or worsen. This is an over-the-counter medication you can find at most pharmacies.   I am also prescribing Flonase nasal spray. Flonase will also help reduce swelling in your sinuses, but it works differently than Afrin, so use both nasal sprays. Spray the Flonase twice (2 times) in each nostril every day at approximately the same time each day until you feel better. This  medication takes several days to start working, so keep taking it if it doesn't help right away.   Unless your are allergic to the over-the-counter medication(s) below, I recommend using:   Acetaminophen (Tylenol), which helps to reduce your discomfort and fever. Take 1-2 pills every 4-6 hours. This is an over-the-counter medication that does not require a prescription.   Ibuprofen. Take 1-3 tablets (200-600mg) every 8 hours to help with the discomfort. Make sure to take the ibuprofen with food. Do not exceed 2400mg in 24 hours. This is an over-the-counter medication that does not require a prescription.   Some people develop allergies to antibiotics. If you have significant swelling or difficulty breathing, stop the medication immediately and call 911 or go to an emergency room. If you notice a new rash, be seen at a clinic or urgent care.  Some women may also develop a yeast infection as a side effect of taking antibiotics. If you notice symptoms of a yeast infection, please use [facility] to get treatment.  If you become pregnant during this course of treatment, stop taking the medication and contact your primary care provider.  You will feel better faster if you take care of yourself by getting more rest and drinking plenty of liquids, especially water.  Remember to wash your hands often and stay home while you are sick to decrease the chance you will spread your infection to others.  Mild ear pain is a common symptom of an upper respiratory infection and should lessen gradually as other symptoms improve.  Try the following to help with your throat pain and discomfort:     Use throat lozenges    Gargle with warm salt water (1/4 teaspoon of salt per 8 ounce glass of water)    Suck on frozen items such as popsicles or ice cubes     Call 911 or go to the emergency room if you feel that your throat is closing off, you suddenly develop a rash, you are unable to swallow fluids, you are drooling, or you are having  difficulty breathing.   Diagnosis: Acute bacterial sinusitis  Diagnosis ICD: J01.90  Prescription: fluticasone propionate (Flonase) 50mcg nasal spray 16 gm, 30 days supply. Take one or two inhalations in each nostril one time a day. Refills: 0, Refill as needed: no, Allow substitutions: yes  Prescription: cefdinir (Omnicef) 300 mg oral capsule 20 capsules, 10 days supply. Take one capsule by mouth two times a day for 10 days. Refills: 0, Refill as needed: no, Allow substitutions: yes  Pharmacy: [place] [id number] [facility] [employer] - [phone] - [address] [place] [address]

## 2025-07-22 ENCOUNTER — APPOINTMENT (OUTPATIENT)
Dept: LAB | Facility: CLINIC | Age: 65
End: 2025-07-22
Attending: INTERNAL MEDICINE
Payer: COMMERCIAL

## 2025-07-22 ENCOUNTER — ONCOLOGY VISIT (OUTPATIENT)
Dept: ONCOLOGY | Facility: CLINIC | Age: 65
End: 2025-07-22
Attending: INTERNAL MEDICINE
Payer: COMMERCIAL

## 2025-07-22 ENCOUNTER — INFUSION THERAPY VISIT (OUTPATIENT)
Dept: INFUSION THERAPY | Facility: CLINIC | Age: 65
End: 2025-07-22
Attending: INTERNAL MEDICINE
Payer: COMMERCIAL

## 2025-07-22 ENCOUNTER — PATIENT OUTREACH (OUTPATIENT)
Dept: ONCOLOGY | Facility: CLINIC | Age: 65
End: 2025-07-22

## 2025-07-22 VITALS
SYSTOLIC BLOOD PRESSURE: 108 MMHG | OXYGEN SATURATION: 98 % | DIASTOLIC BLOOD PRESSURE: 77 MMHG | HEART RATE: 97 BPM | TEMPERATURE: 97.5 F

## 2025-07-22 DIAGNOSIS — N18.31 STAGE 3A CHRONIC KIDNEY DISEASE (H): ICD-10-CM

## 2025-07-22 DIAGNOSIS — C64.2 RENAL CELL CARCINOMA, LEFT (H): Primary | ICD-10-CM

## 2025-07-22 LAB
ALBUMIN SERPL BCG-MCNC: 4.5 G/DL (ref 3.5–5.2)
ALP SERPL-CCNC: 68 U/L (ref 40–150)
ALT SERPL W P-5'-P-CCNC: 21 U/L (ref 0–70)
ANION GAP SERPL CALCULATED.3IONS-SCNC: 10 MMOL/L (ref 7–15)
AST SERPL W P-5'-P-CCNC: 25 U/L (ref 0–45)
BASOPHILS # BLD AUTO: 0.1 10E3/UL (ref 0–0.2)
BASOPHILS NFR BLD AUTO: 1 %
BILIRUB SERPL-MCNC: 0.8 MG/DL
BUN SERPL-MCNC: 26.4 MG/DL (ref 8–23)
CALCIUM SERPL-MCNC: 10 MG/DL (ref 8.8–10.4)
CHLORIDE SERPL-SCNC: 104 MMOL/L (ref 98–107)
CREAT SERPL-MCNC: 1.7 MG/DL (ref 0.67–1.17)
EGFRCR SERPLBLD CKD-EPI 2021: 44 ML/MIN/1.73M2
EOSINOPHIL # BLD AUTO: 0.3 10E3/UL (ref 0–0.7)
EOSINOPHIL NFR BLD AUTO: 3 %
ERYTHROCYTE [DISTWIDTH] IN BLOOD BY AUTOMATED COUNT: 12.7 % (ref 10–15)
GLUCOSE SERPL-MCNC: 101 MG/DL (ref 70–99)
HCO3 SERPL-SCNC: 24 MMOL/L (ref 22–29)
HCT VFR BLD AUTO: 46.5 % (ref 40–53)
HGB BLD-MCNC: 15.7 G/DL (ref 13.3–17.7)
IMM GRANULOCYTES # BLD: 0.1 10E3/UL
IMM GRANULOCYTES NFR BLD: 0 %
LYMPHOCYTES # BLD AUTO: 1.4 10E3/UL (ref 0.8–5.3)
LYMPHOCYTES NFR BLD AUTO: 13 %
MCH RBC QN AUTO: 31 PG (ref 26.5–33)
MCHC RBC AUTO-ENTMCNC: 33.8 G/DL (ref 31.5–36.5)
MCV RBC AUTO: 92 FL (ref 78–100)
MONOCYTES # BLD AUTO: 1 10E3/UL (ref 0–1.3)
MONOCYTES NFR BLD AUTO: 9 %
NEUTROPHILS # BLD AUTO: 8.4 10E3/UL (ref 1.6–8.3)
NEUTROPHILS NFR BLD AUTO: 75 %
NRBC # BLD AUTO: 0 10E3/UL
NRBC BLD AUTO-RTO: 0 /100
PLATELET # BLD AUTO: 242 10E3/UL (ref 150–450)
POTASSIUM SERPL-SCNC: 4.8 MMOL/L (ref 3.4–5.3)
PROT SERPL-MCNC: 8.1 G/DL (ref 6.4–8.3)
RBC # BLD AUTO: 5.07 10E6/UL (ref 4.4–5.9)
SODIUM SERPL-SCNC: 138 MMOL/L (ref 135–145)
TSH SERPL DL<=0.005 MIU/L-ACNC: 2.82 UIU/ML (ref 0.3–4.2)
WBC # BLD AUTO: 11.1 10E3/UL (ref 4–11)

## 2025-07-22 PROCEDURE — 250N000011 HC RX IP 250 OP 636: Mod: JZ | Performed by: INTERNAL MEDICINE

## 2025-07-22 PROCEDURE — 258N000003 HC RX IP 258 OP 636: Performed by: INTERNAL MEDICINE

## 2025-07-22 PROCEDURE — 84443 ASSAY THYROID STIM HORMONE: CPT | Performed by: INTERNAL MEDICINE

## 2025-07-22 PROCEDURE — G0463 HOSPITAL OUTPT CLINIC VISIT: HCPCS | Mod: 25 | Performed by: NURSE PRACTITIONER

## 2025-07-22 PROCEDURE — 85025 COMPLETE CBC W/AUTO DIFF WBC: CPT | Performed by: INTERNAL MEDICINE

## 2025-07-22 PROCEDURE — 82247 BILIRUBIN TOTAL: CPT | Performed by: INTERNAL MEDICINE

## 2025-07-22 PROCEDURE — 36415 COLL VENOUS BLD VENIPUNCTURE: CPT | Performed by: INTERNAL MEDICINE

## 2025-07-22 PROCEDURE — 99024 POSTOP FOLLOW-UP VISIT: CPT | Performed by: NURSE PRACTITIONER

## 2025-07-22 PROCEDURE — 96413 CHEMO IV INFUSION 1 HR: CPT

## 2025-07-22 RX ADMIN — SODIUM CHLORIDE 250 ML: 0.9 INJECTION, SOLUTION INTRAVENOUS at 15:27

## 2025-07-22 RX ADMIN — SODIUM CHLORIDE 200 MG: 9 INJECTION, SOLUTION INTRAVENOUS at 15:24

## 2025-07-22 NOTE — PROGRESS NOTES
Infusion Nursing Note:  Michael Jimenez presents today for Keytruda.    Patient seen by provider today: Yes: NP Maxine Guzman therefore assessment deferred   present during visit today: Not Applicable.    Note: Creatinine 1.70 today, parameters for creat < 1.5x UNL, UNL would be 1.75, verified with NP Maxine Guzman that pt can still receive today, per NP Maxine Guzman OK to proceed today.      Intravenous Access:  Peripheral IV placed.    Treatment Conditions:  Lab Results   Component Value Date     07/22/2025    POTASSIUM 4.8 07/22/2025    CR 1.70 (H) 07/22/2025    SKYLER 10.0 07/22/2025    BILITOTAL 0.8 07/22/2025    ALBUMIN 4.5 07/22/2025    ALT 21 07/22/2025    AST 25 07/22/2025       Results reviewed, labs MET treatment parameters, ok to proceed with treatment.      Post Infusion Assessment:  Patient tolerated infusion without incident.  Blood return noted pre and post infusion.  Site patent and intact, free from redness, edema or discomfort.  No evidence of extravasations.  Access discontinued per protocol.       Discharge Plan:   Copy of AVS reviewed with patient and/or family.  Patient will return 8/12/25 for next appointment.  Patient discharged in stable condition accompanied by: self.  Departure Mode: Ambulatory.      PARRIS RICHARDSON RN

## 2025-07-22 NOTE — PROGRESS NOTES
[facility]: Cancer Care Plan of Care Education Note                                    Discussion with Patient:                                                      D1C1-Pembrolizumab    Medication understanding/side effect: Nausea/vomiting, diarrhea,     Nausea, Diarrhea (loose bowel movements), Constipation (not able to move bowels), Pain in your abdomen, Tiredness, Fever, Decreased appetite (decreased hunger), Changes in your thyroid function, Muscle and bone pain, Pain, Trouble breathing, Cough, Rash, Itching        Assessment:                                                      Assessment completed with:: Patient    Plan of Care Education   Yearly learning assessment completed?: Yes (see Education tab)  Diagnosis:: Renal cell carcinoma  Does patient understand diagnosis?: Yes  Tx plan/regimen:: Pembrolizumab  Does patient understand treatment plan/regimen?: Yes  Preparing for treatment:: Reviewed treatment preparation information with patient (vascular access, day of chemo, visitor policy, what to bring, etc.)  Vascular access education provided for:: Peripheral IV  Side effect education:: Diarrhea/Constipation, Nausea/Vomiting, Skin changes, Lab value monitoring (anemia, neutropenia, thrombocytopenia), Infection  Safety/self care at home reviewed with patient:: Yes  Coping - concerns/fears reviewed with patient:: Yes  Plan of Care:: Treatment schedule, Lab appointment  When to call provider:: Bleeding, Increased shortness of breath, New/worsening pain, Shaking chills, Uncontrolled nausea/vomiting, Uncontrolled diarrhea/constipation, Temperature >100.4F  Reasons for deferring treatment reviewed with patient:: No  Additional education provided for: : Dental clearance/precautions, Neutropenic precautions, Bleeding precautions    Evaluation of Learning  Patient Education Provided: Yes  Readiness:: Acceptance, Eager  Method:: Booklet/Handout, Literature  Response:: Verbalizes  understanding      Intervention/Education provided during outreach:                                                       Pt verbalized understanding of treatment and plan of care with no questions or concerns at this time.    Pt will return 8/12 for labs and D1C2    Signature:  Lise Givens RN

## 2025-07-22 NOTE — PROGRESS NOTES
[facility] Hematology and Oncology Outpatient Progress Note    Patient: Michael Jimenez  MRN: [id number]  Date of Service: Jul 22, 2025          Reason for Visit    Resected renal cell carcinoma    Primary Oncologist: Dr. 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 Recovering well.    Dr. Friedell recommended adjuvant pembrolizumab x 1 yr. Potential side effects related to immunotherapy and schedule reviewed. Pt has consented to proceed.     Labs: creatinine 1.7 (baseline post-nephrectomy), rest CMP WNL. WBC 11.1/ANC 8.4, rest CBC/diff WNL. TSH WNL.    Plan:  -Proceed with C1 pembro 200 mg every 3 weeks  -Return in 3 weeks for C2  -Following 2-3 cycles, if tolerating, will plan to transition to 400 mg every 6 weeks. Planning 1 yr of adjuvant immunotherapy  -Surveillance: Every 3-6 month CT cap. Will get baseline CT in September CKD, post-nephrectomy  Creatinine is 1.6-1.7 post-nephrectomy.     Plan:  -Stay well-hydrated  -Avoid nephrotoxic meds    ______________________________________________________________________________    History of Present Illness/ Interval History    Mr. Michael Jimenez  is a 64 year old with recently resected L renal cell cancer via lap nephrectomy. Dr. Friedell recommended adjuvant pembrolizumab x 1 yr. Returns to initiate C1.    He's recovering well from his surgery.  No pain. Eating/drinking well. No hematuria.      ECOG Performance    0      Oncology History/Treatment  Diagnosis/Stage:   5/2025: Stage III ([id number]) renal cell cancer (L)  -presented with painless hematuria   -CT cap/urogram: L midple renal neoplasm and masslike urothelial thickening L collecting system and renal pelvis with mild L hydronephrosis. No nodes  -5/1/2025 L renal biopsy: clear cell renal cell carcinoma, grade 1  -5/30/2025 surgical path: 3.6 cm grade 2 clear cell RCC; tumor focally invades perinephric  fat and extends into renal sinus. Margins negative.     Treatment:  5/30/2025: L robotic-assisted lap radical nephrectomy    7/22/2025: initiate adjuvant pembrolizumab (plan x 1 yr)      Physical Exam    GENERAL: Alert and oriented to time place and person. Seated comfortably. In no distress.  HEAD: Atraumatic and normocephalic. No alopecia.  EYES: TY, EOMI. No erythema. No icterus.  LYMPH NODES: No palpable supraclavicular, cervical lymphadenopathy.  CHEST: clear to auscultation bilaterally. Resonant to percussion throughout bilaterally. Symmetrical breath movements bilaterally.  CVS: RRR  ABDOMEN: Soft. Not tender. Not distended. No palpable hepatomegaly or splenomegaly. No other mass palpable. Bowel sounds present. Well-healed midline and lap incisions.  EXTREMITIES: Warm. No peripheral edema.  SKIN: no rash, or bruising or purpura.   NEURO: No gross deficit noted. Non-antalgic gait.      Lab Results    Recent Results (from the past week)   Comprehensive metabolic panel   Result Value Ref Range    Sodium 138 135 - 145 mmol/L    Potassium 4.8 3.4 - 5.3 mmol/L    Carbon Dioxide (CO2) 24 22 - 29 mmol/L    Anion Gap 10 7 - 15 mmol/L    Urea Nitrogen 26.4 (H) 8.0 - 23.0 mg/dL    Creatinine 1.70 (H) 0.67 - 1.17 mg/dL    GFR Estimate 44 (L) >60 mL/min/1.73m2    Calcium 10.0 8.8 - 10.4 mg/dL    Chloride 104 98 - 107 mmol/L    Glucose 101 (H) 70 - 99 mg/dL    Alkaline Phosphatase 68 40 - 150 U/L    AST 25 0 - 45 U/L    ALT 21 0 - 70 U/L    Protein Total 8.1 6.4 - 8.3 g/dL    Albumin 4.5 3.5 - 5.2 g/dL    Bilirubin Total 0.8 <=1.2 mg/dL   TSH with free T4 reflex   Result Value Ref Range    TSH 2.82 0.30 - 4.20 uIU/mL   CBC with platelets and differential   Result Value Ref Range    WBC Count 11.1 (H) 4.0 - 11.0 10e3/uL    RBC Count 5.07 4.40 - 5.90 10e6/uL    Hemoglobin 15.7 13.3 - 17.7 g/dL    Hematocrit 46.5 40.0 - 53.0 %    MCV 92 78 - 100 fL    MCH 31.0 26.5 - 33.0 pg    MCHC 33.8 31.5 - 36.5 g/dL    RDW 12.7 10.0 -  15.0 %    Platelet Count 242 150 - 450 10e3/uL    % Neutrophils 75 %    % Lymphocytes 13 %    % Monocytes 9 %    % Eosinophils 3 %    % Basophils 1 %    % Immature Granulocytes 0 %    NRBCs per 100 WBC 0 <1 /100    Absolute Neutrophils 8.4 (H) 1.6 - 8.3 10e3/uL    Absolute Lymphocytes 1.4 0.8 - 5.3 10e3/uL    Absolute Monocytes 1.0 0.0 - 1.3 10e3/uL    Absolute Eosinophils 0.3 0.0 - 0.7 10e3/uL    Absolute Basophils 0.1 0.0 - 0.2 10e3/uL    Absolute Immature Granulocytes 0.1 <=0.4 10e3/uL    Absolute NRBCs 0.0 10e3/uL       Imaging    No results found.    Billing  Total time 35 minutes, to include face to face visit, review of EMR, ordering, documentation and coordination of care on date of service   complexity modifier for longitudinal care.       Signed by: Maxine Guzman NP

## 2025-07-22 NOTE — LETTER
7/22/2025      Michael Jimenez  [address] [place] [id number]      Dear Colleague,    Thank you for referring your patient, Michael Jimenez, to the [facility]. Please see a copy of my visit note below.    [facility] Hematology and Oncology Outpatient Progress Note    Patient: Michael Jimenez  MRN: [id number]  Date of Service: Jul 22, 2025          Reason for Visit    Resected renal cell carcinoma    Primary Oncologist: Dr. Friedell       Assessment/Plan  Resected stage III ([id number]) renal cell carcinoma  Underwent resection with negative margins 7 weeks ago. Recovering well.    Dr. Friedell recommended adjuvant pembrolizumab x 1 yr. Potential side effects related to immunotherapy and schedule reviewed. Pt has consented to proceed.     Labs: creatinine 1.7 (baseline post-nephrectomy), rest CMP WNL. WBC 11.1/ANC 8.4, rest CBC/diff WNL. TSH WNL.    Plan:  -Proceed with C1 pembro 200 mg every 3 weeks  -Return in 3 weeks for C2  -Following 2-3 cycles, if tolerating, will plan to transition to 400 mg every 6 weeks. Planning 1 yr of adjuvant immunotherapy  -Surveillance: Every 3-6 month CT cap. Will get baseline CT in September CKD, post-nephrectomy  Creatinine is 1.6-1.7 post-nephrectomy.     Plan:  -Stay well-hydrated  -Avoid nephrotoxic meds    ______________________________________________________________________________    History of Present Illness/ Interval History    Mr. Michael Jimenez  is a 64 year old with recently resected L renal cell cancer via lap nephrectomy. Dr. Friedell recommended adjuvant pembrolizumab x 1 yr. Returns to initiate C1.    He's recovering well from his surgery.  No pain. Eating/drinking well. No hematuria.      ECOG Performance    0      Oncology History/Treatment  Diagnosis/Stage:   5/2025: Stage III ([id number]) renal cell cancer (L)  -presented with painless hematuria   -CT cap/urogram: L midple renal neoplasm and masslike urothelial thickening L  collecting system and renal pelvis with mild L hydronephrosis. No nodes  -5/1/2025 L renal biopsy: clear cell renal cell carcinoma, grade 1  -5/30/2025 surgical path: 3.6 cm grade 2 clear cell RCC; tumor focally invades perinephric fat and extends into renal sinus. Margins negative.     Treatment:  5/30/2025: L robotic-assisted lap radical nephrectomy    7/22/2025: initiate adjuvant pembrolizumab (plan x 1 yr)      Physical Exam    GENERAL: Alert and oriented to time place and person. Seated comfortably. In no distress.  HEAD: Atraumatic and normocephalic. No alopecia.  EYES: TY, EOMI. No erythema. No icterus.  LYMPH NODES: No palpable supraclavicular, cervical lymphadenopathy.  CHEST: clear to auscultation bilaterally. Resonant to percussion throughout bilaterally. Symmetrical breath movements bilaterally.  CVS: RRR  ABDOMEN: Soft. Not tender. Not distended. No palpable hepatomegaly or splenomegaly. No other mass palpable. Bowel sounds present. Well-healed midline and lap incisions.  EXTREMITIES: Warm. No peripheral edema.  SKIN: no rash, or bruising or purpura.   NEURO: No gross deficit noted. Non-antalgic gait.      Lab Results    Recent Results (from the past week)   Comprehensive metabolic panel   Result Value Ref Range    Sodium 138 135 - 145 mmol/L    Potassium 4.8 3.4 - 5.3 mmol/L    Carbon Dioxide (CO2) 24 22 - 29 mmol/L    Anion Gap 10 7 - 15 mmol/L    Urea Nitrogen 26.4 (H) 8.0 - 23.0 mg/dL    Creatinine 1.70 (H) 0.67 - 1.17 mg/dL    GFR Estimate 44 (L) >60 mL/min/1.73m2    Calcium 10.0 8.8 - 10.4 mg/dL    Chloride 104 98 - 107 mmol/L    Glucose 101 (H) 70 - 99 mg/dL    Alkaline Phosphatase 68 40 - 150 U/L    AST 25 0 - 45 U/L    ALT 21 0 - 70 U/L    Protein Total 8.1 6.4 - 8.3 g/dL    Albumin 4.5 3.5 - 5.2 g/dL    Bilirubin Total 0.8 <=1.2 mg/dL   TSH with free T4 reflex   Result Value Ref Range    TSH 2.82 0.30 - 4.20 uIU/mL   CBC with platelets and differential   Result Value Ref Range    WBC Count  11.1 (H) 4.0 - 11.0 10e3/uL    RBC Count 5.07 4.40 - 5.90 10e6/uL    Hemoglobin 15.7 13.3 - 17.7 g/dL    Hematocrit 46.5 40.0 - 53.0 %    MCV 92 78 - 100 fL    MCH 31.0 26.5 - 33.0 pg    MCHC 33.8 31.5 - 36.5 g/dL    RDW 12.7 10.0 - 15.0 %    Platelet Count 242 150 - 450 10e3/uL    % Neutrophils 75 %    % Lymphocytes 13 %    % Monocytes 9 %    % Eosinophils 3 %    % Basophils 1 %    % Immature Granulocytes 0 %    NRBCs per 100 WBC 0 <1 /100    Absolute Neutrophils 8.4 (H) 1.6 - 8.3 10e3/uL    Absolute Lymphocytes 1.4 0.8 - 5.3 10e3/uL    Absolute Monocytes 1.0 0.0 - 1.3 10e3/uL    Absolute Eosinophils 0.3 0.0 - 0.7 10e3/uL    Absolute Basophils 0.1 0.0 - 0.2 10e3/uL    Absolute Immature Granulocytes 0.1 <=0.4 10e3/uL    Absolute NRBCs 0.0 10e3/uL       Imaging    No results found.    Billing  Total time 35 minutes, to include face to face visit, review of EMR, ordering, documentation and coordination of care on date of service   complexity modifier for longitudinal care.       Signed by: Maxine Guzman NP      Again, thank you for allowing me to participate in the care of your patient.        Sincerely,        Maxine Guzman NP    Electronically signed

## 2025-07-23 ENCOUNTER — TRANSFERRED RECORDS (OUTPATIENT)
Dept: HEALTH INFORMATION MANAGEMENT | Facility: CLINIC | Age: 65
End: 2025-07-23
Payer: COMMERCIAL

## 2025-07-25 ENCOUNTER — MYC MEDICAL ADVICE (OUTPATIENT)
Dept: CARDIOLOGY | Facility: CLINIC | Age: 65
End: 2025-07-25
Payer: COMMERCIAL

## 2025-07-28 ENCOUNTER — MYC MEDICAL ADVICE (OUTPATIENT)
Dept: FAMILY MEDICINE | Facility: CLINIC | Age: 65
End: 2025-07-28
Payer: COMMERCIAL

## 2025-07-28 ENCOUNTER — MYC REFILL (OUTPATIENT)
Dept: FAMILY MEDICINE | Facility: CLINIC | Age: 65
End: 2025-07-28
Payer: COMMERCIAL

## 2025-07-28 DIAGNOSIS — N40.1 BENIGN LOCALIZED PROSTATIC HYPERPLASIA WITH LOWER URINARY TRACT SYMPTOMS (LUTS): ICD-10-CM

## 2025-07-28 DIAGNOSIS — J30.1 HAY FEVER: Primary | ICD-10-CM

## 2025-07-28 RX ORDER — TAMSULOSIN HYDROCHLORIDE 0.4 MG/1
0.4 CAPSULE ORAL DAILY
Qty: 90 CAPSULE | Refills: 1 | Status: SHIPPED | OUTPATIENT
Start: 2025-07-28

## 2025-07-28 RX ORDER — METHYLPREDNISOLONE ACETATE 80 MG/ML
80 INJECTION, SUSPENSION INTRA-ARTICULAR; INTRALESIONAL; INTRAMUSCULAR; SOFT TISSUE ONCE
Status: ACTIVE | OUTPATIENT
Start: 2025-07-29

## 2025-07-28 NOTE — TELEPHONE ENCOUNTER
I saw patient previous my chart message. I did sign CAM order for Medrol injection, 80 mg.    TOREY Estrada CNP

## 2025-08-01 ENCOUNTER — HOSPITAL ENCOUNTER (OUTPATIENT)
Dept: NUCLEAR MEDICINE | Facility: CLINIC | Age: 65
Setting detail: NUCLEAR MEDICINE
Discharge: HOME | End: 2025-08-01
Attending: ORTHOPAEDIC SURGERY
Payer: COMMERCIAL

## 2025-08-01 DIAGNOSIS — M25.512 LEFT SHOULDER PAIN: ICD-10-CM

## 2025-08-01 DIAGNOSIS — Z96.619 S/P REVERSE TOTAL SHOULDER ARTHROPLASTY: ICD-10-CM

## 2025-08-01 PROCEDURE — A9561 TC99M OXIDRONATE: HCPCS | Performed by: ORTHOPAEDIC SURGERY

## 2025-08-01 PROCEDURE — 78315 BONE IMAGING 3 PHASE: CPT

## 2025-08-01 PROCEDURE — 343N000001 HC RX 343 MED OP 636: Performed by: ORTHOPAEDIC SURGERY

## 2025-08-01 RX ADMIN — TECHNETIUM TC 99M OXIDRONATE 25.2 MILLICURIE: 3.15 INJECTION, POWDER, LYOPHILIZED, FOR SOLUTION INTRAVENOUS at 07:35

## 2025-08-11 ENCOUNTER — ALLIED HEALTH/NURSE VISIT (OUTPATIENT)
Dept: FAMILY MEDICINE | Facility: CLINIC | Age: 65
End: 2025-08-11
Payer: COMMERCIAL

## 2025-08-11 DIAGNOSIS — Z23 ENCOUNTER FOR IMMUNIZATION: Primary | ICD-10-CM

## 2025-08-11 PROCEDURE — 96372 THER/PROPH/DIAG INJ SC/IM: CPT | Performed by: NURSE PRACTITIONER

## 2025-08-11 PROCEDURE — 99207 PR NO CHARGE NURSE ONLY: CPT

## 2025-08-11 RX ADMIN — METHYLPREDNISOLONE ACETATE 40 MG: 80 INJECTION, SUSPENSION INTRA-ARTICULAR; INTRALESIONAL; INTRAMUSCULAR; SOFT TISSUE at 15:25

## 2025-08-12 ENCOUNTER — ONCOLOGY VISIT (OUTPATIENT)
Dept: ONCOLOGY | Facility: CLINIC | Age: 65
End: 2025-08-12
Attending: NURSE PRACTITIONER
Payer: COMMERCIAL

## 2025-08-12 ENCOUNTER — APPOINTMENT (OUTPATIENT)
Dept: LAB | Facility: CLINIC | Age: 65
End: 2025-08-12
Attending: NURSE PRACTITIONER
Payer: COMMERCIAL

## 2025-08-12 ENCOUNTER — INFUSION THERAPY VISIT (OUTPATIENT)
Dept: INFUSION THERAPY | Facility: CLINIC | Age: 65
End: 2025-08-12
Attending: NURSE PRACTITIONER
Payer: COMMERCIAL

## 2025-08-12 VITALS
BODY MASS INDEX: 32.06 KG/M2 | OXYGEN SATURATION: 97 % | TEMPERATURE: 98.5 F | WEIGHT: 229 LBS | RESPIRATION RATE: 16 BRPM | SYSTOLIC BLOOD PRESSURE: 111 MMHG | HEIGHT: 71 IN | DIASTOLIC BLOOD PRESSURE: 75 MMHG | HEART RATE: 66 BPM

## 2025-08-12 VITALS — HEART RATE: 62 BPM | SYSTOLIC BLOOD PRESSURE: 139 MMHG | RESPIRATION RATE: 16 BRPM | DIASTOLIC BLOOD PRESSURE: 87 MMHG

## 2025-08-12 DIAGNOSIS — C64.2 RENAL CELL CARCINOMA, LEFT (H): Primary | ICD-10-CM

## 2025-08-12 LAB
ALBUMIN SERPL BCG-MCNC: 4.5 G/DL (ref 3.5–5.2)
ALP SERPL-CCNC: 63 U/L (ref 40–150)
ALT SERPL W P-5'-P-CCNC: 25 U/L (ref 0–70)
ANION GAP SERPL CALCULATED.3IONS-SCNC: 13 MMOL/L (ref 7–15)
AST SERPL W P-5'-P-CCNC: 35 U/L (ref 0–45)
BASOPHILS # BLD AUTO: 0.06 10E3/UL (ref 0–0.2)
BASOPHILS NFR BLD AUTO: 0.6 %
BILIRUB SERPL-MCNC: 0.7 MG/DL
BUN SERPL-MCNC: 33.5 MG/DL (ref 8–23)
CALCIUM SERPL-MCNC: 9.3 MG/DL (ref 8.8–10.4)
CHLORIDE SERPL-SCNC: 103 MMOL/L (ref 98–107)
CREAT SERPL-MCNC: 1.68 MG/DL (ref 0.67–1.17)
EGFRCR SERPLBLD CKD-EPI 2021: 45 ML/MIN/1.73M2
EOSINOPHIL # BLD AUTO: 0.24 10E3/UL (ref 0–0.7)
EOSINOPHIL NFR BLD AUTO: 2.3 %
ERYTHROCYTE [DISTWIDTH] IN BLOOD BY AUTOMATED COUNT: 12.6 % (ref 10–15)
GLUCOSE SERPL-MCNC: 94 MG/DL (ref 70–99)
HCO3 SERPL-SCNC: 22 MMOL/L (ref 22–29)
HCT VFR BLD AUTO: 43.7 % (ref 40–53)
HGB BLD-MCNC: 14.9 G/DL (ref 13.3–17.7)
IMM GRANULOCYTES # BLD: 0.03 10E3/UL
IMM GRANULOCYTES NFR BLD: 0.3 %
LYMPHOCYTES # BLD AUTO: 1.52 10E3/UL (ref 0.8–5.3)
LYMPHOCYTES NFR BLD AUTO: 14.3 %
MCH RBC QN AUTO: 31 PG (ref 26.5–33)
MCHC RBC AUTO-ENTMCNC: 34.1 G/DL (ref 31.5–36.5)
MCV RBC AUTO: 90.9 FL (ref 78–100)
MONOCYTES # BLD AUTO: 0.81 10E3/UL (ref 0–1.3)
MONOCYTES NFR BLD AUTO: 7.6 %
NEUTROPHILS # BLD AUTO: 7.98 10E3/UL (ref 1.6–8.3)
NEUTROPHILS NFR BLD AUTO: 74.9 %
NRBC # BLD AUTO: 0 10E3/UL
NRBC BLD AUTO-RTO: 0 /100
PLATELET # BLD AUTO: 224 10E3/UL (ref 150–450)
POTASSIUM SERPL-SCNC: 4.8 MMOL/L (ref 3.4–5.3)
PROT SERPL-MCNC: 7.5 G/DL (ref 6.4–8.3)
RBC # BLD AUTO: 4.81 10E6/UL (ref 4.4–5.9)
SODIUM SERPL-SCNC: 138 MMOL/L (ref 135–145)
TSH SERPL DL<=0.005 MIU/L-ACNC: 2.23 UIU/ML (ref 0.3–4.2)
WBC # BLD AUTO: 10.64 10E3/UL (ref 4–11)

## 2025-08-12 PROCEDURE — 82310 ASSAY OF CALCIUM: CPT | Performed by: NURSE PRACTITIONER

## 2025-08-12 PROCEDURE — G2211 COMPLEX E/M VISIT ADD ON: HCPCS | Performed by: NURSE PRACTITIONER

## 2025-08-12 PROCEDURE — 85004 AUTOMATED DIFF WBC COUNT: CPT | Performed by: NURSE PRACTITIONER

## 2025-08-12 PROCEDURE — G0463 HOSPITAL OUTPT CLINIC VISIT: HCPCS | Mod: 25 | Performed by: NURSE PRACTITIONER

## 2025-08-12 PROCEDURE — 99214 OFFICE O/P EST MOD 30 MIN: CPT | Performed by: NURSE PRACTITIONER

## 2025-08-12 PROCEDURE — 84443 ASSAY THYROID STIM HORMONE: CPT | Performed by: NURSE PRACTITIONER

## 2025-08-12 RX ORDER — ALBUTEROL SULFATE 90 UG/1
1-2 INHALANT RESPIRATORY (INHALATION)
Status: CANCELLED
Start: 2025-08-12

## 2025-08-12 RX ORDER — MEPERIDINE HYDROCHLORIDE 25 MG/ML
25 INJECTION INTRAMUSCULAR; INTRAVENOUS; SUBCUTANEOUS
Status: CANCELLED | OUTPATIENT
Start: 2025-08-12

## 2025-08-12 RX ORDER — METHYLPREDNISOLONE SODIUM SUCCINATE 40 MG/ML
40 INJECTION INTRAMUSCULAR; INTRAVENOUS
Status: CANCELLED
Start: 2025-08-12

## 2025-08-12 RX ORDER — ALBUTEROL SULFATE 0.83 MG/ML
2.5 SOLUTION RESPIRATORY (INHALATION)
Status: CANCELLED | OUTPATIENT
Start: 2025-08-12

## 2025-08-12 RX ORDER — DIPHENHYDRAMINE HYDROCHLORIDE 50 MG/ML
50 INJECTION, SOLUTION INTRAMUSCULAR; INTRAVENOUS
Status: CANCELLED
Start: 2025-08-12

## 2025-08-12 RX ORDER — LORAZEPAM 2 MG/ML
0.5 INJECTION INTRAMUSCULAR EVERY 4 HOURS PRN
Status: CANCELLED | OUTPATIENT
Start: 2025-08-12

## 2025-08-12 RX ORDER — HEPARIN SODIUM,PORCINE 10 UNIT/ML
5-20 VIAL (ML) INTRAVENOUS DAILY PRN
Status: CANCELLED | OUTPATIENT
Start: 2025-08-12

## 2025-08-12 RX ORDER — DIPHENHYDRAMINE HYDROCHLORIDE 50 MG/ML
25 INJECTION, SOLUTION INTRAMUSCULAR; INTRAVENOUS
Status: CANCELLED
Start: 2025-08-12

## 2025-08-12 RX ORDER — EPINEPHRINE 1 MG/ML
0.3 INJECTION, SOLUTION, CONCENTRATE INTRAVENOUS EVERY 5 MIN PRN
Status: CANCELLED | OUTPATIENT
Start: 2025-08-12

## 2025-08-12 RX ORDER — HEPARIN SODIUM (PORCINE) LOCK FLUSH IV SOLN 100 UNIT/ML 100 UNIT/ML
5 SOLUTION INTRAVENOUS
Status: CANCELLED | OUTPATIENT
Start: 2025-08-12

## 2025-08-12 RX ADMIN — Medication 250 ML: at 11:59

## 2025-08-12 ASSESSMENT — PAIN SCALES - GENERAL: PAINLEVEL_OUTOF10: NO PAIN (0)

## 2025-08-21 ENCOUNTER — TELEPHONE (OUTPATIENT)
Dept: FAMILY MEDICINE | Facility: CLINIC | Age: 65
End: 2025-08-21

## 2025-08-21 ENCOUNTER — ALLIED HEALTH/NURSE VISIT (OUTPATIENT)
Dept: FAMILY MEDICINE | Facility: CLINIC | Age: 65
End: 2025-08-21
Payer: COMMERCIAL

## 2025-08-21 ENCOUNTER — LAB (OUTPATIENT)
Dept: LAB | Facility: CLINIC | Age: 65
End: 2025-08-21
Payer: COMMERCIAL

## 2025-08-21 VITALS
OXYGEN SATURATION: 96 % | RESPIRATION RATE: 16 BRPM | DIASTOLIC BLOOD PRESSURE: 74 MMHG | HEART RATE: 75 BPM | SYSTOLIC BLOOD PRESSURE: 114 MMHG

## 2025-08-21 DIAGNOSIS — M25.50 MULTIPLE JOINT PAIN: Primary | ICD-10-CM

## 2025-08-21 DIAGNOSIS — I10 ESSENTIAL HYPERTENSION: Primary | ICD-10-CM

## 2025-08-21 LAB — ERYTHROCYTE [SEDIMENTATION RATE] IN BLOOD BY WESTERGREN METHOD: 8 MM/HR (ref 0–20)

## 2025-08-26 ENCOUNTER — TELEPHONE (OUTPATIENT)
Dept: FAMILY MEDICINE | Facility: CLINIC | Age: 65
End: 2025-08-26
Payer: COMMERCIAL

## 2025-08-26 DIAGNOSIS — I25.10 CORONARY ARTERY DISEASE INVOLVING NATIVE CORONARY ARTERY OF NATIVE HEART WITHOUT ANGINA PECTORIS: ICD-10-CM

## 2025-08-27 RX ORDER — ROSUVASTATIN CALCIUM 10 MG/1
10 TABLET, COATED ORAL DAILY
Qty: 90 TABLET | Refills: 3 | Status: SHIPPED | OUTPATIENT
Start: 2025-08-27

## 2025-08-27 RX ORDER — ROSUVASTATIN CALCIUM 10 MG/1
10 TABLET, COATED ORAL DAILY
Qty: 90 TABLET | Refills: 1 | Status: CANCELLED | OUTPATIENT
Start: 2025-08-27

## 2025-08-28 ENCOUNTER — LAB (OUTPATIENT)
Dept: LAB | Facility: CLINIC | Age: 65
End: 2025-08-28
Payer: COMMERCIAL

## 2025-08-28 ENCOUNTER — ALLIED HEALTH/NURSE VISIT (OUTPATIENT)
Dept: FAMILY MEDICINE | Facility: CLINIC | Age: 65
End: 2025-08-28
Payer: COMMERCIAL

## 2025-08-28 ENCOUNTER — TRANSFERRED RECORDS (OUTPATIENT)
Dept: HEALTH INFORMATION MANAGEMENT | Facility: CLINIC | Age: 65
End: 2025-08-28

## 2025-08-28 VITALS
SYSTOLIC BLOOD PRESSURE: 118 MMHG | HEART RATE: 71 BPM | RESPIRATION RATE: 16 BRPM | DIASTOLIC BLOOD PRESSURE: 74 MMHG | OXYGEN SATURATION: 94 %

## 2025-08-28 DIAGNOSIS — E78.5 HYPERLIPIDEMIA LDL GOAL <100: ICD-10-CM

## 2025-08-28 DIAGNOSIS — I10 ESSENTIAL HYPERTENSION: Primary | ICD-10-CM

## 2025-08-28 DIAGNOSIS — C64.2 RENAL CELL CARCINOMA, LEFT (H): ICD-10-CM

## 2025-08-28 LAB
ANION GAP SERPL CALCULATED.3IONS-SCNC: 10 MMOL/L (ref 7–15)
BUN SERPL-MCNC: 33.6 MG/DL (ref 8–23)
CALCIUM SERPL-MCNC: 9.3 MG/DL (ref 8.8–10.4)
CHLORIDE SERPL-SCNC: 107 MMOL/L (ref 98–107)
CHOLEST SERPL-MCNC: 138 MG/DL
CREAT SERPL-MCNC: 1.69 MG/DL (ref 0.67–1.17)
EGFRCR SERPLBLD CKD-EPI 2021: 44 ML/MIN/1.73M2
ERYTHROCYTE [DISTWIDTH] IN BLOOD BY AUTOMATED COUNT: 12.9 % (ref 10–15)
FASTING STATUS PATIENT QL REPORTED: NO
FASTING STATUS PATIENT QL REPORTED: NO
GLUCOSE SERPL-MCNC: 97 MG/DL (ref 70–99)
HCO3 SERPL-SCNC: 24 MMOL/L (ref 22–29)
HCT VFR BLD AUTO: 44 % (ref 40–53)
HDLC SERPL-MCNC: 58 MG/DL
HGB BLD-MCNC: 14.9 G/DL (ref 13.3–17.7)
LDLC SERPL CALC-MCNC: 45 MG/DL
MCH RBC QN AUTO: 30.9 PG (ref 26.5–33)
MCHC RBC AUTO-ENTMCNC: 33.9 G/DL (ref 31.5–36.5)
MCV RBC AUTO: 91.3 FL (ref 78–100)
NONHDLC SERPL-MCNC: 80 MG/DL
PLATELET # BLD AUTO: 221 10E3/UL (ref 150–450)
POTASSIUM SERPL-SCNC: 4.6 MMOL/L (ref 3.4–5.3)
RBC # BLD AUTO: 4.82 10E6/UL (ref 4.4–5.9)
SODIUM SERPL-SCNC: 141 MMOL/L (ref 135–145)
TRIGL SERPL-MCNC: 177 MG/DL
WBC # BLD AUTO: 9.48 10E3/UL (ref 4–11)

## 2025-09-03 ENCOUNTER — INFUSION THERAPY VISIT (OUTPATIENT)
Dept: INFUSION THERAPY | Facility: CLINIC | Age: 65
End: 2025-09-03
Attending: INTERNAL MEDICINE
Payer: COMMERCIAL

## 2025-09-03 ENCOUNTER — ONCOLOGY VISIT (OUTPATIENT)
Dept: ONCOLOGY | Facility: CLINIC | Age: 65
End: 2025-09-03
Attending: INTERNAL MEDICINE
Payer: COMMERCIAL

## 2025-09-03 ENCOUNTER — LAB (OUTPATIENT)
Dept: LAB | Facility: CLINIC | Age: 65
End: 2025-09-03
Attending: INTERNAL MEDICINE
Payer: COMMERCIAL

## 2025-09-03 VITALS — HEART RATE: 64 BPM | DIASTOLIC BLOOD PRESSURE: 89 MMHG | SYSTOLIC BLOOD PRESSURE: 153 MMHG

## 2025-09-03 VITALS
OXYGEN SATURATION: 96 % | WEIGHT: 230 LBS | BODY MASS INDEX: 32.2 KG/M2 | DIASTOLIC BLOOD PRESSURE: 94 MMHG | HEART RATE: 74 BPM | SYSTOLIC BLOOD PRESSURE: 131 MMHG | RESPIRATION RATE: 16 BRPM | HEIGHT: 71 IN | TEMPERATURE: 97.7 F

## 2025-09-03 DIAGNOSIS — C64.2 RENAL CELL CARCINOMA, LEFT (H): Primary | ICD-10-CM

## 2025-09-03 DIAGNOSIS — Z79.899 HIGH RISK MEDICATION USE: ICD-10-CM

## 2025-09-03 LAB
ALBUMIN SERPL BCG-MCNC: 4.4 G/DL (ref 3.5–5.2)
ALP SERPL-CCNC: 58 U/L (ref 40–150)
ALT SERPL W P-5'-P-CCNC: 37 U/L (ref 0–70)
ANION GAP SERPL CALCULATED.3IONS-SCNC: 9 MMOL/L (ref 7–15)
AST SERPL W P-5'-P-CCNC: 33 U/L (ref 0–45)
BASOPHILS # BLD AUTO: 0.03 10E3/UL (ref 0–0.2)
BASOPHILS NFR BLD AUTO: 0.2 %
BILIRUB SERPL-MCNC: 0.6 MG/DL
BUN SERPL-MCNC: 37.2 MG/DL (ref 8–23)
CALCIUM SERPL-MCNC: 9.4 MG/DL (ref 8.8–10.4)
CHLORIDE SERPL-SCNC: 107 MMOL/L (ref 98–107)
CREAT SERPL-MCNC: 1.55 MG/DL (ref 0.67–1.17)
EGFRCR SERPLBLD CKD-EPI 2021: 49 ML/MIN/1.73M2
EOSINOPHIL # BLD AUTO: 0.13 10E3/UL (ref 0–0.7)
EOSINOPHIL NFR BLD AUTO: 1 %
ERYTHROCYTE [DISTWIDTH] IN BLOOD BY AUTOMATED COUNT: 13.3 % (ref 10–15)
GLUCOSE SERPL-MCNC: 112 MG/DL (ref 70–99)
HCO3 SERPL-SCNC: 22 MMOL/L (ref 22–29)
HCT VFR BLD AUTO: 46.7 % (ref 40–53)
HGB BLD-MCNC: 15.6 G/DL (ref 13.3–17.7)
IMM GRANULOCYTES # BLD: 0.08 10E3/UL
IMM GRANULOCYTES NFR BLD: 0.6 %
LYMPHOCYTES # BLD AUTO: 1.52 10E3/UL (ref 0.8–5.3)
LYMPHOCYTES NFR BLD AUTO: 11.3 %
MCH RBC QN AUTO: 31.1 PG (ref 26.5–33)
MCHC RBC AUTO-ENTMCNC: 33.4 G/DL (ref 31.5–36.5)
MCV RBC AUTO: 93 FL (ref 78–100)
MONOCYTES # BLD AUTO: 0.91 10E3/UL (ref 0–1.3)
MONOCYTES NFR BLD AUTO: 6.7 %
NEUTROPHILS # BLD AUTO: 10.84 10E3/UL (ref 1.6–8.3)
NEUTROPHILS NFR BLD AUTO: 80.2 %
NRBC # BLD AUTO: <0.03 10E3/UL
NRBC BLD AUTO-RTO: 0 /100
PLATELET # BLD AUTO: 243 10E3/UL (ref 150–450)
POTASSIUM SERPL-SCNC: 4.8 MMOL/L (ref 3.4–5.3)
PROT SERPL-MCNC: 7.6 G/DL (ref 6.4–8.3)
RBC # BLD AUTO: 5.02 10E6/UL (ref 4.4–5.9)
SODIUM SERPL-SCNC: 138 MMOL/L (ref 135–145)
T4 FREE SERPL-MCNC: 0.99 NG/DL (ref 0.9–1.7)
TSH SERPL DL<=0.005 MIU/L-ACNC: 4.25 UIU/ML (ref 0.3–4.2)
WBC # BLD AUTO: 13.51 10E3/UL (ref 4–11)

## 2025-09-03 PROCEDURE — 85041 AUTOMATED RBC COUNT: CPT | Performed by: NURSE PRACTITIONER

## 2025-09-03 PROCEDURE — 96413 CHEMO IV INFUSION 1 HR: CPT

## 2025-09-03 PROCEDURE — 99215 OFFICE O/P EST HI 40 MIN: CPT | Performed by: INTERNAL MEDICINE

## 2025-09-03 PROCEDURE — 84443 ASSAY THYROID STIM HORMONE: CPT | Performed by: NURSE PRACTITIONER

## 2025-09-03 PROCEDURE — 250N000011 HC RX IP 250 OP 636: Mod: JZ | Performed by: INTERNAL MEDICINE

## 2025-09-03 PROCEDURE — G0463 HOSPITAL OUTPT CLINIC VISIT: HCPCS | Performed by: INTERNAL MEDICINE

## 2025-09-03 PROCEDURE — 84439 ASSAY OF FREE THYROXINE: CPT | Performed by: NURSE PRACTITIONER

## 2025-09-03 PROCEDURE — 82435 ASSAY OF BLOOD CHLORIDE: CPT | Performed by: NURSE PRACTITIONER

## 2025-09-03 PROCEDURE — 258N000003 HC RX IP 258 OP 636: Performed by: INTERNAL MEDICINE

## 2025-09-03 PROCEDURE — G2211 COMPLEX E/M VISIT ADD ON: HCPCS | Performed by: INTERNAL MEDICINE

## 2025-09-03 PROCEDURE — 36415 COLL VENOUS BLD VENIPUNCTURE: CPT

## 2025-09-03 RX ORDER — ALBUTEROL SULFATE 90 UG/1
1-2 INHALANT RESPIRATORY (INHALATION)
Status: CANCELLED
Start: 2025-09-03

## 2025-09-03 RX ORDER — HEPARIN SODIUM (PORCINE) LOCK FLUSH IV SOLN 100 UNIT/ML 100 UNIT/ML
5 SOLUTION INTRAVENOUS
Status: CANCELLED | OUTPATIENT
Start: 2025-09-03

## 2025-09-03 RX ORDER — HEPARIN SODIUM,PORCINE 10 UNIT/ML
5-20 VIAL (ML) INTRAVENOUS DAILY PRN
Status: CANCELLED | OUTPATIENT
Start: 2025-09-03

## 2025-09-03 RX ORDER — DIPHENHYDRAMINE HYDROCHLORIDE 50 MG/ML
50 INJECTION INTRAMUSCULAR; INTRAVENOUS
Status: CANCELLED
Start: 2025-09-03

## 2025-09-03 RX ORDER — MEPERIDINE HYDROCHLORIDE 25 MG/ML
25 INJECTION INTRAMUSCULAR; INTRAVENOUS; SUBCUTANEOUS
Status: CANCELLED | OUTPATIENT
Start: 2025-09-03

## 2025-09-03 RX ORDER — EPINEPHRINE 1 MG/ML
0.3 INJECTION, SOLUTION, CONCENTRATE INTRAVENOUS EVERY 5 MIN PRN
Status: CANCELLED | OUTPATIENT
Start: 2025-09-03

## 2025-09-03 RX ORDER — DIPHENHYDRAMINE HYDROCHLORIDE 50 MG/ML
25 INJECTION INTRAMUSCULAR; INTRAVENOUS
Status: CANCELLED
Start: 2025-09-03

## 2025-09-03 RX ORDER — METHYLPREDNISOLONE SODIUM SUCCINATE 40 MG/ML
40 INJECTION INTRAMUSCULAR; INTRAVENOUS
Status: CANCELLED
Start: 2025-09-03

## 2025-09-03 RX ORDER — LORAZEPAM 2 MG/ML
0.5 INJECTION INTRAMUSCULAR EVERY 4 HOURS PRN
Status: CANCELLED | OUTPATIENT
Start: 2025-09-03

## 2025-09-03 RX ORDER — ALBUTEROL SULFATE 0.83 MG/ML
2.5 SOLUTION RESPIRATORY (INHALATION)
Status: CANCELLED | OUTPATIENT
Start: 2025-09-03

## 2025-09-03 RX ADMIN — SODIUM CHLORIDE 400 MG: 9 INJECTION, SOLUTION INTRAVENOUS at 09:01

## 2025-09-03 RX ADMIN — SODIUM CHLORIDE 250 ML: 0.9 INJECTION, SOLUTION INTRAVENOUS at 08:48

## 2025-09-03 ASSESSMENT — PAIN SCALES - GENERAL: PAINLEVEL_OUTOF10: NO PAIN (0)

## (undated) DEVICE — BONE CLEANING TIP INTERPULSE  0210-010-000

## (undated) DEVICE — DRSG AQUACEL AG HYDROFIBER  3.5X10" 422605

## (undated) DEVICE — PAD CHUX UNDERPAD 30X36" P3036C

## (undated) DEVICE — DRAPE POUCH INSTRUMENT 3 POCKET 1018L

## (undated) DEVICE — ENDO TROCAR CONMED AIRSEAL BLADELESS 12X120MM IAS12-120LP

## (undated) DEVICE — BLADE SAW SAGITTAL STRK WIDE 25.4X85X1.2MM 2108-151-000

## (undated) DEVICE — GLOVE BIOGEL PI MICRO INDICATOR UNDERGLOVE SZ 7.5 48975

## (undated) DEVICE — CUSTOM PACK TOTAL HIP SOP5BTHHEA

## (undated) DEVICE — SOL NACL 0.9% INJ 1000ML BAG 2B1324X

## (undated) DEVICE — TUBING CONMED AIRSEAL SMOKE EVAC INSUFFLATION ASM-EVAC

## (undated) DEVICE — BIT DRILL BIOMET PERIPHERAL SCR 2.7MM SS 405889

## (undated) DEVICE — HOLDER LIMB VELCRO OR 0814-1533

## (undated) DEVICE — GLOVE SURG PI ULTRA TOUCH M SZ 8 LF

## (undated) DEVICE — ANTIFOG SOLUTION SEE SHARP 150M TROCAR SWABS 30978 (COI)

## (undated) DEVICE — DRAPE SHEET REV FOLD 3/4 9349

## (undated) DEVICE — PAD FLOOR SURGISAFE

## (undated) DEVICE — DRSG STERI STRIP 1/2X4" R1547

## (undated) DEVICE — TAPE DURAPORE 3" SILK 1538-3

## (undated) DEVICE — SUTURE VICRYL+ 0 27IN CT-1 UND VCP260H

## (undated) DEVICE — CUSTOM PACK OPEN SHOULDER SOP5BOSHEB

## (undated) DEVICE — Device

## (undated) DEVICE — RULER SURGICAL PLASTIC STRL LF CS628

## (undated) DEVICE — ULTRASOUND

## (undated) DEVICE — TUBING SET THERMEDX UROLOGY SGL USE LL0006

## (undated) DEVICE — SUCTION MANIFOLD NEPTUNE 2 SYS 4 PORT 0702-020-000

## (undated) DEVICE — SOL NACL 0.9% IRRIG 3000ML BAG 2B7127

## (undated) DEVICE — SOL WATER IRRIG 1000ML BOTTLE 2F7114

## (undated) DEVICE — SU VICRYL 0 CT-1 27" J340H

## (undated) DEVICE — SU MONOCRYL 4-0 PS-2 18" UND Y496G

## (undated) DEVICE — DRAPE IOBAN INCISE 23X17" 6650EZ

## (undated) DEVICE — DAVINCI XI DRAPE COLUMN 470341

## (undated) DEVICE — TUBING SUCTION MEDI-VAC 1/4"X20' N620A

## (undated) DEVICE — MAT FLOOR WATERPROOF BACKSHEET FMBP30

## (undated) DEVICE — CATH URETERAL DUAL LUMEN 10FRX54CM M0064051000

## (undated) DEVICE — SU STRATAFIX MONOCRYL 3-0 SPIRAL PS-2 30CM SXMP1B106

## (undated) DEVICE — ENDO SEAL BX PORT BPS-A

## (undated) DEVICE — NDL INSUFFLATION 13GA 120MM C2201

## (undated) DEVICE — SUCTION IRR STRYKERFLOW II W/TIP 250-070-520

## (undated) DEVICE — SOL NACL 0.9% IRRIG 3000ML BAG 07972-08

## (undated) DEVICE — A3 SUPPLIES- SEE NURSING INFO PAGE

## (undated) DEVICE — GUIDEWIRE AMPLATZ 0.035"X145CM  SUPER STIFF 640-104

## (undated) DEVICE — ELECTRODE PATIENT RETURN ADULT L10 FT 2 PLATE CORD 0855C

## (undated) DEVICE — PACK DAVINCI UROLOGY SBA15UDFSG

## (undated) DEVICE — CUSTOM PACK CYSTO PREFERRED SOT5BCYHEA

## (undated) DEVICE — DAVINCI XI ESU BIPOLAR 3MM ENDOWRIST FENESTRATED EXT 471205

## (undated) DEVICE — DAVINCI HOT SHEARS TIP COVER  400180

## (undated) DEVICE — SOL NACL 0.9% IRRIG 1000ML BOTTLE 2F7124

## (undated) DEVICE — DRAPE C-ARM W/STRAPS 42X72" 07-CA104

## (undated) DEVICE — GLOVE BIOGEL PI ULTRATOUCH G SZ 7.0 42170

## (undated) DEVICE — ADAPTER CATH CHECK-FLO 9FR FLL 050885 G15476

## (undated) DEVICE — DECANTER VIAL 2006S

## (undated) DEVICE — KIT ENDO FIRST STEP DISINFECTANT 200ML W/POUCH EP-4

## (undated) DEVICE — GLOVE BIOGEL PI ULTRATOUCH SZ 7.0 41170

## (undated) DEVICE — SUTURE VICRYL+ 2-0 27IN CT-1 UND VCP259H

## (undated) DEVICE — GUIDEWIRE SENSOR DUAL FLEX STR 0.035"X150CM M0066703080

## (undated) DEVICE — RETRIVER SUTURE LASSO HOFFEE BLUE 710000

## (undated) DEVICE — STPL POWERED ECHELON VASC 35MM PVE35A

## (undated) DEVICE — CLIP ENDO HEMO-LOC PURPLE LG 544240

## (undated) DEVICE — GLOVE UNDER INDICATOR PI SZ 8.5 LF 41685

## (undated) DEVICE — DAVINCI XI DRAPE ARM 470015

## (undated) DEVICE — DRSG MEPILEX BORDER FLEX 3X3" 595200

## (undated) DEVICE — SURGICEL HEMOSTAT 2X14" 1951

## (undated) DEVICE — SU DERMABOND ADVANCED .7ML DNX12

## (undated) DEVICE — SU ETHIBOND 1 CT-1 30" X425H

## (undated) DEVICE — DRAPE SHEET SHOULDER ARTHROSCOPY 89070

## (undated) DEVICE — DAVINCI XI MONOPOLAR SCISSORS HOT SHEARS 8MM 470179

## (undated) DEVICE — GLOVE BIOGEL PI MICRO SZ 7.5 48575

## (undated) DEVICE — CLEANER INST PRE-KLENZ SOAK SHIELD TUBE 6 ML MEDIUM 2D66J4

## (undated) DEVICE — SUCTION IRR SYSTEM W/O TIP INTERPULSE HANDPIECE 0210-100-000

## (undated) DEVICE — SHEATH URETERAL ACCESS NAVIGATOR HD 11/13FRX46CM M0062502230

## (undated) DEVICE — URETEROSCOPE FLEX SLG USE STD 7.7FR LITHOVUE M0067913500

## (undated) DEVICE — SU VICRYL 2-0 CT-2 27" J333H

## (undated) DEVICE — SU STRATAFIX PDS PLUS 1 CT-1 18" SXPP1A404

## (undated) DEVICE — GLOVE SURG PI ULTRA TOUCH M SZ 7 LF 42670

## (undated) DEVICE — CATH URETERAL OPEN END 5FRX70CM M0064002010

## (undated) DEVICE — GOWN IMPERVIOUS BREATHABLE 2XL/XLONG

## (undated) DEVICE — PACK CYSTO CUSTOM ASC

## (undated) DEVICE — ENDO POUCH UNIVERSAL RETRIEVAL SYSTEM INZII 12/15MM CD004

## (undated) DEVICE — GUIDE PIN LONG ACETABULAR: Type: IMPLANTABLE DEVICE | Site: HIP | Status: NON-FUNCTIONAL

## (undated) DEVICE — CATH URETERAL OPEN END 05FR 70CM 020015

## (undated) DEVICE — SOL NACL 0.9% IRRIG 3000ML BAG 2B7477

## (undated) DEVICE — SU WND CLOSURE VLOC 180 ABS 3-0 6" V-20 VLOCL0604

## (undated) DEVICE — GLOVE BIOGEL INDICATOR 7.5 LF 41675

## (undated) DEVICE — RX SURGIFLO HEMOSTATIC MATRIX 8ML 2991

## (undated) DEVICE — SHEATH URETERAL ACCESS NAVIGATOR HD 11/13FRX36CM M0062502220

## (undated) DEVICE — PILLOW HIP ABDUCTION CONCAVE 1.9

## (undated) DEVICE — SUCTION SLEEVE NEPTUNE 2 165MM 0703-005-165

## (undated) DEVICE — CATH TRAY FOLEY COUDE SURESTEP 16FR W/DRN BAG LATEX A304416A

## (undated) DEVICE — DAVINCI XI GRASPER ENDOWRIST PROGRASP 470093

## (undated) DEVICE — SYR 20ML LL W/O NDL

## (undated) DEVICE — BIT DRILL BIOMET CENTRAL SCR 3.2MM SS 405883

## (undated) DEVICE — DAVINCI XI SEAL UNIVERSAL 5-12MM 470500

## (undated) DEVICE — SUCTION MANIFOLD NEPTUNE 2 SYS 1 PORT 702-025-000

## (undated) DEVICE — DRAPE C-ARM 60X42" 1013

## (undated) DEVICE — SU VICRYL 2-0 CT-2 27" UND J269H

## (undated) DEVICE — CATH URETERAL DL 6FR FLEX-TIP 10FRX50CM G17323 AQ-022610

## (undated) DEVICE — STOCKING SLEEVE COMPRESSION CALF MED

## (undated) DEVICE — FCP BIOPSY URETEROSCOPIC PIRAHNA  3FR M0065051600

## (undated) DEVICE — PREP CHLORHEXIDINE 4% 4OZ (HIBICLENS) 57504

## (undated) DEVICE — CONTAINER URINE SPEC 4OZ STRL 1053

## (undated) DEVICE — SU FIBERWIRE 2 38" T-8 NDL  AR-7206

## (undated) DEVICE — LINEN TOWEL PACK X5 5464

## (undated) DEVICE — BLADE SAGITTAL WIDE (SO-618) 2108-118

## (undated) DEVICE — PREP DYNA-HEX 4% CHG SCRUB 4OZ BOTTLE MDS098710

## (undated) DEVICE — SYR 50ML LL W/O NDL 309653

## (undated) DEVICE — PLATE GROUNDING ADULT W/CORD 9165L

## (undated) DEVICE — DRSG TEGADERM 2 3/8X2 3/4" 1624W

## (undated) DEVICE — SU PDS II 1 CT MONOFIL Z353H

## (undated) DEVICE — GLOVE PROTEXIS BLUE W/NEU-THERA 7.0  2D73EB70

## (undated) RX ORDER — DEXAMETHASONE SODIUM PHOSPHATE 10 MG/ML
INJECTION, SOLUTION INTRAMUSCULAR; INTRAVENOUS
Status: DISPENSED
Start: 2022-04-04

## (undated) RX ORDER — LIDOCAINE HYDROCHLORIDE 10 MG/ML
INJECTION, SOLUTION INFILTRATION; PERINEURAL
Status: DISPENSED
Start: 2025-05-30

## (undated) RX ORDER — REGADENOSON 0.08 MG/ML
INJECTION, SOLUTION INTRAVENOUS
Status: DISPENSED
Start: 2021-03-03

## (undated) RX ORDER — KETOROLAC TROMETHAMINE 15 MG/ML
INJECTION, SOLUTION INTRAMUSCULAR; INTRAVENOUS
Status: DISPENSED
Start: 2025-02-11

## (undated) RX ORDER — LIDOCAINE HYDROCHLORIDE 10 MG/ML
INJECTION, SOLUTION EPIDURAL; INFILTRATION; INTRACAUDAL; PERINEURAL
Status: DISPENSED
Start: 2024-04-01

## (undated) RX ORDER — FENTANYL CITRATE 50 UG/ML
INJECTION, SOLUTION INTRAMUSCULAR; INTRAVENOUS
Status: DISPENSED
Start: 2025-02-11

## (undated) RX ORDER — FENTANYL CITRATE-0.9 % NACL/PF 10 MCG/ML
PLASTIC BAG, INJECTION (ML) INTRAVENOUS
Status: DISPENSED
Start: 2025-02-11

## (undated) RX ORDER — NITROGLYCERIN 0.4 MG/1
TABLET SUBLINGUAL
Status: DISPENSED
Start: 2021-03-12

## (undated) RX ORDER — HYDROMORPHONE HCL IN WATER/PF 6 MG/30 ML
PATIENT CONTROLLED ANALGESIA SYRINGE INTRAVENOUS
Status: DISPENSED
Start: 2025-05-30

## (undated) RX ORDER — CEFAZOLIN SODIUM/WATER 2 G/20 ML
SYRINGE (ML) INTRAVENOUS
Status: DISPENSED
Start: 2025-02-11

## (undated) RX ORDER — FENTANYL CITRATE 50 UG/ML
INJECTION, SOLUTION INTRAMUSCULAR; INTRAVENOUS
Status: DISPENSED
Start: 2025-05-30

## (undated) RX ORDER — OXYCODONE HYDROCHLORIDE 5 MG/1
TABLET ORAL
Status: DISPENSED
Start: 2025-02-11

## (undated) RX ORDER — DEXAMETHASONE SODIUM PHOSPHATE 4 MG/ML
INJECTION, SOLUTION INTRA-ARTICULAR; INTRALESIONAL; INTRAMUSCULAR; INTRAVENOUS; SOFT TISSUE
Status: DISPENSED
Start: 2025-05-30

## (undated) RX ORDER — DEXAMETHASONE SODIUM PHOSPHATE 4 MG/ML
INJECTION, SOLUTION INTRA-ARTICULAR; INTRALESIONAL; INTRAMUSCULAR; INTRAVENOUS; SOFT TISSUE
Status: DISPENSED
Start: 2025-02-11

## (undated) RX ORDER — ACETAMINOPHEN 325 MG/1
TABLET ORAL
Status: DISPENSED
Start: 2025-02-11

## (undated) RX ORDER — ONDANSETRON 2 MG/ML
INJECTION INTRAMUSCULAR; INTRAVENOUS
Status: DISPENSED
Start: 2025-05-30

## (undated) RX ORDER — FENTANYL CITRATE 50 UG/ML
INJECTION, SOLUTION INTRAMUSCULAR; INTRAVENOUS
Status: DISPENSED
Start: 2022-04-04

## (undated) RX ORDER — HYDROMORPHONE HYDROCHLORIDE 1 MG/ML
INJECTION, SOLUTION INTRAMUSCULAR; INTRAVENOUS; SUBCUTANEOUS
Status: DISPENSED
Start: 2025-05-30

## (undated) RX ORDER — DEXAMETHASONE SODIUM PHOSPHATE 10 MG/ML
INJECTION, SOLUTION INTRAMUSCULAR; INTRAVENOUS
Status: DISPENSED
Start: 2025-04-23

## (undated) RX ORDER — LIDOCAINE HYDROCHLORIDE 20 MG/ML
JELLY TOPICAL
Status: DISPENSED
Start: 2025-02-11

## (undated) RX ORDER — FENTANYL CITRATE 50 UG/ML
INJECTION, SOLUTION INTRAMUSCULAR; INTRAVENOUS
Status: DISPENSED
Start: 2024-04-01

## (undated) RX ORDER — ONDANSETRON 2 MG/ML
INJECTION INTRAMUSCULAR; INTRAVENOUS
Status: DISPENSED
Start: 2025-04-23

## (undated) RX ORDER — FENTANYL CITRATE 50 UG/ML
INJECTION, SOLUTION INTRAMUSCULAR; INTRAVENOUS
Status: DISPENSED
Start: 2025-05-01

## (undated) RX ORDER — FENTANYL CITRATE 50 UG/ML
INJECTION, SOLUTION INTRAMUSCULAR; INTRAVENOUS
Status: DISPENSED
Start: 2025-04-23

## (undated) RX ORDER — GLYCOPYRROLATE 0.2 MG/ML
INJECTION, SOLUTION INTRAMUSCULAR; INTRAVENOUS
Status: DISPENSED
Start: 2024-04-01

## (undated) RX ORDER — PROPOFOL 10 MG/ML
INJECTION, EMULSION INTRAVENOUS
Status: DISPENSED
Start: 2022-04-04

## (undated) RX ORDER — DEXAMETHASONE SODIUM PHOSPHATE 10 MG/ML
INJECTION, EMULSION INTRAMUSCULAR; INTRAVENOUS
Status: DISPENSED
Start: 2024-04-01

## (undated) RX ORDER — PROPOFOL 10 MG/ML
INJECTION, EMULSION INTRAVENOUS
Status: DISPENSED
Start: 2025-02-07

## (undated) RX ORDER — GLYCOPYRROLATE 0.2 MG/ML
INJECTION, SOLUTION INTRAMUSCULAR; INTRAVENOUS
Status: DISPENSED
Start: 2025-02-11

## (undated) RX ORDER — PHENYLEPHRINE HYDROCHLORIDE 10 MG/ML
INJECTION INTRAVENOUS
Status: DISPENSED
Start: 2024-04-01

## (undated) RX ORDER — FENTANYL CITRATE-0.9 % NACL/PF 10 MCG/ML
PLASTIC BAG, INJECTION (ML) INTRAVENOUS
Status: DISPENSED
Start: 2025-04-23

## (undated) RX ORDER — LIDOCAINE HYDROCHLORIDE 10 MG/ML
INJECTION, SOLUTION EPIDURAL; INFILTRATION; INTRACAUDAL; PERINEURAL
Status: DISPENSED
Start: 2025-02-11

## (undated) RX ORDER — METOPROLOL TARTRATE 100 MG
TABLET ORAL
Status: DISPENSED
Start: 2021-03-12

## (undated) RX ORDER — IVABRADINE 5 MG/1
TABLET, FILM COATED ORAL
Status: DISPENSED
Start: 2021-03-12

## (undated) RX ORDER — LIDOCAINE HYDROCHLORIDE 10 MG/ML
INJECTION, SOLUTION INFILTRATION; PERINEURAL
Status: DISPENSED
Start: 2025-05-01

## (undated) RX ORDER — PROPOFOL 10 MG/ML
INJECTION, EMULSION INTRAVENOUS
Status: DISPENSED
Start: 2025-02-11

## (undated) RX ORDER — HEPARIN SODIUM 5000 [USP'U]/.5ML
INJECTION, SOLUTION INTRAVENOUS; SUBCUTANEOUS
Status: DISPENSED
Start: 2025-05-30

## (undated) RX ORDER — ONDANSETRON 2 MG/ML
INJECTION INTRAMUSCULAR; INTRAVENOUS
Status: DISPENSED
Start: 2022-04-04

## (undated) RX ORDER — METOPROLOL TARTRATE 1 MG/ML
INJECTION, SOLUTION INTRAVENOUS
Status: DISPENSED
Start: 2021-03-12

## (undated) RX ORDER — PROPOFOL 10 MG/ML
INJECTION, EMULSION INTRAVENOUS
Status: DISPENSED
Start: 2025-05-30

## (undated) RX ORDER — CEFAZOLIN SODIUM/WATER 2 G/20 ML
SYRINGE (ML) INTRAVENOUS
Status: DISPENSED
Start: 2025-05-30

## (undated) RX ORDER — ONDANSETRON 2 MG/ML
INJECTION INTRAMUSCULAR; INTRAVENOUS
Status: DISPENSED
Start: 2025-02-11

## (undated) RX ORDER — BUPIVACAINE HYDROCHLORIDE 2.5 MG/ML
INJECTION, SOLUTION EPIDURAL; INFILTRATION; INTRACAUDAL; PERINEURAL
Status: DISPENSED
Start: 2025-05-30

## (undated) RX ORDER — ONDANSETRON 2 MG/ML
INJECTION INTRAMUSCULAR; INTRAVENOUS
Status: DISPENSED
Start: 2024-04-01

## (undated) RX ORDER — PROPOFOL 10 MG/ML
INJECTION, EMULSION INTRAVENOUS
Status: DISPENSED
Start: 2024-04-01

## (undated) RX ORDER — ACETAMINOPHEN 325 MG/1
TABLET ORAL
Status: DISPENSED
Start: 2025-05-30